# Patient Record
Sex: MALE | Race: WHITE | Employment: OTHER | URBAN - METROPOLITAN AREA
[De-identification: names, ages, dates, MRNs, and addresses within clinical notes are randomized per-mention and may not be internally consistent; named-entity substitution may affect disease eponyms.]

---

## 2017-02-07 DIAGNOSIS — N40.0 BPH (BENIGN PROSTATIC HYPERTROPHY): Primary | ICD-10-CM

## 2017-02-07 NOTE — TELEPHONE ENCOUNTER
TAMSULOSIN HCL 0.4 MG CAPSULE         Last Written Prescription Date: 3/8/2016  Last Fill Quantity: 90, # refills: 3    Last Office Visit with [facility], [facility] or [facility] prescribing provider:  11/21/2016   Future Office Visit:      BP Readings from Last 3 Encounters:   11/21/16 130/80   09/12/16 150/72   09/01/16 130/62

## 2017-02-09 RX ORDER — TAMSULOSIN HYDROCHLORIDE 0.4 MG/1
CAPSULE ORAL
Qty: 90 CAPSULE | Refills: 2 | Status: SHIPPED | OUTPATIENT
Start: 2017-02-09 | End: 2017-11-17

## 2017-03-07 ENCOUNTER — RADIANT APPOINTMENT (OUTPATIENT)
Dept: GENERAL RADIOLOGY | Facility: CLINIC | Age: 67
End: 2017-03-07
Attending: INTERNAL MEDICINE
Payer: COMMERCIAL

## 2017-03-07 ENCOUNTER — OFFICE VISIT (OUTPATIENT)
Dept: FAMILY MEDICINE | Facility: CLINIC | Age: 67
End: 2017-03-07
Payer: COMMERCIAL

## 2017-03-07 VITALS
OXYGEN SATURATION: 96 % | HEIGHT: 68 IN | WEIGHT: 176 LBS | TEMPERATURE: 99.2 F | BODY MASS INDEX: 26.67 KG/M2 | HEART RATE: 89 BPM | SYSTOLIC BLOOD PRESSURE: 144 MMHG | DIASTOLIC BLOOD PRESSURE: 94 MMHG

## 2017-03-07 DIAGNOSIS — J01.00 ACUTE NON-RECURRENT MAXILLARY SINUSITIS: Primary | ICD-10-CM

## 2017-03-07 DIAGNOSIS — J22 LRTI (LOWER RESPIRATORY TRACT INFECTION): ICD-10-CM

## 2017-03-07 DIAGNOSIS — J06.9 UPPER RESPIRATORY TRACT INFECTION, UNSPECIFIED TYPE: ICD-10-CM

## 2017-03-07 LAB
FLUAV+FLUBV AG SPEC QL: NEGATIVE
FLUAV+FLUBV AG SPEC QL: NORMAL
SPECIMEN SOURCE: NORMAL

## 2017-03-07 PROCEDURE — 87804 INFLUENZA ASSAY W/OPTIC: CPT | Performed by: INTERNAL MEDICINE

## 2017-03-07 PROCEDURE — 99213 OFFICE O/P EST LOW 20 MIN: CPT | Performed by: INTERNAL MEDICINE

## 2017-03-07 PROCEDURE — 71020 XR CHEST 2 VW: CPT

## 2017-03-07 RX ORDER — AMOXICILLIN AND CLAVULANATE POTASSIUM 500; 125 MG/1; MG/1
1 TABLET, FILM COATED ORAL 3 TIMES DAILY
Qty: 30 TABLET | Refills: 0 | Status: SHIPPED | OUTPATIENT
Start: 2017-03-07 | End: 2017-07-24

## 2017-03-07 NOTE — MR AVS SNAPSHOT
"              After Visit Summary   3/7/2017    Dale Cuevas    MRN: [id number]           Patient Information     Date Of Birth          1950        Visit Information        Provider Department      3/7/2017 4:30 PM Mike Neff MD [facility]        Today's Diagnoses     Acute non-recurrent maxillary sinusitis    -  1    Upper respiratory tract infection, unspecified type           Follow-ups after your visit        Who to contact     If you have questions or need follow up information about today's clinic visit or your schedule please contact [facility] directly at [phone].  Normal or non-critical lab and imaging results will be communicated to you by MyChart, letter or phone within 4 business days after the clinic has received the results. If you do not hear from us within 7 days, please contact the clinic through [employer] or phone. If you have a critical or abnormal lab result, we will notify you by phone as soon as possible.  Submit refill requests through [employer] or call your pharmacy and they will forward the refill request to us. Please allow 3 business days for your refill to be completed.          Additional Information About Your Visit        MyChart Information     [employer] gives you secure access to your electronic health record. If you see a primary care provider, you can also send messages to your care team and make appointments. If you have questions, please call your primary care clinic.  If you do not have a primary care provider, please call [phone] and they will assist you.        Care EveryWhere ID     This is your Care EveryWhere ID. This could be used by other organizations to access your [place] medical records  [id number]        Your Vitals Were     Pulse Temperature Height Pulse Oximetry BMI (Body Mass Index)       89 99.2  F (37.3  C) (Tympanic) 5' 7.5\" (1.715 m) 96% 27.16 kg/m2        Blood Pressure from Last 3 " Encounters:   03/07/17 (!) 144/94   11/21/16 130/80   09/12/16 150/72    Weight from Last 3 Encounters:   03/07/17 176 lb (79.8 kg)   11/21/16 176 lb (79.8 kg)   09/12/16 168 lb (76.2 kg)              We Performed the Following     Influenza A/B antigen          Today's Medication Changes          These changes are accurate as of: 3/7/17  5:38 PM.  If you have any questions, ask your nurse or doctor.               Start taking these medicines.        Dose/Directions    amoxicillin-clavulanate 500-125 MG per tablet   Commonly known as:  AUGMENTIN   Used for:  Acute non-recurrent maxillary sinusitis   Started by:  Mike Neff MD        Dose:  1 tablet   Take 1 tablet by mouth 3 times daily   Quantity:  30 tablet   Refills:  0            Where to get your medicines      These medications were sent to [place] [address] [place] [address] [address]     Phone:  [phone]     amoxicillin-clavulanate 500-125 MG per tablet                Primary Care Provider Office Phone # Fax #    Artis Espinoza [phone]       [facility] [address] [facility] [address]        Thank you!     Thank you for choosing [facility]  for your care. Our goal is always to provide you with excellent care. Hearing back from our patients is one way we can continue to improve our services. Please take a few minutes to complete the written survey that you may receive in the mail after your visit with us. Thank you!             Your Updated Medication List - Protect others around you: Learn how to safely use, store and throw away your medicines at www.disposemymeds.org.          This list is accurate as of: 3/7/17  5:38 PM.  Always use your most recent med list.                   Brand Name Dispense Instructions for use    amoxicillin-clavulanate 500-125 MG per tablet    AUGMENTIN    30 tablet    Take 1 tablet by  mouth 3 times daily       atorvastatin 40 MG tablet    LIPITOR    30 tablet    TAKE 1 TABLET BY MOUTH EVERY DAY       tamsulosin 0.4 MG capsule    FLOMAX    90 capsule    TAKE ONE CAPSULE BY MOUTH ONE TIME DAILY

## 2017-03-07 NOTE — PROGRESS NOTES
"  SUBJECTIVE:                                                    Dale Cuevas is a 66 year old male who presents to clinic today for the following health issues:      RESPIRATORY SYMPTOMS      Duration: Ongoing this week    Description  nasal congestion, cough, fever, chills and hoarse voice    Severity: moderate    Accompanying signs and symptoms: See above    History (predisposing factors):  none    Precipitating or alleviating factors: None    Therapies tried and outcome:  none                           On prednisone last week per ENT.                  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                Exposed to an ill  3 yr old grandson who goes to .                    Problem list and histories reviewed & adjusted, as indicated.  Additional history: as documented    Current Outpatient Prescriptions   Medication Sig Dispense Refill     tamsulosin (FLOMAX) 0.4 MG capsule TAKE ONE CAPSULE BY MOUTH ONE TIME DAILY 90 capsule 2     atorvastatin (LIPITOR) 40 MG tablet TAKE 1 TABLET BY MOUTH EVERY DAY 30 tablet 8     BP Readings from Last 3 Encounters:   03/07/17 (!) 142/92   11/21/16 130/80   09/12/16 150/72    Wt Readings from Last 3 Encounters:   03/07/17 176 lb (79.8 kg)   11/21/16 176 lb (79.8 kg)   09/12/16 168 lb (76.2 kg)                    Reviewed and updated as needed this visit by clinical staff  Tobacco  Allergies  Meds  Med Hx  Surg Hx  Fam Hx  Soc Hx      Reviewed and updated as needed this visit by Provider         ROS:see above plus  ENT/MOUTH: POSITIVE for rhinorrhea-purulent and bloody                                                                RESP:POSITIVE for cough-productive with tan mucous    OBJECTIVE:                                                    BP (!) 142/92 (BP Location: Left arm, Patient Position: Chair, Cuff Size: Adult Regular)  Pulse 89  Temp 99.2  F (37.3  C) (Tympanic)  Ht 5' 7.5\" (1.715 m)  Wt 176 lb (79.8 kg)  SpO2 96%  BMI 27.16 " kg/m2  Body mass index is 27.16 kg/(m^2).  GENERAL APPEARANCE: alert and no distress  HENT: ear canals and TM's normal, nose and mouth without ulcers or lesions and rhinorrhea bloody and purulent  RESP: no rales or rhonchi  CV: regular rates and rhythm      Diagnostic Test Results:  Results for orders placed or performed in visit on 03/07/17 (from the past 24 hour(s))   Influenza A/B antigen   Result Value Ref Range    Influenza A/B Agn Specimen Nasal     Influenza A Negative NEG    Influenza B  NEG     Negative   Test results must be correlated with clinical data. If necessary, results   should be confirmed by a molecular assay or viral culture.       CXR - no change since 2015; no infiltrates     ASSESSMENT/PLAN:                                                        ICD-10-CM    1. Acute non-recurrent maxillary sinusitis J01.00 amoxicillin-clavulanate (AUGMENTIN) 500-125 MG per tablet   2. Upper respiratory tract infection, unspecified type J06.9 Influenza A/B antigen       Rx for sinusitis.                 Will need BP f/u.   Follow up with Provider - prn     Mike Neff MD  [facility] while

## 2017-03-07 NOTE — NURSING NOTE
"Chief Complaint   Patient presents with     URI     Elevated temp, runny nose, hoarse       Initial BP (!) 142/92 (BP Location: Left arm, Patient Position: Chair, Cuff Size: Adult Regular)  Pulse 89  Temp 99.2  F (37.3  C) (Tympanic)  Ht 5' 7.5\" (1.715 m)  Wt 176 lb (79.8 kg)  SpO2 96%  BMI 27.16 kg/m2 Estimated body mass index is 27.16 kg/(m^2) as calculated from the following:    Height as of this encounter: 5' 7.5\" (1.715 m).    Weight as of this encounter: 176 lb (79.8 kg).  Medication Reconciliation: complete  "

## 2017-06-27 DIAGNOSIS — E78.2 MIXED HYPERLIPIDEMIA: ICD-10-CM

## 2017-06-28 RX ORDER — ATORVASTATIN CALCIUM 40 MG/1
TABLET, FILM COATED ORAL
Qty: 30 TABLET | Refills: 0 | Status: SHIPPED | OUTPATIENT
Start: 2017-06-28 | End: 2017-08-23

## 2017-06-28 NOTE — TELEPHONE ENCOUNTER
atorvastatin (LIPITOR) 40 MG tablet  Last Written Prescription Date: 10/26/2016  Last Fill Quantity: 30, # refills: 8  Last Office Visit with [facility], [facility] or [facility] prescribing provider: 03/07/2017       Lab Results   Component Value Date    CHOL 301 06/07/2016     Lab Results   Component Value Date    HDL 52 06/07/2016     Lab Results   Component Value Date     06/07/2016     Lab Results   Component Value Date    TRIG 91 06/07/2016     Lab Results   Component Value Date    CHOLHDLRATIO 3.9 12/30/2014     Routing refill request to provider for review/approval because:  Patient is overdue for fasting labs. Future lipid profile was ordered. Please add any other PRN labs and approve Rx if PCP would like pt on current dose.

## 2017-07-06 DIAGNOSIS — E78.2 MIXED HYPERLIPIDEMIA: ICD-10-CM

## 2017-07-06 LAB
CHOLEST SERPL-MCNC: 157 MG/DL
HDLC SERPL-MCNC: 38 MG/DL
LDLC SERPL CALC-MCNC: 92 MG/DL
NONHDLC SERPL-MCNC: 119 MG/DL
TRIGL SERPL-MCNC: 134 MG/DL

## 2017-07-06 PROCEDURE — 80061 LIPID PANEL: CPT | Performed by: FAMILY MEDICINE

## 2017-07-06 PROCEDURE — 36415 COLL VENOUS BLD VENIPUNCTURE: CPT | Performed by: FAMILY MEDICINE

## 2017-07-24 ENCOUNTER — OFFICE VISIT (OUTPATIENT)
Dept: FAMILY MEDICINE | Facility: CLINIC | Age: 67
End: 2017-07-24
Payer: COMMERCIAL

## 2017-07-24 VITALS
WEIGHT: 181 LBS | HEIGHT: 68 IN | DIASTOLIC BLOOD PRESSURE: 90 MMHG | RESPIRATION RATE: 16 BRPM | TEMPERATURE: 98 F | HEART RATE: 83 BPM | BODY MASS INDEX: 27.43 KG/M2 | SYSTOLIC BLOOD PRESSURE: 148 MMHG

## 2017-07-24 DIAGNOSIS — R20.9 DISTURBANCE OF SKIN SENSATION: ICD-10-CM

## 2017-07-24 DIAGNOSIS — M75.51 DELTOID BURSITIS, RIGHT: Primary | ICD-10-CM

## 2017-07-24 PROCEDURE — 99213 OFFICE O/P EST LOW 20 MIN: CPT | Performed by: FAMILY MEDICINE

## 2017-07-24 RX ORDER — NAPROXEN SODIUM 220 MG
440 TABLET ORAL 2 TIMES DAILY WITH MEALS
Qty: 60 TABLET | COMMUNITY
Start: 2017-07-24 | End: 2019-03-25

## 2017-07-24 NOTE — NURSING NOTE
"Chief Complaint   Patient presents with     Musculoskeletal Problem     RT shoulder       Initial /90  Pulse 83  Temp 98  F (36.7  C) (Tympanic)  Resp 16  Ht 5' 7.5\" (1.715 m)  Wt 181 lb (82.1 kg)  BMI 27.93 kg/m2 Estimated body mass index is 27.93 kg/(m^2) as calculated from the following:    Height as of this encounter: 5' 7.5\" (1.715 m).    Weight as of this encounter: 181 lb (82.1 kg).  Medication Reconciliation: complete     Fany Pressley CMA      "

## 2017-07-24 NOTE — PATIENT INSTRUCTIONS
Patient will make 2 appointments with [facility]. One for his hand and one for his shoulder. Will increase Aleve to 2 tablets twice daily.

## 2017-07-24 NOTE — MR AVS SNAPSHOT
After Visit Summary   7/24/2017    Dale Cuevas    MRN: [id number]           Patient Information     Date Of Birth          1950        Visit Information        Provider Department      7/24/2017 9:15 AM Artis Espinoza MD [facility]        Today's Diagnoses     Deltoid bursitis, right    -  1    Disturbance of skin sensation          Care Instructions    Patient will make 2 appointments with [facility]. One for his hand and one for his shoulder. Will increase Aleve to 2 tablets twice daily.          Follow-ups after your visit        Additional Services     ORTHOPEDICS ADULT REFERRAL       Your provider has referred you to: [facility] [place] [phone]   [email or web address]    Please be aware that coverage of these services is subject to the terms and limitations of your health insurance plan.  Call member services at your health plan with any benefit or coverage questions.      Please bring the following to your appointment:    >>   Any x-rays, CTs or MRIs which have been performed.  Contact the facility where they were done to arrange for  prior to your scheduled appointment.    >>   List of current medications   >>   This referral request   >>   Any documents/labs given to you for this referral                  Who to contact     If you have questions or need follow up information about today's clinic visit or your schedule please contact [facility] directly at [phone].  Normal or non-critical lab and imaging results will be communicated to you by MyChart, letter or phone within 4 business days after the clinic has received the results. If you do not hear from us within 7 days, please contact the clinic through MyChart or phone. If you have a critical or abnormal lab result, we will notify you by phone as soon as possible.  Submit refill requests through MyChart or call your  "pharmacy and they will forward the refill request to us. Please allow 3 business days for your refill to be completed.          Additional Information About Your Visit        [employer] Information     [employer] gives you secure access to your electronic health record. If you see a primary care provider, you can also send messages to your care team and make appointments. If you have questions, please call your primary care clinic.  If you do not have a primary care provider, please call [phone] and they will assist you.        Care EveryWhere ID     This is your Care EveryWhere ID. This could be used by other organizations to access your [place] medical records  [id number]        Your Vitals Were     Pulse Temperature Respirations Height BMI (Body Mass Index)       83 98  F (36.7  C) (Tympanic) 16 5' 7.5\" (1.715 m) 27.93 kg/m2        Blood Pressure from Last 3 Encounters:   07/24/17 148/90   03/07/17 (!) 144/94   11/21/16 130/80    Weight from Last 3 Encounters:   07/24/17 181 lb (82.1 kg)   03/07/17 176 lb (79.8 kg)   11/21/16 176 lb (79.8 kg)              We Performed the Following     ORTHOPEDICS ADULT REFERRAL          Today's Medication Changes          These changes are accurate as of: 7/24/17 11:40 AM.  If you have any questions, ask your nurse or doctor.               Start taking these medicines.        Dose/Directions    naproxen sodium 220 MG tablet   Commonly known as:  ANAPROX   Used for:  Deltoid bursitis, right   Started by:  Artis Espinoza MD        Dose:  440 mg   Take 2 tablets (440 mg) by mouth 2 times daily (with meals)   Quantity:  60 tablet   Refills:  0            Where to get your medicines      Some of these will need a paper prescription and others can be bought over the counter.  Ask your nurse if you have questions.     You don't need a prescription for these medications     naproxen sodium 220 MG tablet                Primary Care Provider Office Phone # Fax #    Artis Plascencia " MD Alexis [phone]       [facility] [address]  [facility] [address]        Equal Access to Services     SHORTY LLOYD : Hadii artem ku chrisso Sobibali, waaxda luqadaha, qaybta kaalmada adeegyada, geena lozano solitariodemetrius mckeon ella dumont. So [facility] [phone].    ATENCIÓN: Si habla español, tiene a dodge disposición servicios gratuitos de asistencia lingüística. Llame al [phone].    We comply with applicable federal civil rights laws and [place] laws. We do not discriminate on the basis of race, color, national origin, age, disability sex, sexual orientation or gender identity.            Thank you!     Thank you for choosing [facility]  for your care. Our goal is always to provide you with excellent care. Hearing back from our patients is one way we can continue to improve our services. Please take a few minutes to complete the written survey that you may receive in the mail after your visit with us. Thank you!             Your Updated Medication List - Protect others around you: Learn how to safely use, store and throw away your medicines at www.disposemymeds.org.          This list is accurate as of: 7/24/17 11:40 AM.  Always use your most recent med list.                   Brand Name Dispense Instructions for use Diagnosis    atorvastatin 40 MG tablet    LIPITOR    30 tablet    TAKE 1 TABLET BY MOUTH EVERY DAY    Mixed hyperlipidemia       naproxen sodium 220 MG tablet    ANAPROX    60 tablet    Take 2 tablets (440 mg) by mouth 2 times daily (with meals)    Deltoid bursitis, right       tamsulosin 0.4 MG capsule    FLOMAX    90 capsule    TAKE ONE CAPSULE BY MOUTH ONE TIME DAILY    BPH (benign prostatic hypertrophy)

## 2017-07-24 NOTE — PROGRESS NOTES
SUBJECTIVE:                                                    Dale Cuevas is a 67 year old male who presents to clinic today for the following health issues:      Musculoskeletal problem/pain      Duration: 2 months    Description  Location: rt shoulder    Intensity:  moderate    Accompanying signs and symptoms: throbbing pain, pain wakes pt up at night, weakness, numbness in Rt hand and fingers    History  Previous similar problem: no   Previous evaluation:  none    Precipitating or alleviating factors:  Trauma or overuse: YES- thinks may be from overuse  Aggravating factors include: none    Therapies tried and outcome: NSAID - aleve bid            Problem list and histories reviewed & adjusted, as indicated.  Additional history: as documented    Patient Active Problem List   Diagnosis     Elevated blood pressure (not hypertension)     Rosacea     Erectile dysfunction     Psoriasis     Hyperlipidemia LDL goal <100     BPH (benign prostatic hypertrophy)     Family hx of prostate cancer     Diastasis recti     Past Surgical History:   Procedure Laterality Date     ORTHOPEDIC SURGERY  Jan 12, 2015    left wrist     ORTHOPEDIC SURGERY  1989    spine sugery-with disc fragment removed     ORTHOPEDIC SURGERY  2014    right knee arthroscopy       Social History   Substance Use Topics     Smoking status: Former Smoker     Quit date: 5/6/1973     Smokeless tobacco: Never Used     Alcohol use Yes      Comment: every night     Family History   Problem Relation Age of Onset     Hypertension Mother      Prostate Cancer Father 72     Breast Cancer Sister              Reviewed and updated as needed this visit by clinical staff     Reviewed and updated as needed this visit by Provider         ROS:  Constitutional, HEENT, cardiovascular, pulmonary, gi and gu systems are negative, except as otherwise noted.  CONSTITUTIONAL:NEGATIVE for fever, chills, change in weight  MUSCULOSKELETAL: POSITIVE  for arthralgias with right shoulder  "pain  NEURO: POSITIVE for numbness or tingling 4th and 5th fingers on the right      OBJECTIVE:                                                    /90  Pulse 83  Temp 98  F (36.7  C) (Tympanic)  Resp 16  Ht 5' 7.5\" (1.715 m)  Wt 181 lb (82.1 kg)  BMI 27.93 kg/m2  Body mass index is 27.93 kg/(m^2).  GENERAL APPEARANCE: healthy, alert and no distress  MS: extremities normal- no gross deformities noted and right shoulder with tenderness over the deltoid bursa  NEURO: Normal strength and tone, mentation intact, speech normal and normal strength throughout         ASSESSMENT/PLAN:                                                        ICD-10-CM    1. Deltoid bursitis, right M75.51 naproxen sodium (ANAPROX) 220 MG tablet     ORTHOPEDICS ADULT REFERRAL       There are no Patient Instructions on file for this visit.    Artis Espinoza MD  [facility]  "

## 2017-08-01 ENCOUNTER — TRANSFERRED RECORDS (OUTPATIENT)
Dept: HEALTH INFORMATION MANAGEMENT | Facility: CLINIC | Age: 67
End: 2017-08-01

## 2017-08-10 ENCOUNTER — TRANSFERRED RECORDS (OUTPATIENT)
Dept: HEALTH INFORMATION MANAGEMENT | Facility: CLINIC | Age: 67
End: 2017-08-10

## 2017-08-14 ENCOUNTER — TRANSFERRED RECORDS (OUTPATIENT)
Dept: HEALTH INFORMATION MANAGEMENT | Facility: CLINIC | Age: 67
End: 2017-08-14

## 2017-08-23 DIAGNOSIS — E78.2 MIXED HYPERLIPIDEMIA: ICD-10-CM

## 2017-08-24 RX ORDER — ATORVASTATIN CALCIUM 40 MG/1
TABLET, FILM COATED ORAL
Qty: 90 TABLET | Refills: 3 | Status: SHIPPED | OUTPATIENT
Start: 2017-08-24 | End: 2018-08-15

## 2017-08-24 NOTE — TELEPHONE ENCOUNTER
Atorvastatin 40   Last Written Prescription Date: 6/28/2017  Last Fill Quantity: 60, # refills: 0  Last Office Visit with [facility], [facility] or [facility] prescribing provider: 7/24/2017       Lab Results   Component Value Date    CHOL 157 07/06/2017     Lab Results   Component Value Date    HDL 38 07/06/2017     Lab Results   Component Value Date    LDL 92 07/06/2017     Lab Results   Component Value Date    TRIG 134 07/06/2017     Lab Results   Component Value Date    CHOLHDLRATIO 3.9 12/30/2014       Prescription approved per [facility] Refill Protocol.

## 2018-02-01 ENCOUNTER — OFFICE VISIT (OUTPATIENT)
Dept: FAMILY MEDICINE | Facility: CLINIC | Age: 68
End: 2018-02-01
Payer: COMMERCIAL

## 2018-02-01 VITALS
HEART RATE: 80 BPM | BODY MASS INDEX: 26.85 KG/M2 | OXYGEN SATURATION: 96 % | SYSTOLIC BLOOD PRESSURE: 136 MMHG | DIASTOLIC BLOOD PRESSURE: 90 MMHG | RESPIRATION RATE: 20 BRPM | TEMPERATURE: 98.4 F | WEIGHT: 174 LBS

## 2018-02-01 DIAGNOSIS — J06.9 UPPER RESPIRATORY TRACT INFECTION, UNSPECIFIED TYPE: Primary | ICD-10-CM

## 2018-02-01 DIAGNOSIS — J20.9 ACUTE BRONCHITIS, UNSPECIFIED ORGANISM: ICD-10-CM

## 2018-02-01 LAB
FLUAV+FLUBV AG SPEC QL: NEGATIVE
FLUAV+FLUBV AG SPEC QL: NEGATIVE
SPECIMEN SOURCE: NORMAL

## 2018-02-01 PROCEDURE — 87804 INFLUENZA ASSAY W/OPTIC: CPT | Performed by: FAMILY MEDICINE

## 2018-02-01 PROCEDURE — 99213 OFFICE O/P EST LOW 20 MIN: CPT | Performed by: FAMILY MEDICINE

## 2018-02-01 RX ORDER — AZITHROMYCIN 250 MG/1
TABLET, FILM COATED ORAL
Qty: 6 TABLET | Refills: 0 | Status: SHIPPED | OUTPATIENT
Start: 2018-02-01 | End: 2018-02-19

## 2018-02-01 NOTE — MR AVS SNAPSHOT
After Visit Summary   2/1/2018    Dale Cuevas    MRN: [id number]           Patient Information     Date Of Birth          1950        Visit Information        Provider Department      2/1/2018 3:45 PM Edvin Segundo MD [facility]        Today's Diagnoses     Upper respiratory tract infection, unspecified type    -  1    Acute bronchitis, unspecified organism          Care Instructions    Symptomatic treatment.  Will use saline gargles, tylenol and/or advil. Suck on  lozenges as needed. Push fluids. Salt water nasal spray as needed.  Use expectorant such as Mucinex or Robitussin for cough          Follow-ups after your visit        Who to contact     If you have questions or need follow up information about today's clinic visit or your schedule please contact [facility] directly at [phone].  Normal or non-critical lab and imaging results will be communicated to you by [employer], letter or phone within 4 business days after the clinic has received the results. If you do not hear from us within 7 days, please contact the clinic through [employer] or phone. If you have a critical or abnormal lab result, we will notify you by phone as soon as possible.  Submit refill requests through [employer] or call your pharmacy and they will forward the refill request to us. Please allow 3 business days for your refill to be completed.          Additional Information About Your Visit        MyChart Information     [employer] gives you secure access to your electronic health record. If you see a primary care provider, you can also send messages to your care team and make appointments. If you have questions, please call your primary care clinic.  If you do not have a primary care provider, please call [phone] and they will assist you.        Care EveryWhere ID     This is your Care EveryWhere ID. This could be used by other organizations to access your  [facility] medical records  [id number]        Your Vitals Were     Pulse Temperature Respirations Pulse Oximetry BMI (Body Mass Index)       80 98.4  F (36.9  C) (Tympanic) 20 96% 26.85 kg/m2        Blood Pressure from Last 3 Encounters:   02/01/18 136/90   07/24/17 148/90   03/07/17 (!) 144/94    Weight from Last 3 Encounters:   02/01/18 174 lb (78.9 kg)   07/24/17 181 lb (82.1 kg)   03/07/17 176 lb (79.8 kg)              We Performed the Following     Influenza A/B antigen          Today's Medication Changes          These changes are accurate as of 2/1/18  4:17 PM.  If you have any questions, ask your nurse or doctor.               Start taking these medicines.        Dose/Directions    azithromycin 250 MG tablet   Commonly known as:  ZITHROMAX   Used for:  Acute bronchitis, unspecified organism   Started by:  Edvin Segundo MD        Two tablets first day, then one tablet daily for four days.   Quantity:  6 tablet   Refills:  0            Where to get your medicines      These medications were sent to [place] [address] [place] [address]     Phone:  [phone]     azithromycin 250 MG tablet                Primary Care Provider Office Phone # Fax #    Artis Espinoza [phone] [address] [facility] [address]        Equal Access to Services     [facility]: Hadii artem ku hadasho Sobibali, waaxda luqadaha, qaybta kaalmada adeegyada, waxravinder jenny dumont. So [facility] [phone].    ATENCIÓN: Si habla español, tiene a dodge disposición servicios gratuitos de asistencia lingüística. Llame al [phone].    We comply with applicable federal civil rights laws and [place] laws. We do not discriminate on the basis of race, color, national origin, age, disability, sex, sexual orientation, or gender identity.            Thank you!     Thank you for choosing [facility]   for your care. Our goal is always to provide you with excellent care. Hearing back from our patients is one way we can continue to improve our services. Please take a few minutes to complete the written survey that you may receive in the mail after your visit with us. Thank you!             Your Updated Medication List - Protect others around you: Learn how to safely use, store and throw away your medicines at www.disposemymeds.org.          This list is accurate as of 2/1/18  4:17 PM.  Always use your most recent med list.                   Brand Name Dispense Instructions for use Diagnosis    atorvastatin 40 MG tablet    LIPITOR    90 tablet    TAKE 1 TABLET BY MOUTH EVERY DAY    Mixed hyperlipidemia       azithromycin 250 MG tablet    ZITHROMAX    6 tablet    Two tablets first day, then one tablet daily for four days.    Acute bronchitis, unspecified organism       naproxen sodium 220 MG tablet    ANAPROX    60 tablet    Take 2 tablets (440 mg) by mouth 2 times daily (with meals)    Deltoid bursitis, right       tamsulosin 0.4 MG capsule    FLOMAX    90 capsule    TAKE ONE CAPSULE BY MOUTH ONE TIME DAILY    Benign prostatic hyperplasia without lower urinary tract symptoms

## 2018-02-01 NOTE — NURSING NOTE
"Chief Complaint   Patient presents with     URI       Initial /90 (BP Location: Right arm, Patient Position: Sitting, Cuff Size: Adult Regular)  Pulse 80  Temp 98.4  F (36.9  C) (Tympanic)  Resp 20  Wt 174 lb (78.9 kg)  SpO2 96%  BMI 26.85 kg/m2 Estimated body mass index is 26.85 kg/(m^2) as calculated from the following:    Height as of 7/24/17: 5' 7.5\" (1.715 m).    Weight as of this encounter: 174 lb (78.9 kg).  Medication Reconciliation: complete     Princess YOGESH Bernal CMA      "

## 2018-02-01 NOTE — PROGRESS NOTES
SUBJECTIVE:   Dale Cuevas is a 67 year old male who presents to clinic today for the following health issues:    RESPIRATORY SYMPTOMS      Duration: 5 days    Description  nasal congestion, rhinorrhea, cough, fever and chills    Severity: mild    Accompanying signs and symptoms: see below    History (predisposing factors):  Wife and grandchildren were sick    Precipitating or alleviating factors: None    Therapies tried and outcome:  dayquil    ENT Symptoms             Symptoms: cc Present Absent Comment   Fever/Chills  X     Fatigue  X     Muscle Aches  X     Eye Irritation  X  Pink eye   Sneezing       Nasal Ankit/Drg  X     Sinus Pressure/Pain   X    Loss of smell   X    Dental pain   X    Sore Throat  X     Swollen Glands  X     Ear Pain/Fullness   X    Cough  X     Wheeze  X     Chest Pain   X    Shortness of breath   X    Rash   X    Other                 Problem list and histories reviewed & adjusted, as indicated.  Additional history: as documented    Labs reviewed in EPIC    Reviewed and updated as needed this visit by clinical staff  Tobacco  Allergies  Meds  Med Hx  Surg Hx  Fam Hx  Soc Hx      Reviewed and updated as needed this visit by Provider         ROS:  CONSTITUTIONAL:POSITIVE  for fatigue, fever  and myalgias  ENT/MOUTH: POSITIVE for nasal congestion, postnasal drainage, rhinorrhea-clear, sinus pressure and sore throat  RESP:POSITIVE for cough-productive and dyspnea on exertion  CV: NEGATIVE for chest pain, palpitations or peripheral edema  GI: POSITIVE for diarrhea and nausea  MUSCULOSKELETAL: POSITIVE  for myalgia    OBJECTIVE:                                                    /90 (BP Location: Right arm, Patient Position: Sitting, Cuff Size: Adult Regular)  Pulse 80  Temp 98.4  F (36.9  C) (Tympanic)  Resp 20  Wt 174 lb (78.9 kg)  SpO2 96%  BMI 26.85 kg/m2  Body mass index is 26.85 kg/(m^2).  GENERAL APPEARANCE: healthy, alert and no distress  HENT: ear canals and  TM's normal, nose and mouth without ulcers or lesions, nasal mucosa edematous without rhinorrhea, oral mucous membranes moist and oropharynx clear  NECK: no adenopathy, no asymmetry, masses, or scars and thyroid normal to palpation  RESP: rhonchi R mid posterior and L mid posterior and expiratory wheezes R mid posterior and L mid posterior  CV: regular rates and rhythm, normal S1 S2, no S3 or S4 and no murmur, click or rub  ABDOMEN: soft, nontender, without hepatosplenomegaly or masses and bowel sounds normal    Diagnostic test results:  Results for orders placed or performed in visit on 02/01/18 (from the past 24 hour(s))   Influenza A/B antigen   Result Value Ref Range    Influenza A/B Agn Specimen Nasal     Influenza A Negative NEG^Negative    Influenza B Negative NEG^Negative        ASSESSMENT/PLAN:                                                        ICD-10-CM    1. Upper respiratory tract infection, unspecified type J06.9 Influenza A/B antigen   2. Acute bronchitis, unspecified organism J20.9 azithromycin (ZITHROMAX) 250 MG tablet       Follow up with Provider - as needed   Patient Instructions   Symptomatic treatment.  Will use saline gargles, tylenol and/or advil. Suck on  lozenges as needed. Push fluids. Salt water nasal spray as needed.  Use expectorant such as Mucinex or Robitussin for cough      Edvin Segundo MD  [facility]

## 2018-02-19 ENCOUNTER — OFFICE VISIT (OUTPATIENT)
Dept: FAMILY MEDICINE | Facility: CLINIC | Age: 68
End: 2018-02-19
Payer: COMMERCIAL

## 2018-02-19 VITALS
BODY MASS INDEX: 26.7 KG/M2 | HEART RATE: 82 BPM | RESPIRATION RATE: 18 BRPM | DIASTOLIC BLOOD PRESSURE: 86 MMHG | WEIGHT: 173 LBS | TEMPERATURE: 98 F | SYSTOLIC BLOOD PRESSURE: 138 MMHG | OXYGEN SATURATION: 97 %

## 2018-02-19 DIAGNOSIS — M62.08 DIASTASIS RECTI: ICD-10-CM

## 2018-02-19 DIAGNOSIS — R05.3 CHRONIC COUGH: Primary | ICD-10-CM

## 2018-02-19 PROCEDURE — 99214 OFFICE O/P EST MOD 30 MIN: CPT | Performed by: FAMILY MEDICINE

## 2018-02-19 NOTE — PROGRESS NOTES
SUBJECTIVE:   Dale Cuevas is a 67 year old male who presents to clinic today for the following health issues:      RESPIRATORY SYMPTOMS      Duration: 3 weeks    Description  Barking cough    Severity: moderate    Accompanying signs and symptoms: None    History (predisposing factors):  Seen 2/1/18 and treated for bronchitis, NEG flu test    Precipitating or alleviating factors: None  Therapies tried and outcome:  ZITHROMAX- effective at removing his cough that was being caused by his bronchitis.        Problem list and histories reviewed & adjusted, as indicated.  Additional history: Dony is now back to his original barking cough.  He denies any significant reflux.  He has been tried on an acid blocker in the past without much relief.  He is also been to pulmonology and had normal testing done there with the thought that maybe his symptoms were from postnasal drip.  He is also been to [facility] and they have not found anything of significance.    Patient Active Problem List   Diagnosis     Elevated blood pressure (not hypertension)     Rosacea     Erectile dysfunction     Psoriasis     Hyperlipidemia LDL goal <100     BPH (benign prostatic hypertrophy)     Family hx of prostate cancer     Diastasis recti     Past Surgical History:   Procedure Laterality Date     ORTHOPEDIC SURGERY  Jan 12, 2015    left wrist     ORTHOPEDIC SURGERY  1989    spine sugery-with disc fragment removed     ORTHOPEDIC SURGERY  2014    right knee arthroscopy       Social History   Substance Use Topics     Smoking status: Former Smoker     Quit date: 5/6/1973     Smokeless tobacco: Never Used     Alcohol use Yes      Comment: every night     Family History   Problem Relation Age of Onset     Hypertension Mother      Prostate Cancer Father 72     Breast Cancer Sister            Reviewed and updated as needed this visit by clinical staff  Tobacco  Allergies  Meds       Reviewed and updated as needed this visit by Provider          ROS:  Constitutional, HEENT, cardiovascular, pulmonary, gi and gu systems are negative, except as otherwise noted.    OBJECTIVE:                                                    /86  Pulse 82  Temp 98  F (36.7  C) (Tympanic)  Resp 18  Wt 173 lb (78.5 kg)  SpO2 97%  BMI 26.7 kg/m2  Body mass index is 26.7 kg/(m^2).  GENERAL APPEARANCE: healthy, alert and no distress  EYES: Eyes grossly normal to inspection, PERRL and conjunctivae and sclerae normal  HENT: ear canals and TM's normal and nose and mouth without ulcers or lesions  NECK: no adenopathy and no asymmetry, masses, or scars  RESP: lungs clear to auscultation - no rales, rhonchi or wheezes  LYMPHATICS: normal ant/post cervical and supraclavicular nodes         ASSESSMENT/PLAN:                                                        ICD-10-CM    1. Chronic cough R05    2. Diastasis recti M62.08        Patient Instructions   I had a discussion with him about his chronic cough.  I think we have safely at least for the present, ruled out GERD as the source of his cough even though we have not had anyone take a look down into his stomach.  Clearly pulmonology did not think that this was a long related issue and thought it was postnasal drip.  They did recommend a nasal steroid and then adding an antihistamine to that.  Dony did the first part of that with a nasal steroid but never added the antihistamine.  After discussion with the patient we decided to try a couple of weeks of nasal steroids daily along with Allegra on a daily basis.  If he is continuing to improve on that regimen will stay with that.  If not, he will contact me through [employer] at that point,.  We could consider sending him for a GI consult to see if they think that there is any possibility of things such as reflux causing his chronic barky cough.  We also did discuss possibly sending him to the [facility] clinic at the [facility].  I would only suggest doing that  "after we have uncovered every other possibility.  We also had a pretty dev discussion about the fact that this could be \"habit\".  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  He apparently had a lot of workup done on his and they could never find an etiology for it.  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  He will follow-up with me through my chart in a couple of weeks.    We also discussed his diastases recti.  He had a brother who had that fixed about 3 years ago and had nothing but problems with it.  I told him we could certainly refer him over to the general surgeons to get an opinion about whether or not something either could or should be done with the problem.  He will consider that and let me know.      Artis Espinoza MD  [facility]    "

## 2018-02-19 NOTE — MR AVS SNAPSHOT
"              After Visit Summary   2/19/2018    Dale Cuevas    MRN: [id number]           Patient Information     Date Of Birth          1950        Visit Information        Provider Department      2/19/2018 3:30 PM Artis Espinoza MD [facility]        Today's Diagnoses     Chronic cough    -  1    Diastasis recti          Care Instructions    I had a discussion with him about his chronic cough.  I think we have safely at least for the present, ruled out GERD as the source of his cough even though we have not had anyone take a look down into his stomach.  Clearly pulmonology did not think that this was a long related issue and thought it was postnasal drip.  They did recommend a nasal steroid and then adding an antihistamine to that.  Dony did the first part of that with a nasal steroid but never added the antihistamine.  After discussion with the patient we decided to try a couple of weeks of nasal steroids daily along with Allegra on a daily basis.  If he is continuing to improve on that regimen will stay with that.  If not, he will contact me through [employer] at that point,.  We could consider sending him for a GI consult to see if they think that there is any possibility of things such as reflux causing his chronic barky cough.  We also did discuss possibly sending him to the [facility] clinic at the [facility].  I would only suggest doing that after we have uncovered every other possibility.  We also had a pretty dev discussion about the fact that this could be \"habit\".  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  He apparently had a lot of workup done on his and they could never find an etiology for it.  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  He will follow-up with me through my chart in a couple of weeks.    We also discussed his " diastases recti.  He had a brother who had that fixed about 3 years ago and had nothing but problems with it.  I told him we could certainly refer him over to the general surgeons to get an opinion about whether or not something either could or should be done with the problem.  He will consider that and let me know.          Follow-ups after your visit        Who to contact     If you have questions or need follow up information about today's clinic visit or your schedule please contact [facility] directly at [phone].  Normal or non-critical lab and imaging results will be communicated to you by [employer], letter or phone within 4 business days after the clinic has received the results. If you do not hear from us within 7 days, please contact the clinic through [employer] or phone. If you have a critical or abnormal lab result, we will notify you by phone as soon as possible.  Submit refill requests through [employer] or call your pharmacy and they will forward the refill request to us. Please allow 3 business days for your refill to be completed.          Additional Information About Your Visit        [employer] Information     [employer] gives you secure access to your electronic health record. If you see a primary care provider, you can also send messages to your care team and make appointments. If you have questions, please call your primary care clinic.  If you do not have a primary care provider, please call [phone] and they will assist you.        Care EveryWhere ID     This is your Care EveryWhere ID. This could be used by other organizations to access your [facility] medical records  [id number]        Your Vitals Were     Pulse Temperature Respirations Pulse Oximetry BMI (Body Mass Index)       82 98  F (36.7  C) (Tympanic) 18 97% 26.7 kg/m2        Blood Pressure from Last 3 Encounters:   02/19/18 138/86   02/01/18 136/90   07/24/17 148/90    Weight from Last 3 Encounters:    02/19/18 173 lb (78.5 kg)   02/01/18 174 lb (78.9 kg)   07/24/17 181 lb (82.1 kg)              Today, you had the following     No orders found for display       Primary Care Provider Office Phone # Fax #    Artis Espinoza [phone]       [address] [facility] [place] [address]        Equal Access to Services     SHORTY LLOYD : Hadii aad ku hadasho Soomaali, waaxda luqadaha, qaybta kaalmada adeegyada, waxay idiin hayaan adeeg kharash la'betito . So [facility] [phone].    ATENCIÓN: Si habla español, tiene a dodge disposición servicios gratuitos de asistencia lingüística. Llame al [phone].    We comply with applicable federal civil rights laws and [place] laws. We do not discriminate on the basis of race, color, national origin, age, disability, sex, sexual orientation, or gender identity.            Thank you!     Thank you for choosing [facility]  for your care. Our goal is always to provide you with excellent care. Hearing back from our patients is one way we can continue to improve our services. Please take a few minutes to complete the written survey that you may receive in the mail after your visit with us. Thank you!             Your Updated Medication List - Protect others around you: Learn how to safely use, store and throw away your medicines at www.disposemymeds.org.          This list is accurate as of 2/19/18  5:35 PM.  Always use your most recent med list.                   Brand Name Dispense Instructions for use Diagnosis    atorvastatin 40 MG tablet    LIPITOR    90 tablet    TAKE 1 TABLET BY MOUTH EVERY DAY    Mixed hyperlipidemia       naproxen sodium 220 MG tablet    ANAPROX    60 tablet    Take 2 tablets (440 mg) by mouth 2 times daily (with meals)    Deltoid bursitis, right       tamsulosin 0.4 MG capsule    FLOMAX    90 capsule    TAKE ONE CAPSULE BY MOUTH ONE TIME DAILY    Benign prostatic hyperplasia without lower urinary tract  symptoms

## 2018-02-19 NOTE — PATIENT INSTRUCTIONS
"I had a discussion with him about his chronic cough.  I think we have safely at least for the present, ruled out GERD as the source of his cough even though we have not had anyone take a look down into his stomach.  Clearly pulmonology did not think that this was a long related issue and thought it was postnasal drip.  They did recommend a nasal steroid and then adding an antihistamine to that.  Dony did the first part of that with a nasal steroid but never added the antihistamine.  After discussion with the patient we decided to try a couple of weeks of nasal steroids daily along with Allegra on a daily basis.  If he is continuing to improve on that regimen will stay with that.  If not, he will contact me through [employer] at that point,.  We could consider sending him for a GI consult to see if they think that there is any possibility of things such as reflux causing his chronic barky cough.  We also did discuss possibly sending him to the [facility] clinic at the [facility].  I would only suggest doing that after we have uncovered every other possibility.  We also had a pretty dev discussion about the fact that this could be \"habit\".  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  He apparently had a lot of workup done on his and they could never find an etiology for it.  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  He will follow-up with me through my chart in a couple of weeks.    We also discussed his diastases recti.  He had a brother who had that fixed about 3 years ago and had nothing but problems with it.  I told him we could certainly refer him over to the general surgeons to get an opinion about whether or not something either could or should be done with the problem.  He will consider that and let me know.  "

## 2018-02-19 NOTE — NURSING NOTE
"Chief Complaint   Patient presents with     URI     ongoing barking cough     Hernia     past couple years       Initial /86  Pulse 82  Temp 98  F (36.7  C) (Tympanic)  Resp 18  Wt 173 lb (78.5 kg)  SpO2 97%  BMI 26.7 kg/m2 Estimated body mass index is 26.7 kg/(m^2) as calculated from the following:    Height as of 7/24/17: 5' 7.5\" (1.715 m).    Weight as of this encounter: 173 lb (78.5 kg).  Medication Reconciliation: complete     Fany Pressley CMA    "

## 2018-02-27 ENCOUNTER — TRANSFERRED RECORDS (OUTPATIENT)
Dept: HEALTH INFORMATION MANAGEMENT | Facility: CLINIC | Age: 68
End: 2018-02-27

## 2018-03-15 ENCOUNTER — TRANSFERRED RECORDS (OUTPATIENT)
Dept: HEALTH INFORMATION MANAGEMENT | Facility: CLINIC | Age: 68
End: 2018-03-15

## 2018-03-23 ENCOUNTER — TRANSFERRED RECORDS (OUTPATIENT)
Dept: HEALTH INFORMATION MANAGEMENT | Facility: CLINIC | Age: 68
End: 2018-03-23

## 2018-03-29 ENCOUNTER — TRANSFERRED RECORDS (OUTPATIENT)
Dept: HEALTH INFORMATION MANAGEMENT | Facility: CLINIC | Age: 68
End: 2018-03-29

## 2018-05-14 DIAGNOSIS — N40.0 BENIGN PROSTATIC HYPERPLASIA WITHOUT LOWER URINARY TRACT SYMPTOMS: ICD-10-CM

## 2018-05-15 NOTE — TELEPHONE ENCOUNTER
"Requested Prescriptions   Pending Prescriptions Disp Refills     tamsulosin (FLOMAX) 0.4 MG capsule [Pharmacy Med Name: TAMSULOSIN HCL 0.4 MG CAPSULE]  Last Written Prescription Date:  11/20/17  Last Fill Quantity: 90,  # refills: 1   Last office visit: 2/19/2018 with prescribing provider:  Alexis   Future Office Visit:     90 capsule 1     Sig: TAKE ONE CAPSULE BY MOUTH ONE TIME DAILY    Alpha Blockers Passed    5/14/2018  1:24 AM       Passed - Blood pressure under 140/90 in past 12 months    BP Readings from Last 3 Encounters:   02/19/18 138/86   02/01/18 136/90   07/24/17 148/90                Passed - Recent (12 mo) or future (30 days) visit within the authorizing provider's specialty    Patient had office visit in the last 12 months or has a visit in the next 30 days with authorizing provider or within the authorizing provider's specialty.  See \"Patient Info\" tab in inbasket, or \"Choose Columns\" in Meds & Orders section of the refill encounter.           Passed - Patient does not have Tadalafil, Vardenafil, or Sildenafil on their medication list       Passed - Patient is 18 years of age or older          "

## 2018-05-16 RX ORDER — TAMSULOSIN HYDROCHLORIDE 0.4 MG/1
CAPSULE ORAL
Qty: 90 CAPSULE | Refills: 1 | Status: SHIPPED | OUTPATIENT
Start: 2018-05-16 | End: 2018-11-10

## 2018-05-16 NOTE — TELEPHONE ENCOUNTER
Pharmacy calling to check on the status of medication. Patient only has a day or two left. Please send as soon as possible

## 2018-06-06 ENCOUNTER — OFFICE VISIT (OUTPATIENT)
Dept: FAMILY MEDICINE | Facility: CLINIC | Age: 68
End: 2018-06-06
Payer: COMMERCIAL

## 2018-06-06 VITALS
BODY MASS INDEX: 26.7 KG/M2 | RESPIRATION RATE: 18 BRPM | TEMPERATURE: 98.1 F | OXYGEN SATURATION: 97 % | DIASTOLIC BLOOD PRESSURE: 70 MMHG | HEART RATE: 84 BPM | WEIGHT: 173 LBS | SYSTOLIC BLOOD PRESSURE: 118 MMHG

## 2018-06-06 DIAGNOSIS — J22 LOWER RESP. TRACT INFECTION: ICD-10-CM

## 2018-06-06 DIAGNOSIS — J01.90 ACUTE SINUSITIS WITH SYMPTOMS > 10 DAYS: Primary | ICD-10-CM

## 2018-06-06 PROCEDURE — 99214 OFFICE O/P EST MOD 30 MIN: CPT | Performed by: FAMILY MEDICINE

## 2018-06-06 RX ORDER — AMOXICILLIN 500 MG/1
1000 CAPSULE ORAL 3 TIMES DAILY
Qty: 60 CAPSULE | Refills: 0 | Status: SHIPPED | OUTPATIENT
Start: 2018-06-06 | End: 2018-06-16

## 2018-06-06 NOTE — MR AVS SNAPSHOT
After Visit Summary   6/6/2018    Dale Cuevas    MRN: [id number]           Patient Information     Date Of Birth          1950        Visit Information        Provider Department      6/6/2018 2:15 PM Artis Espinoza MD [facility]        Today's Diagnoses     Acute sinusitis with symptoms > 10 days    -  1    Lower resp. tract infection          Care Instructions    Will treat symptomatically and see back as needed if not improving. Will push fluids.  Try tylenol and advil.  May use salt water nasal sprays. Could try a Monica pot.          Follow-ups after your visit        Who to contact     If you have questions or need follow up information about today's clinic visit or your schedule please contact [facility] directly at [phone].  Normal or non-critical lab and imaging results will be communicated to you by Operaxhart, letter or phone within 4 business days after the clinic has received the results. If you do not hear from us within 7 days, please contact the clinic through Operaxhart or phone. If you have a critical or abnormal lab result, we will notify you by phone as soon as possible.  Submit refill requests through CheapFlightsFinder or call your pharmacy and they will forward the refill request to us. Please allow 3 business days for your refill to be completed.          Additional Information About Your Visit        MyChart Information     CheapFlightsFinder gives you secure access to your electronic health record. If you see a primary care provider, you can also send messages to your care team and make appointments. If you have questions, please call your primary care clinic.  If you do not have a primary care provider, please call [phone] and they will assist you.        Care EveryWhere ID     This is your Care EveryWhere ID. This could be used by other organizations to access your [place] medical records  [id number]        Your  Vitals Were     Pulse Temperature Respirations Pulse Oximetry BMI (Body Mass Index)       84 98.1  F (36.7  C) (Tympanic) 18 97% 26.7 kg/m2        Blood Pressure from Last 3 Encounters:   06/06/18 118/70   02/19/18 138/86   02/01/18 136/90    Weight from Last 3 Encounters:   06/06/18 173 lb (78.5 kg)   02/19/18 173 lb (78.5 kg)   02/01/18 174 lb (78.9 kg)              Today, you had the following     No orders found for display         Today's Medication Changes          These changes are accurate as of 6/6/18  2:51 PM.  If you have any questions, ask your nurse or doctor.               Start taking these medicines.        Dose/Directions    amoxicillin 500 MG capsule   Commonly known as:  AMOXIL   Used for:  Acute sinusitis with symptoms > 10 days   Started by:  Artis Espinoza MD        Dose:  1000 mg   Take 2 capsules (1,000 mg) by mouth 3 times daily for 10 days   Quantity:  60 capsule   Refills:  0            Where to get your medicines      These medications were sent to [place] [address] [place] [address]     Phone:  [phone]     amoxicillin 500 MG capsule                Primary Care Provider Office Phone # Fax #    Artis Espinoza [phone] [address] [facility] [address]        Equal Access to Services     DURGA LLOYD AH: Hadii artem ku hadasho Soomaali, waaxda luqadaha, qaybta kaalmada adeegyada, geena dumont. So [facility] [phone].    ATENCIÓN: Si habla español, tiene a dodge disposición servicios gratuitos de asistencia lingüística. Llame al [phone].    We comply with applicable federal civil rights laws and [place] laws. We do not discriminate on the basis of race, color, national origin, age, disability, sex, sexual orientation, or gender identity.            Thank you!     Thank you for choosing [facility]  for your care. Our  goal is always to provide you with excellent care. Hearing back from our patients is one way we can continue to improve our services. Please take a few minutes to complete the written survey that you may receive in the mail after your visit with us. Thank you!             Your Updated Medication List - Protect others around you: Learn how to safely use, store and throw away your medicines at www.disposemymeds.org.          This list is accurate as of 6/6/18  2:51 PM.  Always use your most recent med list.                   Brand Name Dispense Instructions for use Diagnosis    amoxicillin 500 MG capsule    AMOXIL    60 capsule    Take 2 capsules (1,000 mg) by mouth 3 times daily for 10 days    Acute sinusitis with symptoms > 10 days       atorvastatin 40 MG tablet    LIPITOR    90 tablet    TAKE 1 TABLET BY MOUTH EVERY DAY    Mixed hyperlipidemia       naproxen sodium 220 MG tablet    ANAPROX    60 tablet    Take 2 tablets (440 mg) by mouth 2 times daily (with meals)    Deltoid bursitis, right       tamsulosin 0.4 MG capsule    FLOMAX    90 capsule    TAKE ONE CAPSULE BY MOUTH ONE TIME DAILY    Benign prostatic hyperplasia without lower urinary tract symptoms

## 2018-06-06 NOTE — PROGRESS NOTES
SUBJECTIVE:   Dale Cuevas is a 67 year old male who presents to clinic today for the following health issues:      RESPIRATORY SYMPTOMS      Duration: 2 weeks    Description  nasal congestion, facial pain/pressure, cough and fatigue/malaise, fever 101 yesterday    Severity: moderate    Accompanying signs and symptoms: None    History (predisposing factors):  none    Precipitating or alleviating factors: None    Therapies tried and outcome:  dayquil and nyquil          Problem list and histories reviewed & adjusted, as indicated.  Additional history: as documented    Patient Active Problem List   Diagnosis     Elevated blood pressure (not hypertension)     Rosacea     Erectile dysfunction     Psoriasis     Hyperlipidemia LDL goal <100     BPH (benign prostatic hypertrophy)     Family hx of prostate cancer     Diastasis recti     Past Surgical History:   Procedure Laterality Date     ORTHOPEDIC SURGERY  Jan 12, 2015    left wrist     ORTHOPEDIC SURGERY  1989    spine sugery-with disc fragment removed     ORTHOPEDIC SURGERY  2014    right knee arthroscopy       Social History   Substance Use Topics     Smoking status: Former Smoker     Quit date: 5/6/1973     Smokeless tobacco: Never Used     Alcohol use Yes      Comment: every night     Family History   Problem Relation Age of Onset     Hypertension Mother      Prostate Cancer Father 72     Breast Cancer Sister            Reviewed and updated as needed this visit by clinical staff  Tobacco  Allergies  Meds  Med Hx  Surg Hx  Fam Hx  Soc Hx      Reviewed and updated as needed this visit by Provider         ROS:  Constitutional, HEENT, cardiovascular, pulmonary, gi and gu systems are negative, except as otherwise noted.    OBJECTIVE:                                                    /70 (Cuff Size: Adult Large)  Pulse 84  Temp 98.1  F (36.7  C) (Tympanic)  Resp 18  Wt 173 lb (78.5 kg)  SpO2 97%  BMI 26.7 kg/m2  Body mass index is 26.7  kg/(m^2).  GENERAL APPEARANCE: healthy, alert and no distress  EYES: Eyes grossly normal to inspection, PERRL and conjunctivae and sclerae normal  HENT: ear canals and TM's normal and nose and mouth without ulcers or lesions  NECK: no adenopathy and no asymmetry, masses, or scars  RESP: lungs clear to auscultation - no rales, rhonchi or wheezes and bronchial breath sounds right lower anterior  LYMPHATICS: no cervical adenopathy         ASSESSMENT/PLAN:                                                        ICD-10-CM    1. Acute sinusitis with symptoms > 10 days J01.90 amoxicillin (AMOXIL) 500 MG capsule   2. Lower resp. tract infection J22        Patient Instructions   Will treat symptomatically and see back as needed if not improving. Will push fluids.  Try tylenol and advil.  May use salt water nasal sprays. Could try a Monica pot.      Artis Espinoza MD  [facility]

## 2018-06-06 NOTE — PATIENT INSTRUCTIONS
Will treat symptomatically and see back as needed if not improving. Will push fluids.  Try tylenol and advil.  May use salt water nasal sprays. Could try a Monica pot.

## 2018-06-18 ENCOUNTER — TELEPHONE (OUTPATIENT)
Dept: FAMILY MEDICINE | Facility: CLINIC | Age: 68
End: 2018-06-18

## 2018-06-18 NOTE — TELEPHONE ENCOUNTER
LOV 6/6/18:    ASSESSMENT/PLAN:             ICD-10-CM     1. Acute sinusitis with symptoms > 10 days J01.90 amoxicillin (AMOXIL) 500 MG capsule   2. Lower resp. tract infection J22           Patient Instructions   Will treat symptomatically and see back as needed if not improving. Will push fluids.  Try tylenol and advil.  May use salt water nasal sprays. Could try a Monica pot.        Artis Espinoza MD  [facility]      Patient Contact    Attempt # 1    Was call answered?  No.  Left message on voicemail with information to call me back. Dr. SOTO is not here today, and has only same day slots open for tomorrow.

## 2018-06-18 NOTE — TELEPHONE ENCOUNTER
Reason for Call:  Other appointment    Detailed comments: Patient was seen recently and given antibiotics, states that the antibiotics don't seem to have fully worked for him. He would like to be fit in to see Dr. Espinoza on 6/19 or 6/20. Offered him other providers which he declined. He would like a call back to schedule this week with Dr. Espinoza.     Phone Number Patient can be reached at: Home number on file [phone] (home)    Best Time: any    Can we leave a detailed message on this number? YES    Call taken on 6/18/2018 at 8:39 AM by Kristina Pizarro

## 2018-06-19 ENCOUNTER — MYC MEDICAL ADVICE (OUTPATIENT)
Dept: FAMILY MEDICINE | Facility: CLINIC | Age: 68
End: 2018-06-19

## 2018-06-19 ENCOUNTER — RADIANT APPOINTMENT (OUTPATIENT)
Dept: GENERAL RADIOLOGY | Facility: CLINIC | Age: 68
End: 2018-06-19
Attending: FAMILY MEDICINE
Payer: COMMERCIAL

## 2018-06-19 ENCOUNTER — OFFICE VISIT (OUTPATIENT)
Dept: FAMILY MEDICINE | Facility: CLINIC | Age: 68
End: 2018-06-19
Payer: COMMERCIAL

## 2018-06-19 VITALS
DIASTOLIC BLOOD PRESSURE: 80 MMHG | RESPIRATION RATE: 16 BRPM | HEART RATE: 80 BPM | TEMPERATURE: 97 F | SYSTOLIC BLOOD PRESSURE: 136 MMHG | WEIGHT: 182 LBS | BODY MASS INDEX: 28.08 KG/M2

## 2018-06-19 DIAGNOSIS — R05.9 COUGH: Primary | ICD-10-CM

## 2018-06-19 DIAGNOSIS — R05.9 COUGH: ICD-10-CM

## 2018-06-19 PROCEDURE — 71046 X-RAY EXAM CHEST 2 VIEWS: CPT | Mod: FY

## 2018-06-19 PROCEDURE — 99213 OFFICE O/P EST LOW 20 MIN: CPT | Performed by: FAMILY MEDICINE

## 2018-06-19 NOTE — PROGRESS NOTES
SUBJECTIVE:   Dale Cuevas is a 68 year old male who presents to clinic today for the following health issues:      SINUS/COUGH SYMPTOMS      Duration: Chronic    Description  facial pain/pressure, fatigue, cough and headache    Severity: mild    Accompanying signs and symptoms: None    History (predisposing factors):  none    Precipitating or alleviating factors: None    Therapies tried and outcome:  Round of amoxicillin          Problem list and histories reviewed & adjusted, as indicated.  Additional history: as documented    Patient Active Problem List   Diagnosis     Elevated blood pressure (not hypertension)     Rosacea     Erectile dysfunction     Psoriasis     Hyperlipidemia LDL goal <100     BPH (benign prostatic hypertrophy)     Family hx of prostate cancer     Diastasis recti     Past Surgical History:   Procedure Laterality Date     ORTHOPEDIC SURGERY  Jan 12, 2015    left wrist     ORTHOPEDIC SURGERY  1989    spine sugery-with disc fragment removed     ORTHOPEDIC SURGERY  2014    right knee arthroscopy       Social History   Substance Use Topics     Smoking status: Former Smoker     Quit date: 5/6/1973     Smokeless tobacco: Never Used     Alcohol use Yes      Comment: every night     Family History   Problem Relation Age of Onset     Hypertension Mother      Prostate Cancer Father 72     Breast Cancer Sister            Reviewed and updated as needed this visit by clinical staff  Allergies  Meds       Reviewed and updated as needed this visit by Provider         ROS:  Constitutional, HEENT, cardiovascular, pulmonary, gi and gu systems are negative, except as otherwise noted.    OBJECTIVE:                                                    /80 (Cuff Size: Adult Regular)  Pulse 80  Temp 97  F (36.1  C) (Tympanic)  Resp 16  Wt 182 lb (82.6 kg)  BMI 28.08 kg/m2  Body mass index is 28.08 kg/(m^2).  GENERAL APPEARANCE: healthy, alert and no distress  EYES: Eyes grossly normal to inspection,  PERRL and conjunctivae and sclerae normal  HENT: ear canals and TM's normal and nose and mouth without ulcers or lesions  NECK: no adenopathy and no asymmetry, masses, or scars  RESP: lungs clear to auscultation - no rales, rhonchi or wheezes  CV: regular rates and rhythm, normal S1 S2, no S3 or S4 and no murmur, click or rub  LYMPHATICS: no cervical adenopathy    Diagnostic test results:  CXR -was unremarkable to my reading.  I did compare it to his previous x-rays done a year ago and I did not see anything new.     ASSESSMENT/PLAN:                                                        ICD-10-CM    1. Cough R05 XR Chest 2 Views       Patient Instructions   We will await official reading of his chest x-ray by radiology.  If that comes back negative I would suggest that he get a CT scan of his sinuses for further evaluation.  We also did discuss that some of this could be due to reflux even though he is not having any true symptoms of that.  Follow-up will be after review all of his tests.      Artis Espinoza MD  [facility]

## 2018-06-20 NOTE — TELEPHONE ENCOUNTER
I faxed the referral for CT scan to [facility] in [place]. Called pt and gave him their phone number to call for an appt.

## 2018-06-20 NOTE — PATIENT INSTRUCTIONS
We will await official reading of his chest x-ray by radiology.  If that comes back negative I would suggest that he get a CT scan of his sinuses for further evaluation.  We also did discuss that some of this could be due to reflux even though he is not having any true symptoms of that.  Follow-up will be after review all of his tests.

## 2018-06-21 ENCOUNTER — TELEPHONE (OUTPATIENT)
Dept: FAMILY MEDICINE | Facility: CLINIC | Age: 68
End: 2018-06-21

## 2018-06-21 DIAGNOSIS — R05.9 COUGH: Primary | ICD-10-CM

## 2018-06-21 NOTE — TELEPHONE ENCOUNTER
Reason for Call: Request for an order or referral:    Order or referral being requested: CT of the sinuses    Date needed: as soon as possible    Has the patient been seen by the PCP for this problem? YES    Additional comments: An order was sent but need a new order for sinuses not facial. Call if any questions. Patient coming in tomorrow morning.    Phone number Patient can be reached at:  Cell number on file:  [phone]    Best Time:  Until 5 today and 830 tomorrow    Can we leave a detailed message on this number?  YES    Call taken on 6/21/2018 at 2:44 PM by ALEJANDRO JONES

## 2018-06-22 ENCOUNTER — TRANSFERRED RECORDS (OUTPATIENT)
Dept: HEALTH INFORMATION MANAGEMENT | Facility: CLINIC | Age: 68
End: 2018-06-22

## 2018-06-26 ENCOUNTER — TRANSFERRED RECORDS (OUTPATIENT)
Dept: HEALTH INFORMATION MANAGEMENT | Facility: CLINIC | Age: 68
End: 2018-06-26

## 2018-06-26 DIAGNOSIS — J32.2 CHRONIC ETHMOIDAL SINUSITIS: Primary | ICD-10-CM

## 2018-06-26 RX ORDER — DOXYCYCLINE 100 MG/1
100 TABLET ORAL 2 TIMES DAILY
Qty: 28 TABLET | Refills: 0 | Status: SHIPPED | OUTPATIENT
Start: 2018-06-26 | End: 2018-07-10

## 2018-07-19 ENCOUNTER — TRANSFERRED RECORDS (OUTPATIENT)
Dept: HEALTH INFORMATION MANAGEMENT | Facility: CLINIC | Age: 68
End: 2018-07-19

## 2018-07-19 ENCOUNTER — RESULTS ONLY (OUTPATIENT)
Dept: OTHER | Facility: CLINIC | Age: 68
End: 2018-07-19

## 2018-07-19 DIAGNOSIS — M79.673 FOOT PAIN: Primary | ICD-10-CM

## 2018-07-19 LAB
BASOPHILS # BLD AUTO: 0 10E9/L (ref 0–0.2)
BASOPHILS NFR BLD AUTO: 0.3 %
DIFFERENTIAL METHOD BLD: NORMAL
EOSINOPHIL # BLD AUTO: 0.1 10E9/L (ref 0–0.7)
EOSINOPHIL NFR BLD AUTO: 1.3 %
ERYTHROCYTE [DISTWIDTH] IN BLOOD BY AUTOMATED COUNT: 12.9 % (ref 10–15)
ERYTHROCYTE [SEDIMENTATION RATE] IN BLOOD BY WESTERGREN METHOD: 8 MM/H (ref 0–20)
HCT VFR BLD AUTO: 43.6 % (ref 40–53)
HGB BLD-MCNC: 14.7 G/DL (ref 13.3–17.7)
LYMPHOCYTES # BLD AUTO: 1.9 10E9/L (ref 0.8–5.3)
LYMPHOCYTES NFR BLD AUTO: 29.6 %
MCH RBC QN AUTO: 30.8 PG (ref 26.5–33)
MCHC RBC AUTO-ENTMCNC: 33.7 G/DL (ref 31.5–36.5)
MCV RBC AUTO: 91 FL (ref 78–100)
MONOCYTES # BLD AUTO: 0.5 10E9/L (ref 0–1.3)
MONOCYTES NFR BLD AUTO: 8.2 %
NEUTROPHILS # BLD AUTO: 3.8 10E9/L (ref 1.6–8.3)
NEUTROPHILS NFR BLD AUTO: 60.6 %
PLATELET # BLD AUTO: 199 10E9/L (ref 150–450)
RBC # BLD AUTO: 4.77 10E12/L (ref 4.4–5.9)
WBC # BLD AUTO: 6.3 10E9/L (ref 4–11)

## 2018-07-19 PROCEDURE — 81374 HLA I TYPING 1 ANTIGEN LR: CPT | Performed by: FAMILY MEDICINE

## 2018-07-19 PROCEDURE — 84550 ASSAY OF BLOOD/URIC ACID: CPT

## 2018-07-19 PROCEDURE — 85025 COMPLETE CBC W/AUTO DIFF WBC: CPT

## 2018-07-19 PROCEDURE — 86431 RHEUMATOID FACTOR QUANT: CPT

## 2018-07-19 PROCEDURE — 85652 RBC SED RATE AUTOMATED: CPT

## 2018-07-19 PROCEDURE — 36415 COLL VENOUS BLD VENIPUNCTURE: CPT

## 2018-07-20 LAB
HLA-B27 QL NAA+PROBE: NORMAL
RHEUMATOID FACT SER NEPH-ACNC: <20 IU/ML (ref 0–20)
URATE SERPL-MCNC: 4.7 MG/DL (ref 3.5–7.2)

## 2018-07-24 ENCOUNTER — TRANSFERRED RECORDS (OUTPATIENT)
Dept: HEALTH INFORMATION MANAGEMENT | Facility: CLINIC | Age: 68
End: 2018-07-24

## 2018-07-24 LAB
B LOCUS: NORMAL
B27TEST METHOD: NORMAL

## 2018-07-30 ENCOUNTER — OFFICE VISIT (OUTPATIENT)
Dept: FAMILY MEDICINE | Facility: CLINIC | Age: 68
End: 2018-07-30
Payer: COMMERCIAL

## 2018-07-30 VITALS
BODY MASS INDEX: 25.91 KG/M2 | DIASTOLIC BLOOD PRESSURE: 80 MMHG | TEMPERATURE: 98 F | HEART RATE: 78 BPM | HEIGHT: 68 IN | RESPIRATION RATE: 16 BRPM | SYSTOLIC BLOOD PRESSURE: 120 MMHG | WEIGHT: 171 LBS

## 2018-07-30 DIAGNOSIS — Z01.818 PREOP GENERAL PHYSICAL EXAM: Primary | ICD-10-CM

## 2018-07-30 DIAGNOSIS — H53.8 BLURRED VISION, RIGHT EYE: ICD-10-CM

## 2018-07-30 PROCEDURE — 99214 OFFICE O/P EST MOD 30 MIN: CPT | Performed by: FAMILY MEDICINE

## 2018-07-30 NOTE — MR AVS SNAPSHOT
After Visit Summary   7/30/2018    Dale Cuevas    MRN: [id number]           Patient Information     Date Of Birth          1950        Visit Information        Provider Department      7/30/2018 11:00 AM Artis Espinoza MD [facility]        Today's Diagnoses     Preop general physical exam    -  1    Blurred vision, right eye          Care Instructions      Before Your Surgery      Call your surgeon if there is any change in your health. This includes signs of a cold or flu (such as a sore throat, runny nose, cough, rash or fever).    Do not smoke, drink alcohol or take over the counter medicine (unless your surgeon or primary care doctor tells you to) for the 24 hours before and after surgery.    If you take prescribed drugs: Follow your doctor s orders about which medicines to take and which to stop until after surgery.    Eating and drinking prior to surgery: follow the instructions from your surgeon    Take a shower or bath the night before surgery. Use the soap your surgeon gave you to gently clean your skin. If you do not have soap from your surgeon, use your regular soap. Do not shave or scrub the surgery site.  Wear clean pajamas and have clean sheets on your bed.           Follow-ups after your visit        Who to contact     If you have questions or need follow up information about today's clinic visit or your schedule please contact [facility] directly at [phone].  Normal or non-critical lab and imaging results will be communicated to you by MyChart, letter or phone within 4 business days after the clinic has received the results. If you do not hear from us within 7 days, please contact the clinic through MyChart or phone. If you have a critical or abnormal lab result, we will notify you by phone as soon as possible.  Submit refill requests through ASYM III or call your pharmacy and they will forward the refill  "request to us. Please allow 3 business days for your refill to be completed.          Additional Information About Your Visit        MyChart Information     CogniFitharSlideJar gives you secure access to your electronic health record. If you see a primary care provider, you can also send messages to your care team and make appointments. If you have questions, please call your primary care clinic.  If you do not have a primary care provider, please call [phone] and they will assist you.        Care EveryWhere ID     This is your Care EveryWhere ID. This could be used by other organizations to access your [place] medical records  [id number]        Your Vitals Were     Pulse Temperature Respirations Height BMI (Body Mass Index)       78 98  F (36.7  C) (Tympanic) 16 5' 7.5\" (1.715 m) 26.39 kg/m2        Blood Pressure from Last 3 Encounters:   07/30/18 120/80   06/19/18 136/80   06/06/18 118/70    Weight from Last 3 Encounters:   07/30/18 171 lb (77.6 kg)   06/19/18 182 lb (82.6 kg)   06/06/18 173 lb (78.5 kg)              Today, you had the following     No orders found for display       Primary Care Provider Office Phone # Fax #    Artis Espinoza [phone] [address] [facility] [address]        Equal Access to Services     SHORTY LLOYD : Hadii aad ku hadasho Soomaali, waaxda luqadaha, qaybta kaalmada adeegyada, geena dumont. So [facility] [phone].    ATENCIÓN: Si habla español, tiene a dodge disposición servicios gratuitos de asistencia lingüística. Llame al [phone].    We comply with applicable federal civil rights laws and [place] laws. We do not discriminate on the basis of race, color, national origin, age, disability, sex, sexual orientation, or gender identity.            Thank you!     Thank you for choosing [facility]  for your care. Our goal is always to provide you with excellent care. Hearing back from our " patients is one way we can continue to improve our services. Please take a few minutes to complete the written survey that you may receive in the mail after your visit with us. Thank you!             Your Updated Medication List - Protect others around you: Learn how to safely use, store and throw away your medicines at www.disposemymeds.org.          This list is accurate as of 7/30/18 12:07 PM.  Always use your most recent med list.                   Brand Name Dispense Instructions for use Diagnosis    atorvastatin 40 MG tablet    LIPITOR    90 tablet    TAKE 1 TABLET BY MOUTH EVERY DAY    Mixed hyperlipidemia       naproxen sodium 220 MG tablet    ANAPROX    60 tablet    Take 2 tablets (440 mg) by mouth 2 times daily (with meals)    Deltoid bursitis, right       tamsulosin 0.4 MG capsule    FLOMAX    90 capsule    TAKE ONE CAPSULE BY MOUTH ONE TIME DAILY    Benign prostatic hyperplasia without lower urinary tract symptoms

## 2018-07-30 NOTE — PROGRESS NOTES
[facility]  [address]  [facility] [address]  [phone]  Dept: [phone]    PRE-OP EVALUATION:  Today's date: 2018    Dale Cuevas (: 1950) presents for pre-operative evaluation assessment as requested by  .  He requires evaluation and anesthesia risk assessment prior to undergoing surgery/procedure for treatment of RT eye .    Fax number for surgical facility: [phone]  Primary Physician: Artis Espinoza  Type of Anesthesia Anticipated: Local with MAC    Patient has a Health Care Directive or Living Will:  NO    Preop Questions 2018   Who is doing your surgery? [facility]   What are you having done? vitrectomy, membrane peel   Date of Surgery/Procedure:     Facility or Hospital where procedure/surgery will be performed: [facility]   1.  Do you have a history of Heart attack, stroke, stent, coronary bypass surgery, or other heart surgery? No   2.  Do you ever have any pain or discomfort in your chest? No   3.  Do you have a history of  Heart Failure? No   4.   Are you troubled by shortness of breath when:  walking on a level surface, or up a slight hill, or at night? No   5.  Do you currently have a cold, bronchitis or other respiratory infection? No   6.  Do you have a cough, shortness of breath, or wheezing? YES- cough   7.  Do you sometimes get pains in the calves of your legs when you walk? No   8. Do you or anyone in your family have previous history of blood clots? No   9.  Do you or does anyone in your family have a serious bleeding problem such as prolonged bleeding following surgeries or cuts? No   10. Have you ever had problems with anemia or been told to take iron pills? No   11. Have you had any abnormal blood loss such as black, tarry or bloody stools? No   12. Have you ever had a blood transfusion? YES - with back surgery    13. Have you or any of your relatives ever had  problems with anesthesia? No   14. Do you have sleep apnea, excessive snoring or daytime drowsiness? No   15. Do you have any prosthetic heart valves? No   16. Do you have prosthetic joints? No         HPI:     HPI related to upcoming procedure: blurry vision with wrinkle in the right eye      See problem list for active medical problems.  Problems all longstanding and stable, except as noted/documented.  See ROS for pertinent symptoms related to these conditions.                                                                                                                                                          .    MEDICAL HISTORY:     Patient Active Problem List    Diagnosis Date Noted     Diastasis recti 06/15/2016     Priority: Medium     Family hx of prostate cancer 06/07/2016     Priority: Medium     BPH (benign prostatic hypertrophy) 09/08/2014     Priority: Medium     Hyperlipidemia LDL goal <100 02/05/2014     Priority: Medium     Psoriasis 11/23/2013     Priority: Medium     Elevated blood pressure (not hypertension) 05/06/2013     Priority: Medium     Rosacea 05/06/2013     Priority: Medium     Erectile dysfunction 05/06/2013     Priority: Medium      History reviewed. No pertinent past medical history.  Past Surgical History:   Procedure Laterality Date     ORTHOPEDIC SURGERY  Jan 12, 2015    left wrist     ORTHOPEDIC SURGERY  1989    spine sugery-with disc fragment removed     ORTHOPEDIC SURGERY  2014    right knee arthroscopy     Current Outpatient Prescriptions   Medication Sig Dispense Refill     atorvastatin (LIPITOR) 40 MG tablet TAKE 1 TABLET BY MOUTH EVERY DAY 90 tablet 3     tamsulosin (FLOMAX) 0.4 MG capsule TAKE ONE CAPSULE BY MOUTH ONE TIME DAILY 90 capsule 1     naproxen sodium (ANAPROX) 220 MG tablet Take 2 tablets (440 mg) by mouth 2 times daily (with meals) (Patient not taking: Reported on 7/30/2018) 60 tablet      OTC products: None, except as noted above    Allergies   Allergen  "Reactions     Levofloxacin       Latex Allergy: NO    Social History   Substance Use Topics     Smoking status: Former Smoker     Quit date: 5/6/1973     Smokeless tobacco: Never Used     Alcohol use Yes      Comment: every night     History   Drug Use No       REVIEW OF SYSTEMS:   Constitutional, neuro, ENT, endocrine, pulmonary, cardiac, gastrointestinal, genitourinary, musculoskeletal, integument and psychiatric systems are negative, except as otherwise noted.    EXAM:   /80 (Cuff Size: Adult Regular)  Pulse 78  Temp 98  F (36.7  C) (Tympanic)  Resp 16  Ht 5' 7.5\" (1.715 m)  Wt 171 lb (77.6 kg)  BMI 26.39 kg/m2    GENERAL APPEARANCE: healthy, alert and no distress     EYES: EOMI,  PERRL     HENT: ear canals and TM's normal and nose and mouth without ulcers or lesions     NECK: no adenopathy, no asymmetry, masses, or scars and thyroid normal to palpation     RESP: lungs clear to auscultation - no rales, rhonchi or wheezes     CV: regular rates and rhythm, normal S1 S2, no S3 or S4 and no murmur, click or rub     ABDOMEN:  soft, nontender, no HSM or masses and bowel sounds normal     MS: extremities normal- no gross deformities noted, no evidence of inflammation in joints, FROM in all extremities.     SKIN: no suspicious lesions or rashes     NEURO: Normal strength and tone, sensory exam grossly normal, mentation intact and speech normal     PSYCH: mentation appears normal. and affect normal/bright     LYMPHATICS: No cervical adenopathy    DIAGNOSTICS:   No labs or EKG required for low risk surgery (cataract, skin procedure, breast biopsy, etc)    Recent Labs   Lab Test  07/19/18   1148  09/12/16   1638  06/07/16   1122  03/01/15   1435   HGB  14.7  14.1  15.8  16.0   PLT  199  273  198  215   INR   --    --    --   1.05   NA   --   143  139  138   POTASSIUM   --   4.0  3.9  4.2   CR   --   1.05  0.90  0.81        IMPRESSION:   Reason for surgery/procedure: blurred vision with distortions right eye due " to retinal issues    The proposed surgical procedure is considered LOW risk.    REVISED CARDIAC RISK INDEX  The patient has the following serious cardiovascular risks for perioperative complications such as (MI, PE, VFib and 3  AV Block):  No serious cardiac risks  INTERPRETATION: 0 risks: Class I (very low risk - 0.4% complication rate)    The patient has the following additional risks for perioperative complications:  No identified additional risks      ICD-10-CM    1. Preop general physical exam Z01.818    2. Blurred vision, right eye H53.8        RECOMMENDATIONS:             APPROVAL GIVEN to proceed with proposed procedure, without further diagnostic evaluation       Signed Electronically by: Artis Espinoza MD    Copy of this evaluation report is provided to requesting physician.    [facility] [employer] [facility] Preop Guidelines    Revised Cardiac Risk Index

## 2018-08-15 DIAGNOSIS — E78.2 MIXED HYPERLIPIDEMIA: ICD-10-CM

## 2018-08-15 NOTE — TELEPHONE ENCOUNTER
"Requested Prescriptions   Pending Prescriptions Disp Refills     atorvastatin (LIPITOR) 40 MG tablet [Pharmacy Med Name: ATORVASTATIN 40 MG TABLET]  Last Written Prescription Date:  8/24/17  Last Fill Quantity: 90,  # refills: 3   Last office visit: 7/30/2018 with prescribing provider:  Alexis   Future Office Visit:     90 tablet 3     Sig: TAKE 1 TABLET BY MOUTH EVERY DAY    Statins Protocol Failed    8/15/2018  1:36 AM       Failed - LDL on file in past 12 months    Recent Labs   Lab Test  07/06/17   1018   LDL  92            Passed - No abnormal creatine kinase in past 12 months    No lab results found.            Passed - Recent (12 mo) or future (30 days) visit within the authorizing provider's specialty    Patient had office visit in the last 12 months or has a visit in the next 30 days with authorizing provider or within the authorizing provider's specialty.  See \"Patient Info\" tab in inbasket, or \"Choose Columns\" in Meds & Orders section of the refill encounter.           Passed - Patient is age 18 or older          "

## 2018-08-16 DIAGNOSIS — M10.09 IDIOPATHIC GOUT OF MULTIPLE SITES, UNSPECIFIED CHRONICITY: Primary | ICD-10-CM

## 2018-08-16 RX ORDER — ATORVASTATIN CALCIUM 40 MG/1
TABLET, FILM COATED ORAL
Qty: 90 TABLET | Refills: 3 | Status: SHIPPED | OUTPATIENT
Start: 2018-08-16 | End: 2018-09-06

## 2018-08-16 RX ORDER — ALLOPURINOL 100 MG/1
100 TABLET ORAL DAILY
Qty: 30 TABLET | Refills: 1 | Status: SHIPPED | OUTPATIENT
Start: 2018-08-16 | End: 2018-10-26

## 2018-09-06 ENCOUNTER — TELEPHONE (OUTPATIENT)
Dept: FAMILY MEDICINE | Facility: CLINIC | Age: 68
End: 2018-09-06

## 2018-09-06 DIAGNOSIS — E78.2 MIXED HYPERLIPIDEMIA: ICD-10-CM

## 2018-09-06 RX ORDER — ATORVASTATIN CALCIUM 40 MG/1
40 TABLET, FILM COATED ORAL DAILY
Qty: 90 TABLET | Refills: 3 | Status: SHIPPED | OUTPATIENT
Start: 2018-09-06 | End: 2020-01-27

## 2018-09-06 NOTE — TELEPHONE ENCOUNTER
[facility] pharmacist left message with Susan in reception, asking Rx for Lipitor get resent. There was a transmission problem. Rx was resent as requested.

## 2018-09-25 ENCOUNTER — TRANSFERRED RECORDS (OUTPATIENT)
Dept: HEALTH INFORMATION MANAGEMENT | Facility: CLINIC | Age: 68
End: 2018-09-25

## 2018-10-23 ENCOUNTER — TRANSFERRED RECORDS (OUTPATIENT)
Dept: HEALTH INFORMATION MANAGEMENT | Facility: CLINIC | Age: 68
End: 2018-10-23

## 2018-10-26 DIAGNOSIS — M10.09 IDIOPATHIC GOUT OF MULTIPLE SITES, UNSPECIFIED CHRONICITY: ICD-10-CM

## 2018-10-26 RX ORDER — ALLOPURINOL 100 MG/1
TABLET ORAL
Qty: 30 TABLET | Refills: 1 | Status: SHIPPED | OUTPATIENT
Start: 2018-10-26 | End: 2018-12-23

## 2018-10-26 NOTE — TELEPHONE ENCOUNTER
"Requested Prescriptions   Pending Prescriptions Disp Refills     allopurinol (ZYLOPRIM) 100 MG tablet [Pharmacy Med Name: ALLOPURINOL 100 MG TABLET]  Last Written Prescription Date:  9/6/18  Last Fill Quantity: 90,  # refills: 3   Last office visit: 7/30/2018 with prescribing provider:  Alexis   Future Office Visit:   30 tablet 1     Sig: TAKE 1 TABLET BY MOUTH EVERY DAY    Gout Agents Protocol Failed    10/26/2018  1:34 AM       Failed - ALT on file in past 12 months    Recent Labs   Lab Test  09/12/16   1638   ALT  43            Failed - Normal serum creatinine on file in the past 12 months    Recent Labs   Lab Test  09/12/16   1638   CR  1.05            Passed - CBC on file in past 12 months    Recent Labs   Lab Test  07/19/18   1148   WBC  6.3   RBC  4.77   HGB  14.7   HCT  43.6   PLT  199                Passed - Has Uric Acid on file in past 12 months and value is less than 6    Recent Labs   Lab Test  07/19/18   1148   URIC  4.7     If level is 6mg/dL or greater, ok to refill one time and refer to provider.          Passed - Recent (12 mo) or future (30 days) visit within the authorizing provider's specialty    Patient had office visit in the last 12 months or has a visit in the next 30 days with authorizing provider or within the authorizing provider's specialty.  See \"Patient Info\" tab in inbasket, or \"Choose Columns\" in Meds & Orders section of the refill encounter.             Passed - Patient is age 18 or older          "

## 2018-11-10 DIAGNOSIS — N40.0 BENIGN PROSTATIC HYPERPLASIA WITHOUT LOWER URINARY TRACT SYMPTOMS: ICD-10-CM

## 2018-11-10 NOTE — TELEPHONE ENCOUNTER
"Requested Prescriptions   Pending Prescriptions Disp Refills     tamsulosin (FLOMAX) 0.4 MG capsule [Pharmacy Med Name: TAMSULOSIN HCL 0.4 MG CAPSULE]  Last Written Prescription Date:  05/16/2018  Last Fill Quantity: 90,  # refills: 1   Last office visit: 7/30/2018 with prescribing provider:   ERA  Future Office Visit:     90 capsule 1     Sig: TAKE ONE CAPSULE BY MOUTH ONE TIME DAILY    Alpha Blockers Passed    11/10/2018  1:25 AM       Passed - Blood pressure under 140/90 in past 12 months    BP Readings from Last 3 Encounters:   07/30/18 120/80   06/19/18 136/80   06/06/18 118/70                Passed - Recent (12 mo) or future (30 days) visit within the authorizing provider's specialty    Patient had office visit in the last 12 months or has a visit in the next 30 days with authorizing provider or within the authorizing provider's specialty.  See \"Patient Info\" tab in inbasket, or \"Choose Columns\" in Meds & Orders section of the refill encounter.             Passed - Patient does not have Tadalafil, Vardenafil, or Sildenafil on their medication list       Passed - Patient is 18 years of age or older          "

## 2018-11-12 RX ORDER — TAMSULOSIN HYDROCHLORIDE 0.4 MG/1
CAPSULE ORAL
Qty: 90 CAPSULE | Refills: 2 | Status: SHIPPED | OUTPATIENT
Start: 2018-11-12 | End: 2019-07-25

## 2018-11-29 ENCOUNTER — TRANSFERRED RECORDS (OUTPATIENT)
Dept: HEALTH INFORMATION MANAGEMENT | Facility: CLINIC | Age: 68
End: 2018-11-29

## 2019-01-24 DIAGNOSIS — M10.09 IDIOPATHIC GOUT OF MULTIPLE SITES, UNSPECIFIED CHRONICITY: ICD-10-CM

## 2019-01-24 NOTE — TELEPHONE ENCOUNTER
"Requested Prescriptions   Pending Prescriptions Disp Refills     allopurinol (ZYLOPRIM) 100 MG tablet [Pharmacy Med Name: ALLOPURINOL 100 MG TABLET] 30 tablet 0    Last Written Prescription Date:  12/26/2018  Last Fill Quantity: 30,  # refills: 0   Last office visit: 7/30/2018 with prescribing provider:  Dr. Espinoza   Future Office Visit:   Sig: TAKE 1 TABLET BY MOUTH EVERY DAY. PLEASE MAKE APPT FOR LABS    Gout Agents Protocol Failed - 1/24/2019  1:58 AM       Failed - ALT on file in past 12 months    Recent Labs   Lab Test 09/12/16  1638   ALT 43            Failed - Normal serum creatinine on file in the past 12 months    Recent Labs   Lab Test 09/12/16  1638   CR 1.05            Passed - CBC on file in past 12 months    Recent Labs   Lab Test 07/19/18  1148   WBC 6.3   RBC 4.77   HGB 14.7   HCT 43.6                   Passed - Has Uric Acid on file in past 12 months and value is less than 6    Recent Labs   Lab Test 07/19/18  1148   URIC 4.7     If level is 6mg/dL or greater, ok to refill one time and refer to provider.          Passed - Recent (12 mo) or future (30 days) visit within the authorizing provider's specialty    Patient had office visit in the last 12 months or has a visit in the next 30 days with authorizing provider or within the authorizing provider's specialty.  See \"Patient Info\" tab in inbasket, or \"Choose Columns\" in Meds & Orders section of the refill encounter.             Passed - Medication is active on med list       Passed - Patient is age 18 or older        "

## 2019-01-25 RX ORDER — ALLOPURINOL 100 MG/1
TABLET ORAL
Qty: 30 TABLET | Refills: 0 | Status: SHIPPED | OUTPATIENT
Start: 2019-01-25 | End: 2019-02-26

## 2019-03-25 ENCOUNTER — OFFICE VISIT (OUTPATIENT)
Dept: FAMILY MEDICINE | Facility: CLINIC | Age: 69
End: 2019-03-25
Payer: COMMERCIAL

## 2019-03-25 VITALS
TEMPERATURE: 98.5 F | WEIGHT: 184 LBS | HEART RATE: 95 BPM | SYSTOLIC BLOOD PRESSURE: 134 MMHG | RESPIRATION RATE: 14 BRPM | OXYGEN SATURATION: 96 % | BODY MASS INDEX: 27.25 KG/M2 | DIASTOLIC BLOOD PRESSURE: 80 MMHG | HEIGHT: 69 IN

## 2019-03-25 DIAGNOSIS — Z12.11 SCREEN FOR COLON CANCER: ICD-10-CM

## 2019-03-25 DIAGNOSIS — M19.90 INFLAMMATORY ARTHRITIS: ICD-10-CM

## 2019-03-25 DIAGNOSIS — L40.9 PSORIASIS: ICD-10-CM

## 2019-03-25 DIAGNOSIS — Z00.00 ROUTINE MEDICAL EXAM: Primary | ICD-10-CM

## 2019-03-25 DIAGNOSIS — Z12.5 SCREENING FOR PROSTATE CANCER: ICD-10-CM

## 2019-03-25 DIAGNOSIS — E78.5 HYPERLIPIDEMIA LDL GOAL <100: ICD-10-CM

## 2019-03-25 DIAGNOSIS — M10.9 GOUT, UNSPECIFIED CAUSE, UNSPECIFIED CHRONICITY, UNSPECIFIED SITE: ICD-10-CM

## 2019-03-25 LAB
ALBUMIN UR-MCNC: ABNORMAL MG/DL
APPEARANCE UR: CLEAR
BASOPHILS # BLD AUTO: 0 10E9/L (ref 0–0.2)
BASOPHILS NFR BLD AUTO: 0.7 %
BILIRUB UR QL STRIP: NEGATIVE
COLOR UR AUTO: YELLOW
CRP SERPL-MCNC: 12 MG/L (ref 0–8)
DIFFERENTIAL METHOD BLD: NORMAL
EOSINOPHIL # BLD AUTO: 0.1 10E9/L (ref 0–0.7)
EOSINOPHIL NFR BLD AUTO: 1.1 %
ERYTHROCYTE [DISTWIDTH] IN BLOOD BY AUTOMATED COUNT: 13 % (ref 10–15)
ERYTHROCYTE [SEDIMENTATION RATE] IN BLOOD BY WESTERGREN METHOD: 9 MM/H (ref 0–20)
GLUCOSE UR STRIP-MCNC: NEGATIVE MG/DL
HCT VFR BLD AUTO: 43.8 % (ref 40–53)
HGB BLD-MCNC: 15 G/DL (ref 13.3–17.7)
HGB UR QL STRIP: ABNORMAL
KETONES UR STRIP-MCNC: NEGATIVE MG/DL
LEUKOCYTE ESTERASE UR QL STRIP: NEGATIVE
LYMPHOCYTES # BLD AUTO: 1.9 10E9/L (ref 0.8–5.3)
LYMPHOCYTES NFR BLD AUTO: 34.1 %
MCH RBC QN AUTO: 30.5 PG (ref 26.5–33)
MCHC RBC AUTO-ENTMCNC: 34.2 G/DL (ref 31.5–36.5)
MCV RBC AUTO: 89 FL (ref 78–100)
MONOCYTES # BLD AUTO: 0.5 10E9/L (ref 0–1.3)
MONOCYTES NFR BLD AUTO: 8.8 %
MUCOUS THREADS #/AREA URNS LPF: PRESENT /LPF
NEUTROPHILS # BLD AUTO: 3.1 10E9/L (ref 1.6–8.3)
NEUTROPHILS NFR BLD AUTO: 55.3 %
NITRATE UR QL: NEGATIVE
PH UR STRIP: 5.5 PH (ref 5–7)
PLATELET # BLD AUTO: 210 10E9/L (ref 150–450)
RBC # BLD AUTO: 4.92 10E12/L (ref 4.4–5.9)
RBC #/AREA URNS AUTO: ABNORMAL /HPF
SOURCE: ABNORMAL
SP GR UR STRIP: >1.03 (ref 1–1.03)
UROBILINOGEN UR STRIP-ACNC: 0.2 EU/DL (ref 0.2–1)
WBC # BLD AUTO: 5.5 10E9/L (ref 4–11)
WBC #/AREA URNS AUTO: ABNORMAL /HPF

## 2019-03-25 PROCEDURE — 80053 COMPREHEN METABOLIC PANEL: CPT | Performed by: FAMILY MEDICINE

## 2019-03-25 PROCEDURE — 36415 COLL VENOUS BLD VENIPUNCTURE: CPT | Performed by: FAMILY MEDICINE

## 2019-03-25 PROCEDURE — 80061 LIPID PANEL: CPT | Performed by: FAMILY MEDICINE

## 2019-03-25 PROCEDURE — 85025 COMPLETE CBC W/AUTO DIFF WBC: CPT | Performed by: FAMILY MEDICINE

## 2019-03-25 PROCEDURE — 86140 C-REACTIVE PROTEIN: CPT | Performed by: FAMILY MEDICINE

## 2019-03-25 PROCEDURE — 81001 URINALYSIS AUTO W/SCOPE: CPT | Performed by: FAMILY MEDICINE

## 2019-03-25 PROCEDURE — G0103 PSA SCREENING: HCPCS | Performed by: FAMILY MEDICINE

## 2019-03-25 PROCEDURE — 85652 RBC SED RATE AUTOMATED: CPT | Performed by: FAMILY MEDICINE

## 2019-03-25 PROCEDURE — G0439 PPPS, SUBSEQ VISIT: HCPCS | Performed by: FAMILY MEDICINE

## 2019-03-25 RX ORDER — CELECOXIB 200 MG/1
200 CAPSULE ORAL 2 TIMES DAILY
Qty: 60 CAPSULE | Refills: 5 | Status: SHIPPED | OUTPATIENT
Start: 2019-03-25 | End: 2020-01-15

## 2019-03-25 ASSESSMENT — MIFFLIN-ST. JEOR: SCORE: 1585

## 2019-03-25 ASSESSMENT — ACTIVITIES OF DAILY LIVING (ADL): CURRENT_FUNCTION: NO ASSISTANCE NEEDED

## 2019-03-25 NOTE — PATIENT INSTRUCTIONS
Preventive Health Recommendations:     See your health care provider every year to    Review health changes.     Discuss preventive care.      Review your medicines if your doctor has prescribed any.    Talk with your health care provider about whether you should have a test to screen for prostate cancer (PSA).    Every 3 years, have a diabetes test (fasting glucose). If you are at risk for diabetes, you should have this test more often.    Every 5 years, have a cholesterol test. Have this test more often if you are at risk for high cholesterol or heart disease.     Every 10 years, have a colonoscopy. Or, have a yearly FIT test (stool test). These exams will check for colon cancer.    Talk to with your health care provider about screening for Abdominal Aortic Aneurysm if you have a family history of AAA or have a history of smoking.  Shots:     Get a flu shot each year.     Get a tetanus shot every 10 years.     Talk to your doctor about your pneumonia vaccines. There are now two you should receive - Pneumovax (PPSV 23) and Prevnar (PCV 13).    Talk to your pharmacist about a shingles vaccine.     Talk to your doctor about the hepatitis B vaccine.  Nutrition:     Eat at least 5 servings of fruits and vegetables each day.     Eat whole-grain bread, whole-wheat pasta and brown rice instead of white grains and rice.     Get adequate Calcium and Vitamin D.   Lifestyle    Exercise for at least 150 minutes a week (30 minutes a day, 5 days a week). This will help you control your weight and prevent disease.     Limit alcohol to one drink per day.     No smoking.     Wear sunscreen to prevent skin cancer.     See your dentist every six months for an exam and cleaning.     See your eye doctor every 1 to 2 years to screen for conditions such as glaucoma, macular degeneration and cataracts.    Personalized Prevention Plan  You are due for the preventive services outlined below.  Your care team is available to assist you in  scheduling these services.  If you have already completed any of these items, please share that information with your care team to update in your medical record.    Health Maintenance Due   Topic Date Due     Zoster (Shingles) Vaccine (1 of 2) 06/17/1998     AORTIC ANEURYSM SCREENING (SYSTEM ASSIGNED)  06/17/2013     Annual Wellness Visit  06/07/2017     Colon Cancer Screening - every 10 years.  11/25/2018     Depression Assessment 2 - yearly  02/19/2019

## 2019-03-25 NOTE — PROGRESS NOTES
"SUBJECTIVE:   Dale Cuevas is a 68 year old male who presents for Preventive Visit.  click delete button to remove this line now  click delete button to remove this line now  Are you in the first 12 months of your Medicare coverage?  No    Annual Wellness Visit     In general, how would you rate your overall health?  Good    Frequency of exercise:  2-3 days/week    Duration of exercise:  15-30 minutes    Do you usually eat at least 4 servings of fruit and vegetables a day, include whole grains    & fiber and avoid regularly eating high fat or \"junk\" foods?  Yes    Taking medications regularly:  Yes    Medication side effects:  None    Ability to successfully perform activities of daily living:  No assistance needed    Home Safety:  No safety concerns identified    Hearing Impairment:  No hearing concerns    In the past 6 months, have you been bothered by leaking of urine?  No    In general, how would you rate your overall mental or emotional health?  Good    PHQ-2 Total Score: 2    Additional concerns today:  Yes    Do you feel safe in your environment? Yes    Do you have a Health Care Directive? No: Advance care planning reviewed with patient; information given to patient to review.      Fall risk  Fallen 2 or more times in the past year?: No  Any fall with injury in the past year?: No  click delete button to remove this line now  Cognitive Screening   1) Repeat 3 items (Leader, Season, Table)    2) Clock draw: NORMAL  3) 3 item recall: Recalls 2 objects   Results: NORMAL clock, 1-2 items recalled: COGNITIVE IMPAIRMENT LESS LIKELY    Mini-CogTM Copyright TAY Doshi. Licensed by the author for use in [facility]; reprinted with permission (jose armando@.[email or web address]). All rights reserved.      Do you have sleep apnea, excessive snoring or daytime drowsiness?: no    Reviewed and updated as needed this visit by clinical staff  Tobacco  Allergies  Meds  Med Hx  Surg Hx  Fam Hx  Soc Hx        Reviewed and updated as " needed this visit by Provider        Social History     Tobacco Use     Smoking status: Former Smoker     Last attempt to quit: 1973     Years since quittin.9     Smokeless tobacco: Never Used   Substance Use Topics     Alcohol use: Yes     Comment: every night       Alcohol Use 3/25/2019   If you drink alcohol do you typically have greater than 3 drinks per day OR greater than 7 drinks per week? No               Current providers sharing in care for this patient include:   Patient Care Team:  Artis Espinoza MD as PCP - General (Family Practice)  Artis Espinoza MD as Assigned PCP  Aysha Le RN as     The following health maintenance items are reviewed in Epic and correct as of today:  Health Maintenance   Topic Date Due     ZOSTER IMMUNIZATION (1 of 2) 1998     AORTIC ANEURYSM SCREENING (SYSTEM ASSIGNED)  2013     MEDICARE ANNUAL WELLNESS VISIT  2017     COLON CANCER SCREEN (SYSTEM ASSIGNED)  2018     PHQ-2 Q1 YR  2019     FALL RISK ASSESSMENT  2019     ADVANCE DIRECTIVE PLANNING Q5 YRS  2020     LIPID SCREEN Q5 YR MALE (SYSTEM ASSIGNED)  2022     DTAP/TDAP/TD IMMUNIZATION (3 - Td) 2023     INFLUENZA VACCINE  Completed     PNEUMOCOCCAL IMMUNIZATION 65+ LOW/MEDIUM RISK  Completed     HEPATITIS C SCREENING  Completed     IPV IMMUNIZATION  Aged Out     MENINGITIS IMMUNIZATION  Aged Out     Patient Active Problem List   Diagnosis     Elevated blood pressure (not hypertension)     Rosacea     Erectile dysfunction     Psoriasis     Hyperlipidemia LDL goal <100     BPH (benign prostatic hypertrophy)     Family hx of prostate cancer     Diastasis recti     Past Surgical History:   Procedure Laterality Date     ORTHOPEDIC SURGERY  2015    left wrist     ORTHOPEDIC SURGERY      spine sugery-with disc fragment removed     ORTHOPEDIC SURGERY      right knee arthroscopy       Social History     Tobacco Use      "Smoking status: Former Smoker     Last attempt to quit: 1973     Years since quittin.9     Smokeless tobacco: Never Used   Substance Use Topics     Alcohol use: Yes     Comment: every night     Family History   Problem Relation Age of Onset     Hypertension Mother      Prostate Cancer Father 72     Breast Cancer Sister              Review of Systems  CONSTITUTIONAL: NEGATIVE for fever, chills, change in weight  INTEGUMENTARY/SKIN: POSITIVE for rash over extensor surfaces at the elbows and knees  EYES: NEGATIVE for vision changes or irritation  ENT/MOUTH: NEGATIVE for ear, mouth and throat problems  RESP: NEGATIVE for significant cough or SOB  BREAST: NEGATIVE for masses, tenderness or discharge  CV: NEGATIVE for chest pain, palpitations or peripheral edema  GI: POSITIVE for ventral hernia  : NEGATIVE for frequency, dysuria, or hematuria  MUSCULOSKELETAL:joint swelling hands, wrists, elbows and feet  NEURO: NEGATIVE for weakness, dizziness or paresthesias  ENDOCRINE: NEGATIVE for temperature intolerance, skin/hair changes  HEME: NEGATIVE for bleeding problems  PSYCHIATRIC: NEGATIVE for changes in mood or affect    OBJECTIVE:   /80 (Patient Position: Sitting, Cuff Size: Adult Regular)   Pulse 95   Temp 98.5  F (36.9  C) (Tympanic)   Resp 14   Ht 1.753 m (5' 9\")   Wt 83.5 kg (184 lb)   SpO2 96%   BMI 27.17 kg/m   Estimated body mass index is 27.17 kg/m  as calculated from the following:    Height as of this encounter: 1.753 m (5' 9\").    Weight as of this encounter: 83.5 kg (184 lb).  Physical Exam  GENERAL: healthy, alert and no distress  EYES: Eyes grossly normal to inspection, PERRL and conjunctivae and sclerae normal  HENT: ear canals and TM's normal, nose and mouth without ulcers or lesions  NECK: no adenopathy, no asymmetry, masses, or scars and thyroid normal to palpation  RESP: lungs clear to auscultation - no rales, rhonchi or wheezes  CV: regular rate and rhythm, normal S1 S2, no S3 or " S4, no murmur, click or rub, no peripheral edema and peripheral pulses strong  ABDOMEN: soft, nontender, no hepatosplenomegaly, no masses and bowel sounds normal   (male): normal male genitalia without lesions or urethral discharge, no hernia  RECTAL: normal sphincter tone, no rectal masses, prostate normal size, smooth, nontender without nodules or masses  MS: There is marked diffuse swelling of the carpals on both hands.  Wrists are also slightly swollen.  Elbows are slightly swollen.  SKIN: There is plaque psoriasis present over the extensor surfaces of the elbows and knees.  NEURO: Normal strength and tone, mentation intact and speech normal  PSYCH: mentation appears normal, affect normal/bright        ASSESSMENT / PLAN:       ICD-10-CM    1. Screen for colon cancer Z12.11 RHEUMATOLOGY REFERRAL   2. Psoriasis L40.9 CBC with platelets differential     ESR: Erythrocyte sedimentation rate     CRP, inflammation     celecoxib (CELEBREX) 200 MG capsule   3. Inflammatory arthritis M19.90 RHEUMATOLOGY REFERRAL     UA with Microscopic     Comprehensive metabolic panel     celecoxib (CELEBREX) 200 MG capsule   4. Hyperlipidemia LDL goal <100 E78.5 Lipid Profile   5. Gout, unspecified cause, unspecified chronicity, unspecified site M10.9    6. Screening for prostate cancer Z12.5 Prostate spec antigen screen       End of Life Planning:  Patient currently has an advanced directive: No.  I have verified the patient's ablity to prepare an advanced directive/make health care decisions.  Literature was provided to assist patient in preparing an advanced directive.    COUNSELING:  Reviewed preventive health counseling, as reflected in patient instructions       Regular exercise       Healthy diet/nutrition       Vision screening       Referral to rheumatology was completed    BP Readings from Last 1 Encounters:   03/25/19 134/80     Estimated body mass index is 27.17 kg/m  as calculated from the following:    Height as of this  "encounter: 1.753 m (5' 9\").    Weight as of this encounter: 83.5 kg (184 lb).    BP Screening:   Last 3 BP Readings:    BP Readings from Last 3 Encounters:   03/25/19 134/80   07/30/18 120/80   06/19/18 136/80       The following was recommended to the patient:  Re-screen BP within a year and recommended lifestyle modifications       reports that he quit smoking about 45 years ago. he has never used smokeless tobacco.      Appropriate preventive services were discussed with this patient, including applicable screening as appropriate for cardiovascular disease, diabetes, osteopenia/osteoporosis, and glaucoma.  As appropriate for age/gender, discussed screening for colorectal cancer, prostate cancer, breast cancer, and cervical cancer. Checklist reviewing preventive services available has been given to the patient.    Reviewed patients plan of care and provided an AVS. The Basic Care Plan (routine screening as documented in Health Maintenance) for Dale meets the Care Plan requirement. This Care Plan has been established and reviewed with the Patient.    Counseling Resources:  ATP IV Guidelines  Pooled Cohorts Equation Calculator  Breast Cancer Risk Calculator  FRAX Risk Assessment  ICSI Preventive Guidelines  Dietary Guidelines for Americans, 2010  [employer] MyPlate  ASA Prophylaxis  Lung CA Screening    Artis Espinoza MD  [facility]  "

## 2019-03-26 ENCOUNTER — TRANSFERRED RECORDS (OUTPATIENT)
Dept: HEALTH INFORMATION MANAGEMENT | Facility: CLINIC | Age: 69
End: 2019-03-26

## 2019-03-26 LAB
ALBUMIN SERPL-MCNC: 3.8 G/DL (ref 3.4–5)
ALP SERPL-CCNC: 118 U/L (ref 40–150)
ALT SERPL W P-5'-P-CCNC: 27 U/L (ref 0–70)
ANION GAP SERPL CALCULATED.3IONS-SCNC: 6 MMOL/L (ref 3–14)
AST SERPL W P-5'-P-CCNC: 25 U/L (ref 0–45)
BILIRUB SERPL-MCNC: 0.8 MG/DL (ref 0.2–1.3)
BUN SERPL-MCNC: 13 MG/DL (ref 7–30)
CALCIUM SERPL-MCNC: 8.8 MG/DL (ref 8.5–10.1)
CHLORIDE SERPL-SCNC: 111 MMOL/L (ref 94–109)
CHOLEST SERPL-MCNC: 172 MG/DL
CO2 SERPL-SCNC: 24 MMOL/L (ref 20–32)
CREAT SERPL-MCNC: 0.72 MG/DL (ref 0.66–1.25)
GFR SERPL CREATININE-BSD FRML MDRD: >90 ML/MIN/{1.73_M2}
GLUCOSE SERPL-MCNC: 100 MG/DL (ref 70–99)
HDLC SERPL-MCNC: 34 MG/DL
LDLC SERPL CALC-MCNC: 115 MG/DL
NONHDLC SERPL-MCNC: 138 MG/DL
POTASSIUM SERPL-SCNC: 3.9 MMOL/L (ref 3.4–5.3)
PROT SERPL-MCNC: 7.7 G/DL (ref 6.8–8.8)
PSA SERPL-ACNC: 3.68 UG/L (ref 0–4)
SODIUM SERPL-SCNC: 141 MMOL/L (ref 133–144)
TRIGL SERPL-MCNC: 115 MG/DL

## 2019-03-29 ENCOUNTER — DOCUMENTATION ONLY (OUTPATIENT)
Dept: CARE COORDINATION | Facility: CLINIC | Age: 69
End: 2019-03-29

## 2019-04-11 ENCOUNTER — ANCILLARY PROCEDURE (OUTPATIENT)
Dept: GENERAL RADIOLOGY | Facility: CLINIC | Age: 69
End: 2019-04-11
Attending: NURSE PRACTITIONER
Payer: COMMERCIAL

## 2019-04-11 ENCOUNTER — OFFICE VISIT (OUTPATIENT)
Dept: RHEUMATOLOGY | Facility: CLINIC | Age: 69
End: 2019-04-11
Attending: FAMILY MEDICINE
Payer: COMMERCIAL

## 2019-04-11 VITALS
HEIGHT: 69 IN | SYSTOLIC BLOOD PRESSURE: 153 MMHG | DIASTOLIC BLOOD PRESSURE: 88 MMHG | TEMPERATURE: 98 F | OXYGEN SATURATION: 98 % | WEIGHT: 181.1 LBS | BODY MASS INDEX: 26.82 KG/M2 | HEART RATE: 88 BPM

## 2019-04-11 DIAGNOSIS — M54.6 ACUTE MIDLINE THORACIC BACK PAIN: ICD-10-CM

## 2019-04-11 DIAGNOSIS — M10.09 IDIOPATHIC GOUT OF MULTIPLE SITES, UNSPECIFIED CHRONICITY: ICD-10-CM

## 2019-04-11 DIAGNOSIS — L40.9 PSORIASIS: ICD-10-CM

## 2019-04-11 DIAGNOSIS — M19.90 INFLAMMATORY ARTHRITIS: ICD-10-CM

## 2019-04-11 DIAGNOSIS — L40.50 PSORIATIC ARTHRITIS (H): ICD-10-CM

## 2019-04-11 DIAGNOSIS — Z11.59 ENCOUNTER FOR SCREENING FOR OTHER VIRAL DISEASES: ICD-10-CM

## 2019-04-11 DIAGNOSIS — L40.9 PSORIASIS: Primary | ICD-10-CM

## 2019-04-11 DIAGNOSIS — M12.9 ARTHROPATHY: ICD-10-CM

## 2019-04-11 LAB
ALT SERPL W P-5'-P-CCNC: 20 U/L (ref 0–70)
CREAT SERPL-MCNC: 0.73 MG/DL (ref 0.66–1.25)
CRP SERPL-MCNC: 24.4 MG/L (ref 0–8)
ERYTHROCYTE [SEDIMENTATION RATE] IN BLOOD BY WESTERGREN METHOD: 15 MM/H (ref 0–20)
GFR SERPL CREATININE-BSD FRML MDRD: >90 ML/MIN/{1.73_M2}
URATE SERPL-MCNC: 3.5 MG/DL (ref 3.5–7.2)

## 2019-04-11 PROCEDURE — 82310 ASSAY OF CALCIUM: CPT | Performed by: NURSE PRACTITIONER

## 2019-04-11 PROCEDURE — 86038 ANTINUCLEAR ANTIBODIES: CPT | Performed by: NURSE PRACTITIONER

## 2019-04-11 PROCEDURE — 83970 ASSAY OF PARATHORMONE: CPT | Performed by: NURSE PRACTITIONER

## 2019-04-11 PROCEDURE — 87340 HEPATITIS B SURFACE AG IA: CPT | Performed by: NURSE PRACTITIONER

## 2019-04-11 PROCEDURE — 86481 TB AG RESPONSE T-CELL SUSP: CPT | Performed by: NURSE PRACTITIONER

## 2019-04-11 PROCEDURE — 86200 CCP ANTIBODY: CPT | Performed by: NURSE PRACTITIONER

## 2019-04-11 PROCEDURE — 84550 ASSAY OF BLOOD/URIC ACID: CPT | Performed by: NURSE PRACTITIONER

## 2019-04-11 PROCEDURE — 86803 HEPATITIS C AB TEST: CPT | Performed by: NURSE PRACTITIONER

## 2019-04-11 PROCEDURE — 86140 C-REACTIVE PROTEIN: CPT | Performed by: NURSE PRACTITIONER

## 2019-04-11 PROCEDURE — G0463 HOSPITAL OUTPT CLINIC VISIT: HCPCS | Mod: ZF

## 2019-04-11 PROCEDURE — 86704 HEP B CORE ANTIBODY TOTAL: CPT | Performed by: NURSE PRACTITIONER

## 2019-04-11 PROCEDURE — 36415 COLL VENOUS BLD VENIPUNCTURE: CPT | Performed by: NURSE PRACTITIONER

## 2019-04-11 PROCEDURE — 85652 RBC SED RATE AUTOMATED: CPT | Performed by: NURSE PRACTITIONER

## 2019-04-11 PROCEDURE — 86039 ANTINUCLEAR ANTIBODIES (ANA): CPT | Performed by: NURSE PRACTITIONER

## 2019-04-11 PROCEDURE — 83540 ASSAY OF IRON: CPT | Performed by: NURSE PRACTITIONER

## 2019-04-11 PROCEDURE — 84460 ALANINE AMINO (ALT) (SGPT): CPT | Performed by: NURSE PRACTITIONER

## 2019-04-11 PROCEDURE — 84100 ASSAY OF PHOSPHORUS: CPT | Performed by: NURSE PRACTITIONER

## 2019-04-11 PROCEDURE — 82565 ASSAY OF CREATININE: CPT | Performed by: NURSE PRACTITIONER

## 2019-04-11 RX ORDER — METHYLPREDNISOLONE 4 MG
TABLET, DOSE PACK ORAL
Qty: 21 TABLET | Refills: 0 | Status: ON HOLD | OUTPATIENT
Start: 2019-04-11 | End: 2019-04-25

## 2019-04-11 ASSESSMENT — PAIN SCALES - GENERAL: PAINLEVEL: SEVERE PAIN (7)

## 2019-04-11 ASSESSMENT — MIFFLIN-ST. JEOR: SCORE: 1571.84

## 2019-04-11 NOTE — LETTER
HEALTH LAB  [address] [facility]  [address] [id number] [phone]                                                                                                           Dale Cuevas  [address] [facility] [id number]    April 12, 2019      Dear Dale,    Please see attached lab results   They are all normal     THE FOLLOWING ARE EXPLANATIONS OF SOME OF OUR LAB TESTS     YOU DID NOT NECESSARILY HAVE ALL OF THESE DONE     Hgb is the blood iron level   WBC means White Blood Cells   Platelets are small blood    cells that help with forming the blood clots along with other blood factors.   Electrolytes are Sodium, Potassium, Calcium, Magnesium, Phosphorus.   Liver tests are: AST, ALT, Bilirubin, Alkaline Phosphatase.   Kidney tests are Creatinine, GFR.   HDL Choles   terol - is the good cholesterol and it is good to have it high.   LDL cholesterol is the bad cholesterol and it is good to have it low.   It is recommended to have LDL less than 130 for people with hypertension and to have it less than 100 for people with   heart disease, diabetes and chronic kidney disease.   Triglycerides are another type of lipid that can cause heart disease, like the cholesterol and should be kept low     Please continue on the same medications     Thank you for choosing [facility].  We appreciate the opportunity to serve you and look forward to supporting your healthcare needs in the future.    If you have any questions or concerns, please call me or my staff at [phone].      Sincerely,    Sanna Claros MD    Results for orders placed or performed in visit on 04/11/19   Uric acid   Result Value Ref Range    Uric Acid 3.5 3.5 - 7.2 mg/dL   Erythrocyte sedimentation rate auto   Result Value Ref Range    Sed Rate 15 0 - 20 mm/h   CRP inflammation   Result Value Ref Range    CRP Inflammation 24.4 (H) 0.0 - 8.0 mg/L   **ALT FUTURE 6mo   Result Value Ref Range    ALT 20  0 - 70 U/L   **Creatinine FUTURE anytime   Result Value Ref Range    Creatinine 0.73 0.66 - 1.25 mg/dL    GFR Estimate >90 >60 mL/min/[1.73_m2]    GFR Estimate If Black >90 >60 mL/min/[1.73_m2]

## 2019-04-11 NOTE — PATIENT INSTRUCTIONS
Shingrix vaccine. Recommended shingrix. CDC recommends this vaccine 2 doses,  by 2-6 months for adults 50 years and older. It is killed virus and ok to be used in immunocompromised patients. Shingrix reduces the risk of shingles and post-herpetic neuralgia (PH) by more than 90% in people 50 and older.      Possible side-effects include: pain, redness and swelling at the inj site, myalgia, fatigue, headaches, shivering, fever and stomach upset.     Shingrix is an inactive, subunit vaccine for H. zoster, >50 yrs, given as 2 injections doses (0.5 mL each), 2nd dose 2-6 months after 1st. Shingrix is more effective than Zostavax but has more possible side effects.     DONT START OUR MEDICATIONS FOR 2 WEEKS AFTER THE VACCINE

## 2019-04-11 NOTE — PROGRESS NOTES
"[facility] - Rheumatology Clinic Visit    Dale Cuevas  is a 68 year old here for consultation for Inflammatory arthritis  +CRP 12 +HLA B27 -RF, history gout on allopurinol per PCP (uric acid 4.7 7-2018)     Review-  Past-celebrex not effective, aleve has helped some, prednisone 2018 dramatic improvement x 5-7 day course     Initial visit April 11, 2019  Emiliano DELGADO:   Pain with swelling started 1-2 year ago in base of thumbs, hands, MCPs, PIPs, elbows, balls of the feet, swelling in the fingers (sausage) 2-5th bilateral and left 2-5th sausage toes, pain arms to forearm, not able to put rings on, right 4th sausage toes, not able to make a fist, pushes this hard to make a fist and not able to straighten the fingers which is worse in the mornings, lasts all day. Psoriasis on his [elbows, knees, scattered on legs,back of the ears] dry flakey. Reports this is making him depressed, and would be concerned of starting aprimilast (otezla) as this can worsen this. He has started drinking 2-3 drinks a day for the past years due to the pain. The whole hand swells, and then he cant use them. He has pain in the middle part of his spine then this shoots over to his arm, has past had a \"popped disc\" in 1989 then was paralzyed had to have ER surgery and learn to walk again thus suffers peripheral neuropathy as a result from his knees down, this is impacting his ability to lay flat as he gets severe pain and hard to sleep. Denies any fever, chills, SOB, QUEZADA, night sweats, or chest pain, or cough. Reports healthy. celebrex BID, tolerating. Not effective. On allopurinol for gout, reports this is not gout. No nail pitting.       PMH:  Injury-right thumb hammer on this a few times   Medical-gout, BPH, HLD, psoriasis, hypertension, rosacea, ED, URI, chicken pox, right cataract, peripheral neuropathy feet to knee \"feel heat and pain, like my feet are asleep and sensitive to hot and cold) from prior spine injury " "1989, depression     Surgical-left wrist tendon sheath 1-2015, T8-9 (paralzyed for 30 min, learned to walk and stand, \"disc popped) removed rib spine surgery for disc fragment removed 1989 (DDD), right knee arthroscopy, right retinal surgery 2018 (wrinkle on retinal), injections base of thumbs     FH:  No autoimmune disorders, psoriasis, UC, crohn's, SLE, RA, PsA, gout, autoimmune thyroid.  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 Quit >50 yr ago smoking. No IVDU. No Children. Left handed. Partner. Retired  and professional [employer]. Combat vet [place]       [facility] personally reviewed and updated by me.    ROS:  Negative raynaud s phenomena, hairloss, sun sensitivities, keratoconjunctivitis sicca, pleurisy, inflammatory joint symptoms, significant rashes like malar, oral/nasal or ulcerations, inflammatory eye disease, inflammatory bowel disease, tenosynovitis, or enthespathy. Negative for blood clots, gout, UC, crohn's. No temporal headache, no jaw claudication, no scalp tenderness, vision changes, carotidynia, cough. No Parotid swelling. Denies international travel. Denies history of pneumonia, hepatitis, tuberculosis, shingles, sepsis, tick bites or other infections.   +psoriasis   +see above   CONSTITUTIONAL: No fevers, night sweats or unintentional weight change. No acute distress, swollen glands  EYES: No vision change, diplopia, pain in eyes or red eyes   EARS, NOSE, MOUTH, THROAT: No tinnitus or hearing change, no epistaxis or nasal discharge, no oral lesions, throat clear. Normal saliva pool.  No drymouth. No thyroid  enlargement.   CARDIOVASCULAR: No chest pain, palpitations, or pain with walking, no " "orthopnea or PND   RESPIRATORY: No dyspnea, cough, shortness of breath or wheezing. No pleurisy.   GI: No nausea, vomiting, diarrhea or constipation, no abdominal pain, or blood in stools.   : No change in urine, no dysuria or hematuria   MUSCKL: No enthesitis, plantar fascitis or heel pain. +see above   INTEGUMENTARY: No concerning lesions or moles +see above   NEURO: No loss of strength or sensation, no numbness or tingling, no tremor, no dizziness, no headache. No falls   ENDO: No polyuria or polydipsia, no temperature intolerance   HEME/LYMPH:No concerning bumps, bleeding problems, or swollen lymph nodes. No recent infections, hospitalizations or new illnesses.   ALLERGY: No environmental allergies   PSYCH:No depression or anxiety, no sleep problems.  Otherwise 14 point ROS obtained, reviewed and found negative.     Physical exam:  Vitals: Blood pressure 153/88, pulse 88, temperature 98  F (36.7  C), temperature source Oral, height 1.753 m (5' 9\"), weight 82.1 kg (181 lb 1.6 oz), SpO2 98 %.  Wt Readings from Last 4 Encounters:   04/11/19 82.1 kg (181 lb 1.6 oz)   03/25/19 83.5 kg (184 lb)   07/30/18 77.6 kg (171 lb)   06/19/18 82.6 kg (182 lb)     Constitutional: WD-WN-WG cooperative  Eyes: nl EOM, PERRLA, vision, conjunctiva, sclera, No Unilateral or bilateral external ear inflammation   ENT: nl external ears, nose, hearing, lips, teeth, gums, throat. No saddle nose   Neck: no mass or thyroid enlargement  Resp: lungs clear to auscultation, nl to palpation, nl effort  CV: RRR, no murmurs, rubs or gallops, no edema  GI: no ABD mass or tenderness, no HSM  : not tested  Lymph: no cervical or epitrochlear nodes  MS: 2+ swelling in all MCPs bilateral, 2-5th DIP with heberones, all PIPs, 2-3+ swelling at base of thumbs in CMCs, fist formation <1/3 with not able to straighten his fingers, reduced flexion of wrists, tailors bunions, bunions, hammertoes, dactylitis of 2-5th fingers, bilateral 4th toes, s/t MTPs, " reduced flexion left ankle. Tender over thoracic spine. Right wrist s/t, tender in bicep tendons. Right elbow 15 degree contracture, left 10 degree. All TMJ, neck, shoulder, elbow, wrist, hand, spine, hip, knee, ankle, and foot joints were examined and otherwise found normal. Negative Lhermitte's sign. No periuncle erythema  Skin: no nail pitting, alopecia. +dry scaley psoriasis elbows, knees, scattered spots on legs, ears.   Neuro: nl cranial nerves, strength, sensation, DTRs.   Psych: nl judgement, orientation, memory, affect.      Labs/Imaging:  Results for orders placed or performed in visit on 03/25/19   UA with Microscopic   Result Value Ref Range    Color Urine Yellow     Appearance Urine Clear     Glucose Urine Negative NEG^Negative mg/dL    Bilirubin Urine Negative NEG^Negative    Ketones Urine Negative NEG^Negative mg/dL    Specific Gravity Urine >1.030 1.003 - 1.035    pH Urine 5.5 5.0 - 7.0 pH    Protein Albumin Urine Trace (A) NEG^Negative mg/dL    Urobilinogen Urine 0.2 0.2 - 1.0 EU/dL    Nitrite Urine Negative NEG^Negative    Blood Urine Trace (A) NEG^Negative    Leukocyte Esterase Urine Negative NEG^Negative    Source Midstream Urine     WBC Urine 0 - 5 OTO5^0 - 5 /HPF    RBC Urine 2-5 (A) OTO2^O - 2 /HPF    Mucous Urine Present (A) NEG^Negative /LPF   CBC with platelets differential   Result Value Ref Range    WBC 5.5 4.0 - 11.0 10e9/L    RBC Count 4.92 4.4 - 5.9 10e12/L    Hemoglobin 15.0 13.3 - 17.7 g/dL    Hematocrit 43.8 40.0 - 53.0 %    MCV 89 78 - 100 fl    MCH 30.5 26.5 - 33.0 pg    MCHC 34.2 31.5 - 36.5 g/dL    RDW 13.0 10.0 - 15.0 %    Platelet Count 210 150 - 450 10e9/L    % Neutrophils 55.3 %    % Lymphocytes 34.1 %    % Monocytes 8.8 %    % Eosinophils 1.1 %    % Basophils 0.7 %    Absolute Neutrophil 3.1 1.6 - 8.3 10e9/L    Absolute Lymphocytes 1.9 0.8 - 5.3 10e9/L    Absolute Monocytes 0.5 0.0 - 1.3 10e9/L    Absolute Eosinophils 0.1 0.0 - 0.7 10e9/L    Absolute Basophils 0.0 0.0 - 0.2  10e9/L    Diff Method Automated Method    Comprehensive metabolic panel   Result Value Ref Range    Sodium 141 133 - 144 mmol/L    Potassium 3.9 3.4 - 5.3 mmol/L    Chloride 111 (H) 94 - 109 mmol/L    Carbon Dioxide 24 20 - 32 mmol/L    Anion Gap 6 3 - 14 mmol/L    Glucose 100 (H) 70 - 99 mg/dL    Urea Nitrogen 13 7 - 30 mg/dL    Creatinine 0.72 0.66 - 1.25 mg/dL    GFR Estimate >90 >60 mL/min/[1.73_m2]    GFR Estimate If Black >90 >60 mL/min/[1.73_m2]    Calcium 8.8 8.5 - 10.1 mg/dL    Bilirubin Total 0.8 0.2 - 1.3 mg/dL    Albumin 3.8 3.4 - 5.0 g/dL    Protein Total 7.7 6.8 - 8.8 g/dL    Alkaline Phosphatase 118 40 - 150 U/L    ALT 27 0 - 70 U/L    AST 25 0 - 45 U/L   Lipid Profile   Result Value Ref Range    Cholesterol 172 <200 mg/dL    Triglycerides 115 <150 mg/dL    HDL Cholesterol 34 (L) >39 mg/dL    LDL Cholesterol Calculated 115 (H) <100 mg/dL    Non HDL Cholesterol 138 (H) <130 mg/dL   Prostate spec antigen screen   Result Value Ref Range    PSA 3.68 0 - 4 ug/L   ESR: Erythrocyte sedimentation rate   Result Value Ref Range    Sed Rate 9 0 - 20 mm/h   CRP, inflammation   Result Value Ref Range    CRP Inflammation 12.0 (H) 0.0 - 8.0 mg/L       Impression/Plan:  1. Psoriatic arthritis with evidence of rheumatoid arthritis (RA) dactylitis, synovitis, tendonitis, enthesitis, contractures in elbows, hammertoes, tailors bunions, reduced wrist ROM, EAS, elevated CRP with +HLA B27 with dramatic prednisone response. Discussed methotrexate (see below) contraindicated, hydroxychloroquine (plaquenil) contraindicated as may worsen his psoriasis, aprimilast (otezla) contraindicated (see below), thus discussed sulfasalazine (this would not control his severe arthritis and would not help his psoriasis) vs adalimumab (humira) he wishes to do the latter. Will refer him to [facility] for formal educations. We will do x-rays to look for erosions and joint damage, screen hep c/b and MTB QG screen for viral disease before trial of  "adalimumab (humira), check uric acid (gout), CCP for rheumatoid arthritis (RA) and ANTHONY. He may try medrol dose pack 2 weeks after shingrix vaccine, then may start the immunosuppression after he gets more information.   2. Psoriasis, referred to dermatology for formal dx   3. Thoracic pain with radiculopathy, history disc \"pop\" and surgery residual peripheral neuropathy,. Will do thoracic x-rays and refer him to ortho for formal evaluation and treatment  4. Depression w/daily alcohol use. Reports he is not ready to quit alcohol use, I did give him Dr. Juno Gloria contact information for psychology if he wishes or his PCP. Methotrexate contraindicated due to daily alcohol use as this is risk of liver toxicity and he would need to quit, which he does not feel he is able to at this time. This may also worsen depression. Aprimilast (otezla) contraindicated as may worsen depression and cause suicidal ideations and this is concern as well.     RTC 3 weeks    The patient understood the rationale for the diagnosis and treatment plan. Patient shared in the decision making. All questions were answered to best of my ability and the patient's satisfaction  Emiliano DELGADO, CNP, MSN  [facility]  Department of Rheumatology & Autoimmune Disorders    1. Immunization: Reviewed CDC guidelines with patient updated, information provided to patient based on CDC guidelines for vaccination recommendations, patient will discuss with primary provider, he will get the shingrix vaccine   2. Bone Health:Educated on adequate calcium and vitamin D intake, Educated on exercise, Glucocorticoid education discussed risks, benefits & potential long term side effects from use  3. Discussed routine annual physicals, cancer screening, cardiac risk monitoring, bone health and primary care with primary care provider.   4. Educated on diagnosis, prognosis, labs, imaging, treatment options (including risks, benefits, risk of no " treatment), medications (use, dose, side-effects, risks of medications including infection/cancer/bone marrow suppression, lab monitoring), infections what to do, plan of cares, goals of treatment.     Humira risks versus benefits discussed. Relative contra-indication: Multiple sclerosis; CHF; solid malignancy in last 5 years. Discussed side effects: reactions to medication, infections, demyelinating disorder, malignancy.   TB, HCV, HBV, screening  Ordered.  Immunizations to be given : CBC periodically to watch for neutropenia.   Methotrexate risks versus benefits discussed.     Discussed side effects: bone marrow, liver, lung, GI, exacerbation of rheumatoid nodules if present. May be an increased risk of lymphoma. Kidney toxicity not common with low dose MTX therapy however renal failure can increase risk of MTX toxicity. Compliance with lab monitoring discussed. Studies on methotrexate have shown mortality benefit in rheumatoid arthritis patients. Discussed that it is a weekly medication and takes up to few months for maximum drug effectiveness. In women with exposure very early during pregnancy, the risk of congenital anomalies may be lower than 9 percent.   Would need folic acid 1mg PO daily.     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 Recommends checking iron/TIBC.  and serum calcium and phosphorus/PTH, since hemachromatosis and parathyroid disorders could be associated with those bony changes

## 2019-04-11 NOTE — NURSING NOTE
Chief Complaint   Patient presents with     Consult     inflammatory arthritis     Renata Miles MA

## 2019-04-11 NOTE — LETTER
"4/11/2019      RE: Dale Cuevas  [address] [facility] [id number]       [facility] - Rheumatology Clinic Visit    Dale Cuevas  is a 68 year old here for consultation for Inflammatory arthritis  +CRP 12 +HLA B27 -RF, history gout on allopurinol per PCP (uric acid 4.7 7-2018)     Review-  Past-celebrex not effective, aleve has helped some, prednisone 2018 dramatic improvement x 5-7 day course     Initial visit April 11, 2019  Emiliano King APRN:   Pain with swelling started 1-2 year ago in base of thumbs, hands, MCPs, PIPs, elbows, balls of the feet, swelling in the fingers (sausage) 2-5th bilateral and left 2-5th sausage toes, pain arms to forearm, not able to put rings on, right 4th sausage toes, not able to make a fist, pushes this hard to make a fist and not able to straighten the fingers which is worse in the mornings, lasts all day. Psoriasis on his [elbows, knees, scattered on legs,back of the ears] dry flakey. Reports this is making him depressed, and would be concerned of starting aprimilast (otezla) as this can worsen this. He has started drinking 2-3 drinks a day for the past years due to the pain. The whole hand swells, and then he cant use them. He has pain in the middle part of his spine then this shoots over to his arm, has past had a \"popped disc\" in 1989 then was paralzyed had to have ER surgery and learn to walk again thus suffers peripheral neuropathy as a result from his knees down, this is impacting his ability to lay flat as he gets severe pain and hard to sleep. Denies any fever, chills, SOB, QUEZADA, night sweats, or chest pain, or cough. Reports healthy. celebrex BID, tolerating. Not effective. On allopurinol for gout, reports this is not gout. No nail pitting.       PMH:  Injury-right thumb hammer on this a few times   Medical-gout, BPH, HLD, psoriasis, hypertension, rosacea, ED, URI, chicken pox, right cataract, peripheral neuropathy feet to knee \"feel heat and " "pain, like my feet are asleep and sensitive to hot and cold) from prior spine injury 1989, depression     Surgical-left wrist tendon sheath 1-2015, T8-9 (paralzyed for 30 min, learned to walk and stand, \"disc popped) removed rib spine surgery for disc fragment removed 1989 (DDD), right knee arthroscopy, right retinal surgery 2018 (wrinkle on retinal), injections base of thumbs     FH:  No autoimmune disorders, psoriasis, UC, crohn's, SLE, RA, PsA, gout, autoimmune thyroid.  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 Quit >50 yr ago smoking. No IVDU. No Children. Left handed. Partner. Retired  and professional [employer]. Combat vet [place]       [facility] personally reviewed and updated by me.    ROS:  Negative raynaud s phenomena, hairloss, sun sensitivities, keratoconjunctivitis sicca, pleurisy, inflammatory joint symptoms, significant rashes like malar, oral/nasal or ulcerations, inflammatory eye disease, inflammatory bowel disease, tenosynovitis, or enthespathy. Negative for blood clots, gout, UC, crohn's. No temporal headache, no jaw claudication, no scalp tenderness, vision changes, carotidynia, cough. No Parotid swelling. Denies international travel. Denies history of pneumonia, hepatitis, tuberculosis, shingles, sepsis, tick bites or other infections.   +psoriasis   +see above   CONSTITUTIONAL: No fevers, night sweats or unintentional weight change. No acute distress, swollen glands  EYES: No vision change, diplopia, pain in eyes or red eyes   EARS, NOSE, MOUTH, THROAT: No tinnitus or hearing change, no epistaxis or nasal discharge, no oral lesions, throat clear. Normal saliva pool.  No drymouth. No " "thyroid  enlargement.   CARDIOVASCULAR: No chest pain, palpitations, or pain with walking, no orthopnea or PND   RESPIRATORY: No dyspnea, cough, shortness of breath or wheezing. No pleurisy.   GI: No nausea, vomiting, diarrhea or constipation, no abdominal pain, or blood in stools.   : No change in urine, no dysuria or hematuria   MUSCKL: No enthesitis, plantar fascitis or heel pain. +see above   INTEGUMENTARY: No concerning lesions or moles +see above   NEURO: No loss of strength or sensation, no numbness or tingling, no tremor, no dizziness, no headache. No falls   ENDO: No polyuria or polydipsia, no temperature intolerance   HEME/LYMPH:No concerning bumps, bleeding problems, or swollen lymph nodes. No recent infections, hospitalizations or new illnesses.   ALLERGY: No environmental allergies   PSYCH:No depression or anxiety, no sleep problems.  Otherwise 14 point ROS obtained, reviewed and found negative.     Physical exam:  Vitals: Blood pressure 153/88, pulse 88, temperature 98  F (36.7  C), temperature source Oral, height 1.753 m (5' 9\"), weight 82.1 kg (181 lb 1.6 oz), SpO2 98 %.  Wt Readings from Last 4 Encounters:   04/11/19 82.1 kg (181 lb 1.6 oz)   03/25/19 83.5 kg (184 lb)   07/30/18 77.6 kg (171 lb)   06/19/18 82.6 kg (182 lb)     Constitutional: WD-WN-WG cooperative  Eyes: nl EOM, PERRLA, vision, conjunctiva, sclera, No Unilateral or bilateral external ear inflammation   ENT: nl external ears, nose, hearing, lips, teeth, gums, throat. No saddle nose   Neck: no mass or thyroid enlargement  Resp: lungs clear to auscultation, nl to palpation, nl effort  CV: RRR, no murmurs, rubs or gallops, no edema  GI: no ABD mass or tenderness, no HSM  : not tested  Lymph: no cervical or epitrochlear nodes  MS: 2+ swelling in all MCPs bilateral, 2-5th DIP with heberones, all PIPs, 2-3+ swelling at base of thumbs in CMCs, fist formation <1/3 with not able to straighten his fingers, reduced flexion of wrists, tailors " bunions, bunions, hammertoes, dactylitis of 2-5th fingers, bilateral 4th toes, s/t MTPs, reduced flexion left ankle. Tender over thoracic spine. Right wrist s/t, tender in bicep tendons. Right elbow 15 degree contracture, left 10 degree. All TMJ, neck, shoulder, elbow, wrist, hand, spine, hip, knee, ankle, and foot joints were examined and otherwise found normal. Negative Lhermitte's sign. No periuncle erythema  Skin: no nail pitting, alopecia. +dry scaley psoriasis elbows, knees, scattered spots on legs, ears.   Neuro: nl cranial nerves, strength, sensation, DTRs.   Psych: nl judgement, orientation, memory, affect.      Labs/Imaging:  Results for orders placed or performed in visit on 03/25/19   UA with Microscopic   Result Value Ref Range    Color Urine Yellow     Appearance Urine Clear     Glucose Urine Negative NEG^Negative mg/dL    Bilirubin Urine Negative NEG^Negative    Ketones Urine Negative NEG^Negative mg/dL    Specific Gravity Urine >1.030 1.003 - 1.035    pH Urine 5.5 5.0 - 7.0 pH    Protein Albumin Urine Trace (A) NEG^Negative mg/dL    Urobilinogen Urine 0.2 0.2 - 1.0 EU/dL    Nitrite Urine Negative NEG^Negative    Blood Urine Trace (A) NEG^Negative    Leukocyte Esterase Urine Negative NEG^Negative    Source Midstream Urine     WBC Urine 0 - 5 OTO5^0 - 5 /HPF    RBC Urine 2-5 (A) OTO2^O - 2 /HPF    Mucous Urine Present (A) NEG^Negative /LPF   CBC with platelets differential   Result Value Ref Range    WBC 5.5 4.0 - 11.0 10e9/L    RBC Count 4.92 4.4 - 5.9 10e12/L    Hemoglobin 15.0 13.3 - 17.7 g/dL    Hematocrit 43.8 40.0 - 53.0 %    MCV 89 78 - 100 fl    MCH 30.5 26.5 - 33.0 pg    MCHC 34.2 31.5 - 36.5 g/dL    RDW 13.0 10.0 - 15.0 %    Platelet Count 210 150 - 450 10e9/L    % Neutrophils 55.3 %    % Lymphocytes 34.1 %    % Monocytes 8.8 %    % Eosinophils 1.1 %    % Basophils 0.7 %    Absolute Neutrophil 3.1 1.6 - 8.3 10e9/L    Absolute Lymphocytes 1.9 0.8 - 5.3 10e9/L    Absolute Monocytes 0.5 0.0 -  1.3 10e9/L    Absolute Eosinophils 0.1 0.0 - 0.7 10e9/L    Absolute Basophils 0.0 0.0 - 0.2 10e9/L    Diff Method Automated Method    Comprehensive metabolic panel   Result Value Ref Range    Sodium 141 133 - 144 mmol/L    Potassium 3.9 3.4 - 5.3 mmol/L    Chloride 111 (H) 94 - 109 mmol/L    Carbon Dioxide 24 20 - 32 mmol/L    Anion Gap 6 3 - 14 mmol/L    Glucose 100 (H) 70 - 99 mg/dL    Urea Nitrogen 13 7 - 30 mg/dL    Creatinine 0.72 0.66 - 1.25 mg/dL    GFR Estimate >90 >60 mL/min/[1.73_m2]    GFR Estimate If Black >90 >60 mL/min/[1.73_m2]    Calcium 8.8 8.5 - 10.1 mg/dL    Bilirubin Total 0.8 0.2 - 1.3 mg/dL    Albumin 3.8 3.4 - 5.0 g/dL    Protein Total 7.7 6.8 - 8.8 g/dL    Alkaline Phosphatase 118 40 - 150 U/L    ALT 27 0 - 70 U/L    AST 25 0 - 45 U/L   Lipid Profile   Result Value Ref Range    Cholesterol 172 <200 mg/dL    Triglycerides 115 <150 mg/dL    HDL Cholesterol 34 (L) >39 mg/dL    LDL Cholesterol Calculated 115 (H) <100 mg/dL    Non HDL Cholesterol 138 (H) <130 mg/dL   Prostate spec antigen screen   Result Value Ref Range    PSA 3.68 0 - 4 ug/L   ESR: Erythrocyte sedimentation rate   Result Value Ref Range    Sed Rate 9 0 - 20 mm/h   CRP, inflammation   Result Value Ref Range    CRP Inflammation 12.0 (H) 0.0 - 8.0 mg/L       Impression/Plan:  1. Psoriatic arthritis with evidence of rheumatoid arthritis (RA) dactylitis, synovitis, tendonitis, enthesitis, contractures in elbows, hammertoes, tailors bunions, reduced wrist ROM, EAS, elevated CRP with +HLA B27 with dramatic prednisone response. Discussed methotrexate (see below) contraindicated, hydroxychloroquine (plaquenil) contraindicated as may worsen his psoriasis, aprimilast (otezla) contraindicated (see below), thus discussed sulfasalazine (this would not control his severe arthritis and would not help his psoriasis) vs adalimumab (humira) he wishes to do the latter. Will refer him to [facility] for formal educations. We will do x-rays to look for  "erosions and joint damage, screen hep c/b and MTB QG screen for viral disease before trial of adalimumab (humira), check uric acid (gout), CCP for rheumatoid arthritis (RA) and ANTHONY. He may try medrol dose pack 2 weeks after shingrix vaccine, then may start the immunosuppression after he gets more information.   2. Psoriasis, referred to dermatology for formal dx   3. Thoracic pain with radiculopathy, history disc \"pop\" and surgery residual peripheral neuropathy,. Will do thoracic x-rays and refer him to ortho for formal evaluation and treatment  4. Depression w/daily alcohol use. Reports he is not ready to quit alcohol use, I did give him Dr. Juno Gloria contact information for psychology if he wishes or his PCP. Methotrexate contraindicated due to daily alcohol use as this is risk of liver toxicity and he would need to quit, which he does not feel he is able to at this time. This may also worsen depression. Aprimilast (otezla) contraindicated as may worsen depression and cause suicidal ideations and this is concern as well.     RTC 3 weeks    The patient understood the rationale for the diagnosis and treatment plan. Patient shared in the decision making. All questions were answered to best of my ability and the patient's satisfaction  Emiliano DELGADO, CNP, MSN  [facility]  Department of Rheumatology & Autoimmune Disorders    1. Immunization: Reviewed CDC guidelines with patient updated, information provided to patient based on CDC guidelines for vaccination recommendations, patient will discuss with primary provider, he will get the shingrix vaccine   2. Bone Health:Educated on adequate calcium and vitamin D intake, Educated on exercise, Glucocorticoid education discussed risks, benefits & potential long term side effects from use  3. Discussed routine annual physicals, cancer screening, cardiac risk monitoring, bone health and primary care with primary care provider.   4. Educated on " diagnosis, prognosis, labs, imaging, treatment options (including risks, benefits, risk of no treatment), medications (use, dose, side-effects, risks of medications including infection/cancer/bone marrow suppression, lab monitoring), infections what to do, plan of cares, goals of treatment.     Humira risks versus benefits discussed. Relative contra-indication: Multiple sclerosis; CHF; solid malignancy in last 5 years. Discussed side effects: reactions to medication, infections, demyelinating disorder, malignancy.   TB, HCV, HBV, screening  Ordered.  Immunizations to be given : CBC periodically to watch for neutropenia.   Methotrexate risks versus benefits discussed.     Discussed side effects: bone marrow, liver, lung, GI, exacerbation of rheumatoid nodules if present. May be an increased risk of lymphoma. Kidney toxicity not common with low dose MTX therapy however renal failure can increase risk of MTX toxicity. Compliance with lab monitoring discussed. Studies on methotrexate have shown mortality benefit in rheumatoid arthritis patients. Discussed that it is a weekly medication and takes up to few months for maximum drug effectiveness. In women with exposure very early during pregnancy, the risk of congenital anomalies may be lower than 9 percent.   Would need folic acid 1mg PO daily.     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 Recommends checking iron/TIBC.  and serum calcium and phosphorus/PTH, since hemachromatosis and parathyroid disorders could be associated with those bony changes     (4) no impairment

## 2019-04-12 ENCOUNTER — ANCILLARY PROCEDURE (OUTPATIENT)
Dept: GENERAL RADIOLOGY | Facility: CLINIC | Age: 69
End: 2019-04-12
Attending: FAMILY MEDICINE
Payer: COMMERCIAL

## 2019-04-12 ENCOUNTER — PRE VISIT (OUTPATIENT)
Dept: ORTHOPEDICS | Facility: CLINIC | Age: 69
End: 2019-04-12

## 2019-04-12 ENCOUNTER — OFFICE VISIT (OUTPATIENT)
Dept: ORTHOPEDICS | Facility: CLINIC | Age: 69
End: 2019-04-12
Payer: COMMERCIAL

## 2019-04-12 VITALS — BODY MASS INDEX: 26.81 KG/M2 | WEIGHT: 181 LBS | HEIGHT: 69 IN | RESPIRATION RATE: 16 BRPM

## 2019-04-12 DIAGNOSIS — M54.14 THORACIC RADICULOPATHY: ICD-10-CM

## 2019-04-12 DIAGNOSIS — M54.2 NECK PAIN: ICD-10-CM

## 2019-04-12 DIAGNOSIS — M54.6 PAIN IN THORACIC SPINE: ICD-10-CM

## 2019-04-12 DIAGNOSIS — M79.10 TRIGGER POINT: ICD-10-CM

## 2019-04-12 DIAGNOSIS — M54.2 NECK PAIN: Primary | ICD-10-CM

## 2019-04-12 LAB
ANA PAT SER IF-IMP: ABNORMAL
ANA SER QL IF: ABNORMAL
ANA TITR SER IF: ABNORMAL {TITER}
CALCIUM SERPL-MCNC: 9.2 MG/DL (ref 8.5–10.1)
HBV CORE AB SERPL QL IA: NONREACTIVE
HBV SURFACE AG SERPL QL IA: NONREACTIVE
HCV AB SERPL QL IA: NONREACTIVE
IRON SERPL-MCNC: 50 UG/DL (ref 35–180)
PHOSPHATE SERPL-MCNC: 3.3 MG/DL (ref 2.5–4.5)

## 2019-04-12 ASSESSMENT — MIFFLIN-ST. JEOR: SCORE: 1571.39

## 2019-04-12 NOTE — TELEPHONE ENCOUNTER
RECORDS RECEIVED FROM: Inflammatory arthritis, Psoriasis, Psoriatic arthritis, Acute midline thoracic back pain, records are internal, Appt per Pt and [facility]   DATE RECEIVED: 4/12   NOTES STATUS DETAILS   OFFICE NOTE from referring provider Internal    OFFICE NOTE from other specialist Internal    DISCHARGE SUMMARY from hospital N/A    DISCHARGE REPORT from the ER N/A    OPERATIVE REPORT N/A    MEDICATION LIST Internal    IMPLANT RECORD/STICKER N/A    LABS     CBC/DIFF Internal    CULTURES N/A    INJECTIONS DONE IN RADIOLOGY N/A    MRI N/A    CT SCAN N/A    XRAYS (IMAGES & REPORTS) Internal    TUMOR     PATHOLOGY  Slides & report n/a

## 2019-04-12 NOTE — PROGRESS NOTES
" Subjective:   Dale Cuevas is a 68 year old male who presents with thoracic/cervical spine pain with numbing pain down right upper extremity. Prior hx of T8-T9 disc herniation.     He states that he has had several weeks of lower cervical and upper thoracic discomfort.  He denies any trauma.  He states that he feels like maybe he would need a massage.  He describes the pain as radiating into the right trapezius and then it can radiate down the entire arm involving the entire right arm but he tends to notice it more strongly in the axilla and medial upper arm.  He denies any weakness associated with it.  He had thoracic spine films obtained yesterday and those are reviewed.    Background:   Date of injury: None  Duration of symptoms: 2 weeks  Mechanism of Injury: Insidious Onset; Unknown   Intensity: 9/10 at the worst   Aggravating factors: Lying flat, rolling over in bed  Relieving Factors: Nothing- has been on Celebrex and started medrol dose pack today   Prior Evaluation: Dr. King, xrays    PAST MEDICAL, SOCIAL, SURGICAL AND FAMILY HISTORY: He  has no past medical history on file.  He  has a past surgical history that includes orthopedic surgery (Jan 12, 2015); orthopedic surgery (1989); and orthopedic surgery (2014).  His family history includes Breast Cancer in his sister; Hypertension in his mother; Prostate Cancer (age of onset: 72) in his father.  He reports that he quit smoking about 45 years ago. He has never used smokeless tobacco. He reports that he drinks alcohol. He reports that he does not use drugs.    ALLERGIES: He is allergic to levofloxacin.    CURRENT MEDICATIONS: He has a current medication list which includes the following prescription(s): allopurinol, atorvastatin, celecoxib, methylprednisolone, and tamsulosin.     REVIEW OF SYSTEMS: 10 point review of systems is negative except as noted above.     Exam:   Resp 16   Ht 1.753 m (5' 9\")   Wt 82.1 kg (181 lb)   BMI 26.73 kg/m      "   CONSTITUTIONAL: healthy, alert and no distress  HEAD: Normocephalic. No masses, lesions, tenderness or abnormalities  SKIN: no suspicious lesions or rashes  GAIT: normal  NEUROLOGIC: Non-focal, Normal muscle tone and strength, reflexes normal, sensation grossly normal.  PSYCHIATRIC: affect normal/bright and mentation appears normal.    MUSCULOSKELETAL:   CERVICAL SPINE: tender over the cervical spine at approx C6; ROM of demonstrates full and painless flexion, limited and painful extension, limited and painful extension rotation to the right, limited and painful extension rotation to the left, (Negative) Spurling's maneuver; motor examination demonstrates symmetric motor strength (5/5) bilaterally. Sensation to light touch is intact in the bilateral upper extremities. Negative schmidt's.    DTRs   R   L  Triceps  trace   trace  Biceps   trace   trace  Brachioradialis trace   trace    THORACIC SPINE: nontender    R SHOULDER GIRDLE: there is a discrete trigger point in the proximal trapezius which is tender to palpation. No reproduction of arm symptoms with manipulation.     Cervical spine films demonstrate degenerative changes at C5-C6 and C7 with large anterior osteophyte complex between C5 and C6.     Assessment/Plan:   (M54.2) Neck pain  (primary encounter diagnosis  Plan: X-ray Cervical Spine 2-3 vws, MRI Cervical         spine w/o contrast    (M54.6) Pain in thoracic spine  Plan: MR Thoracic Spine w/o Contrast          Dale is a 68-year-old man with psoriatic arthritis who presents with unclear symptoms but suggestion of certainly possible T1 radiculopathy on the right.  He also has significant degenerative change in the mid cervical spine and a myofascial trigger point on the right all of which can produce or contribute to some of the symptoms.  To that end we will can proceed with an MRI of the cervical and thoracic spine.  He will return for 30-minute follow-up with me after he has sees images  completed.    Thank you for allowing me to participate in his care.

## 2019-04-12 NOTE — RESULT ENCOUNTER NOTE
Reviewed x-rays, showing erosive osteoarthritis notably in CMCs bilaterally , RC/triscphase (wrist) and narrowing in 3rd MCPs, question of hemachromatosis and CCPD. Will add more labs. Feet x-rays show degenerative changes as well 1st MTPs and left 3-4 MTP. Narrowing of the 3rd MCPs suggests features of rheumatoid arthritis (RA)

## 2019-04-12 NOTE — LETTER
4/12/2019      RE: Dale Cuevas  [address] [facility] [address]        Subjective:   Dale Cuevas is a 68 year old male who presents with thoracic/cervical spine pain with numbing pain down right upper extremity. Prior hx of T8-T9 disc herniation.     He states that he has had several weeks of lower cervical and upper thoracic discomfort.  He denies any trauma.  He states that he feels like maybe he would need a massage.  He describes the pain as radiating into the right trapezius and then it can radiate down the entire arm involving the entire right arm but he tends to notice it more strongly in the axilla and medial upper arm.  He denies any weakness associated with it.  He had thoracic spine films obtained yesterday and those are reviewed.    Background:   Date of injury: None  Duration of symptoms: 2 weeks  Mechanism of Injury: Insidious Onset; Unknown   Intensity: 9/10 at the worst   Aggravating factors: Lying flat, rolling over in bed  Relieving Factors: Nothing- has been on Celebrex and started medrol dose pack today   Prior Evaluation: Dr. King, xrays    PAST MEDICAL, SOCIAL, SURGICAL AND FAMILY HISTORY: He  has no past medical history on file.  He  has a past surgical history that includes orthopedic surgery (Jan 12, 2015); orthopedic surgery (1989); and orthopedic surgery (2014).  His family history includes Breast Cancer in his sister; Hypertension in his mother; Prostate Cancer (age of onset: 72) in his father.  He reports that he quit smoking about 45 years ago. He has never used smokeless tobacco. He reports that he drinks alcohol. He reports that he does not use drugs.    ALLERGIES: He is allergic to levofloxacin.    CURRENT MEDICATIONS: He has a current medication list which includes the following prescription(s): allopurinol, atorvastatin, celecoxib, methylprednisolone, and tamsulosin.     REVIEW OF SYSTEMS: 10 point review of systems is negative except as noted above.     Exam:  "  Resp 16   Ht 1.753 m (5' 9\")   Wt 82.1 kg (181 lb)   BMI 26.73 kg/m         CONSTITUTIONAL: healthy, alert and no distress  HEAD: Normocephalic. No masses, lesions, tenderness or abnormalities  SKIN: no suspicious lesions or rashes  GAIT: normal  NEUROLOGIC: Non-focal, Normal muscle tone and strength, reflexes normal, sensation grossly normal.  PSYCHIATRIC: affect normal/bright and mentation appears normal.    MUSCULOSKELETAL:   CERVICAL SPINE: tender over the cervical spine at approx C6; ROM of demonstrates full and painless flexion, limited and painful extension, limited and painful extension rotation to the right, limited and painful extension rotation to the left, (Negative) Spurling's maneuver; motor examination demonstrates symmetric motor strength (5/5) bilaterally. Sensation to light touch is intact in the bilateral upper extremities. Negative schmidt's.    DTRs   R   L  Triceps  trace   trace  Biceps   trace   trace  Brachioradialis trace   trace    THORACIC SPINE: nontender    R SHOULDER GIRDLE: there is a discrete trigger point in the proximal trapezius which is tender to palpation. No reproduction of arm symptoms with manipulation.     Cervical spine films demonstrate degenerative changes at C5-C6 and C7 with large anterior osteophyte complex between C5 and C6.     Assessment/Plan:   (M54.2) Neck pain  (primary encounter diagnosis  Plan: X-ray Cervical Spine 2-3 vws, MRI Cervical         spine w/o contrast    (M54.6) Pain in thoracic spine  Plan: MR Thoracic Spine w/o Contrast          Dale is a 68-year-old man with psoriatic arthritis who presents with unclear symptoms but suggestion of certainly possible T1 radiculopathy on the right.  He also has significant degenerative change in the mid cervical spine and a myofascial trigger point on the right all of which can produce or contribute to some of the symptoms.  To that end we will can proceed with an MRI of the cervical and thoracic spine.  He " will return for 30-minute follow-up with me after he has sees images completed.    Thank you for allowing me to participate in his care.          Fredrick Faith MD

## 2019-04-14 LAB
GAMMA INTERFERON BACKGROUND BLD IA-ACNC: 0.07 IU/ML
M TB IFN-G BLD-IMP: NEGATIVE
M TB IFN-G CD4+ BCKGRND COR BLD-ACNC: >10 IU/ML
MITOGEN IGNF BCKGRD COR BLD-ACNC: 0 IU/ML
MITOGEN IGNF BCKGRD COR BLD-ACNC: 0 IU/ML

## 2019-04-15 ENCOUNTER — ANCILLARY PROCEDURE (OUTPATIENT)
Dept: MRI IMAGING | Facility: CLINIC | Age: 69
End: 2019-04-15
Attending: FAMILY MEDICINE
Payer: COMMERCIAL

## 2019-04-15 DIAGNOSIS — M54.6 PAIN IN THORACIC SPINE: ICD-10-CM

## 2019-04-15 DIAGNOSIS — M54.2 NECK PAIN: ICD-10-CM

## 2019-04-16 LAB — CCP AB SER IA-ACNC: 1 U/ML

## 2019-04-16 NOTE — TELEPHONE ENCOUNTER
FUTURE VISIT INFORMATION      FUTURE VISIT INFORMATION:    Date: 4/18    Time: 1240    Location: Derm  REFERRAL INFORMATION:    Referring provider:  Emiliano King APRN CNP    Referring providers clinic:  [facility]he[facility]    Reason for visit/diagnosis: psoriasis    RECORDS REQUESTED FROM:       Clinic name Comments Records Status Imaging Status   Internal 4/11/19 Referral and clinic note EPIC

## 2019-04-17 ENCOUNTER — OFFICE VISIT (OUTPATIENT)
Dept: PHARMACY | Facility: CLINIC | Age: 69
End: 2019-04-17
Payer: COMMERCIAL

## 2019-04-17 VITALS
BODY MASS INDEX: 26.26 KG/M2 | WEIGHT: 177.8 LBS | DIASTOLIC BLOOD PRESSURE: 91 MMHG | SYSTOLIC BLOOD PRESSURE: 154 MMHG | HEART RATE: 75 BPM

## 2019-04-17 DIAGNOSIS — L40.50 PSORIATIC ARTHRITIS (H): Primary | ICD-10-CM

## 2019-04-17 DIAGNOSIS — M10.9 GOUT, UNSPECIFIED CAUSE, UNSPECIFIED CHRONICITY, UNSPECIFIED SITE: ICD-10-CM

## 2019-04-17 DIAGNOSIS — E78.5 HYPERLIPIDEMIA LDL GOAL <100: ICD-10-CM

## 2019-04-17 DIAGNOSIS — R03.0 ELEVATED BLOOD PRESSURE READING WITHOUT DIAGNOSIS OF HYPERTENSION: ICD-10-CM

## 2019-04-17 DIAGNOSIS — L40.9 PSORIASIS: ICD-10-CM

## 2019-04-17 DIAGNOSIS — N40.0 BENIGN PROSTATIC HYPERPLASIA, UNSPECIFIED WHETHER LOWER URINARY TRACT SYMPTOMS PRESENT: ICD-10-CM

## 2019-04-17 PROCEDURE — 99605 MTMS BY PHARM NP 15 MIN: CPT | Performed by: PHARMACIST

## 2019-04-17 PROCEDURE — 99607 MTMS BY PHARM ADDL 15 MIN: CPT | Performed by: PHARMACIST

## 2019-04-17 NOTE — PROGRESS NOTES
"SUBJECTIVE/OBJECTIVE:                           Dale Cuevas is a 68 year old male coming in for an initial visit for Medication Therapy Management.  He was referred to me from Emiliano DELGADO.     Chief Complaint: Potential humira/sulfasalazine start    Allergies/ADRs: Reviewed in Epic  Tobacco: No tobacco use  Alcohol: 1-3/day  Caffeine: little caffeine    Medication Adherence/Access:  no issues reported    Psoriasis/Psoriatic Arthritis:  Medrol dosepak - still some pills left  Celecoxib 200 mg BID    Feels swelling is improved, symptoms have improved. He was prescribed Celebrex by Dr. Espinoza, but says that he is not really using this as it made him feel off. Reports history of multiple strong reactions to drugs - including caffeine where he coded likely due to electrolyte loss.     He recently saw Emiliano DELGADO on 4/11. Per that note \"Discussed methotrexate (see below) contraindicated, hydroxychloroquine (plaquenil) contraindicated as may worsen his psoriasis, aprimilast (otezla) contraindicated (see below), thus discussed sulfasalazine (this would not control his severe arthritis and would not help his psoriasis) vs adalimumab (humira) he wishes to do the latter. \" He is also scheduled to see Dermatology tomorrow.    Our discussion focused on Humira today at Dony's preference. Although sulfasalzine is a consideration, he prefers to use something that is really going to work. He is not concerned with self-injections. We reviewed major topics such as mechanism of action, general dosing, side effects (both common/serious), precautions, monitoring, and time to efficacy. Dony preferred to keep with the major warnings, as he realizes that the labeling will contain a more extensive list of possibilities. Reviewed recommended yearly skin checks with him. Encouraged compliance with recommended inactivated vaccines and avoidance of LIVE vaccines.     He is not sure about he Shingrix vaccines recommended by Emiliano. We " discussed that if he chooses to wait, it is less of a safety concern since Shingrix is inactivated - and more of a concern for decreased efficacy. He has a good relationship with Dr. Espinoza and prefers to hold-off on this until they are able to touch base.    Sed Rate   Date Value Ref Range Status   04/11/2019 15 0 - 20 mm/h Final     CRP Inflammation   Date Value Ref Range Status   04/11/2019 24.4 (H) 0.0 - 8.0 mg/L Final       Elevated Uric Acid:   Allopurinol 100 mg daily    Pt reports struggle with label of gout - as he doesn't know if he really ever had gout. Has inflammatory pain - but unsure sometimes if this is from gout or otherwise. No reported concerns.  Uric Acid   Date Value Ref Range Status   04/11/2019 3.5 3.5 - 7.2 mg/dL Final     Hyperlipidemia: Current therapy includes atorvastatin 40 mg once daily.  Pt reports no significant myalgias or other side effects.  The 10-year ASCVD risk score (Drebarber RUFF Jr., et al., 2013) is: 26.4%    Values used to calculate the score:      Age: 68 years      Sex: Male      Is Non- : No      Diabetic: No      Tobacco smoker: No      Systolic Blood Pressure: 154 mmHg      Is BP treated: No      HDL Cholesterol: 34 mg/dL      Total Cholesterol: 172 mg/dL    Blood Pressure:   No current therapy    Dony denies previous concerns with blood pressure. He reports last time in clinic it was high, and that was unusual for him. He is surprised by today's reading as well since nothing has changed. He denies symptoms of high blood pressure at this time. He is agreeable to having this re-checked with Dr. Espinoza.    BPH:   Tamsulosin 0.4 mg daily    Repots good efficacy of this, however, he did notice one of the side effects for him is erectile dysfunction. Denies dizziness. No other side effects reported. He is aware of ED treatments if needed.    Today's Vitals: BP (!) 154/91   Pulse 75   Wt 177 lb 12.8 oz (80.6 kg)   BMI 26.26 kg/m        ASSESSMENT:                              Current medications were reviewed today.     Medication Adherence: excellent, no issues identified    Psoriasis/Psoriatic Arthritis: Needs improvement. Dony would likely benefit from starting Humira pending dermatology visit. Given that Dony likely qualifies for multiple conditions, we usually defer to the higher dosing strategy - which in this case would be fore psoriasis. Dony is aware of this.    Elevated Uric Acid: Stable.     Hyperlipidemia: Stable.     Blood Pressure: Needs improvement. Agree that this is uncharacteristic for him given good control up until late March. He is still on Medrol, but he was not on his first check in the Rheum clinic. Encouraged him to have this re-checked. We discussed impact of variability between machine and manual blood pressures.    BPH: Stable based on report.    PLAN:                            1. See dermatology as planned  2. Mili to discuss Humira start with Emiliano (update: started by Dermatology)  3. Dony to consider Shingrix, prefers to discuss with Dr. Espinoza  4. Dony to re-check blood pressure with primary care    I spent 90 minutes with this patient today. I offer these suggestions for consideration by his care team. A copy of the visit note was provided to the patient's referring provider.    Will follow up in 1 month from medication start.    The patient was sent via [employer] a summary of these recommendations as an after visit summary.     Mili PhillipsD   MTM Pharmacist   Adult Rheumatology and IBD  Phone: [phone]

## 2019-04-17 NOTE — Clinical Note
heathi - looks like Derm started Humira as discussed. It was approved! Now they are working on free drug due to cost.

## 2019-04-18 ENCOUNTER — OFFICE VISIT (OUTPATIENT)
Dept: DERMATOLOGY | Facility: CLINIC | Age: 69
End: 2019-04-18
Payer: COMMERCIAL

## 2019-04-18 ENCOUNTER — PRE VISIT (OUTPATIENT)
Dept: DERMATOLOGY | Facility: CLINIC | Age: 69
End: 2019-04-18

## 2019-04-18 ENCOUNTER — TELEPHONE (OUTPATIENT)
Dept: DERMATOLOGY | Facility: CLINIC | Age: 69
End: 2019-04-18

## 2019-04-18 VITALS — HEART RATE: 68 BPM | DIASTOLIC BLOOD PRESSURE: 89 MMHG | SYSTOLIC BLOOD PRESSURE: 151 MMHG

## 2019-04-18 DIAGNOSIS — L40.50 PSORIATIC ARTHRITIS (H): ICD-10-CM

## 2019-04-18 DIAGNOSIS — L40.9 PSORIASIS: Primary | ICD-10-CM

## 2019-04-18 ASSESSMENT — PAIN SCALES - GENERAL: PAINLEVEL: NO PAIN (0)

## 2019-04-18 NOTE — PROGRESS NOTES
[facility] Dermatology Note      Dermatology Problem List:  1.Psoriasis with psoriatic arthritis - new diagnosis. Following with rheumatology for psoriatic arthritis.  - Current tx: plan to start adalimumab    Encounter Date: Apr 18, 2019    CC:  Chief Complaint   Patient presents with     Derm Problem     Patient is here today for psoriasis.         History of Present Illness:  Mr. Dale Cuevas is a 68 year old male who presents in consultation from Emiliano King, DANNY, CNP, for evaluation of possible psoriasis. Has had scaly patches on his elbows and knees with some satellite lesions for several years that improve at the end of the summer. They do not itch or cause pain. Has not done anything for the patches besides occasional hydrocortisone, which didn't do anything. He recently saw rheumatology for arthritis that has been present for six months. It is in his hands and feet and is worse in the morning. Recently completed a one week course of prednisone. He and Emiliano King from rheumatology discussed initiating adalimumab, which he was amenable to starting.     Past Medical History:   Patient Active Problem List   Diagnosis     Elevated blood pressure (not hypertension)     Rosacea     Erectile dysfunction     Psoriasis     Hyperlipidemia LDL goal <100     BPH (benign prostatic hypertrophy)     Family hx of prostate cancer     Diastasis recti     History reviewed. No pertinent past medical history.  Past Surgical History:   Procedure Laterality Date     ORTHOPEDIC SURGERY  Jan 12, 2015    left wrist     ORTHOPEDIC SURGERY  1989    spine sugery-with disc fragment removed     ORTHOPEDIC SURGERY  2014    right knee arthroscopy       Social History:  Patient reports that he quit smoking about 45 years ago. He has never used smokeless tobacco. He reports that he drinks alcohol. He reports that he does not use drugs.    Family History:  Family History   Problem Relation Age of Onset      Hypertension Mother      Prostate Cancer Father 72     Breast Cancer Sister        Medications:  Current Outpatient Medications   Medication Sig Dispense Refill     allopurinol (ZYLOPRIM) 100 MG tablet TAKE 1 TABLET BY MOUTH EVERY DAY. PLEASE MAKE APPT FOR LABS 30 tablet 3     atorvastatin (LIPITOR) 40 MG tablet Take 1 tablet (40 mg) by mouth daily 90 tablet 3     celecoxib (CELEBREX) 200 MG capsule Take 1 capsule (200 mg) by mouth 2 times daily 60 capsule 5     methylPREDNISolone (MEDROL DOSEPAK) 4 MG tablet therapy pack Take per package instructions. Take once a day by mouth 21 tablet 0     tamsulosin (FLOMAX) 0.4 MG capsule TAKE ONE CAPSULE BY MOUTH ONE TIME DAILY 90 capsule 2     Allergies   Allergen Reactions     Levofloxacin          Review of Systems:  -Constitutional: Otherwise feeling well today, in usual state of health. No fever, chills, or headache.   -Skin: As above in HPI. No additional skin concerns.    Physical exam:  Vitals: /84 (BP Location: Left arm, Patient Position: Chair, Cuff Size: Adult Regular)   Pulse 76   GEN: This is a well developed, well-nourished male in no acute distress, in a pleasant mood.    SKIN: Total skin excluding the undergarment areas was performed. The exam included the head/face, neck, both arms, chest, back, abdomen, both legs, digits and/or nails.   -Mcconnell phototype II  -Pink plaques with silvery scale on extensor elbows and anterior knees.   -Diffuse erythema with white scale on scalp  -No other lesions of concern on areas examined.     Impression/Plan:  1. Psoriasis with psoriatic arthritis- findings are classic for plaque psoriasis. Psoriatic arthritis appears to be the most bothersome. Based on Mr. Cuevas's risk factors and other co-morbidities, adalimumab would be the best choice. Please see note from Emiliano King, DANNY, CNP on 4/11/19 detailing decision making for avoiding other systemic medications such as methotrexate, hydroxychloroquine,  sulfasalazine, and apremilast.    Start adalimumab    Continue follow-up with rheumatology      CC DANNY Gallego CNP  [address] [address] on close of this encounter.  Follow-up in 3-6 months, earlier for new or changing lesions.     Staff Involved:  I,Fabienne Womack, MS4, saw and examined the patient in the presence of Dr. Jaden MD.      Staff Physician:  I was present with the medical student who participated in the service and in the documentation of the note. I have verified the history and personally performed the physical exam and medical decision making. I edited the assessment and plan of care as documented in the note.     Lopez Stanley MD   of Dermatology  Department of Dermatology  [facility]

## 2019-04-18 NOTE — LETTER
4/18/2019       RE: Dale Cuevas  [address] [id number]     Dear Colleague,    Thank you for referring your patient, Dale Cuevas, to the [facility] DERMATOLOGY at [facility]. Please see a copy of my visit note below.    [facility] Dermatology Note      Dermatology Problem List:  1.Psoriasis with psoriatic arthritis - new diagnosis. Following with rheumatology for psoriatic arthritis.  - Current tx: plan to start adalimumab    Encounter Date: Apr 18, 2019    CC:  Chief Complaint   Patient presents with     Derm Problem     Patient is here today for psoriasis.         History of Present Illness:  Mr. Dale Cuevas is a 68 year old male who presents in consultation from Emiliano King, APRN, CNP, for evaluation of possible psoriasis. Has had scaly patches on his elbows and knees with some satellite lesions for several years that improve at the end of the summer. They do not itch or cause pain. Has not done anything for the patches besides occasional hydrocortisone, which didn't do anything. He recently saw rheumatology for arthritis that has been present for six months. It is in his hands and feet and is worse in the morning. Recently completed a one week course of prednisone. He and Emiliano King from rheumatology discussed initiating adalimumab, which he was amenable to starting.     Past Medical History:   Patient Active Problem List   Diagnosis     Elevated blood pressure (not hypertension)     Rosacea     Erectile dysfunction     Psoriasis     Hyperlipidemia LDL goal <100     BPH (benign prostatic hypertrophy)     Family hx of prostate cancer     Diastasis recti     History reviewed. No pertinent past medical history.  Past Surgical History:   Procedure Laterality Date     ORTHOPEDIC SURGERY  Jan 12, 2015    left wrist     ORTHOPEDIC SURGERY  1989    spine sugery-with disc fragment removed     ORTHOPEDIC SURGERY  2014    right knee  arthroscopy       Social History:  Patient reports that he quit smoking about 45 years ago. He has never used smokeless tobacco. He reports that he drinks alcohol. He reports that he does not use drugs.    Family History:  Family History   Problem Relation Age of Onset     Hypertension Mother      Prostate Cancer Father 72     Breast Cancer Sister        Medications:  Current Outpatient Medications   Medication Sig Dispense Refill     allopurinol (ZYLOPRIM) 100 MG tablet TAKE 1 TABLET BY MOUTH EVERY DAY. PLEASE MAKE APPT FOR LABS 30 tablet 3     atorvastatin (LIPITOR) 40 MG tablet Take 1 tablet (40 mg) by mouth daily 90 tablet 3     celecoxib (CELEBREX) 200 MG capsule Take 1 capsule (200 mg) by mouth 2 times daily 60 capsule 5     methylPREDNISolone (MEDROL DOSEPAK) 4 MG tablet therapy pack Take per package instructions. Take once a day by mouth 21 tablet 0     tamsulosin (FLOMAX) 0.4 MG capsule TAKE ONE CAPSULE BY MOUTH ONE TIME DAILY 90 capsule 2     Allergies   Allergen Reactions     Levofloxacin          Review of Systems:  -Constitutional: Otherwise feeling well today, in usual state of health. No fever, chills, or headache.   -Skin: As above in HPI. No additional skin concerns.    Physical exam:  Vitals: /84 (BP Location: Left arm, Patient Position: Chair, Cuff Size: Adult Regular)   Pulse 76   GEN: This is a well developed, well-nourished male in no acute distress, in a pleasant mood.    SKIN: Total skin excluding the undergarment areas was performed. The exam included the head/face, neck, both arms, chest, back, abdomen, both legs, digits and/or nails.   -Mcconnell phototype II  -Pink plaques with silvery scale on extensor elbows and anterior knees.   -Diffuse erythema with white scale on scalp  -No other lesions of concern on areas examined.     Impression/Plan:  1. Psoriasis with psoriatic arthritis- findings are classic for plaque psoriasis. Psoriatic arthritis appears to be the most bothersome.  Based on Mr. Cuevas's risk factors and other co-morbidities, adalimumab would be the best choice. Please see note from DANNY Gallego, CNP on 4/11/19 detailing decision making for avoiding other systemic medications such as methotrexate, hydroxychloroquine, sulfasalazine, and apremilast.    Start adalimumab    Continue follow-up with rheumatology      CC DANNY Gallego CNP  [address] [place] [address] on close of this encounter.  Follow-up in 3-6 months, earlier for new or changing lesions.     Staff Involved:  I,Fabienne Womack, MS4, saw and examined the patient in the presence of Dr. Jaden MD.      Staff Physician:  I was present with the medical student who participated in the service and in the documentation of the note. I have verified the history and personally performed the physical exam and medical decision making. I edited the assessment and plan of care as documented in the note.     Lopez Stanley MD

## 2019-04-18 NOTE — TELEPHONE ENCOUNTER
PA Initiation    Medication: Humira CF 80mg/ 0.8mL & 40mg/ 0.4mL pens (Pso start kit & maintenance)  Insurance Company: [facility] - Phone [phone] Fax [phone]  Pharmacy Filling the Rx: [facility] - [place], [place] - [facility] [address]  Filling Pharmacy Phone: [phone]  Filling Pharmacy Fax:    Start Date: 4/18/2019    ** New start Humira; unable to tell cost so will also print off free drug forms in case Medicare copay is high

## 2019-04-18 NOTE — TELEPHONE ENCOUNTER
Prior Authorization Approval    Authorization Effective Date: 3/19/2019  Authorization Expiration Date: 7/17/2019  Medication: Humira CF 80mg/ 0.8mL & 40mg/ 0.4mL pens   Approved Dose/Quantity: Pso start kit & maintenance  Reference #: CMM eky# [id number]   Insurance Company: [employer] - Phone [phone] Fax [phone]  Expected CoPay: $2200+ (1st month)     CoPay Card Available: No    Foundation Assistance Needed:    Which Pharmacy is filling the prescription (Not needed for infusion/clinic administered): [facility] - [place], [place] - [address]  Pharmacy Notified:    Patient Notified:      ** Assume need for patient assistance as copay are high

## 2019-04-18 NOTE — NURSING NOTE
Chief Complaint   Patient presents with     Derm Problem     Patient is here today for psoriasis.     Monie Rebolledo, CMA

## 2019-04-19 NOTE — TELEPHONE ENCOUNTER
Starting free drug carlito on behalf of pt due to high cost.    [email or web address]

## 2019-04-22 ENCOUNTER — OFFICE VISIT (OUTPATIENT)
Dept: ORTHOPEDICS | Facility: CLINIC | Age: 69
End: 2019-04-22
Payer: COMMERCIAL

## 2019-04-22 VITALS — BODY MASS INDEX: 26.81 KG/M2 | HEIGHT: 69 IN | WEIGHT: 181 LBS

## 2019-04-22 DIAGNOSIS — M54.12 CERVICAL RADICULOPATHY AT C8: Primary | ICD-10-CM

## 2019-04-22 ASSESSMENT — PAIN SCALES - GENERAL: PAINLEVEL: EXTREME PAIN (8)

## 2019-04-22 ASSESSMENT — MIFFLIN-ST. JEOR: SCORE: 1571.39

## 2019-04-22 NOTE — PROGRESS NOTES
" Subjective:   Dale Cuevas is a 68 year old male who is f/u for cervical and thoracic MRIs.  He has right upper extremity symptoms consistent with C8 and T1 radiculopathy.  We have reviewed his MRIs.  He does have moderate right neural foraminal narrowing predominantly secondary to his disc at C7-T1.  He does have a history of psoriatic arthritis.    Date last seen: 4/12/2019    PAST MEDICAL, SOCIAL, SURGICAL AND FAMILY HISTORY: He  has no past medical history on file.  He  has a past surgical history that includes orthopedic surgery (Jan 12, 2015); orthopedic surgery (1989); and orthopedic surgery (2014).  His family history includes Breast Cancer in his sister; Hypertension in his mother; Prostate Cancer (age of onset: 72) in his father.  He reports that he quit smoking about 45 years ago. He has never used smokeless tobacco. He reports that he drinks alcohol. He reports that he does not use drugs.    ALLERGIES: He is allergic to levofloxacin.    CURRENT MEDICATIONS: He has a current medication list which includes the following prescription(s): adalimumab, adalimumab, allopurinol, atorvastatin, celecoxib, tamsulosin, and methylprednisolone.     REVIEW OF SYSTEMS: 10 point review of systems is negative except as noted above.     Exam:   Ht 1.753 m (5' 9\")   Wt 82.1 kg (181 lb)   BMI 26.73 kg/m        CONSTITUTIONAL: healthy, alert and no distress  GAIT: normal  PSYCHIATRIC: affect normal/bright and mentation appears normal.     Assessment/Plan:   (M54.12) Cervical radiculopathy at C8  (primary encounter diagnosis)    Dale is a 68-year-old gentleman with psoriatic arthritis who has C8 and T1 radiculopathy on the right.  His symptoms do not entirely fit with his findings since there seems to be a lot of crossover between C8 and T1.  That being the case however, given his MRI findings, we will proceed with a right sided C7-C8 transforaminal epidural steroid injection.  We will try and get this toward the end of " the week.  He will pay attention to both immediate relief and relief over the next 2-3 weeks.  If he finds that he has no significant relief in symptoms after approximately 2 weeks then he will call our office and we will proceed with a physical therapy referral to the [facility] site and also have a trial of cervical traction.  If all of these fail then we will have him see neurosurgery.

## 2019-04-22 NOTE — LETTER
"  4/22/2019      RE: Dale Cuevas  [address] [address]        Subjective:   Dale Cuevas is a 68 year old male who is f/u for cervical and thoracic MRIs.  He has right upper extremity symptoms consistent with C8 and T1 radiculopathy.  We have reviewed his MRIs.  He does have moderate right neural foraminal narrowing predominantly secondary to his disc at C7-T1.  He does have a history of psoriatic arthritis.    Date last seen: 4/12/2019    PAST MEDICAL, SOCIAL, SURGICAL AND FAMILY HISTORY: He  has no past medical history on file.  He  has a past surgical history that includes orthopedic surgery (Jan 12, 2015); orthopedic surgery (1989); and orthopedic surgery (2014).  His family history includes Breast Cancer in his sister; Hypertension in his mother; Prostate Cancer (age of onset: 72) in his father.  He reports that he quit smoking about 45 years ago. He has never used smokeless tobacco. He reports that he drinks alcohol. He reports that he does not use drugs.    ALLERGIES: He is allergic to levofloxacin.    CURRENT MEDICATIONS: He has a current medication list which includes the following prescription(s): adalimumab, adalimumab, allopurinol, atorvastatin, celecoxib, tamsulosin, and methylprednisolone.     REVIEW OF SYSTEMS: 10 point review of systems is negative except as noted above.     Exam:   Ht 1.753 m (5' 9\")   Wt 82.1 kg (181 lb)   BMI 26.73 kg/m         CONSTITUTIONAL: healthy, alert and no distress  GAIT: normal  PSYCHIATRIC: affect normal/bright and mentation appears normal.     Assessment/Plan:   (M54.12) Cervical radiculopathy at C8  (primary encounter diagnosis)    Dale is a 68-year-old gentleman with psoriatic arthritis who has C8 and T1 radiculopathy on the right.  His symptoms do not entirely fit with his findings since there seems to be a lot of crossover between C8 and T1.  That being the case however, given his MRI findings, we will proceed with a right sided C7-C8 " transforaminal epidural steroid injection.  We will try and get this toward the end of the week.  He will pay attention to both immediate relief and relief over the next 2-3 weeks.  If he finds that he has no significant relief in symptoms after approximately 2 weeks then he will call our office and we will proceed with a physical therapy referral to the [facility] site and also have a trial of cervical traction.  If all of these fail then we will have him see neurosurgery.    Fredrick Faith MD

## 2019-04-23 NOTE — PATIENT INSTRUCTIONS
Recommendations from today's MTM visit:                                                    MTM (medication therapy management) is a service provided by a clinical pharmacist designed to help you get the most of out of your medicines.   Today we reviewed what your medicines are for, how to know if they are working, that your medicines are safe and how to make your medicine regimen as easy as possible.     1. See dermatology as planned. I will also let Emiliano know you are ready to move forward with Humira as discussed.    2. Consider the Shingrix vaccines. You can discuss this with Dr. Espinoza as well. This vaccine is okay to get while on Humira in terms of safety since it is a non-LIVE vaccine. The gray area is in terms of how well the vaccine will work given immunosuppressive therapy (Humira). Avoid live vaccines while on immunosuppression.     3. Your blood pressure was high today, as it was when you were last visit. If this remains > 140/90 mmHg, I would encourage you to make an appointment with primary care.    Next MTM visit: 1 month for check-in    To schedule another MTM appointment, please call the clinic directly or you may call the MTM scheduling line at [phone] or toll-free at [phone].     My Clinical Pharmacist's contact information:                                                      It was a pleasure talking with you today!  Please feel free to contact me with any questions or concerns you have.      Mili PhillipsD   MTM Pharmacist   Adult Rheumatology and IBD  Phone: [phone]    You may receive a survey about the MTM services you received by email and/or US Mail.  I would appreciate your feedback to help me serve you better in the future. Your comments will be anonymous.

## 2019-04-23 NOTE — TELEPHONE ENCOUNTER
Pt has spoken with [employer] PAP. Pt came to [facility] to drop off add'l info for free drug evaluation. Info was copied for record keeping and faxed to [employer] by writer. Will f/u in 1 week or less if no word from [employer].

## 2019-04-25 ENCOUNTER — HOSPITAL ENCOUNTER (OUTPATIENT)
Dept: GENERAL RADIOLOGY | Facility: CLINIC | Age: 69
End: 2019-04-25
Attending: FAMILY MEDICINE
Payer: COMMERCIAL

## 2019-04-25 ENCOUNTER — HOSPITAL ENCOUNTER (OUTPATIENT)
Facility: CLINIC | Age: 69
Discharge: HOME | End: 2019-04-25
Admitting: PHYSICIAN ASSISTANT
Payer: COMMERCIAL

## 2019-04-25 ENCOUNTER — TRANSFERRED RECORDS (OUTPATIENT)
Dept: HEALTH INFORMATION MANAGEMENT | Facility: CLINIC | Age: 69
End: 2019-04-25

## 2019-04-25 VITALS
TEMPERATURE: 96.7 F | SYSTOLIC BLOOD PRESSURE: 161 MMHG | RESPIRATION RATE: 16 BRPM | HEIGHT: 69 IN | WEIGHT: 178 LBS | BODY MASS INDEX: 26.36 KG/M2 | OXYGEN SATURATION: 94 % | DIASTOLIC BLOOD PRESSURE: 90 MMHG | HEART RATE: 80 BPM

## 2019-04-25 DIAGNOSIS — M54.12 CERVICAL RADICULOPATHY AT C8: ICD-10-CM

## 2019-04-25 PROCEDURE — 25500064 ZZH RX 255 OP 636: Performed by: PHYSICIAN ASSISTANT

## 2019-04-25 PROCEDURE — 25000128 H RX IP 250 OP 636: Performed by: PHYSICIAN ASSISTANT

## 2019-04-25 PROCEDURE — 27211111 XR NERVE BLOCK ROOT CERVICAL/THORACIC SINGLE LEVEL

## 2019-04-25 PROCEDURE — 25000125 ZZHC RX 250: Performed by: PHYSICIAN ASSISTANT

## 2019-04-25 PROCEDURE — 40000863 ZZH STATISTIC RADIOLOGY XRAY, US, CT, MAR, NM

## 2019-04-25 RX ORDER — NICOTINE POLACRILEX 4 MG
15-30 LOZENGE BUCCAL
Status: CANCELLED | OUTPATIENT
Start: 2019-04-25

## 2019-04-25 RX ORDER — LIDOCAINE HYDROCHLORIDE 10 MG/ML
30 INJECTION, SOLUTION EPIDURAL; INFILTRATION; INTRACAUDAL; PERINEURAL ONCE
Status: COMPLETED | OUTPATIENT
Start: 2019-04-25 | End: 2019-04-25

## 2019-04-25 RX ORDER — DEXAMETHASONE SODIUM PHOSPHATE 10 MG/ML
20 INJECTION, SOLUTION INTRAMUSCULAR; INTRAVENOUS ONCE
Status: COMPLETED | OUTPATIENT
Start: 2019-04-25 | End: 2019-04-25

## 2019-04-25 RX ORDER — IOPAMIDOL 408 MG/ML
20 INJECTION, SOLUTION INTRATHECAL ONCE
Status: COMPLETED | OUTPATIENT
Start: 2019-04-25 | End: 2019-04-25

## 2019-04-25 RX ORDER — LIDOCAINE HYDROCHLORIDE 10 MG/ML
5 INJECTION, SOLUTION EPIDURAL; INFILTRATION; INTRACAUDAL; PERINEURAL ONCE
Status: COMPLETED | OUTPATIENT
Start: 2019-04-25 | End: 2019-04-25

## 2019-04-25 RX ORDER — DEXTROSE MONOHYDRATE 25 G/50ML
25-50 INJECTION, SOLUTION INTRAVENOUS
Status: DISCONTINUED | OUTPATIENT
Start: 2019-04-25 | End: 2019-04-25 | Stop reason: HOSPADM

## 2019-04-25 RX ORDER — NICOTINE POLACRILEX 4 MG
15-30 LOZENGE BUCCAL
Status: DISCONTINUED | OUTPATIENT
Start: 2019-04-25 | End: 2019-04-25 | Stop reason: HOSPADM

## 2019-04-25 RX ORDER — DEXTROSE MONOHYDRATE 25 G/50ML
25-50 INJECTION, SOLUTION INTRAVENOUS
Status: CANCELLED | OUTPATIENT
Start: 2019-04-25

## 2019-04-25 RX ADMIN — LIDOCAINE HYDROCHLORIDE 1.5 ML: 10 INJECTION, SOLUTION EPIDURAL; INFILTRATION; INTRACAUDAL; PERINEURAL at 15:17

## 2019-04-25 RX ADMIN — IOPAMIDOL 1 ML: 408 INJECTION, SOLUTION INTRATHECAL at 15:20

## 2019-04-25 RX ADMIN — DEXAMETHASONE SODIUM PHOSPHATE 10 MG: 10 INJECTION, SOLUTION INTRAMUSCULAR; INTRAVENOUS at 15:30

## 2019-04-25 RX ADMIN — LIDOCAINE HYDROCHLORIDE 1 ML: 10 INJECTION, SOLUTION EPIDURAL; INFILTRATION; INTRACAUDAL; PERINEURAL at 15:31

## 2019-04-25 ASSESSMENT — MIFFLIN-ST. JEOR: SCORE: 1557.78

## 2019-04-25 NOTE — TELEPHONE ENCOUNTER
Free Drug Application Approved  Effective Dates Thru 12/31/19  Patient notified? Yes, LVM for pt  Additional Information

## 2019-04-25 NOTE — DISCHARGE INSTRUCTIONS
Steroid Injection Discharge Instructions     After you go home:      You may resume your normal diet.    Care of Puncture Site:      If you have a bandaid on your puncture site, you may remove it the next morning    You may shower tomorrow    No bath tubs, whirlpools or swimming for at least 3 days     Activity:      You may go back to normal activity in 24 hours    You should let pain be your guide as to the extent of your activities    Maintain any activity limitations as ordered by your provider    Do NOT drive a vehicle until tomorrow    Medicines:      You may resume all medications    For minor pain, you may take Acetaminophen (Tylenol) or Ibuprofen (Advil)    Pain:       You may experience increased or different pain over the next 24-48 hours    For the next 48 hrs - you may use ice packs for discomfort     Call your primary care doctor if:      You have severe pain that does not improve with pain medication    You have chills or a fever greater than 101 F (38 C)    The site is red, swollen, hot or tender    New problems with your bowel or bladder    Any questions or concerns    Other Instructions:      New numbness down your leg or arm post injection is temporary and may last for up to 6 hours. You may need assistance with activity until your leg or arm has normal sensation.    For Your Information:      A steroid was injected to help decrease swelling and may help to reduce pain. It may take up to 7-10 days to obtain full results.    Some patients will get lasting relief from a single injection. Others may require up to 3 injections to get results. If you have more than one steroid injection, they should be given 2 weeks apart.    Side effects of your steroid injection are mild and will go away in 2-3 days  - Insomnia  - Heartburn  - Flushed face  - Water retention  - Increased appetite  - Increased blood sugar      If you have questions call:        [facility] Radiology Dept @  [phone]          The provider who performed your procedure was _________________.

## 2019-04-25 NOTE — PROGRESS NOTES
After 30 minutes post injection, injection site is CDI, no hematoma or swelling.  Patient states pain is a little better in the shoulder, but is hopeful for complete relief.  Tolerating regular diet and fluids.  Denies numbness or tingling in hands and feet.  Patient discharged to home. Discharge instructions reviewed with patient and patient verbalizes understanding.

## 2019-04-25 NOTE — PROGRESS NOTES
RADIOLOGY PROCEDURE NOTE  Patient name: Dale Cuevas  MRN: [id number]  : 1950    Pre-procedure diagnosis: right upper extremity radiculopathy, request for C8 selective nerve root block  Post-procedure diagnosis: Same    Procedure Date/Time: 2019  3:36 PM  Procedure: right C8 selective nerve root block  Estimated blood loss: None  Specimen(s) collected with description: none  The patient tolerated the procedure well with no immediate complications.  Significant findings:none    See imaging dictation for procedural details.    Provider name: Deidra Natarajan  Radiologist: Dr Gordon

## 2019-04-27 DIAGNOSIS — M10.09 IDIOPATHIC GOUT OF MULTIPLE SITES, UNSPECIFIED CHRONICITY: ICD-10-CM

## 2019-04-29 ENCOUNTER — OFFICE VISIT (OUTPATIENT)
Dept: RHEUMATOLOGY | Facility: CLINIC | Age: 69
End: 2019-04-29
Attending: NURSE PRACTITIONER
Payer: COMMERCIAL

## 2019-04-29 VITALS
HEIGHT: 69 IN | TEMPERATURE: 97.8 F | HEART RATE: 81 BPM | OXYGEN SATURATION: 96 % | DIASTOLIC BLOOD PRESSURE: 88 MMHG | WEIGHT: 178.7 LBS | SYSTOLIC BLOOD PRESSURE: 168 MMHG | BODY MASS INDEX: 26.47 KG/M2

## 2019-04-29 DIAGNOSIS — D84.9 IMMUNOSUPPRESSION (H): ICD-10-CM

## 2019-04-29 DIAGNOSIS — L40.50 PSORIATIC ARTHRITIS (H): Primary | ICD-10-CM

## 2019-04-29 DIAGNOSIS — L40.9 PSORIASIS: ICD-10-CM

## 2019-04-29 PROCEDURE — G0463 HOSPITAL OUTPT CLINIC VISIT: HCPCS | Mod: ZF

## 2019-04-29 RX ORDER — ALLOPURINOL 100 MG/1
TABLET ORAL
Qty: 30 TABLET | Refills: 3 | Status: SHIPPED | OUTPATIENT
Start: 2019-04-29 | End: 2020-01-15

## 2019-04-29 ASSESSMENT — MIFFLIN-ST. JEOR: SCORE: 1560.96

## 2019-04-29 ASSESSMENT — PAIN SCALES - GENERAL: PAINLEVEL: EXTREME PAIN (9)

## 2019-04-29 NOTE — TELEPHONE ENCOUNTER
"Requested Prescriptions   Pending Prescriptions Disp Refills     allopurinol (ZYLOPRIM) 100 MG tablet [Pharmacy Med Name: ALLOPURINOL 100 MG TABLET] 30 tablet 3     Sig: TAKE 1 TABLET BY MOUTH EVERY DAY. PLEASE MAKE APPT FOR LABS           Last Written Prescription Date:  2/27/2019  Last Fill Quantity: 30,  # refills: 3   Last office visit: 3/25/2019 with prescribing provider:  Dr. Espinoza  Future Office Visit:  None with providers here       Gout Agents Protocol Passed - 4/27/2019  2:44 PM        Passed - CBC on file in past 12 months     Recent Labs   Lab Test 03/25/19  1448   WBC 5.5   RBC 4.92   HGB 15.0   HCT 43.8                    Passed - ALT on file in past 12 months     Recent Labs   Lab Test 04/11/19  1558   ALT 20             Passed - Has Uric Acid on file in past 12 months and value is less than 6     Recent Labs   Lab Test 04/11/19  1558   URIC 3.5     If level is 6mg/dL or greater, ok to refill one time and refer to provider.           Passed - Recent (12 mo) or future (30 days) visit within the authorizing provider's specialty     Patient had office visit in the last 12 months or has a visit in the next 30 days with authorizing provider or within the authorizing provider's specialty.  See \"Patient Info\" tab in inbasket, or \"Choose Columns\" in Meds & Orders section of the refill encounter.              Passed - Medication is active on med list        Passed - Patient is age 18 or older        Passed - Normal serum creatinine on file in the past 12 months     Recent Labs   Lab Test 04/11/19  1558   CR 0.73             "

## 2019-04-29 NOTE — LETTER
"4/29/2019       RE: Dale Cuevas  [address] [id number]     Dear Colleague,    Thank you for referring your patient, Dale Cuevas, to the [facility] RHE[facility]ATOLOG[facility] at [facility]. Please see a copy of my visit note below.    [facility] - Rheumatology Clinic Visit    Dale Cuevas  is a 68 year old here for follow-up newly dx osoriatic arthritis, Inflammatory arthritis history gout on allopurinol per PCP (uric acid 3.5 4-2019)     Review- +CRP 24 +HLA B27 -RF -CCP -ANTHONY -hep B/c, MTB QG   Past-celebrex not effective, aleve has helped some, prednisone 2018 dramatic improvement x 5-7 day course, adalimumab (humira) 4-, medrol dose pack, methotrexate/ leflunomide (arava) and aprimilast (otezla) contraindicated due to daily alcohol use and depression.       Initial visit April 11, 2019  Emiliano DELGADO:   Pain with swelling started 1-2 year ago in base of thumbs, hands, MCPs, PIPs, elbows, balls of the feet, swelling in the fingers (sausage) 2-5th bilateral and left 2-5th sausage toes, pain arms to forearm, not able to put rings on, right 4th sausage toes, not able to make a fist, pushes this hard to make a fist and not able to straighten the fingers which is worse in the mornings, lasts all day. Psoriasis on his [elbows, knees, scattered on legs,back of the ears] dry flakey. Reports this is making him depressed, and would be concerned of starting aprimilast (otezla) as this can worsen this. He has started drinking 2-3 drinks a day for the past years due to the pain. The whole hand swells, and then he cant use them. He has pain in the middle part of his spine then this shoots over to his arm, has past had a \"popped disc\" in 1989 then was paralzyed had to have ER surgery and learn to walk again thus suffers peripheral neuropathy as a result from his knees down, this is impacting his ability to lay flat as he gets severe pain and hard to " "sleep.      April 29, 2019  Will be starting adalimumab (humira) citrate free 80 mg loading dose, then every 14 days starting day 22 per derm. On celebrex 200 mg BID and allopurinol 100 mg day per PCP. Medrol dose pack with dramatic improvement. Local hand specialist injected bilateral 4th trigger fingers and bilateral CMC, dramatic steroid response. Injection of cervical spine last week, right arm is better but still the nerve shooting pain and right rib area. He will follow-up with them later this week with update 7-10 days later. Now able to make a fist, the dactylitis is much improved and less swelling. Would like to hold off on the shingrix and rather start the arthritis medication. Able to function better with all the medrol and cortisone injections. Psoriasis did not get worse on steroid. No leg weakness or loss of bowel and bladder.     Denies any fever, chills, SOB, QUEZADA, night sweats, or chest pain, or cough. Reports healthy. celebrex BID, tolerating. Not effective.  No nail pitting.     PMH:  Injury-right thumb hammer on this a few times   Medical-gout, BPH, HLD, psoriasis, hypertension, rosacea, ED, URI, chicken pox, right cataract, peripheral neuropathy feet to knee \"feel heat and pain, like my feet are asleep and sensitive to hot and cold) from prior spine injury 1989, depression, MRI cervical/thoracic spine 4-2019 per ortho moderate right neural foraminal narrowing predominantly secondary to his disc at C7-T1  Surgical-left wrist tendon sheath 1-2015, T8-9 (paralzyed for 30 min, learned to walk and stand, \"disc popped) removed rib spine surgery for disc fragment removed 1989 (DDD), right knee arthroscopy, right retinal surgery 2018 (wrinkle on retinal), injections base of thumbs     FH:  No autoimmune disorders, psoriasis, UC, crohn's, SLE, RA, PsA, gout, autoimmune thyroid.  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 Quit >50 yr ago smoking. No IVDU. No Children. Left handed. Partner. Retired  and professional [employer]. Combat vet [place]       [facility] personally reviewed and updated by me.    ROS:  Negative raynaud s phenomena, hairloss, sun sensitivities, keratoconjunctivitis sicca, pleurisy, inflammatory joint symptoms, significant rashes like malar, oral/nasal or ulcerations, inflammatory eye disease, inflammatory bowel disease, tenosynovitis, or enthespathy. Negative for blood clots, gout, UC, crohn's. No temporal headache, no jaw claudication, no scalp tenderness, vision changes, carotidynia, cough. No Parotid swelling. Denies international travel. Denies history of pneumonia, hepatitis, tuberculosis, shingles, sepsis, tick bites or other infections.   +psoriasis   +see above   CONSTITUTIONAL: No fevers, night sweats or unintentional weight change. No acute distress, swollen glands  EYES: No vision change, diplopia, pain in eyes or red eyes   EARS, NOSE, MOUTH, THROAT: No tinnitus or hearing change, no epistaxis or nasal discharge, no oral lesions, throat clear. Normal saliva pool.  No drymouth. No thyroid  enlargement.   CARDIOVASCULAR: No chest pain, palpitations, or pain with walking, no orthopnea or PND   RESPIRATORY: No dyspnea, cough, shortness of breath or wheezing. No pleurisy.   GI: No nausea, vomiting, diarrhea or constipation, no abdominal pain, or blood in stools.   : No change in urine, no dysuria or hematuria   MUSCKL: No enthesitis, plantar fascitis or heel pain. +see above   INTEGUMENTARY: No concerning lesions or moles +see above   NEURO: No loss of strength or sensation, no numbness or tingling, no tremor, no dizziness, no headache. No falls   ENDO: No polyuria or polydipsia, no  "temperature intolerance   HEME/LYMPH:No concerning bumps, bleeding problems, or swollen lymph nodes. No recent infections, hospitalizations or new illnesses.   ALLERGY: No environmental allergies   PSYCH:No depression or anxiety, no sleep problems.  Otherwise 14 point ROS obtained, reviewed and found negative.     Physical exam:  Vitals: Blood pressure 176/90, pulse 81, temperature 97.8  F (36.6  C), temperature source Oral, height 1.753 m (5' 9\"), weight 81.1 kg (178 lb 11.2 oz), SpO2 96 %.  Wt Readings from Last 4 Encounters:   04/29/19 81.1 kg (178 lb 11.2 oz)   04/25/19 80.7 kg (178 lb)   04/22/19 82.1 kg (181 lb)   04/17/19 80.6 kg (177 lb 12.8 oz)     Constitutional: WD-WN-WG cooperative  Eyes: nl EOM, PERRLA, vision, conjunctiva, sclera, No Unilateral or bilateral external ear inflammation   ENT: nl external ears, nose, hearing, lips, teeth, gums, throat. No saddle nose   Neck: no mass or thyroid enlargement  Resp: lungs clear to auscultation, nl to palpation, nl effort  CV: RRR, no murmurs, rubs or gallops, no edema  GI: no ABD mass or tenderness, no HSM  : not tested  Lymph: no cervical or epitrochlear nodes  MS: bilateral swelling CMC, trace dactylitis in fingers and 4-5 toes. CMC squaring. Full fist formation. Hammertoes and bunions. Reduced ankle ROM. Elbow contracture 15 degree right and 10 on left. All TMJ, neck, shoulder, elbow, wrist, hand, spine, hip, knee, ankle, and foot joints were examined and otherwise found normal. Negative Lhermitte's sign. No periuncle erythema  4- 2+ swelling in all MCPs bilateral, 2-5th DIP with heberones, all PIPs, 2-3+ swelling at base of thumbs in CMCs, fist formation <1/3 with not able to straighten his fingers, reduced flexion of wrists, tailors bunions, bunions, hammertoes, dactylitis of 2-5th fingers, bilateral 4th toes, s/t MTPs, reduced flexion left ankle. Tender over thoracic spine. Right wrist s/t, tender in bicep tendons. Right elbow 15 degree " contracture, left 10 degree.  Skin: no nail pitting, alopecia. +dry scaley psoriasis elbows, knees, scattered spots on legs, ears.   Neuro: nl cranial nerves, strength, sensation, DTRs.   Psych: nl judgement, orientation, memory, affect.      Labs/Imaging:  Results for orders placed or performed during the hospital encounter of 04/25/19   XR Nerve Block Root Cerv/Thor Single Lvl    Narrative    XR NERVE BLOCK ROOT CERVICAL/THORACIC SINGLE LEVEL       4/25/2019  3:47 PM       History:  Cervical radiculopathy at C8 on the right. Multilevel  cervical degenerative disease. The ordering provider has specifically  requested a right C8 nerve root injection    Procedure:  The risks (bleeding, infection, reaction to contrast and  medications) and benefits of the procedure were explained to the  patient and consent was obtained. Images were reviewed as well as a  symptomatology. . Using sterile technique and fluoroscopic guidance a  #25 gauge spinal needle was placed adjacent to the right C8 exiting  nerve root using a anterior-lateral approach.  A small amount of  contrast was injected confirming satisfactory position of the needle  tip.  1.0 mL of dexamethasone mixed with 1.0 mL Lidocaine 1% were  injected.  No initial complication.        Fluoroscopy time: 0.5 minutes  Number of Images: 4    Meds given: 1.5mL 1% Lidocaine local, 1 mL Isovue M 200,  1 mL  Dexamethazone, 1 mL 1% Lidocaine    The patient's pain levels (0-10 scale) were as follows:                          PRE INJECTION  Neck         6    RIght arm  01     POST INJECTION          Neck         7-8    RIght arm  8       Impression    IMPRESSION:  Technically successful right C8 selective nerve root  injection.  Long term results pending. Discussed with the patient that  if this injection is ineffective, may consider cervical epidural  steroid injection as the next step.    VERONA JHAVERI PA-C       Impression/Plan:  1. Psoriatic arthritis with evidence of  "rheumatoid arthritis (RA) dactylitis, synovitis, tendonitis, enthesitis, contractures in elbows, hammertoes, tailors bunions, reduced wrist ROM, EAS,psoriasis, elevated CRP with +HLA B27 with dramatic prednisone response. I agree with use of adalimumab (humira) for his psoriatic arthritis with goals of improved ROM, function and reduced swelling and psoriasis.     Review- Discussed methotrexate (see below) contraindicated, hydroxychloroquine (plaquenil) contraindicated as may worsen his psoriasis, aprimilast (otezla) contraindicated (see below), thus discussed sulfasalazine (this would not control his severe arthritis and would not help his psoriasis).     2. Psoriasis, per dermatology. Will start adalimumab (humira)   3. Immunosuppression, labs 4-2019 normal. Did advise shingrix prior to start of biologics   4. Depression w/daily alcohol use. Reports he is not ready to quit alcohol use, I did give him Dr. Juno Gloria contact information for psychology if he wishes or his PCP. Methotrexate contraindicated due to daily alcohol use as this is risk of liver toxicity and he would need to quit, which he does not feel he is able to at this time. This may also worsen depression. Aprimilast (otezla) contraindicated as may worsen depression and cause suicidal ideations and this is concern as well.   5. Cervical radiculopathy C8-T1  with thoracic pain. S/p FRANTZ . History disc \"pop\" and surgery residual peripheral neuropathy. Per orthopedics       RTC 6-8 weeks    The patient understood the rationale for the diagnosis and treatment plan. Patient shared in the decision making. All questions were answered to best of my ability and the patient's satisfaction  Emiliano King APRN, CNP, MSN  [facility]  Department of Rheumatology & Autoimmune Disorders    Education:  1. Immunization: Reviewed CDC guidelines with patient updated, information provided to patient based on CDC guidelines for vaccination " recommendations, patient will discuss with primary provider, he will get the shingrix vaccine   2. Bone Health:Educated on adequate calcium and vitamin D intake, Educated on exercise, Glucocorticoid education discussed risks, benefits & potential long term side effects from use  3. Discussed routine annual physicals, cancer screening, cardiac risk monitoring, bone health and primary care with primary care provider.   4. Educated on diagnosis, prognosis, labs, imaging, treatment options (including risks, benefits, risk of no treatment), medications (use, dose, side-effects, risks of medications including infection/cancer/bone marrow suppression, lab monitoring), infections what to do, plan of cares, goals of treatment.        TT 30 min CT 25 min face to face with patient discussion under impression/plan and education              Again, thank you for allowing me to participate in the care of your patient.      Sincerely,    DANNY Gamble CNP
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
## 2019-04-29 NOTE — NURSING NOTE
"Chief Complaint   Patient presents with     RECHECK     Inlammatory arthritis     /90   Pulse 81   Temp 97.8  F (36.6  C) (Oral)   Ht 1.753 m (5' 9\")   Wt 81.1 kg (178 lb 11.2 oz)   SpO2 96%   BMI 26.39 kg/m    Sophy Haywood CMA    "

## 2019-05-13 ENCOUNTER — OFFICE VISIT (OUTPATIENT)
Dept: ORTHOPEDICS | Facility: CLINIC | Age: 69
End: 2019-05-13
Payer: COMMERCIAL

## 2019-05-13 VITALS — HEIGHT: 69 IN | BODY MASS INDEX: 26.36 KG/M2 | WEIGHT: 178 LBS

## 2019-05-13 DIAGNOSIS — M79.18 MYOFASCIAL PAIN: ICD-10-CM

## 2019-05-13 DIAGNOSIS — M50.30 DEGENERATIVE DISC DISEASE, CERVICAL: Primary | ICD-10-CM

## 2019-05-13 ASSESSMENT — MIFFLIN-ST. JEOR: SCORE: 1557.78

## 2019-05-13 ASSESSMENT — PAIN SCALES - GENERAL: PAINLEVEL: EXTREME PAIN (9)

## 2019-05-13 NOTE — PROGRESS NOTES
May 13, 2019: Dale Cuevas is a 68 year old male who is seen in f/u after his cervical injection. He reports little improvement after injection.  He noted primarily C8 radicular symptoms previously.  He had a right sided transforaminal epidural steroid injection which provided him no significant relief.  He notes mostly tightness in the trapezius on the right.  He now is developing nondermatomal symptoms of intermittent paresthesias in the right arm involving the entire arm.  He states that these will last for minutes or up to an hour.  He has no weakness.  He does note a correlation between shoulder pain and upper extremity paresthesias.    Physical examination of the cervical spine demonstrates that he is nontender over the cervical spine.  He has functional range of motion.  Negative Spurling's.  His cervical motor and deep tendon reflexes are symmetric.  Motor exam is 5 out of 5 bilaterally.  Deep tendon reflexes are 1+ and symmetric.  Sensation to light touch is intact and over the bilateral upper extremities.  Right shoulder girdle does have tightness throughout with multiple trigger points.    Assessment: Dony is a 68-year-old male with psoriatic arthritis and degenerative disc disease of the cervical spine with neuroforaminal encroachment's and myofascial shoulder and neck pain.  He is going [facility]  We will give a trial of cervical traction and if this does not provide significant relief then will focus on rehabilitation of his myofascial shoulder pain and trigger point extinguishment.

## 2019-05-13 NOTE — LETTER
5/13/2019      RE: Dale Cuevas  [address] [facility] [address]       May 13, 2019: Dale Cuevas is a 68 year old male who is seen in f/u after his cervical injection. He reports little improvement after injection.  He noted primarily C8 radicular symptoms previously.  He had a right sided transforaminal epidural steroid injection which provided him no significant relief.  He notes mostly tightness in the trapezius on the right.  He now is developing nondermatomal symptoms of intermittent paresthesias in the right arm involving the entire arm.  He states that these will last for minutes or up to an hour.  He has no weakness.  He does note a correlation between shoulder pain and upper extremity paresthesias.    Physical examination of the cervical spine demonstrates that he is nontender over the cervical spine.  He has functional range of motion.  Negative Spurling's.  His cervical motor and deep tendon reflexes are symmetric.  Motor exam is 5 out of 5 bilaterally.  Deep tendon reflexes are 1+ and symmetric.  Sensation to light touch is intact and over the bilateral upper extremities.  Right shoulder girdle does have tightness throughout with multiple trigger points.    Assessment: Dony is a 68-year-old male with psoriatic arthritis and degenerative disc disease of the cervical spine with neuroforaminal encroachment's and myofascial shoulder and neck pain.  He is going to see physical therapy at our [facility].  We will give a trial of cervical traction and if this does not provide significant relief then will focus on rehabilitation of his myofascial shoulder pain and trigger point extinguishment.    Fredrick Faith MD

## 2019-05-14 ENCOUNTER — THERAPY VISIT (OUTPATIENT)
Dept: PHYSICAL THERAPY | Facility: CLINIC | Age: 69
End: 2019-05-14
Payer: COMMERCIAL

## 2019-05-14 DIAGNOSIS — M50.30 DEGENERATIVE DISC DISEASE, CERVICAL: ICD-10-CM

## 2019-05-14 DIAGNOSIS — M79.18 MYOFASCIAL PAIN: ICD-10-CM

## 2019-05-14 DIAGNOSIS — M50.30 DDD (DEGENERATIVE DISC DISEASE), CERVICAL: ICD-10-CM

## 2019-05-14 PROCEDURE — 97110 THERAPEUTIC EXERCISES: CPT | Mod: GP | Performed by: PHYSICAL THERAPIST

## 2019-05-14 PROCEDURE — 97161 PT EVAL LOW COMPLEX 20 MIN: CPT | Mod: GP | Performed by: PHYSICAL THERAPIST
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
## 2019-05-22 ENCOUNTER — THERAPY VISIT (OUTPATIENT)
Dept: PHYSICAL THERAPY | Facility: CLINIC | Age: 69
End: 2019-05-22
Payer: COMMERCIAL

## 2019-05-22 DIAGNOSIS — M50.30 DDD (DEGENERATIVE DISC DISEASE), CERVICAL: ICD-10-CM

## 2019-05-22 PROCEDURE — 97110 THERAPEUTIC EXERCISES: CPT | Mod: GP | Performed by: PHYSICAL THERAPIST

## 2019-05-22 PROCEDURE — 97530 THERAPEUTIC ACTIVITIES: CPT | Mod: GP | Performed by: PHYSICAL THERAPIST

## 2019-05-22 PROCEDURE — 97012 MECHANICAL TRACTION THERAPY: CPT | Mod: GP | Performed by: PHYSICAL THERAPIST

## 2019-05-28 ENCOUNTER — THERAPY VISIT (OUTPATIENT)
Dept: PHYSICAL THERAPY | Facility: CLINIC | Age: 69
End: 2019-05-28
Payer: COMMERCIAL

## 2019-05-28 DIAGNOSIS — M50.30 DDD (DEGENERATIVE DISC DISEASE), CERVICAL: ICD-10-CM

## 2019-05-28 PROCEDURE — 97110 THERAPEUTIC EXERCISES: CPT | Mod: GP | Performed by: PHYSICAL THERAPIST

## 2019-05-28 PROCEDURE — 97140 MANUAL THERAPY 1/> REGIONS: CPT | Mod: GP | Performed by: PHYSICAL THERAPIST

## 2019-06-05 ENCOUNTER — THERAPY VISIT (OUTPATIENT)
Dept: PHYSICAL THERAPY | Facility: CLINIC | Age: 69
End: 2019-06-05
Payer: COMMERCIAL

## 2019-06-05 DIAGNOSIS — M50.30 DDD (DEGENERATIVE DISC DISEASE), CERVICAL: ICD-10-CM

## 2019-06-05 PROCEDURE — 97110 THERAPEUTIC EXERCISES: CPT | Mod: GP | Performed by: PHYSICAL THERAPIST

## 2019-06-05 PROCEDURE — 97140 MANUAL THERAPY 1/> REGIONS: CPT | Mod: GP | Performed by: PHYSICAL THERAPIST

## 2019-06-12 ENCOUNTER — OFFICE VISIT (OUTPATIENT)
Dept: RHEUMATOLOGY | Facility: CLINIC | Age: 69
End: 2019-06-12
Attending: NURSE PRACTITIONER
Payer: COMMERCIAL

## 2019-06-12 VITALS
OXYGEN SATURATION: 96 % | SYSTOLIC BLOOD PRESSURE: 171 MMHG | HEIGHT: 69 IN | WEIGHT: 176 LBS | BODY MASS INDEX: 26.07 KG/M2 | TEMPERATURE: 98.6 F | DIASTOLIC BLOOD PRESSURE: 86 MMHG | HEART RATE: 91 BPM

## 2019-06-12 DIAGNOSIS — D84.9 IMMUNOSUPPRESSION (H): ICD-10-CM

## 2019-06-12 DIAGNOSIS — L40.50 PSORIATIC ARTHRITIS (H): Primary | ICD-10-CM

## 2019-06-12 DIAGNOSIS — L40.9 PSORIASIS: ICD-10-CM

## 2019-06-12 PROCEDURE — G0463 HOSPITAL OUTPT CLINIC VISIT: HCPCS | Mod: ZF

## 2019-06-12 ASSESSMENT — PAIN SCALES - GENERAL: PAINLEVEL: EXTREME PAIN (8)

## 2019-06-12 ASSESSMENT — MIFFLIN-ST. JEOR: SCORE: 1548.71

## 2019-06-12 NOTE — PATIENT INSTRUCTIONS
Preventive Care:    Colorectal Cancer Screening: During our visit today, we discussed that it is recommended you receive colorectal cancer screening. Please call or make an appointment with your primary care provider to discuss this. You may also call the [facility] scheduling line ([phone]) to set up a colonoscopy appointment.

## 2019-06-12 NOTE — NURSING NOTE
"Chief Complaint   Patient presents with     RECHECK     Degenerative disc disease     /86 (BP Location: Left arm, Patient Position: Sitting, Cuff Size: Adult Regular)   Pulse 91   Temp 98.6  F (37  C) (Oral)   Ht 1.753 m (5' 9\")   Wt 79.8 kg (176 lb)   SpO2 96%   BMI 25.99 kg/m    Julita Baker CMA    6/12/2019 10:21 AM      "
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
## 2019-07-11 ENCOUNTER — TELEPHONE (OUTPATIENT)
Dept: FAMILY MEDICINE | Facility: CLINIC | Age: 69
End: 2019-07-11

## 2019-07-11 NOTE — TELEPHONE ENCOUNTER
Panel Management Review      Patient has the following on his problem list: None      Composite cancer screening  Chart review shows that this patient is due/due soon for the following Colonoscopy  Summary:    Patient is due/failing the following:   COLONOSCOPY    Action needed:   Patient needs referral/order: Colonoscopy    Type of outreach:    Sent letter.    Questions for provider review:    None                                                                                                                                    Delphine Armstrong CMA       Chart routed to .

## 2019-07-11 NOTE — LETTER
July 11, 2019    Dale Cuevas  [address] [address]    Dear Ron Hunter cares about your health and your health plan.  I have reviewed your medical conditions, medication list and lab results, and am making recommendations based on this review to better manage your health.    You are in particular need of attention regarding:  -Colon Cancer Screening    I am recommending that you:     -schedule a COLONOSCOPY to look for colon cancer (due every 10 years or 5 years in higher risk situations.)        Colon cancer is now the second leading cause of cancer-related deaths in the [place] for both men and women and there are over 130,000 new cases and 50,000 deaths per year from colon cancer.  Colonoscopies can prevent 90-95% of these deaths.  Problem lesions can be removed before they ever become cancer.  This test is not only looking for cancer, but also getting rid of precancerious lesions.    If you are under/uninsured, we recommend you contact the [facility] program. [facility] is a free colorectal cancer screening program that provides colonoscopies for eligible under/uninsured [place] men and women. If you are interested in receiving a free colonoscopy, please call [facility] at [phone] (mention code [employer]) to see if you re eligible.      If you do not wish to do a colonoscopy or cannot afford to do one, at this time, there is another option. It is called a FIT test or Fecal Immunochemical Occult Blood Test (take home stool sample kit).  It does not replace the colonoscopy for colorectal cancer screening, but it can detect hidden bleeding in the lower colon.  It does need to be repeated every year and if a positive result is obtained, you would be referred for a colonoscopy.          If you have completed either one of these tests at another facility, please call with the details of when and where the tests were done and if they were normal or not. Or have the  records sent to our clinic so that we can best coordinate your care.      Please call us at the [facility] location:  [phone] or use [employer] to address the above recommendations.     Thank you for trusting [facility].  We appreciate the opportunity to serve you and look forward to supporting your healthcare in the future.    If you have (or plan to have) any of these tests done at a facility other than a [facility] or a [facility], please have the results sent to the [facility] location noted above.      Best Regards,    Artis Espinoza MD

## 2019-07-15 ENCOUNTER — OFFICE VISIT (OUTPATIENT)
Dept: FAMILY MEDICINE | Facility: CLINIC | Age: 69
End: 2019-07-15
Payer: COMMERCIAL

## 2019-07-15 VITALS
BODY MASS INDEX: 26 KG/M2 | OXYGEN SATURATION: 96 % | HEART RATE: 79 BPM | DIASTOLIC BLOOD PRESSURE: 78 MMHG | TEMPERATURE: 98.8 F | WEIGHT: 175.5 LBS | RESPIRATION RATE: 16 BRPM | SYSTOLIC BLOOD PRESSURE: 134 MMHG | HEIGHT: 69 IN

## 2019-07-15 DIAGNOSIS — Z01.818 PREOP GENERAL PHYSICAL EXAM: Primary | ICD-10-CM

## 2019-07-15 DIAGNOSIS — L40.50 PSORIATIC ARTHRITIS (H): ICD-10-CM

## 2019-07-15 DIAGNOSIS — M62.08 DIASTASIS RECTI: ICD-10-CM

## 2019-07-15 DIAGNOSIS — N40.0 BENIGN PROSTATIC HYPERPLASIA, UNSPECIFIED WHETHER LOWER URINARY TRACT SYMPTOMS PRESENT: ICD-10-CM

## 2019-07-15 DIAGNOSIS — E78.5 HYPERLIPIDEMIA LDL GOAL <100: ICD-10-CM

## 2019-07-15 DIAGNOSIS — L40.9 PSORIASIS: ICD-10-CM

## 2019-07-15 DIAGNOSIS — M50.30 DDD (DEGENERATIVE DISC DISEASE), CERVICAL: ICD-10-CM

## 2019-07-15 DIAGNOSIS — H25.9 SENILE CATARACT OF RIGHT EYE, UNSPECIFIED AGE-RELATED CATARACT TYPE: ICD-10-CM

## 2019-07-15 PROCEDURE — 99214 OFFICE O/P EST MOD 30 MIN: CPT | Performed by: FAMILY MEDICINE

## 2019-07-15 RX ORDER — KETOROLAC TROMETHAMINE 5 MG/ML
SOLUTION OPHTHALMIC
COMMUNITY
Start: 2019-07-02 | End: 2021-03-03

## 2019-07-15 RX ORDER — POLYMYXIN B SULFATE AND TRIMETHOPRIM 1; 10000 MG/ML; [USP'U]/ML
SOLUTION OPHTHALMIC
COMMUNITY
Start: 2019-07-02 | End: 2020-02-03

## 2019-07-15 RX ORDER — ATROPINE SULFATE 10 MG/ML
SOLUTION/ DROPS OPHTHALMIC
COMMUNITY
Start: 2019-07-03 | End: 2020-02-03

## 2019-07-15 RX ORDER — PREDNISOLONE ACETATE 10 MG/ML
SUSPENSION/ DROPS OPHTHALMIC
COMMUNITY
Start: 2019-07-02 | End: 2020-02-03

## 2019-07-15 ASSESSMENT — MIFFLIN-ST. JEOR: SCORE: 1541.44

## 2019-07-15 NOTE — PROGRESS NOTES
[facility]  [address]  [facility] [address]  [phone]  Dept: [phone]    PRE-OP EVALUATION:  Today's date: 7/15/2019    Dale Cuevas (: 1950) presents for pre-operative evaluation assessment as requested by Dr. Cameron.  He requires evaluation and anesthesia risk assessment prior to undergoing surgery/procedure for treatment of right cataract .    Fax number for surgical facility: [phone]  Primary Physician: Artis Espinoza  Type of Anesthesia Anticipated: Local with MAC    Patient has a Health Care Directive or Living Will:  NO    Preop Questions 7/15/2019   Who is doing your surgery? [facility]   What are you having done? cataract   Date of Surgery/Procedure:    Facility or Hospital where procedure/surgery will be performed: -   1.  Do you have a history of Heart attack, stroke, stent, coronary bypass surgery, or other heart surgery? No   2.  Do you ever have any pain or discomfort in your chest? No   3.  Do you have a history of  Heart Failure? No   4.   Are you troubled by shortness of breath when:  walking on a level surface, or up a slight hill, or at night? No   5.  Do you currently have a cold, bronchitis or other respiratory infection? No   6.  Do you have a cough, shortness of breath, or wheezing? No   7.  Do you sometimes get pains in the calves of your legs when you walk? No   8. Do you or anyone in your family have previous history of blood clots? No   9.  Do you or does anyone in your family have a serious bleeding problem such as prolonged bleeding following surgeries or cuts? No   10. Have you ever had problems with anemia or been told to take iron pills? No   11. Have you had any abnormal blood loss such as black, tarry or bloody stools? No   12. Have you ever had a blood transfusion? No   13. Have you or any of your relatives ever had problems with anesthesia? No   14. Do you have sleep apnea,  excessive snoring or daytime drowsiness? No   15. Do you have any prosthetic heart valves? No   16. Do you have prosthetic joints? No         HPI:     HPI related to upcoming procedure: increasing problems with vision in the right eye due to a cataract      See problem list for active medical problems.  Problems all longstanding and stable, except as noted/documented.  See ROS for pertinent symptoms related to these conditions.      MEDICAL HISTORY:     Patient Active Problem List    Diagnosis Date Noted     DDD (degenerative disc disease), cervical 05/14/2019     Priority: Medium     Diastasis recti 06/15/2016     Priority: Medium     Family hx of prostate cancer 06/07/2016     Priority: Medium     Benign prostatic hyperplasia 09/08/2014     Priority: Medium     Hyperlipidemia LDL goal <100 02/05/2014     Priority: Medium     Psoriasis 11/23/2013     Priority: Medium     Elevated blood pressure reading without diagnosis of hypertension 05/06/2013     Priority: Medium     Rosacea 05/06/2013     Priority: Medium     Erectile dysfunction 05/06/2013     Priority: Medium      Past Medical History:   Diagnosis Date     Psoriatic arthritis (H)      Past Surgical History:   Procedure Laterality Date     ORTHOPEDIC SURGERY  Jan 12, 2015    left wrist     ORTHOPEDIC SURGERY  1989    spine sugery-with disc fragment removed     ORTHOPEDIC SURGERY  2014    right knee arthroscopy     Current Outpatient Medications   Medication Sig Dispense Refill     adalimumab (HUMIRA *CF*) 40 MG/0.4ML pen kit Inject 0.4 mLs (40 mg) Subcutaneous every 14 days Starting day 22. 1 each 3     allopurinol (ZYLOPRIM) 100 MG tablet TAKE 1 TABLET BY MOUTH EVERY DAY. PLEASE MAKE APPT FOR LABS 30 tablet 3     atorvastatin (LIPITOR) 40 MG tablet Take 1 tablet (40 mg) by mouth daily 90 tablet 3     atropine 1 % ophthalmic solution        celecoxib (CELEBREX) 200 MG capsule Take 1 capsule (200 mg) by mouth 2 times daily 60 capsule 5     ketorolac (ACULAR)  "0.5 % ophthalmic solution        prednisoLONE acetate (PRED FORTE) 1 % ophthalmic suspension        tamsulosin (FLOMAX) 0.4 MG capsule TAKE ONE CAPSULE BY MOUTH ONE TIME DAILY 90 capsule 2     trimethoprim-polymyxin b (POLYTRIM) 05521-2.1 UNIT/ML-% ophthalmic solution        OTC products: None, except as noted above    Allergies   Allergen Reactions     Levofloxacin      Other reaction(s): *Unknown      Latex Allergy: NO    Social History     Tobacco Use     Smoking status: Former Smoker     Last attempt to quit: 1973     Years since quittin.2     Smokeless tobacco: Never Used   Substance Use Topics     Alcohol use: Yes     Comment: every night     History   Drug Use No       REVIEW OF SYSTEMS:   CONSTITUTIONAL: NEGATIVE for fever, chills, change in weight  INTEGUMENTARY/SKIN: NEGATIVE for worrisome rashes, moles or lesions  EYES: NEGATIVE for vision changes or irritation  ENT/MOUTH: NEGATIVE for ear, mouth and throat problems  RESP: NEGATIVE for significant cough or SOB  BREAST: NEGATIVE for masses, tenderness or discharge  CV: NEGATIVE for chest pain, palpitations or peripheral edema  GI: NEGATIVE for nausea, abdominal pain, heartburn, or change in bowel habits  : NEGATIVE for frequency, dysuria, or hematuria  MUSCULOSKELETAL: NEGATIVE for significant arthralgias or myalgia  NEURO: NEGATIVE for weakness, dizziness or paresthesias  ENDOCRINE: NEGATIVE for temperature intolerance, skin/hair changes  HEME: NEGATIVE for bleeding problems  PSYCHIATRIC: NEGATIVE for changes in mood or affect    EXAM:   /78   Pulse 79   Temp 98.8  F (37.1  C) (Tympanic)   Resp 16   Ht 1.753 m (5' 9\")   Wt 79.6 kg (175 lb 8 oz)   SpO2 96%   BMI 25.92 kg/m      GENERAL APPEARANCE: healthy, alert and no distress     EYES: EOMI,  PERRL     HENT: ear canals and TM's normal and nose and mouth without ulcers or lesions     NECK: no adenopathy, no asymmetry, masses, or scars and thyroid normal to palpation     RESP: lungs " clear to auscultation - no rales, rhonchi or wheezes     CV: regular rates and rhythm, normal S1 S2, no S3 or S4 and no murmur, click or rub     ABDOMEN:  soft, nontender, no HSM or masses and bowel sounds normal     MS: extremities normal- no gross deformities noted, no evidence of inflammation in joints, FROM in all extremities.     SKIN: no suspicious lesions or rashes     NEURO: Normal strength and tone, sensory exam grossly normal, mentation intact and speech normal     PSYCH: mentation appears normal. and affect normal/bright     LYMPHATICS: No cervical adenopathy    DIAGNOSTICS:   No labs or EKG required for low risk surgery (cataract, skin procedure, breast biopsy, etc)    Recent Labs   Lab Test 04/11/19  1558 03/25/19  1448 07/19/18  1148 09/12/16  1638  03/01/15  1435   HGB  --  15.0 14.7 14.1   < > 16.0   PLT  --  210 199 273   < > 215   INR  --   --   --   --   --  1.05   NA  --  141  --  143   < > 138   POTASSIUM  --  3.9  --  4.0   < > 4.2   CR 0.73 0.72  --  1.05   < > 0.81    < > = values in this interval not displayed.        IMPRESSION:   Reason for surgery/procedure: increasing vision problems right eye due to cataract    The proposed surgical procedure is considered LOW risk.    REVISED CARDIAC RISK INDEX  The patient has the following serious cardiovascular risks for perioperative complications such as (MI, PE, VFib and 3  AV Block):  No serious cardiac risks  INTERPRETATION: 0 risks: Class I (very low risk - 0.4% complication rate)    The patient has the following additional risks for perioperative complications:  No identified additional risks      ICD-10-CM    1. Preop general physical exam Z01.818    2. Senile cataract of right eye, unspecified age-related cataract type H25.9    3. Hyperlipidemia LDL goal <100 E78.5    4. Psoriasis L40.9    5. Benign prostatic hyperplasia, unspecified whether lower urinary tract symptoms present N40.0    6. DDD (degenerative disc disease), cervical M50.30     7. Psoriatic arthritis (H) L40.50        RECOMMENDATIONS:             APPROVAL GIVEN to proceed with proposed procedure, without further diagnostic evaluation       Signed Electronically by: Artis Espinoza MD    Copy of this evaluation report is provided to requesting physician.    [place] Preop Guidelines    Revised Cardiac Risk Index

## 2019-07-15 NOTE — PROGRESS NOTES
[facility]  [address]  [place] [facility] [address]  [phone]  Dept: [phone]    PRE-OP EVALUATION:  Today's date: 7/15/2019    Dale Cuevas (: 1950) presents for pre-operative evaluation assessment as requested by Dr. Cameron.  He requires evaluation and anesthesia risk assessment prior to undergoing surgery/procedure for treatment of Vitrectomy, membrane peel .    Fax number for surgical facility: [phone]  Primary Physician: Artis Espinoza  Type of Anesthesia Anticipated: Local with MAC    Patient has a Health Care Directive or Living Will:  NO    Preop Questions 2018   Who is doing your surgery? [facility]   What are you having done? vitrectomy, membrane peel   Date of Surgery/Procedure:     Facility or Hospital where procedure/surgery will be performed: [facility]   1.  Do you have a history of Heart attack, stroke, stent, coronary bypass surgery, or other heart surgery? No   2.  Do you ever have any pain or discomfort in your chest? No   3.  Do you have a history of  Heart Failure? No   4.   Are you troubled by shortness of breath when:  walking on a level surface, or up a slight hill, or at night? No   5.  Do you currently have a cold, bronchitis or other respiratory infection? No   6.  Do you have a cough, shortness of breath, or wheezing? No   7.  Do you sometimes get pains in the calves of your legs when you walk? No   8. Do you or anyone in your family have previous history of blood clots? No   9.  Do you or does anyone in your family have a serious bleeding problem such as prolonged bleeding following surgeries or cuts? No   10. Have you ever had problems with anemia or been told to take iron pills? No   11. Have you had any abnormal blood loss such as black, tarry or bloody stools? No   12. Have you ever had a blood transfusion? YES - ***   13. Have you or any of your relatives ever had  problems with anesthesia? No   14. Do you have sleep apnea, excessive snoring or daytime drowsiness? No   15. Do you have any prosthetic heart valves? No   16. Do you have prosthetic joints? No         HPI:     HPI related to upcoming procedure: ***      {. Problems:087220}    MEDICAL HISTORY:     Patient Active Problem List    Diagnosis Date Noted     DDD (degenerative disc disease), cervical 05/14/2019     Priority: Medium     Diastasis recti 06/15/2016     Priority: Medium     Family hx of prostate cancer 06/07/2016     Priority: Medium     Benign prostatic hyperplasia 09/08/2014     Priority: Medium     Hyperlipidemia LDL goal <100 02/05/2014     Priority: Medium     Psoriasis 11/23/2013     Priority: Medium     Elevated blood pressure reading without diagnosis of hypertension 05/06/2013     Priority: Medium     Rosacea 05/06/2013     Priority: Medium     Erectile dysfunction 05/06/2013     Priority: Medium      No past medical history on file.  Past Surgical History:   Procedure Laterality Date     ORTHOPEDIC SURGERY  Jan 12, 2015    left wrist     ORTHOPEDIC SURGERY  1989    spine sugery-with disc fragment removed     ORTHOPEDIC SURGERY  2014    right knee arthroscopy     Current Outpatient Medications   Medication Sig Dispense Refill     adalimumab (HUMIRA *CF*) 40 MG/0.4ML pen kit Inject 0.4 mLs (40 mg) Subcutaneous every 14 days Starting day 22. 1 each 3     adalimumab (HUMIRA *CF*) 80 MG/0.8ML & 40MG/0.4ML pen kit 80mg subcutaneous on day 1, 40 mg subcutaneous on day 8, then every 2 weeks thereafter 1 each 0     allopurinol (ZYLOPRIM) 100 MG tablet TAKE 1 TABLET BY MOUTH EVERY DAY. PLEASE MAKE APPT FOR LABS 30 tablet 3     atorvastatin (LIPITOR) 40 MG tablet Take 1 tablet (40 mg) by mouth daily 90 tablet 3     celecoxib (CELEBREX) 200 MG capsule Take 1 capsule (200 mg) by mouth 2 times daily 60 capsule 5     tamsulosin (FLOMAX) 0.4 MG capsule TAKE ONE CAPSULE BY MOUTH ONE TIME DAILY 90 capsule 2     OTC  "products: {OTC ANALGESICS:735960}    Allergies   Allergen Reactions     Levofloxacin      Other reaction(s): *Unknown      Latex Allergy: {YES/NO WITH DEFAULT:094257::\"NO\"}    Social History     Tobacco Use     Smoking status: Former Smoker     Last attempt to quit: 1973     Years since quittin.2     Smokeless tobacco: Never Used   Substance Use Topics     Alcohol use: Yes     Comment: every night     History   Drug Use No       REVIEW OF SYSTEMS:   {ROS Preop Choices:380457}    EXAM:   There were no vitals taken for this visit.  {EXAM Preop Choices:453716}    DIAGNOSTICS:   {DIAGNOSTIC FOR PREOP:890274}    Recent Labs   Lab Test 19  1558 19  1448 18  1148 16  1638  03/01/15  1435   HGB  --  15.0 14.7 14.1   < > 16.0   PLT  --  210 199 273   < > 215   INR  --   --   --   --   --  1.05   NA  --  141  --  143   < > 138   POTASSIUM  --  3.9  --  4.0   < > 4.2   CR 0.73 0.72  --  1.05   < > 0.81    < > = values in this interval not displayed.        IMPRESSION:   {PREOP REASONS:226478::\"Reason for surgery/procedure: ***\",\"Diagnosis/reason for consult: ***\"}    The proposed surgical procedure is considered {HIGH=major cardiovascular or procedures requiring prolonged anesthesia >4 hours or large fluid shifts;    INTERMEDIATE=abdominal, most orthopedic and intrathoracic surgery; LOW= endoscopy, cataract and breast surgery:823477} risk.    REVISED CARDIAC RISK INDEX  The patient has the following serious cardiovascular risks for perioperative complications such as (MI, PE, VFib and 3  AV Block):  {PREOP REVISED CARDIAC INDEX (RCI):065178:p:\"No serious cardiac risks\"}  INTERPRETATION: {REVISED CARDIAC RISK INTERPRETATION:221130}    The patient has the following additional risks for perioperative complications:  {Additional perioperative risks:323037:p:\"No identified additional risks\"}      ICD-10-CM    1. Preop general physical exam Z01.818        RECOMMENDATIONS:     {IMPORTANT - Conditions " "- complete carefully!!:842942}    {IMPORTANT - Medications:353014::\"--Patient is to take all scheduled medications on the day of surgery EXCEPT for modifications listed below.\"}    {IMPORTANT - Approval:673624:p:\"APPROVAL GIVEN to proceed with proposed procedure, without further diagnostic evaluation\"}       Signed Electronically by: Artis Espinoza MD    Copy of this evaluation report is provided to requesting physician.    [facility] Preop Guidelines    Revised Cardiac Risk Index  "

## 2019-07-22 ENCOUNTER — OFFICE VISIT (OUTPATIENT)
Dept: DERMATOLOGY | Facility: CLINIC | Age: 69
End: 2019-07-22
Payer: COMMERCIAL

## 2019-07-22 VITALS — HEART RATE: 93 BPM | SYSTOLIC BLOOD PRESSURE: 161 MMHG | DIASTOLIC BLOOD PRESSURE: 83 MMHG

## 2019-07-22 DIAGNOSIS — L40.9 PSORIASIS: Primary | ICD-10-CM

## 2019-07-22 DIAGNOSIS — L40.50 PSORIATIC ARTHRITIS (H): ICD-10-CM

## 2019-07-22 ASSESSMENT — PAIN SCALES - GENERAL: PAINLEVEL: NO PAIN (0)

## 2019-07-22 NOTE — PROGRESS NOTES
[facility] Dermatology Note      Dermatology Problem List:  1. Psoriasis with psoriatic arthritis  - Current therapy: adalimumab 40 mg [id number] as of 4/19/2019  - Following with rheumatology for psoriatic arthritis  - Last safety lab work in April 2019 (CBC with diff, CMP and quantiferon gold)    Encounter Date: Jul 22, 2019    CC:   Chief Complaint   Patient presents with     Psoriasis     Dale is here today for a recheck and says there has been a lot of improvement.   Psoriatic arthritis follow up    History of Present Illness:  Mr. Dale Cuevas is a 69 year old male who presents as a follow-up for psoriasis with psoriatic arthritis. The patient was last seen on April 18, 2018, when he was felt to have psoriasis with psoriatic arthritis.  The plan was to start adalimumab.  The patient is currently following with rheumatology for psoriatic arthritis as well. The patient says that after 2 to 3 days of starting the medication, he had resolution of psoriasis as well as joint pain.  He is extremely satisfied with his progress on adalimumab.  He denies any adverse effects with the medication.  He says that his joint pain is almost completely resolved.  He is now able to do activities such as cycling without issues.  He does note some scaling and redness of the eyebrows, around the nose and of the beard.  He uses an over-the-counter topical cortisone cream.  He says that he does not need a more potent topical steroid today.    The patient denies any constitutional symptoms, lymphadenopathy, unintentional weight loss or decreased appetite. He denies other skin problems today.    Past Medical History:   Patient Active Problem List   Diagnosis     Elevated blood pressure reading without diagnosis of hypertension     Rosacea     Erectile dysfunction     Psoriasis     Hyperlipidemia LDL goal <100     Benign prostatic hyperplasia     Family hx of prostate cancer     Diastasis recti     DDD (degenerative  disc disease), cervical     Psoriatic arthritis (H)     Past Medical History:   Diagnosis Date     Psoriatic arthritis (H)      Past Surgical History:   Procedure Laterality Date     ORTHOPEDIC SURGERY  Jan 12, 2015    left wrist     ORTHOPEDIC SURGERY  1989    spine sugery-with disc fragment removed     ORTHOPEDIC SURGERY  2014    right knee arthroscopy       Social History:  Patient reports that he quit smoking about 46 years ago. He has never used smokeless tobacco. He reports that he drinks alcohol. He reports that he does not use drugs.    Family History:  Family History   Problem Relation Age of Onset     Hypertension Mother      Prostate Cancer Father 72     Breast Cancer Sister        Medications:  Current Outpatient Medications   Medication Sig Dispense Refill     adalimumab (HUMIRA *CF*) 40 MG/0.4ML pen kit Inject 0.4 mLs (40 mg) Subcutaneous every 14 days Starting day 22. 1 each 3     allopurinol (ZYLOPRIM) 100 MG tablet TAKE 1 TABLET BY MOUTH EVERY DAY. PLEASE MAKE APPT FOR LABS 30 tablet 3     atorvastatin (LIPITOR) 40 MG tablet Take 1 tablet (40 mg) by mouth daily 90 tablet 3     atropine 1 % ophthalmic solution        celecoxib (CELEBREX) 200 MG capsule Take 1 capsule (200 mg) by mouth 2 times daily 60 capsule 5     ketorolac (ACULAR) 0.5 % ophthalmic solution        prednisoLONE acetate (PRED FORTE) 1 % ophthalmic suspension        tamsulosin (FLOMAX) 0.4 MG capsule TAKE ONE CAPSULE BY MOUTH ONE TIME DAILY 90 capsule 2     trimethoprim-polymyxin b (POLYTRIM) 30555-2.1 UNIT/ML-% ophthalmic solution           Allergies   Allergen Reactions     Levofloxacin      Other reaction(s): *Unknown         Review of Systems:  -Skin Establ Pt: The patient denies any new rash, pruritus, or lesions that are symptomatic, changing or bleeding, except as per HPI.  -Constitutional: Otherwise feeling well today, in usual state of health.  -HEENT: Patient denies nonhealing oral sores.  -Skin: As above in HPI. No  additional skin concerns.    Physical exam:  Vitals: /83 (BP Location: Left arm, Patient Position: Sitting, Cuff Size: Adult Regular)   Pulse 93   GEN: This is a well developed, well-nourished male in no acute distress, in a pleasant mood.    SKIN: Total skin excluding the undergarment areas was performed. The exam included the head/face, neck, both arms, chest, back, abdomen, both legs, digits and/or nails.   -Rare nail pitting noted on a few fingernails  -Mild scaly patches noted of the eyebrows and alar crease.  -Few scattered less than 1 cm erythematous and scaly papules noted on the elbows  -No joint deformity or joint swelling noted on the hands  -Less than 1/2% body surface area involved with psoriatic plaques on areas examined  -No other lesions of concern on areas examined.     Impression/Plan:  1. Psoriasis with psoriatic arthritis    Overall, the patient has had dramatic improvement after starting adalimumab.  He denies any adverse effects associated with the medication.  He denies any constitutional symptoms or increased rate of infection.  The last safety labs were reviewed, which were obtained in April 2019, and reassuring.  We will continue with the current plan.    Continue with adalimumab 40 mg q. 14 days    The patient will message when he is about to run out of adalimumab.    No need for topical medicines today    Continue following with rheumatology    Next QuantiFERON gold will be due in April 2020    Appropriate for follow up within 6 months    2. Mild seborrheic dermatitis of the face    The patient is currently using an over-the-counter topical cortisone cream.  He declined a more potent topical corticosteroid, which is reasonable. If he changes, his mind, he was told to contact the clinic. In that case, starting him on hydrocortisone 2.5% ointment BID PRN flares would be reasonable.      Follow-up in 6 months, earlier for new or changing lesions.     Dr. Stanley staffed the  patient.    Staff Involved:  Resident(Dr. Gomez Ely)/Staff    Staff Physician Comments:   I saw and evaluated the patient with the resident and I edited the assessment and plan as documented in the note. I was present for the examination.    Lopez Stanley MD   of Dermatology  Department of Dermatology  [facility] [employer]

## 2019-07-22 NOTE — NURSING NOTE
Dermatology Rooming Note    Dale Cuevas's goals for this visit include:   Chief Complaint   Patient presents with     Psoriasis     Dale is here today for a recheck and says there has been a lot of improvement.     Madai Angela, CMA

## 2019-07-22 NOTE — LETTER
7/22/2019       RE: Dale Cuevas  [address] [address]     Dear Colleague,    Thank you for referring your patient, Dale Cuevas, to the [facility] DERMATOLOGY at [facility]. Please see a copy of my visit note below.    [facility] Dermatology Note      Dermatology Problem List:  1. Psoriasis with psoriatic arthritis  - Current therapy: adalimumab 40 mg [id number] as of 4/19/2019  - Following with rheumatology for psoriatic arthritis  - Last safety lab work in April 2019 (CBC with diff, CMP and quantiferon gold)    Encounter Date: Jul 22, 2019    CC:   Chief Complaint   Patient presents with     Psoriasis     Dale is here today for a recheck and says there has been a lot of improvement.   Psoriatic arthritis follow up    History of Present Illness:  Mr. Dale Cuevas is a 69 year old male who presents as a follow-up for psoriasis with psoriatic arthritis. The patient was last seen on April 18, 2018, when he was felt to have psoriasis with psoriatic arthritis.  The plan was to start adalimumab.  The patient is currently following with rheumatology for psoriatic arthritis as well. The patient says that after 2 to 3 days of starting the medication, he had resolution of psoriasis as well as joint pain.  He is extremely satisfied with his progress on adalimumab.  He denies any adverse effects with the medication.  He says that his joint pain is almost completely resolved.  He is now able to do activities such as cycling without issues.  He does note some scaling and redness of the eyebrows, around the nose and of the beard.  He uses an over-the-counter topical cortisone cream.  He says that he does not need a more potent topical steroid today.    The patient denies any constitutional symptoms, lymphadenopathy, unintentional weight loss or decreased appetite. He denies other skin problems today.    Past Medical History:   Patient Active Problem List    Diagnosis     Elevated blood pressure reading without diagnosis of hypertension     Rosacea     Erectile dysfunction     Psoriasis     Hyperlipidemia LDL goal <100     Benign prostatic hyperplasia     Family hx of prostate cancer     Diastasis recti     DDD (degenerative disc disease), cervical     Psoriatic arthritis (H)     Past Medical History:   Diagnosis Date     Psoriatic arthritis (H)      Past Surgical History:   Procedure Laterality Date     ORTHOPEDIC SURGERY  Jan 12, 2015    left wrist     ORTHOPEDIC SURGERY  1989    spine sugery-with disc fragment removed     ORTHOPEDIC SURGERY  2014    right knee arthroscopy       Social History:  Patient reports that he quit smoking about 46 years ago. He has never used smokeless tobacco. He reports that he drinks alcohol. He reports that he does not use drugs.    Family History:  Family History   Problem Relation Age of Onset     Hypertension Mother      Prostate Cancer Father 72     Breast Cancer Sister        Medications:  Current Outpatient Medications   Medication Sig Dispense Refill     adalimumab (HUMIRA *CF*) 40 MG/0.4ML pen kit Inject 0.4 mLs (40 mg) Subcutaneous every 14 days Starting day 22. 1 each 3     allopurinol (ZYLOPRIM) 100 MG tablet TAKE 1 TABLET BY MOUTH EVERY DAY. PLEASE MAKE APPT FOR LABS 30 tablet 3     atorvastatin (LIPITOR) 40 MG tablet Take 1 tablet (40 mg) by mouth daily 90 tablet 3     atropine 1 % ophthalmic solution        celecoxib (CELEBREX) 200 MG capsule Take 1 capsule (200 mg) by mouth 2 times daily 60 capsule 5     ketorolac (ACULAR) 0.5 % ophthalmic solution        prednisoLONE acetate (PRED FORTE) 1 % ophthalmic suspension        tamsulosin (FLOMAX) 0.4 MG capsule TAKE ONE CAPSULE BY MOUTH ONE TIME DAILY 90 capsule 2     trimethoprim-polymyxin b (POLYTRIM) 94711-3.1 UNIT/ML-% ophthalmic solution           Allergies   Allergen Reactions     Levofloxacin      Other reaction(s): *Unknown         Review of Systems:  -Skin Establ Pt:  The patient denies any new rash, pruritus, or lesions that are symptomatic, changing or bleeding, except as per HPI.  -Constitutional: Otherwise feeling well today, in usual state of health.  -HEENT: Patient denies nonhealing oral sores.  -Skin: As above in HPI. No additional skin concerns.    Physical exam:  Vitals: /83 (BP Location: Left arm, Patient Position: Sitting, Cuff Size: Adult Regular)   Pulse 93   GEN: This is a well developed, well-nourished male in no acute distress, in a pleasant mood.    SKIN: Total skin excluding the undergarment areas was performed. The exam included the head/face, neck, both arms, chest, back, abdomen, both legs, digits and/or nails.   -Rare nail pitting noted on a few fingernails  -Mild scaly patches noted of the eyebrows and alar crease.  -Few scattered less than 1 cm erythematous and scaly papules noted on the elbows  -No joint deformity or joint swelling noted on the hands  -Less than 1/2% body surface area involved with psoriatic plaques on areas examined  -No other lesions of concern on areas examined.     Impression/Plan:  1. Psoriasis with psoriatic arthritis    Overall, the patient has had dramatic improvement after starting adalimumab.  He denies any adverse effects associated with the medication.  He denies any constitutional symptoms or increased rate of infection.  The last safety labs were reviewed, which were obtained in April 2019, and reassuring.  We will continue with the current plan.    Continue with adalimumab 40 mg q. 14 days    The patient will message when he is about to run out of adalimumab.    No need for topical medicines today    Continue following with rheumatology    Next QuantiFERON gold will be due in April 2020    Appropriate for follow up within 6 months    2. Mild seborrheic dermatitis of the face    The patient is currently using an over-the-counter topical cortisone cream.  He declined a more potent topical corticosteroid, which is  reasonable. If he changes, his mind, he was told to contact the clinic. In that case, starting him on hydrocortisone 2.5% ointment BID PRN flares would be reasonable.      Follow-up in 6 months, earlier for new or changing lesions.     Dr. Stanley staffed the patient.    Staff Involved:  Resident(Dr. Gomez Ely)/Staff    Staff Physician Comments:   I saw and evaluated the patient with the resident and I edited the assessment and plan as documented in the note. I was present for the examination.    Lopez Stanley MD   of Dermatology  Department of Dermatology  [facility]

## 2019-07-22 NOTE — PATIENT INSTRUCTIONS
We will see you back in clinic in 6 months while you are on adalimumab. Please message us a few weeks before you are due for a refill, so that we can refill that for you.    If you want, you can use a Head and Shoulders shampoo and massage it into your face in the shower.

## 2019-07-24 ENCOUNTER — TELEPHONE (OUTPATIENT)
Dept: RHEUMATOLOGY | Facility: CLINIC | Age: 69
End: 2019-07-24

## 2019-09-13 PROBLEM — M50.30 DDD (DEGENERATIVE DISC DISEASE), CERVICAL: Status: RESOLVED | Noted: 2019-05-14 | Resolved: 2019-09-13

## 2019-09-13 NOTE — PROGRESS NOTES
Pt is being discharged secondary to lack of follow up.  Please refer to initial eval or last progress note for final values.

## 2019-09-29 ENCOUNTER — HEALTH MAINTENANCE LETTER (OUTPATIENT)
Age: 69
End: 2019-09-29

## 2019-10-31 ENCOUNTER — TRANSFERRED RECORDS (OUTPATIENT)
Dept: HEALTH INFORMATION MANAGEMENT | Facility: CLINIC | Age: 69
End: 2019-10-31

## 2019-12-05 ENCOUNTER — TRANSFERRED RECORDS (OUTPATIENT)
Dept: HEALTH INFORMATION MANAGEMENT | Facility: CLINIC | Age: 69
End: 2019-12-05

## 2019-12-06 ENCOUNTER — TELEPHONE (OUTPATIENT)
Dept: FAMILY MEDICINE | Facility: CLINIC | Age: 69
End: 2019-12-06

## 2019-12-06 NOTE — PROGRESS NOTES
[facility]  [address]  [place] [address]  [phone]  Dept: [phone]    PRE-OP EVALUATION:  Today's date: 2019    Dale Cuevas (: 1950) presents for pre-operative evaluation assessment as requested by Dr. Reed.  He requires evaluation and anesthesia risk assessment prior to undergoing surgery/procedure for treatment of Right Eye .    Fax number for surgical facility: [phone]  Primary Physician: Artis Espinoza  Type of Anesthesia Anticipated: Local with MAC    Patient has a Health Care Directive or Living Will:  NO    Preop Questions 2019   Who is doing your surgery? dr reed   What are you having done? eye laser   Date of Surgery/Procedure: [id number]   Facility or Hospital where procedure/surgery will be performed: [facility] eye   1.  Do you have a history of Heart attack, stroke, stent, coronary bypass surgery, or other heart surgery? No   2.  Do you ever have any pain or discomfort in your chest? No   3.  Do you have a history of  Heart Failure? No   4.   Are you troubled by shortness of breath when:  walking on a level surface, or up a slight hill, or at night? No   5.  Do you currently have a cold, bronchitis or other respiratory infection? No   6.  Do you have a cough, shortness of breath, or wheezing? No   7.  Do you sometimes get pains in the calves of your legs when you walk? No   8. Do you or anyone in your family have previous history of blood clots? No   9.  Do you or does anyone in your family have a serious bleeding problem such as prolonged bleeding following surgeries or cuts? No   10. Have you ever had problems with anemia or been told to take iron pills? No   11. Have you had any abnormal blood loss such as black, tarry or bloody stools? No   12. Have you ever had a blood transfusion? No   13. Have you or any of your relatives ever had problems with anesthesia? No   14. Do you have sleep apnea, excessive  snoring or daytime drowsiness? No   15. Do you have any prosthetic heart valves? No   16. Do you have prosthetic joints? No         HPI:     HPI related to upcoming procedure:       See problem list for active medical problems.  Problems all longstanding and stable, except as noted/documented.  See ROS for pertinent symptoms related to these conditions.      MEDICAL HISTORY:     Patient Active Problem List    Diagnosis Date Noted     Psoriatic arthritis (H) 07/15/2019     Priority: Medium     Diastasis recti 06/15/2016     Priority: Medium     Family hx of prostate cancer 06/07/2016     Priority: Medium     Benign prostatic hyperplasia 09/08/2014     Priority: Medium     Hyperlipidemia LDL goal <100 02/05/2014     Priority: Medium     Psoriasis 11/23/2013     Priority: Medium     Elevated blood pressure reading without diagnosis of hypertension 05/06/2013     Priority: Medium     Rosacea 05/06/2013     Priority: Medium     Erectile dysfunction 05/06/2013     Priority: Medium      Past Medical History:   Diagnosis Date     Psoriatic arthritis (H)      Past Surgical History:   Procedure Laterality Date     ORTHOPEDIC SURGERY  Jan 12, 2015    left wrist     ORTHOPEDIC SURGERY  1989    spine sugery-with disc fragment removed     ORTHOPEDIC SURGERY  2014    right knee arthroscopy     Current Outpatient Medications   Medication Sig Dispense Refill     adalimumab (HUMIRA *CF*) 40 MG/0.4ML pen kit Inject 0.4 mLs (40 mg) Subcutaneous every 14 days Starting day 22. 1 each 3     allopurinol (ZYLOPRIM) 100 MG tablet TAKE 1 TABLET BY MOUTH EVERY DAY. PLEASE MAKE APPT FOR LABS 30 tablet 3     atorvastatin (LIPITOR) 40 MG tablet Take 1 tablet (40 mg) by mouth daily 90 tablet 3     atropine 1 % ophthalmic solution        celecoxib (CELEBREX) 200 MG capsule Take 1 capsule (200 mg) by mouth 2 times daily 60 capsule 5     ketorolac (ACULAR) 0.5 % ophthalmic solution        prednisoLONE acetate (PRED FORTE) 1 % ophthalmic suspension     "    tamsulosin (FLOMAX) 0.4 MG capsule TAKE ONE CAPSULE BY MOUTH ONE TIME DAILY 90 capsule 3     trimethoprim-polymyxin b (POLYTRIM) 75610-1.1 UNIT/ML-% ophthalmic solution        OTC products: None, except as noted above    Allergies   Allergen Reactions     Levofloxacin      Other reaction(s): *Unknown      Latex Allergy: NO    Social History     Tobacco Use     Smoking status: Former Smoker     Last attempt to quit: 1973     Years since quittin.6     Smokeless tobacco: Never Used   Substance Use Topics     Alcohol use: Yes     Comment: every night     History   Drug Use No       REVIEW OF SYSTEMS:   Constitutional, HEENT, cardiovascular, pulmonary, gi and gu systems are negative, except as otherwise noted.    EXAM:   BP (!) 160/90   Pulse 73   Temp 97.9  F (36.6  C) (Tympanic)   Resp 14   Ht 1.753 m (5' 9\")   Wt 81.2 kg (179 lb)   SpO2 96%   BMI 26.43 kg/m      GENERAL APPEARANCE: healthy, alert and no distress     EYES: EOMI,  PERRL     HENT: ear canals and TM's normal and nose and mouth without ulcers or lesions     NECK: no adenopathy, no asymmetry, masses, or scars and thyroid normal to palpation     RESP: lungs clear to auscultation - no rales, rhonchi or wheezes     CV: regular rates and rhythm, normal S1 S2, no S3 or S4 and no murmur, click or rub     ABDOMEN:  soft, nontender, no HSM or masses and bowel sounds normal     MS: extremities normal- no gross deformities noted, no evidence of inflammation in joints, FROM in all extremities.     SKIN: no suspicious lesions or rashes     NEURO: Normal strength and tone, sensory exam grossly normal, mentation intact and speech normal     PSYCH: mentation appears normal. and affect normal/bright     LYMPHATICS: No cervical adenopathy    DIAGNOSTICS:   No labs or EKG required for low risk surgery (cataract, skin procedure, breast biopsy, etc)    Recent Labs   Lab Test 19  1558 19  1448 18  1148 16  1638  03/01/15  1435   HGB  " --  15.0 14.7 14.1   < > 16.0   PLT  --  210 199 273   < > 215   INR  --   --   --   --   --  1.05   NA  --  141  --  143   < > 138   POTASSIUM  --  3.9  --  4.0   < > 4.2   CR 0.73 0.72  --  1.05   < > 0.81    < > = values in this interval not displayed.        IMPRESSION:   Reason for surgery/procedure: problems with vision right eye    The proposed surgical procedure is considered LOW risk.    REVISED CARDIAC RISK INDEX  The patient has the following serious cardiovascular risks for perioperative complications such as (MI, PE, VFib and 3  AV Block):  No serious cardiac risks  INTERPRETATION: 0 risks: Class I (very low risk - 0.4% complication rate)    The patient has the following additional risks for perioperative complications:  No identified additional risks      ICD-10-CM    1. Preop general physical exam Z01.818    2. Vision problem H54.7     right eye   3. Elevated blood pressure reading without diagnosis of hypertension R03.0    4. Psoriatic arthritis (H) L40.50    5. Hyperlipidemia LDL goal <100 E78.5    6. Psoriasis L40.9        RECOMMENDATIONS:             APPROVAL GIVEN to proceed with proposed procedure, without further diagnostic evaluation       Signed Electronically by: Artis Espinoza MD    Copy of this evaluation report is provided to requesting physician.    [facility] Preop Guidelines    Revised Cardiac Risk Index

## 2019-12-06 NOTE — LETTER
December 6, 2019    Dale Cuevas  [address] [address]    Dear Ron Hunter cares about your health and your health plan.  I have reviewed your medical conditions, medication list and lab results, and am making recommendations based on this review to better manage your health.    You are in particular need of attention regarding:  -Colon Cancer Screening    I am recommending that you:     -schedule a COLONOSCOPY to look for colon cancer (due every 10 years or 5 years in higher risk situations.)        Colon cancer is now the second leading cause of cancer-related deaths in the [place] for both men and women and there are over 130,000 new cases and 50,000 deaths per year from colon cancer.  Colonoscopies can prevent 90-95% of these deaths.  Problem lesions can be removed before they ever become cancer.  This test is not only looking for cancer, but also getting rid of precancerious lesions.    If you are under/uninsured, we recommend you contact the [employer] program. [employer] is a free colorectal cancer screening program that provides colonoscopies for eligible under/uninsured [place] men and women. If you are interested in receiving a free colonoscopy, please call [employer] at [phone] (mention code [employer]) to see if you re eligible.      If you do not wish to do a colonoscopy or cannot afford to do one, at this time, there is another option. It is called a FIT test or Fecal Immunochemical Occult Blood Test (take home stool sample kit).  It does not replace the colonoscopy for colorectal cancer screening, but it can detect hidden bleeding in the lower colon.  It does need to be repeated every year and if a positive result is obtained, you would be referred for a colonoscopy.          If you have completed either one of these tests at another facility, please call with the details of when and where the tests were done and if they were normal or not. Or have the  records sent to our clinic so that we can best coordinate your care.      Please call us at the [facility] location:  [phone] or use Linkedwith to address the above recommendations.     Thank you for trusting [facility].  We appreciate the opportunity to serve you and look forward to supporting your healthcare in the future.    If you have (or plan to have) any of these tests done at a facility other than a [facility] or a [facility], please have the results sent to the [facility] location noted above.      Best Regards,    Artis Espinoza MD

## 2019-12-06 NOTE — TELEPHONE ENCOUNTER
Panel Management Review      Patient has the following on his problem list: None      Composite cancer screening  Chart review shows that this patient is due/due soon for the following Colonoscopy  Summary:    Patient is due/failing the following:   COLONOSCOPY    Action needed:   Patient needs to schedule colonoscopy    Type of outreach:    Sent letter.    Questions for provider review:    None                                                                                                                                    Sandi Cash LPN     Chart routed to Provider .

## 2019-12-09 ENCOUNTER — OFFICE VISIT (OUTPATIENT)
Dept: FAMILY MEDICINE | Facility: CLINIC | Age: 69
End: 2019-12-09
Payer: COMMERCIAL

## 2019-12-09 VITALS
SYSTOLIC BLOOD PRESSURE: 160 MMHG | OXYGEN SATURATION: 96 % | RESPIRATION RATE: 14 BRPM | HEIGHT: 69 IN | TEMPERATURE: 97.9 F | BODY MASS INDEX: 26.51 KG/M2 | WEIGHT: 179 LBS | HEART RATE: 73 BPM | DIASTOLIC BLOOD PRESSURE: 90 MMHG

## 2019-12-09 DIAGNOSIS — L40.50 PSORIATIC ARTHRITIS (H): ICD-10-CM

## 2019-12-09 DIAGNOSIS — H54.7 VISION PROBLEM: ICD-10-CM

## 2019-12-09 DIAGNOSIS — E78.5 HYPERLIPIDEMIA LDL GOAL <100: ICD-10-CM

## 2019-12-09 DIAGNOSIS — Z01.818 PREOP GENERAL PHYSICAL EXAM: Primary | ICD-10-CM

## 2019-12-09 DIAGNOSIS — L40.9 PSORIASIS: ICD-10-CM

## 2019-12-09 DIAGNOSIS — R03.0 ELEVATED BLOOD PRESSURE READING WITHOUT DIAGNOSIS OF HYPERTENSION: ICD-10-CM

## 2019-12-09 PROCEDURE — 99214 OFFICE O/P EST MOD 30 MIN: CPT | Performed by: FAMILY MEDICINE

## 2019-12-09 ASSESSMENT — MIFFLIN-ST. JEOR: SCORE: 1557.32

## 2020-01-15 ENCOUNTER — OFFICE VISIT (OUTPATIENT)
Dept: FAMILY MEDICINE | Facility: CLINIC | Age: 70
End: 2020-01-15
Payer: COMMERCIAL

## 2020-01-15 VITALS
WEIGHT: 178 LBS | TEMPERATURE: 98.2 F | OXYGEN SATURATION: 96 % | RESPIRATION RATE: 18 BRPM | HEART RATE: 83 BPM | BODY MASS INDEX: 26.36 KG/M2 | HEIGHT: 69 IN | SYSTOLIC BLOOD PRESSURE: 132 MMHG | DIASTOLIC BLOOD PRESSURE: 86 MMHG

## 2020-01-15 DIAGNOSIS — M54.2 CERVICALGIA: Primary | ICD-10-CM

## 2020-01-15 DIAGNOSIS — L40.50 PSORIATIC ARTHRITIS (H): ICD-10-CM

## 2020-01-15 PROCEDURE — 99213 OFFICE O/P EST LOW 20 MIN: CPT | Performed by: FAMILY MEDICINE

## 2020-01-15 ASSESSMENT — MIFFLIN-ST. JEOR: SCORE: 1552.78

## 2020-01-15 NOTE — PROGRESS NOTES
Subjective     Dale Cuevas is a 69 year old male who presents to clinic today for the following health issues:    HPI   Neck/upper back Pain       Duration: ongoing for months        Specific cause: none    Description:   Location of pain: upper back right  Character of pain: dull ache, stabbing and constant  Pain radiation:none    New numbness or weakness in legs, not attributed to pain:  no -- pt reports numbness in arms    Intensity: Currently 8/10, At its worst 9/10    History:   Pain interferes with job: YES  History of back problems: had disc removed around 30 years ago,lower back   Any previous MRI or X-rays: Yes- at [facility].  Date -4/15/19  Sees a specialist for back pain:  No  Therapies tried without relief: Aleve    Alleviating factors:   Improved by: none      Precipitating factors:  Worsened by: Lifting, Bending, Standing, Sitting, Lying Flat and Walking    Pt would like referral for specialist          Accompanying Signs & Symptoms:  Risk of Fracture:  None  Risk of Cauda Equina:  None  Risk of Infection:  None  Risk of Cancer:  None  Risk of Ankylosing Spondylitis:  Onset at age <35, male, AND morning back stiffness. no         Pt had nerve block C8 done 4/25/19.  He noted that that helped somewhat but he wondered if level was accurate       Pt has Hx of psoriatic arthritis.  He gets Humera injections every 2 weeks    BP Readings from Last 3 Encounters:   01/15/20 132/86   12/09/19 (!) 160/90   07/22/19 161/83    Wt Readings from Last 3 Encounters:   01/15/20 80.7 kg (178 lb)   12/09/19 81.2 kg (179 lb)   07/15/19 79.6 kg (175 lb 8 oz)                      Reviewed and updated as needed this visit by Provider         Review of Systems   ROS COMP: CONSTITUTIONAL: NEGATIVE for fever, chills, change in weight  ENT/MOUTH: NEGATIVE for ear, mouth and throat problems  RESP: NEGATIVE for significant cough or SOB  CV: NEGATIVE for chest pain, palpitations or peripheral edema  MUSCULOSKELETAL: POSITIVE  for  "back pain upper back and neck pain  NEURO: NEGATIVE for weakness, dizziness or paresthesias      Objective    /86 (Patient Position: Sitting, Cuff Size: Adult Regular)   Pulse 83   Temp 98.2  F (36.8  C) (Tympanic)   Resp 18   Ht 1.753 m (5' 9\")   Wt 80.7 kg (178 lb)   SpO2 96%   BMI 26.29 kg/m    Body mass index is 26.29 kg/m .  Physical Exam   GENERAL: healthy, alert and no distress  NECK: no adenopathy, no asymmetry, masses, or scars and thyroid normal to palpation  RESP: lungs clear to auscultation - no rales, rhonchi or wheezes  CV: regular rate and rhythm, normal S1 S2, no S3 or S4, no murmur, click or rub, no peripheral edema and peripheral pulses strong  ABDOMEN: soft, nontender, no hepatosplenomegaly, no masses and bowel sounds normal  MS: neck exam shows normal strength and tenderness to palpation at posterior spinal process C8-T1.  Muscles are tight in this area, tender to palpation more on Rt side than Lt side    Diagnostic Test Results:  Labs reviewed in Epic  none         Assessment & Plan     Dale was seen today for referral and back pain.    Diagnoses and all orders for this visit:    Cervicalgia  -     SPINE SURGERY REFERRAL    Psoriatic arthritis (H)         BMI:   Estimated body mass index is 26.29 kg/m  as calculated from the following:    Height as of this encounter: 1.753 m (5' 9\").    Weight as of this encounter: 80.7 kg (178 lb).           FUTURE APPOINTMENTS:       - Follow-up visit in 1 mo  Patient Instructions   Ice packs to area frequently as needed. Use Ice massage on trigger point areas.  Do gentle Range of motion exercises      Return in about 1 month (around 2/15/2020) for neck & upper back pain.    Edvin Segundo MD  [facility]      "

## 2020-01-15 NOTE — PATIENT INSTRUCTIONS
Ice packs to area frequently as needed. Use Ice massage on trigger point areas.  Do gentle Range of motion exercises

## 2020-01-24 DIAGNOSIS — E78.2 MIXED HYPERLIPIDEMIA: ICD-10-CM

## 2020-01-24 NOTE — TELEPHONE ENCOUNTER
"Requested Prescriptions   Pending Prescriptions Disp Refills     atorvastatin (LIPITOR) 40 MG tablet [Pharmacy Med Name: ATORVASTATIN 40 MG TABLET]  Last Written Prescription Date:  9/6/2018  Last Fill Quantity: 90 tablet,  # refills: 3   Last office visit: 1/15/2020 with prescribing provider:  Sanya   Future Office Visit:     90 tablet 3     Sig: TAKE 1 TABLET BY MOUTH EVERY DAY       Statins Protocol Passed - 1/24/2020 12:30 PM        Passed - LDL on file in past 12 months     Recent Labs   Lab Test 03/25/19  1448   *             Passed - No abnormal creatine kinase in past 12 months     No lab results found.             Passed - Recent (12 mo) or future (30 days) visit within the authorizing provider's specialty     Patient has had an office visit with the authorizing provider or a provider within the authorizing providers department within the previous 12 mos or has a future within next 30 days. See \"Patient Info\" tab in inbasket, or \"Choose Columns\" in Meds & Orders section of the refill encounter.              Passed - Medication is active on med list        Passed - Patient is age 18 or older           "

## 2020-01-27 RX ORDER — ATORVASTATIN CALCIUM 40 MG/1
TABLET, FILM COATED ORAL
Qty: 90 TABLET | Refills: 2 | Status: SHIPPED | OUTPATIENT
Start: 2020-01-27 | End: 2020-10-19

## 2020-02-03 ENCOUNTER — OFFICE VISIT (OUTPATIENT)
Dept: FAMILY MEDICINE | Facility: CLINIC | Age: 70
End: 2020-02-03
Payer: COMMERCIAL

## 2020-02-03 ENCOUNTER — ANCILLARY PROCEDURE (OUTPATIENT)
Dept: GENERAL RADIOLOGY | Facility: CLINIC | Age: 70
End: 2020-02-03
Attending: FAMILY MEDICINE
Payer: COMMERCIAL

## 2020-02-03 VITALS
HEART RATE: 87 BPM | DIASTOLIC BLOOD PRESSURE: 76 MMHG | HEIGHT: 69 IN | OXYGEN SATURATION: 96 % | BODY MASS INDEX: 26.14 KG/M2 | RESPIRATION RATE: 14 BRPM | TEMPERATURE: 99.3 F | WEIGHT: 176.5 LBS | SYSTOLIC BLOOD PRESSURE: 128 MMHG

## 2020-02-03 DIAGNOSIS — R05.9 COUGH: Primary | ICD-10-CM

## 2020-02-03 DIAGNOSIS — E78.5 HYPERLIPIDEMIA LDL GOAL <70: ICD-10-CM

## 2020-02-03 DIAGNOSIS — R05.9 COUGH: ICD-10-CM

## 2020-02-03 PROCEDURE — 99213 OFFICE O/P EST LOW 20 MIN: CPT | Performed by: FAMILY MEDICINE

## 2020-02-03 PROCEDURE — 71046 X-RAY EXAM CHEST 2 VIEWS: CPT | Mod: FY

## 2020-02-03 ASSESSMENT — MIFFLIN-ST. JEOR: SCORE: 1545.98

## 2020-02-03 NOTE — PROGRESS NOTES
"Subjective     Dale Cuevas is a 69 year old male who presents to clinic today for the following health issues:    HPI   RESPIRATORY SYMPTOMS      Duration: Intermittent, ongoing cough since end of December    Description  cough, headache and low grade    Severity: mild    Accompanying signs and symptoms: None    History (predisposing factors):  none    Precipitating or alleviating factors: None    Therapies tried and outcome:  none        Patient Active Problem List   Diagnosis     Elevated blood pressure reading without diagnosis of hypertension     Rosacea     Erectile dysfunction     Psoriasis     Hyperlipidemia LDL goal <100     Benign prostatic hyperplasia     Family hx of prostate cancer     Diastasis recti     Psoriatic arthritis (H)     Past Surgical History:   Procedure Laterality Date     ORTHOPEDIC SURGERY  2015    left wrist     ORTHOPEDIC SURGERY      spine sugery-with disc fragment removed     ORTHOPEDIC SURGERY      right knee arthroscopy       Social History     Tobacco Use     Smoking status: Former Smoker     Last attempt to quit: 1973     Years since quittin.7     Smokeless tobacco: Never Used   Substance Use Topics     Alcohol use: Yes     Comment: every night     Family History   Problem Relation Age of Onset     Hypertension Mother      Prostate Cancer Father 72     Breast Cancer Sister              Reviewed and updated as needed this visit by Provider         Review of Systems   ROS COMP: Constitutional, HEENT, cardiovascular, pulmonary, gi and gu systems are negative, except as otherwise noted.      Objective    /76 (Patient Position: Sitting, Cuff Size: Adult Regular)   Pulse 87   Temp 99.3  F (37.4  C) (Tympanic)   Resp 14   Ht 1.753 m (5' 9\")   Wt 80.1 kg (176 lb 8 oz)   SpO2 96%   BMI 26.06 kg/m    Body mass index is 26.06 kg/m .  Physical Exam   GENERAL APPEARANCE: healthy, alert and no distress  EYES: Eyes grossly normal to inspection, PERRL and " conjunctivae and sclerae normal  HENT: ear canals and TM's normal and nose and mouth without ulcers or lesions  NECK: no adenopathy and no asymmetry, masses, or scars  RESP: lungs clear to auscultation - no rales, rhonchi or wheezes  LYMPHATICS: no cervical adenopathy    Diagnostic Test Results:  CXR - unremarkable to my reading        Assessment & Plan       ICD-10-CM    1. Cough R05 XR Chest 2 Views   2. Hyperlipidemia LDL goal <70 E78.5           Patient Instructions   Patient has seen ENT in the past and was diagnosed with chronic ethmoid sinusitis.  I suggested a follow-up visit with them for further evaluation.  His cough is very dry in nature and may just be from drainage from his sinuses.  He is due in March for his annual wellness exam.  His cough has not affected his ability to sing although sometimes it gets a little bit harder of late.      Return in about 7 weeks (around 3/23/2020) for wellness exam.    Artis Esipnoza MD  [facility]

## 2020-02-04 NOTE — PATIENT INSTRUCTIONS
Patient has seen ENT in the past and was diagnosed with chronic ethmoid sinusitis.  I suggested a follow-up visit with them for further evaluation.  His cough is very dry in nature and may just be from drainage from his sinuses.  He is due in March for his annual wellness exam.  His cough has not affected his ability to sing although sometimes it gets a little bit harder of late.

## 2020-02-05 ENCOUNTER — TELEPHONE (OUTPATIENT)
Dept: NEUROSURGERY | Facility: CLINIC | Age: 70
End: 2020-02-05

## 2020-02-07 ENCOUNTER — OFFICE VISIT (OUTPATIENT)
Dept: NEUROSURGERY | Facility: CLINIC | Age: 70
End: 2020-02-07

## 2020-02-07 VITALS
WEIGHT: 175.7 LBS | HEART RATE: 80 BPM | BODY MASS INDEX: 25.95 KG/M2 | OXYGEN SATURATION: 96 % | DIASTOLIC BLOOD PRESSURE: 88 MMHG | SYSTOLIC BLOOD PRESSURE: 157 MMHG

## 2020-02-07 DIAGNOSIS — M54.12 ACUTE CERVICAL RADICULOPATHY: Primary | ICD-10-CM

## 2020-02-07 DIAGNOSIS — M54.2 ACUTE NECK PAIN: ICD-10-CM

## 2020-02-07 RX ORDER — METHYLPREDNISOLONE 4 MG
TABLET, DOSE PACK ORAL
Qty: 21 TABLET | Refills: 0 | Status: SHIPPED | OUTPATIENT
Start: 2020-02-07 | End: 2021-03-03

## 2020-02-07 ASSESSMENT — ENCOUNTER SYMPTOMS
COUGH DISTURBING SLEEP: 1
ARTHRALGIAS: 0
SPUTUM PRODUCTION: 0
WHEEZING: 1
DYSPNEA ON EXERTION: 0
SHORTNESS OF BREATH: 0
JOINT SWELLING: 0
MYALGIAS: 1
POSTURAL DYSPNEA: 0
NECK PAIN: 1
SNORES LOUDLY: 0
STIFFNESS: 0
MUSCLE WEAKNESS: 1
HEMOPTYSIS: 0
BACK PAIN: 1
COUGH: 1
MUSCLE CRAMPS: 0

## 2020-02-07 ASSESSMENT — PAIN SCALES - GENERAL: PAINLEVEL: EXTREME PAIN (9)

## 2020-02-07 NOTE — LETTER
RE: Dale Cuevas  [address] [id number]     Dear Colleague,    Thank you for referring your patient, Dale Cuevas, to the [facility] NEUROSURGERY at [facility]. Please see a copy of my visit note below.    2/7/2020     Neurosurgery Clinic Note    Reason for visit:                Cervicalgia and right arm radiculopathy     History of present illness:   Mr. Dale Cuevas is a 69 year old male with PMH of psoriatic arthritis, HLD, HTN, and rosacea who presents for neck pain and right upper extremity pain.   He states his symptoms have been present for approx 1 year.  No precipitating event or trauma.  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  He describes pain in right upper back/shoulder blade (in between shoulder blades) and gets a sharp, stabbing pain.  This pain is brought on and increased with   arm movement and cervical extension.  He has pain that radiates down to the right shoulder and down the right arm (posterior/lateral bicep). He gets numbness and tingling in to hand and fingers (primarily last two digits).  Left arm is asymptomatic. He underwent a C8 selective nerve root block which was not helpful.      Current Pain Medications:    Ibuprofen-   Not helpful       Pain Relevant Medications:               Opiates:  None              NSAIDS:    Ibuprofen, Aleve   Not helpful               Muscle Relaxants: None              Anti-depressants:   None              Neuropathics:         None               Topicals:    Ice pack/ Heat.   Not helpful               Other medications not covered above:    None     Past Pain Treatments:              Pain Clinic:     None             PT:     Yes   approx 4-5 sessions      Not helpful              Chiropractor:    No             Acupuncture:    None             TENs Unit:  None             Injections:    Yes  -right C8 selective nerve root injection.   Not helpful              Surgeries related  to pain:    None      Review of systems: 10 point ROS negative except for as detailed in HPI    Past Medical History:   Past Medical History:   Diagnosis Date     Psoriatic arthritis (H)        Elevated blood pressure (not hypertension)     Rosacea     Erectile dysfunction     Psoriasis     Hyperlipidemia LDL goal <100     BPH (benign prostatic hypertrophy)     Family hx of prostate cancer     Diastasis recti     Surgical History:   Past Surgical History:   Procedure Laterality Date     ORTHOPEDIC SURGERY  2015    left wrist     ORTHOPEDIC SURGERY      spine sugery-with disc fragment removed      T8-T9 Michell Garay     ORTHOPEDIC SURGERY      right knee arthroscopy   Vitrectomy-   Right Eye       Medications:  Current Outpatient Medications   Medication     adalimumab (HUMIRA *CF*) 40 MG/0.4ML pen kit     atorvastatin (LIPITOR) 40 MG tablet     ketorolac (ACULAR) 0.5 % ophthalmic solution     tamsulosin (FLOMAX) 0.4 MG capsule     No current facility-administered medications for this visit.      Social History     Socioeconomic History     Marital status: Single   Occupational History     None                [employer]  - [employer]    Tobacco Use     Smoking status: Former Smoker     Last attempt to quit: 1973     Years since quittin.7     Smokeless tobacco: Never Used   Substance and Sexual Activity     Alcohol use: Yes     Comment: every night     Drug use: No     Sexual activity: Never       Family history:   Prostate Cancer- Father     Physical exam:   BP (!) 157/88 (BP Location: Left arm, Patient Position: Sitting)   Pulse 80   Wt 79.7 kg (175 lb 11.2 oz)   SpO2 96%   BMI 25.95 kg/m       General: Awake and alert and in no acute distress.  Pulm: Breathing comfortably on room air  CN: Grossly intact.   Motor: No pronator drift.    Full cervical ROM - elicits pain with Cervical Extension  Good muscle bulk throughout.   Bilateral upper extremities     5/5 including WF/WE/, IO  Bilateral lower extremities    5/5 including DF/PF/EHL  Able to heel walk / toe walk   Spine:   TTP at right scapular ridge  Gait: Intact tandem gait. Negative Romberg  Reflexes:  No Hyper-reflexia or ankle clonus     Imaging:   MR CERVICAL SPINE W/O CONTRAST, MR THORACIC SPINE W/O CONTRAST  4/15/2019 9:39 AM     Findings:  Cervical: The cervical vertebrae appear normally aligned.  There is  multifocal disc height narrowing , most marked at C5-6.  There is  normal signal within the cervical spinal cord which also demonstrates  a normal contour.  The findings on a level by level basis are as  follows:  .  C2-3:  No spinal canal or neuroforaminal narrowing.     C3-4:  No spinal canal or neuroforaminal narrowing.     C4-5:  Minimal left neural foraminal narrowing. Minimal spinal canal  narrowing. Patent right neural foramen.     C5-6:  Severe right and moderate left neural foraminal narrowing. Mild  spinal canal narrowing.     C6-7:  Severe left and mild-to-moderate right neural foraminal  narrowing. Minimal spinal canal narrowing.     C7-T1:  No spinal canal or neural foraminal narrowing.     Thoracic: The thoracic vertebral column appears in normal alignment.  No significant disc height narrowing. The spinal cord contour and  signal pattern appear within normal limits.       Congenital T8-T9 block vertebra. (Surgery 30 yrs ago for herniated disc)   Old mild anterior wedge compression  deformity of L1 vertebra. Degenerative endplate changes at T11-12  intervertebral disc level, likely secondary to block vertebra T8-T9.  No significant neural foraminal or spinal canal narrowing in the  thoracic spine.                                                           Impression:   1. Degenerative changes cervical spine most marked at C5-6 and C6-7  with severe right C5-C6 and severe left C6-7 neural foraminal  narrowing.  2. Congenital T8-T9 block vertebra with degenerative changes below  this  level.  3. Old mild anterior wedge compression deformity of the L1 vertebral  body.     I have personally reviewed the examination and initial interpretation  and I agree with the findings.     GALA PATEL MD    Assessment:         Cervical neuro-foraminal stenosis          Right upper extremity cervical radiculopathy (possibly C6)      Plan:   ~Medrol dose khalida to reduce acute inflammation  ~Referral to Dr. Hines, chiropractor, focusing on cervical traction for neuro-foraminal stenosis  ~EMG (nerve study) of right arm   ~Consider repeat injection at C5-C6 (based on EMG findings)  ~Return to clinic after EMG study and after some therapy sessions with Dr. Donny Bustamante DNP  Neurosurgery Nurse Practitioner  [facility]  [phone]

## 2020-02-07 NOTE — PATIENT INSTRUCTIONS
~Referral to chiropractic focusing on cervical traction for neuro-foraminal stenosis  ~EMG (nerve study) of right arm   ~Consider repeat injection at C5-C6 (based on EMG findings)  ~Return to clinic after EMG study and after some therapy sessions with Dr. Hines

## 2020-02-07 NOTE — PROGRESS NOTES
2/7/2020     Neurosurgery Clinic Note    Reason for visit:                Cervicalgia and right arm radiculopathy     History of present illness:   Mr. Dale Cuevas is a 69 year old male with PMH of psoriatic arthritis, HLD, HTN, and rosacea who presents for neck pain and right upper extremity pain.   He states his symptoms have been present for approx 1 year.  No precipitating event or trauma.  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  He describes pain in right upper back/shoulder blade (in between shoulder blades) and gets a sharp, stabbing pain.  This pain is brought on and increased with   arm movement and cervical extension.  He has pain that radiates down to the right shoulder and down the right arm (posterior/lateral bicep). He gets numbness and tingling in to hand and fingers (primarily last two digits).  Left arm is asymptomatic. He underwent a C8 selective nerve root block which was not helpful.      Current Pain Medications:    Ibuprofen-   Not helpful       Pain Relevant Medications:               Opiates:  None              NSAIDS:   Ibuprofen, Aleve   Not helpful               Muscle Relaxants: None              Anti-depressants:  None              Neuropathics:        None               Topicals:   Ice pack/ Heat.   Not helpful               Other medications not covered above:    None     Past Pain Treatments:              Pain Clinic:     None             PT:    Yes   approx 4-5 sessions      Not helpful              Chiropractor:   No             Acupuncture:   None             TENs Unit:  None             Injections:   Yes  -right C8 selective nerve root injection.   Not helpful              Surgeries related to pain:    None      Review of systems: 10 point ROS negative except for as detailed in HPI    Past Medical History:   Past Medical History:   Diagnosis Date     Psoriatic arthritis (H)        Elevated blood pressure (not hypertension)     Rosacea     Erectile dysfunction      Psoriasis     Hyperlipidemia LDL goal <100     BPH (benign prostatic hypertrophy)     Family hx of prostate cancer     Diastasis recti     Surgical History:   Past Surgical History:   Procedure Laterality Date     ORTHOPEDIC SURGERY  2015    left wrist     ORTHOPEDIC SURGERY      spine sugery-with disc fragment removed      T8-T9 Michell Gandhiis     ORTHOPEDIC SURGERY      right knee arthroscopy   Vitrectomy-   Right Eye       Medications:  Current Outpatient Medications   Medication     adalimumab (HUMIRA *CF*) 40 MG/0.4ML pen kit     atorvastatin (LIPITOR) 40 MG tablet     ketorolac (ACULAR) 0.5 % ophthalmic solution     tamsulosin (FLOMAX) 0.4 MG capsule     No current facility-administered medications for this visit.      Social History     Socioeconomic History     Marital status: Single   Occupational History     None                [employer]  - [employer]    Tobacco Use     Smoking status: Former Smoker     Last attempt to quit: 1973     Years since quittin.7     Smokeless tobacco: Never Used   Substance and Sexual Activity     Alcohol use: Yes     Comment: every night     Drug use: No     Sexual activity: Never       Family history:   Prostate Cancer- Father     Physical exam:   BP (!) 157/88 (BP Location: Left arm, Patient Position: Sitting)   Pulse 80   Wt 79.7 kg (175 lb 11.2 oz)   SpO2 96%   BMI 25.95 kg/m      General: Awake and alert and in no acute distress.  Pulm: Breathing comfortably on room air  CN: Grossly intact.   Motor: No pronator drift.    Full cervical ROM - elicits pain with Cervical Extension  Good muscle bulk throughout.   Bilateral upper extremities    5/5 including WF/WE/, IO  Bilateral lower extremities    5/5 including DF/PF/EHL  Able to heel walk / toe walk   Spine:   TTP at right scapular ridge  Gait: Intact tandem gait. Negative Romberg  Reflexes:  No Hyper-reflexia or ankle clonus     Imaging:   MR CERVICAL  SPINE W/O CONTRAST, MR THORACIC SPINE W/O CONTRAST  4/15/2019 9:39 AM     Findings:  Cervical: The cervical vertebrae appear normally aligned.  There is  multifocal disc height narrowing , most marked at C5-6.  There is  normal signal within the cervical spinal cord which also demonstrates  a normal contour.  The findings on a level by level basis are as  follows:  .  C2-3:  No spinal canal or neuroforaminal narrowing.     C3-4:  No spinal canal or neuroforaminal narrowing.     C4-5:  Minimal left neural foraminal narrowing. Minimal spinal canal  narrowing. Patent right neural foramen.     C5-6:  Severe right and moderate left neural foraminal narrowing. Mild  spinal canal narrowing.     C6-7:  Severe left and mild-to-moderate right neural foraminal  narrowing. Minimal spinal canal narrowing.     C7-T1:  No spinal canal or neural foraminal narrowing.     Thoracic: The thoracic vertebral column appears in normal alignment.  No significant disc height narrowing. The spinal cord contour and  signal pattern appear within normal limits.       Congenital T8-T9 block vertebra. (Surgery 30 yrs ago for herniated disc)   Old mild anterior wedge compression  deformity of L1 vertebra. Degenerative endplate changes at T11-12  intervertebral disc level, likely secondary to block vertebra T8-T9.  No significant neural foraminal or spinal canal narrowing in the  thoracic spine.                                                           Impression:   1. Degenerative changes cervical spine most marked at C5-6 and C6-7  with severe right C5-C6 and severe left C6-7 neural foraminal  narrowing.  2. Congenital T8-T9 block vertebra with degenerative changes below  this level.  3. Old mild anterior wedge compression deformity of the L1 vertebral  body.     I have personally reviewed the examination and initial interpretation  and I agree with the findings.     GALA PATEL MD    Assessment:         Cervical neuro-foraminal stenosis           Right upper extremity cervical radiculopathy (possibly C6)      Plan:   ~Medrol dose khalida to reduce acute inflammation  ~Referral to Dr. Hines, chiropractor, focusing on cervical traction for neuro-foraminal stenosis  ~EMG (nerve study) of right arm   ~Consider repeat injection at C5-C6 (based on EMG findings)  ~Return to clinic after EMG study and after some therapy sessions with Dr. Donny Bustamante DNP  Neurosurgery Nurse Practitioner  [facility]  [phone]

## 2020-02-07 NOTE — NURSING NOTE
Chief Complaint   Patient presents with     Consult     [facility] NEW - CERVICALGIA        Lidia Heard, EMT

## 2020-02-10 ENCOUNTER — TRANSFERRED RECORDS (OUTPATIENT)
Dept: HEALTH INFORMATION MANAGEMENT | Facility: CLINIC | Age: 70
End: 2020-02-10

## 2020-02-11 ENCOUNTER — TRANSFERRED RECORDS (OUTPATIENT)
Dept: HEALTH INFORMATION MANAGEMENT | Facility: CLINIC | Age: 70
End: 2020-02-11

## 2020-02-21 ENCOUNTER — OFFICE VISIT (OUTPATIENT)
Dept: PHYSICAL MEDICINE AND REHAB | Facility: CLINIC | Age: 70
End: 2020-02-21
Attending: NURSE PRACTITIONER

## 2020-02-21 DIAGNOSIS — M54.12 CERVICAL RADICULOPATHY: Primary | ICD-10-CM

## 2020-02-21 DIAGNOSIS — G56.21 ULNAR NEUROPATHY AT ELBOW OF RIGHT UPPER EXTREMITY: ICD-10-CM

## 2020-02-21 NOTE — PROGRESS NOTES
[facility]  Physical Medicine and Rehabilitation    Nerve Conduction & EMG Report          Patient:       Dale Cuevas  Patient ID:    [id number]  Gender:        Male  YOB: 1950  Age:            69 Years 8 Months        History & Examination: Patient is a 70 yo male who presents with one year h/o pain at right side of his neck, and right sofya-scapular area with radiation to RUE. Pain is associated with paresthesia at RUE, located at lateral arm, lateral forearm, 4th and 5th digits. Query peripheral neuropathy vs radiculopathy.       Techniques: Motor and sensory conduction studies were done with surface recording electrodes. Temperature was monitored and recorded throughout the study. Upper extremities were maintained at a temperature of 32 degrees Centigrade or higher. EMG was done with a concentric needle electrode.        Results:    Right median, ulnar, and radial antidromic sensory NCSs were normal.       Right palmar median and ulnar orthodromic mixed NCS was normal.       Right median motor NCSs showed normal distal latencies, reduced distal CMAP amplitudes without segmental changes, and normal conduction velocity.       Left median and ulnar motor NCSs (done distally only) were normal.      Right ulnar motor NCS (recording from the first dorsal interosseous and abductor digiti minimi muscles) showed normal distal latency, normal CMAP amplitudes, normal conduction velocity at the forearm, and attenuated conduction velocity across the the elbow.       Right median, and ulnar F-wave minimal latencies were normal.       EMG showed 2+ positive sharp waves in the right abductor pollicis brevis muscle, large, long duration motor units in the right first dorsal interosseous and abductor pollicis brevis muscles, and a few polyphasic MUAPs in the right abductor pollicis brevis and extensor indicis muscles. Recruitment was mildly to moderately reduced in these muscles as well.    Otherwise, EMG of selected muscles in the right upper extremity was normal as tabulated below.          Interpretation:  --There is electrodiagnostic evidence of a mild right ulnar mononeuropathy at the elbow.    --There is electrodiagnostic evidence of a moderate subacute to chronic, right C8/T1 radiculopathy with some evidence of reinnervation.    --There is no electrodiagnostic evidence of a median, or radial mononeuropathy in the right upper extremity.    --There is no electrodiagnostic evidence of a median or ulnar mononeuropathy on the left.          EMG Physician:  Aubrie Rinaldi MD  Physical Medicine & Rehabilitation           Sensory NCS      Nerve / Sites Rec. Site Onset Peak Ref. NP Amp Ref. PP Amp Dist Willian Ref. Temp     ms ms ms  V  V  V cm m/s m/s  C   R MEDIAN - Dig II Anti      Wrist Dig II 2.92 3.59  19.2 10.0 18.3 14 48.0 48.0 33.3   R ULNAR - Dig V Anti      Wrist Dig V 2.40 3.07  10.0 8.0 6.8 12.5 52.2 48.0 33.1   R RADIAL - Snuff      Forearm Snuff 1.51 2.14  17.7 15.0 23.1 10 66.2 48.0 33.3   R MEDIAN - Ulnar - Palmar      Median Wrist 1.72 2.19 2.40 67.6  85.4 8 46.5  33.4      Ulnar Wrist 1.77 2.34 2.40 10.5  12.4 8 45.2  33.3       Motor NCS      Nerve / Sites Rec. Site Lat Ref. Amp Ref. Rel Amp Dist Willian Ref. Dur. Area Temp.     ms ms mV mV % cm m/s m/s ms %  C   R MEDIAN - APB      Wrist APB 3.54 4.40 2.5 5.0 100 8   5.57 100 33.8      Elbow APB 8.02  2.2  87.6 25.5 56.9 48.0 6.04 90 33.9   L MEDIAN - APB      Wrist APB 3.65 4.40 5.4 5.0 100 8   5.00 100 32.4   R ULNAR - ADM      Wrist ADM 2.97 3.50 7.6 5.0 100 8   5.78 100 33.6      B.Elbow ADM 6.41  6.6  87.2 21 61.1 48.0 5.78 93.7 33.6      A.Elbow ADM 8.65  6.7  87.6 11 49.1 48.0 5.94 93 33.6      Axilla ADM 10.26  6.6  87.3 10 61.9 48.0 5.94 94.8 33.4   L ULNAR - ADM      Wrist ADM 2.45 3.50 12.2 5.0 100 8   5.05 100 32.9   R ULNAR - FDI      Wrist FDI 3.54  13.1  100    4.69 100 33.4      B.Elbow FDI 6.93  12.2  93.3 21 62.0  4.95  99.5 33.4      A.Elbow FDI 9.22  11.3  86.6 11 48.0  5.10 94.5 33.4      Axilla FDI 10.73  11.1  84.6 10 66.2  5.21 93.5 33.4       F  Wave      Nerve Min F Lat Max F Lat Mean FLat Temp.    ms ms ms  C   R MEDIAN 29.43 31.77 30.49 33.9   R ULNAR 28.65 31.88 29.80 33.4       EMG Summary Table     Spontaneous MUAP Recruitment    IA Fib/PSW Fasc H.F. Amp Dur. PPP Pattern   R. DELTOID N None None None N N N N   R. BICEPS N None None None N N N N   R. TRICEPS N None None None N N N N   R. PRON TERES N None None None N N N N   R. FIRST D INTEROSS N None None None N 1+ 2+ Mildly Reduced   R. ABD POLL BREVIS N 2+ None None 1+ 1+ 1+ Moderately Reduced   R. EXT INDICIS N None None None N N 1+ Mildly Reduced   R. RHOMB MAJOR N None None None N N N N

## 2020-02-21 NOTE — LETTER
2/21/2020       RE: Dale Cuevas  [address] [facility] [address] [address]     Dear Colleague,    Thank you for referring your patient, Dale Cuevas, to the [facility] PHYSICAL MEDICINE AND REHABILITATION at [facility]. Please see a copy of my visit note below.                  [facility]  Physical Medicine and Rehabilitation    Nerve Conduction & EMG Report          Patient:       Dale Cuevas  Patient ID:    [id number]  Gender:        Male  YOB: 1950  Age:            69 Years 8 Months        History & Examination: Patient is a 70 yo male who presents with one year h/o pain at right side of his neck, and right sofya-scapular area with radiation to RUE. Pain is associated with paresthesia at RUE, located at lateral arm, lateral forearm, 4th and 5th digits. Query peripheral neuropathy vs radiculopathy.       Techniques: Motor and sensory conduction studies were done with surface recording electrodes. Temperature was monitored and recorded throughout the study. Upper extremities were maintained at a temperature of 32 degrees Centigrade or higher. EMG was done with a concentric needle electrode.        Results:    Right median, ulnar, and radial antidromic sensory NCSs were normal.       Right palmar median and ulnar orthodromic mixed NCS was normal.       Right median motor NCSs showed normal distal latencies, reduced distal CMAP amplitudes without segmental changes, and normal conduction velocity.       Left median and ulnar motor NCSs (done distally only) were normal.      Right ulnar motor NCS (recording from the first dorsal interosseous and abductor digiti minimi muscles) showed normal distal latency, normal CMAP amplitudes, normal conduction velocity at the forearm, and attenuated conduction velocity across the the elbow.       Right median, and ulnar F-wave minimal latencies were normal.       EMG showed 2+ positive sharp waves in the right abductor  pollicis brevis muscle, large, long duration motor units in the right first dorsal interosseous and abductor pollicis brevis muscles, and a few polyphasic MUAPs in the right abductor pollicis brevis and extensor indicis muscles. Recruitment was mildly to moderately reduced in these muscles as well.   Otherwise, EMG of selected muscles in the right upper extremity was normal as tabulated below.          Interpretation:  --There is electrodiagnostic evidence of a mild right ulnar mononeuropathy at the elbow.    --There is electrodiagnostic evidence of a moderate subacute to chronic, right C8/T1 radiculopathy with some evidence of reinnervation.    --There is no electrodiagnostic evidence of a median, or radial mononeuropathy in the right upper extremity.    --There is no electrodiagnostic evidence of a median or ulnar mononeuropathy on the left.          EMG Physician:  Aubrie Rinaldi MD  Physical Medicine & Rehabilitation           Sensory NCS      Nerve / Sites Rec. Site Onset Peak Ref. NP Amp Ref. PP Amp Dist Willian Ref. Temp     ms ms ms  V  V  V cm m/s m/s  C   R MEDIAN - Dig II Anti      Wrist Dig II 2.92 3.59  19.2 10.0 18.3 14 48.0 48.0 33.3   R ULNAR - Dig V Anti      Wrist Dig V 2.40 3.07  10.0 8.0 6.8 12.5 52.2 48.0 33.1   R RADIAL - Snuff      Forearm Snuff 1.51 2.14  17.7 15.0 23.1 10 66.2 48.0 33.3   R MEDIAN - Ulnar - Palmar      Median Wrist 1.72 2.19 2.40 67.6  85.4 8 46.5  33.4      Ulnar Wrist 1.77 2.34 2.40 10.5  12.4 8 45.2  33.3       Motor NCS      Nerve / Sites Rec. Site Lat Ref. Amp Ref. Rel Amp Dist Willian Ref. Dur. Area Temp.     ms ms mV mV % cm m/s m/s ms %  C   R MEDIAN - APB      Wrist APB 3.54 4.40 2.5 5.0 100 8   5.57 100 33.8      Elbow APB 8.02  2.2  87.6 25.5 56.9 48.0 6.04 90 33.9   L MEDIAN - APB      Wrist APB 3.65 4.40 5.4 5.0 100 8   5.00 100 32.4   R ULNAR - ADM      Wrist ADM 2.97 3.50 7.6 5.0 100 8   5.78 100 33.6      B.Elbow ADM 6.41  6.6  87.2 21 61.1 48.0 5.78 93.7 33.6       A.Elbow ADM 8.65  6.7  87.6 11 49.1 48.0 5.94 93 33.6      Axilla ADM 10.26  6.6  87.3 10 61.9 48.0 5.94 94.8 33.4   L ULNAR - ADM      Wrist ADM 2.45 3.50 12.2 5.0 100 8   5.05 100 32.9   R ULNAR - FDI      Wrist FDI 3.54  13.1  100    4.69 100 33.4      B.Elbow FDI 6.93  12.2  93.3 21 62.0  4.95 99.5 33.4      A.Elbow FDI 9.22  11.3  86.6 11 48.0  5.10 94.5 33.4      Axilla FDI 10.73  11.1  84.6 10 66.2  5.21 93.5 33.4       F  Wave      Nerve Min F Lat Max F Lat Mean FLat Temp.    ms ms ms  C   R MEDIAN 29.43 31.77 30.49 33.9   R ULNAR 28.65 31.88 29.80 33.4       EMG Summary Table     Spontaneous MUAP Recruitment    IA Fib/PSW Fasc H.F. Amp Dur. PPP Pattern   R. DELTOID N None None None N N N N   R. BICEPS N None None None N N N N   R. TRICEPS N None None None N N N N   R. PRON TERES N None None None N N N N   R. FIRST D INTEROSS N None None None N 1+ 2+ Mildly Reduced   R. ABD POLL BREVIS N 2+ None None 1+ 1+ 1+ Moderately Reduced   R. EXT INDICIS N None None None N N 1+ Mildly Reduced   R. RHOMB MAJOR N None None None N N N N                                      Again, thank you for allowing me to participate in the care of your patient.      Sincerely,    Aubrie Rinaldi MD

## 2020-03-06 ENCOUNTER — OFFICE VISIT (OUTPATIENT)
Dept: NEUROSURGERY | Facility: CLINIC | Age: 70
End: 2020-03-06

## 2020-03-06 VITALS
OXYGEN SATURATION: 95 % | RESPIRATION RATE: 16 BRPM | HEART RATE: 103 BPM | DIASTOLIC BLOOD PRESSURE: 81 MMHG | WEIGHT: 174.8 LBS | SYSTOLIC BLOOD PRESSURE: 154 MMHG | BODY MASS INDEX: 25.81 KG/M2

## 2020-03-06 DIAGNOSIS — M54.12 ACUTE CERVICAL RADICULOPATHY: Primary | ICD-10-CM

## 2020-03-06 ASSESSMENT — PAIN SCALES - GENERAL: PAINLEVEL: EXTREME PAIN (8)

## 2020-03-06 NOTE — PROGRESS NOTES
[facility]   Neurosurgery Clinic Progress Note      Reason for visit:                Cervicalgia and right arm radiculopathy      History of present illness:   Mr. Dale Cuevas is a 69 year old male with PMH of psoriatic arthritis, HLD, HTN, and rosacea who presents for neck pain and right upper extremity pain.   He states his symptoms have been present for approx 1 year.  No precipitating event or trauma.  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  He describes pain in right upper back/shoulder blade (in between shoulder blades) and gets a sharp, stabbing pain.  This pain is brought on and increased with   arm movement and cervical extension.  He has pain that radiates down to the right shoulder and down the right arm (posterior/lateral bicep). He gets numbness and tingling in to hand and fingers (primarily last two digits).  Left arm is asymptomatic. He underwent a C8 selective nerve root block which was not helpful.      Last seen in Neurosurgery Clinic on 2/07/2020:  ~Medrol dose khalida to reduce acute inflammation  ~Referral to Dr. Hines, chiropractor, focusing on cervical traction for neuro-foraminal stenosis  ~EMG (nerve study) of right arm   ~Consider repeat injection at C5-C6 (based on EMG findings)  ~Return to clinic after EMG study and after some therapy sessions with Dr. Hines    He returns today to update his symptoms and progress.   He took Medrol Dose khalida. Might have helped some.   Saw Dr. Hines for chiropractic therapy -  Not very helpful   However, may consider home cervical traction unit  Interested in repeat injection      EMG STUDY   2/21/2020  Interpretation:   --There is electrodiagnostic evidence of a mild right ulnar mononeuropathy at the elbow.   --There is electrodiagnostic evidence of a moderate subacute to chronic, right C8/T1 radiculopathy with some evidence of reinnervation.   --There is no electrodiagnostic evidence of a median, or radial  mononeuropathy in the right upper extremity.   --There is no electrodiagnostic evidence of a median or ulnar mononeuropathy on the left.     EMG Physician:  Aubrie Rinaldi MD  Physical Medicine & Rehabilitation        Examination:   BP (!) 154/81   Pulse 103   Resp 16   Wt 79.3 kg (174 lb 12.8 oz)   SpO2 95%   BMI 25.81 kg/m        Neurological Assessment:     General Appearance: Awake; Well-Nourished; Pleasant; In no apparent distress  Mental Status: Alert and Oriented to Person, Place, Time and Situation  Speech: Fluent; No aphasia or dysarthria    Cranial Nerves:    Grossly intact     Motor:  Upper Extremities:     C5 (Deltoid) C5/6 (Bicep) C7 (Tricep) C8 (Finger Flexion) T1 (Interosseous)   Right 5/5 5/5 5/5 5/5 5/5   Left 5/5 5/5 5/5 5/5 5/5     IMAGING:    MR CERVICAL SPINE W/O CONTRAST  4/15/2019 9:39 AM    C2-3:  No spinal canal or neuroforaminal narrowing.     C3-4:  No spinal canal or neuroforaminal narrowing.     C4-5:  Minimal left neural foraminal narrowing. Minimal spinal canal  narrowing. Patent right neural foramen.     C5-6:  Severe right and moderate left neural foraminal narrowing. Mild  spinal canal narrowing.     C6-7:  Severe left and mild-to-moderate right neural foraminal  narrowing. Minimal spinal canal narrowing.     C7-T1:  No spinal canal or neural foraminal narrowing.       Impression:   1. Degenerative changes cervical spine most marked at C5-6 and C6-7  with severe right C5-C6 and severe left C6-7 neural foraminal  narrowing.    I have personally reviewed the examination and initial interpretation  and I agree with the findings.     GALA PATEL MD      Assessment:         Cervical neuro-foraminal stenosis                                Right upper extremity cervical radiculopathy (possibly C6)        Plan:   ~RIVER cervical spine on the right at C5-C6  ~If no improvement- referral to spine surgeon for possible foraminotomy  ~RTC with me for update of progress, or to surgeon  depending on how he is doing   Following injection       Rosa Bustamante DNP  Neurosurgery Nurse Practitioner  [facility]  [phone]

## 2020-03-06 NOTE — NURSING NOTE
Chief Complaint   Patient presents with     Follow Up     [facility] RETURN - 4 WEEK RETURN        Sanjeev Sebastian

## 2020-03-06 NOTE — LETTER
3/6/2020       RE: Dale Cuevas  [address] [facility] [address]     Dear Colleague,    Thank you for referring your patient, Dale Cuevas, to the [facility] NEUROSURGERY at [facility]. Please see a copy of my visit note below.    [facility]   Neurosurgery Clinic Progress Note      Reason for visit:                Cervicalgia and right arm radiculopathy      History of present illness:   Mr. Dale Cuevas is a 69 year old male with PMH of psoriatic arthritis, HLD, HTN, and rosacea who presents for neck pain and right upper extremity pain.   He states his symptoms have been present for approx 1 year.  No precipitating event or trauma.  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  He describes pain in right upper back/shoulder blade (in between shoulder blades) and gets a sharp, stabbing pain.  This pain is brought on and increased with   arm movement and cervical extension.  He has pain that radiates down to the right shoulder and down the right arm (posterior/lateral bicep). He gets numbness and tingling in to hand and fingers (primarily last two digits).  Left arm is asymptomatic. He underwent a C8 selective nerve root block which was not helpful.      Last seen in Neurosurgery Clinic on 2/07/2020:  ~Medrol dose khalida to reduce acute inflammation  ~Referral to Dr. Hines, chiropractor, focusing on cervical traction for neuro-foraminal stenosis  ~EMG (nerve study) of right arm   ~Consider repeat injection at C5-C6 (based on EMG findings)  ~Return to clinic after EMG study and after some therapy sessions with Dr. Hines    He returns today to update his symptoms and progress.   He took Medrol Dose khalida. Might have helped some.   Saw Dr. Hines for chiropractic therapy -  Not very helpful   However, may consider home cervical traction unit  Interested in repeat injection      EMG STUDY   2/21/2020  Interpretation:   --There is  electrodiagnostic evidence of a mild right ulnar mononeuropathy at the elbow.   --There is electrodiagnostic evidence of a moderate subacute to chronic, right C8/T1 radiculopathy with some evidence of reinnervation.   --There is no electrodiagnostic evidence of a median, or radial mononeuropathy in the right upper extremity.   --There is no electrodiagnostic evidence of a median or ulnar mononeuropathy on the left.     EMG Physician:  Aubrie Rinaldi MD  Physical Medicine & Rehabilitation        Examination:   BP (!) 154/81   Pulse 103   Resp 16   Wt 79.3 kg (174 lb 12.8 oz)   SpO2 95%   BMI 25.81 kg/m         Neurological Assessment:     General Appearance: Awake; Well-Nourished; Pleasant; In no apparent distress  Mental Status: Alert and Oriented to Person, Place, Time and Situation  Speech: Fluent; No aphasia or dysarthria    Cranial Nerves:    Grossly intact     Motor:  Upper Extremities:     C5 (Deltoid) C5/6 (Bicep) C7 (Tricep) C8 (Finger Flexion) T1 (Interosseous)   Right 5/5 5/5 5/5 5/5 5/5   Left 5/5 5/5 5/5 5/5 5/5     IMAGING:    MR CERVICAL SPINE W/O CONTRAST  4/15/2019 9:39 AM    C2-3:  No spinal canal or neuroforaminal narrowing.     C3-4:  No spinal canal or neuroforaminal narrowing.     C4-5:  Minimal left neural foraminal narrowing. Minimal spinal canal  narrowing. Patent right neural foramen.     C5-6:  Severe right and moderate left neural foraminal narrowing. Mild  spinal canal narrowing.     C6-7:  Severe left and mild-to-moderate right neural foraminal  narrowing. Minimal spinal canal narrowing.     C7-T1:  No spinal canal or neural foraminal narrowing.       Impression:   1. Degenerative changes cervical spine most marked at C5-6 and C6-7  with severe right C5-C6 and severe left C6-7 neural foraminal  narrowing.    I have personally reviewed the examination and initial interpretation  and I agree with the findings.     GALA PATEL MD      Assessment:         Cervical neuro-foraminal  stenosis                                Right upper extremity cervical radiculopathy (possibly C6)        Plan:   ~RIVER cervical spine on the right at C5-C6  ~If no improvement- referral to spine surgeon for possible foraminotomy  ~RTC with me for update of progress, or to surgeon depending on how he is doing   Following injection     Rosa Bustamante DNP  Neurosurgery Nurse Practitioner  [facility]  [phone]

## 2020-03-11 ENCOUNTER — TELEPHONE (OUTPATIENT)
Dept: ANESTHESIOLOGY | Facility: CLINIC | Age: 70
End: 2020-03-11

## 2020-03-11 NOTE — TELEPHONE ENCOUNTER
Received voicemail from patient stating that he has a direct procedure referral and would like to schedule an injection. Please follow up with patient.

## 2020-03-13 ENCOUNTER — TELEPHONE (OUTPATIENT)
Dept: PALLIATIVE MEDICINE | Facility: CLINIC | Age: 70
End: 2020-03-13

## 2020-03-13 NOTE — TELEPHONE ENCOUNTER
[facility] Call Center    Phone Message    May a detailed message be left on voicemail: yes     Reason for Call: Other: Please call back to schedule procedure only, referral in chart.     Action Taken: Message routed to:  [facility] ([facility] Pain     Travel Screening: Not Applicable

## 2020-03-16 NOTE — TELEPHONE ENCOUNTER
Patient called and left voicemail requesting a call back ASAP to schedule an injection. Patient states that he has been trying to schedule this for over a week. Please advise.

## 2020-04-16 ENCOUNTER — TELEPHONE (OUTPATIENT)
Dept: OTOLARYNGOLOGY | Facility: CLINIC | Age: 70
End: 2020-04-16

## 2020-04-16 NOTE — TELEPHONE ENCOUNTER
Spoke with patient regarding virtual visit with Dr. Fuentes on 4/20/20. Confirmed patient's email address. Also verified patient's appointment time, and informed him/her that we would be calling again 10-15 minutes prior to the appointment. Patient expressed understanding. Will send [employer] message with virtual visit information.    Patient email: [email or web address]  Appointment date: 4/20/20  Appointment time: 1:40 pm  Anahart active? YES

## 2020-04-20 ENCOUNTER — PRE VISIT (OUTPATIENT)
Dept: OTOLARYNGOLOGY | Facility: CLINIC | Age: 70
End: 2020-04-20

## 2020-04-20 ENCOUNTER — VIRTUAL VISIT (OUTPATIENT)
Dept: OTOLARYNGOLOGY | Facility: CLINIC | Age: 70
End: 2020-04-20

## 2020-04-20 VITALS — WEIGHT: 175 LBS | BODY MASS INDEX: 25.92 KG/M2 | HEIGHT: 69 IN

## 2020-04-20 DIAGNOSIS — R49.0 DYSPHONIA: Primary | ICD-10-CM

## 2020-04-20 ASSESSMENT — MIFFLIN-ST. JEOR: SCORE: 1539.17

## 2020-04-20 ASSESSMENT — PAIN SCALES - GENERAL: PAINLEVEL: NO PAIN (0)

## 2020-04-20 NOTE — LETTER
"4/20/2020      RE: Dale Cuevas  [address] [facility] [address]       Dale Cuevas is a 69-year-old male who is being evaluated via a billable video visit.      The patient has been notified of following:     \"This video visit will be conducted via a call between you and your physician/provider. We have found that certain health care needs can be provided without the need for an in-person physical exam.  This service lets us provide the care you need with a video conversation.  If a prescription is necessary we can send it directly to your pharmacy.  If lab work is needed we can place an order for that and you can then stop by our lab to have the test done at a later time.    Video visits are billed at different rates depending on your insurance coverage.  Please reach out to your insurance provider with any questions.    If during the course of the call the physician/provider feels a video visit is not appropriate, you will not be charged for this service.\"    Patient has given verbal consent for Video visit? Yes    How would you like to obtain your AVS? Maryjane    Patient would like the video invitation sent by: Send to e-mail at: [email or web address]      Video Start Time: 2:01 PM    Additional provider notes:    Dear Colleague:    I had the pleasure of meeting Dale Cuevas in consultation at the [facility] of the [facility] at your request, for evaluation of dysphonia. The note from our visit follows. Speech recognition software may have been used in the documentation below; input is reviewed before signature to the best of my ability. I appreciate the opportunity to participate in the care of this pleasant patient.    Please feel free to contact me with any questions.    Sincerely yours,    Anika Fuentes M.D., M.P.H.  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       Voice  He is a bass baritone.  He feels that he is losing his lower range. He was seen by Dr. Abdi who recommended further evaluation with us. He noted mild vocal cord thinning in February 2020.  The rest of his notes feel fine.  His lower range loss is limiting his ability to sing some of his repertoire.      Swallowing  No concerns.       Cough/Throat-clearing  He has had a chronic mild cough but this seems better since he has not been caring for his grandchildren during this Covid period.      Breathing  No concerns.       Throat discomfort  No concerns.       Reflux-type symptoms  He experiences heartburn/indigestion: rarely. He is not taking reflux medications.      Allergy tests were negative.      Prior outside records were reviewed for this visit.      MEDICATIONS:     Current Outpatient Medications   Medication Sig Dispense Refill     adalimumab (HUMIRA *CF*) 40 MG/0.4ML pen kit Inject 0.4 mLs (40 mg) Subcutaneous every 14 days Starting day 22. 1 each 3     atorvastatin (LIPITOR) 40 MG tablet TAKE 1 TABLET BY MOUTH EVERY DAY 90 tablet 2     tamsulosin (FLOMAX) 0.4 MG capsule TAKE ONE CAPSULE BY MOUTH ONE TIME DAILY 90 capsule 3     ketorolac (ACULAR) 0.5 % ophthalmic solution        methylPREDNISolone (MEDROL DOSEPAK) 4 MG tablet therapy pack Follow Package Directions 21 tablet 0       ALLERGIES:    Allergies   Allergen Reactions     Levofloxacin      Other reaction(s): *Unknown       PAST MEDICAL HISTORY:   Past Medical History:   Diagnosis Date     Psoriatic arthritis (H)         PAST SURGICAL HISTORY:   Past Surgical History:   Procedure Laterality Date     ORTHOPEDIC SURGERY  Jan 12, 2015    left wrist     ORTHOPEDIC SURGERY  1989    spine sugery-with disc fragment removed     ORTHOPEDIC SURGERY  2014    right  knee arthroscopy       HABITS/SOCIAL HISTORY:    Social History     Tobacco Use     Smoking status: Former Smoker     Last attempt to quit: 1973     Years since quittin.9     Smokeless tobacco: Never Used   Substance Use Topics     Alcohol use: Yes     Comment: every night       FAMILY HISTORY:    Family History   Problem Relation Age of Onset     Hypertension Mother      Prostate Cancer Father 72     Breast Cancer Sister        REVIEW OF SYSTEMS:  The patient completed a comprehensive 11 point review of systems (below), which was reviewed. Positives are as noted below; pertinent findings are as noted in the HPI.     Patient Supplied Answers to Review of Systems       PHYSICAL EXAMINATION:  General: The patient was alert and conversant, and in no acute distress.    Eyes: EOM normal, Conjunctiva and lids normal.  Nose: Grossly unremarkable. no  bleeding.   Oral cavity/oropharynx (visualized by webcam): No visible anterior masses or lesions. Tongue mobility intact and symmetric.  Ears: Normal auricles bilaterally.  Neck: No appreciable cervical masses. Good neck extension and rotation. No tenderness in thyrohyoid region  Resp: Breathing comfortably, no stridor or stertor.  Neuro: Symmetric facial function. Other cranial nerves as documented above.  Psych: Normal affect, pleasant and cooperative.  Voice/speech: No significant dysphonia of speaking voice. Pitch glide: full. MPT: supranormal  Extremities: No cyanosis, clubbing, or edema of the upper extremities.    Speech pathologist present: Delmy York, PhD, CCC-SLP     Intake scores  Total Score for Last Patient-Answered VHI Questionnaire  No flowsheet data found.  Total Score for Last Patient-Answered EAT Questionnaire  No flowsheet data found.  Total Score for Last Patient-Answered CSI Questionnaire  No flowsheet data found.      PROCEDURE: Deferred due to COVID-19 pandemic. Laryngoscopy is an aerosol-generating procedure.      IMPRESSION AND PLAN:    Dale Cuevas presents with a focused dysphonia in the lowest part of his range. Laryngoscopy by Dr. Abdi in February 2020 did not show any lesions.    I recommended speech therapy as initial primary management, with goals including improving laryngeal efficiency and improving phonatory mechanics.      He understands that we also recommend a laryngoscopy/stroboscopy to complete the workup once it is safe to do so from a COVID-19 perspective.    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 He is in agreement, is not interested in pursuing any invasive treatments.      I asked the patient to call if his voice worsens. I appreciate the opportunity to participate in the care of this very pleasant patient.         Video-Visit Details    Type of service:  Video Visit    Video End Time (time video stopped): 2:29 pm    Originating Location (pt. Location): Home    Distant Location (provider location):  [facility] EAR NOSE AND THROAT / Residence    Mode of Communication:  Video Conference via [facility]      Anika Fuentes MD

## 2020-04-20 NOTE — LETTER
"4/20/2020       RE: Dale Cuevas  [address] [facility] [address]     Dear Referred Self,    Thank you for referring your patient, Dale Cuevas, to the Facial Plastic Surgery and Reconstructive Clinic at [facility]. Please see a copy of my visit note below.    Dale Cuevas is a 69-year-old male who is being evaluated via a billable video visit.      The patient has been notified of following:     \"This video visit will be conducted via a call between you and your physician/provider. We have found that certain health care needs can be provided without the need for an in-person physical exam.  This service lets us provide the care you need with a video conversation.  If a prescription is necessary we can send it directly to your pharmacy.  If lab work is needed we can place an order for that and you can then stop by our lab to have the test done at a later time.    Video visits are billed at different rates depending on your insurance coverage.  Please reach out to your insurance provider with any questions.    If during the course of the call the physician/provider feels a video visit is not appropriate, you will not be charged for this service.\"    Patient has given verbal consent for Video visit? Yes    How would you like to obtain your AVS? [facility]    Patient would like the video invitation sent by: Send to e-mail at: [email or web address]"  {If patient encounters technical issues they should call [phone] :[id number]}    Video Start Time: 2:01 PM    Additional provider notes: {insert note template:[id number]} ***    Dear Dr. Patel:    I had the pleasure of meeting Dale Cuevas in consultation at the [facility] the [facility] at your request, for evaluation of dysphonia. The note from our visit follows. Speech recognition software may have been used in the documentation below; input is reviewed before signature to the best of my " ability. I appreciate the opportunity to participate in the care of this pleasant*** patient.    Please feel free to contact me with any questions.    Sincerely yours,    Anika Fuentes M.D., M.P.H.  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       Voice  ***He is a bass baritone.  He feels that he is losing his lower range. He was seen by Dr. Abdi who recommended further evaluation with us. He noted mild vocal cord thinning in February 2020.  The rest of his notes feel fine.  His lower range loss is limiting his ability to sing some of his repertoire.      Swallowing  No concerns.       Cough/Throat-clearing  He has had a chronic mild cough but this seems better since he has not been caring for his grandchildren during this Covid period.      Breathing  No concerns.       Throat discomfort  No concerns.       Reflux-type symptoms  He experiences heartburn/indigestion: rarely. He is not taking reflux medications.      Allergy tests were negative.      Prior outside records were reviewed for this visit.      MEDICATIONS:     Current Outpatient Medications   Medication Sig Dispense Refill     adalimumab (HUMIRA *CF*) 40 MG/0.4ML pen kit Inject 0.4 mLs (40 mg) Subcutaneous every 14 days Starting day 22. 1 each 3     atorvastatin (LIPITOR) 40 MG tablet TAKE 1 TABLET BY MOUTH EVERY DAY 90 tablet 2     tamsulosin (FLOMAX) 0.4 MG capsule TAKE ONE CAPSULE BY MOUTH ONE TIME DAILY 90 capsule 3     ketorolac (ACULAR) 0.5 % ophthalmic solution        methylPREDNISolone (MEDROL DOSEPAK) 4 MG tablet therapy pack Follow Package Directions 21 tablet 0       ALLERGIES:    Allergies   Allergen Reactions     Levofloxacin      Other reaction(s): *Unknown       PAST MEDICAL  HISTORY:   Past Medical History:   Diagnosis Date     Psoriatic arthritis (H)         PAST SURGICAL HISTORY:   Past Surgical History:   Procedure Laterality Date     ORTHOPEDIC SURGERY  2015    left wrist     ORTHOPEDIC SURGERY      spine sugery-with disc fragment removed     ORTHOPEDIC SURGERY      right knee arthroscopy       HABITS/SOCIAL HISTORY:    Social History     Tobacco Use     Smoking status: Former Smoker     Last attempt to quit: 1973     Years since quittin.9     Smokeless tobacco: Never Used   Substance Use Topics     Alcohol use: Yes     Comment: every night       FAMILY HISTORY:    Family History   Problem Relation Age of Onset     Hypertension Mother      Prostate Cancer Father 72     Breast Cancer Sister        REVIEW OF SYSTEMS:  The patient completed a comprehensive 11 point review of systems (below), which was reviewed. Positives are as noted below; pertinent findings are as noted in the HPI.     Patient Supplied Answers to Review of Systems       PHYSICAL EXAMINATION:  General: The patient was alert and conversant, and in no acute distress.    Eyes: EOM normal, Conjunctiva and lids normal.  Nose: Grossly unremarkable. no  bleeding.   Oral cavity/oropharynx (visualized by webcam): No visible anterior masses or lesions. Tongue mobility intact and symmetric.  Ears: Normal auricles bilaterally.  Neck: No appreciable cervical masses. Good neck extension and rotation. No tenderness in thyrohyoid region  Resp: Breathing comfortably, no stridor or stertor.  Neuro: Symmetric facial function. Other cranial nerves as documented above.  Psych: Normal affect, pleasant and cooperative.  Voice/speech: No significant dysphonia of speaking voice. Pitch glide: full. MPT: supranormal  Extremities: No cyanosis, clubbing, or edema of the upper extremities.    Speech pathologist present: Delmy York, PhD, CCC-SLP     Intake scores  Total Score for Last Patient-Answered VHI  Questionnaire  No flowsheet data found.  Total Score for Last Patient-Answered EAT Questionnaire  No flowsheet data found.  Total Score for Last Patient-Answered CSI Questionnaire  No flowsheet data found.      PROCEDURE: Deferred due to COVID-19 pandemic. Laryngoscopy is an aerosol-generating procedure.      IMPRESSION AND PLAN:   Dale Cuevas presents with a focused dysphonia in the lowest part of his range. Laryngoscopy by Dr. Abdi in February 2020 did not show any lesions.    I recommended speech therapy as initial primary management, with goals including improving laryngeal efficiency and improving phonatory mechanics.      He understands that we also recommend a laryngoscopy/stroboscopy to complete the workup once it is safe to do so from a COVID-19 perspective.    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 He is in agreement, is not interested in pursuing any invasive treatments.      I asked the patient to call if his voice worsens. I appreciate the opportunity to participate in the care of this very pleasant patient.         Video-Visit Details    Type of service:  Video Visit    Video End Time (time video stopped): 2:29 pm    Originating Location (pt. Location): Home    Distant Location (provider location):  [facility] / Residence    Mode of Communication:  Video Conference via [employer]      Anika Fuentes MD        Again, thank you for allowing me to participate in the care of your patient.      Sincerely,    Anika Fuentes MD

## 2020-04-20 NOTE — PROGRESS NOTES
"Dale Cuevas is a 69-year-old male who is being evaluated via a billable video visit.      The patient has been notified of following:     \"This video visit will be conducted via a call between you and your physician/provider. We have found that certain health care needs can be provided without the need for an in-person physical exam.  This service lets us provide the care you need with a video conversation.  If a prescription is necessary we can send it directly to your pharmacy.  If lab work is needed we can place an order for that and you can then stop by our lab to have the test done at a later time.    Video visits are billed at different rates depending on your insurance coverage.  Please reach out to your insurance provider with any questions.    If during the course of the call the physician/provider feels a video visit is not appropriate, you will not be charged for this service.\"    Patient has given verbal consent for Video visit? Yes    How would you like to obtain your AVS? Maryjane    Patient would like the video invitation sent by: Send to e-mail at: [email or web address]      Video Start Time: 2:01 PM    Additional provider notes:    Dear Colleague:    I had the pleasure of meeting Dale Cuevas in consultation at the [facility] of the [facility] at your request, for evaluation of dysphonia. The note from our visit follows. Speech recognition software may have been used in the documentation below; input is reviewed before signature to the best of my ability. I appreciate the opportunity to participate in the care of this pleasant patient.    Please feel free to contact me with any questions.    Sincerely yours,    nAika Fuentes M.D., M.P.H.  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       Voice  He is a bass baritone.  He feels that he is losing his lower range. He was seen by Dr. Abdi who recommended further evaluation with us. He noted mild vocal cord thinning in February 2020.  The rest of his notes feel fine.  His lower range loss is limiting his ability to sing some of his repertoire.      Swallowing  No concerns.       Cough/Throat-clearing  He has had a chronic mild cough but this seems better since he has not been caring for his grandchildren during this Covid period.      Breathing  No concerns.       Throat discomfort  No concerns.       Reflux-type symptoms  He experiences heartburn/indigestion: rarely. He is not taking reflux medications.      Allergy tests were negative.      Prior outside records were reviewed for this visit.      MEDICATIONS:     Current Outpatient Medications   Medication Sig Dispense Refill     adalimumab (HUMIRA *CF*) 40 MG/0.4ML pen kit Inject 0.4 mLs (40 mg) Subcutaneous every 14 days Starting day 22. 1 each 3     atorvastatin (LIPITOR) 40 MG tablet TAKE 1 TABLET BY MOUTH EVERY DAY 90 tablet 2     tamsulosin (FLOMAX) 0.4 MG capsule TAKE ONE CAPSULE BY MOUTH ONE TIME DAILY 90 capsule 3     ketorolac (ACULAR) 0.5 % ophthalmic solution        methylPREDNISolone (MEDROL DOSEPAK) 4 MG tablet therapy pack Follow Package Directions 21 tablet 0       ALLERGIES:    Allergies   Allergen Reactions     Levofloxacin      Other reaction(s): *Unknown       PAST MEDICAL HISTORY:   Past Medical History:   Diagnosis Date     Psoriatic arthritis (H)         PAST SURGICAL HISTORY:   Past Surgical History:   Procedure Laterality Date     ORTHOPEDIC SURGERY  Jan 12, 2015    left wrist     ORTHOPEDIC SURGERY  1989    spine sugery-with disc fragment removed     ORTHOPEDIC SURGERY  2014    right knee arthroscopy       HABITS/SOCIAL HISTORY:    Social History     Tobacco Use     Smoking  status: Former Smoker     Last attempt to quit: 1973     Years since quittin.9     Smokeless tobacco: Never Used   Substance Use Topics     Alcohol use: Yes     Comment: every night       FAMILY HISTORY:    Family History   Problem Relation Age of Onset     Hypertension Mother      Prostate Cancer Father 72     Breast Cancer Sister        REVIEW OF SYSTEMS:  The patient completed a comprehensive 11 point review of systems (below), which was reviewed. Positives are as noted below; pertinent findings are as noted in the HPI.     Patient Supplied Answers to Review of Systems       PHYSICAL EXAMINATION:  General: The patient was alert and conversant, and in no acute distress.    Eyes: EOM normal, Conjunctiva and lids normal.  Nose: Grossly unremarkable. no  bleeding.   Oral cavity/oropharynx (visualized by webcam): No visible anterior masses or lesions. Tongue mobility intact and symmetric.  Ears: Normal auricles bilaterally.  Neck: No appreciable cervical masses. Good neck extension and rotation. No tenderness in thyrohyoid region  Resp: Breathing comfortably, no stridor or stertor.  Neuro: Symmetric facial function. Other cranial nerves as documented above.  Psych: Normal affect, pleasant and cooperative.  Voice/speech: No significant dysphonia of speaking voice. Pitch glide: full. MPT: supranormal  Extremities: No cyanosis, clubbing, or edema of the upper extremities.    Speech pathologist present: Delmy York, PhD, CCC-SLP     Intake scores  Total Score for Last Patient-Answered VHI Questionnaire  No flowsheet data found.  Total Score for Last Patient-Answered EAT Questionnaire  No flowsheet data found.  Total Score for Last Patient-Answered CSI Questionnaire  No flowsheet data found.      PROCEDURE: Deferred due to COVID-19 pandemic. Laryngoscopy is an aerosol-generating procedure.      IMPRESSION AND PLAN:   Dale Cuevas presents with a focused dysphonia in the lowest part of his range.  Laryngoscopy by Dr. Abdi in February 2020 did not show any lesions.    I recommended speech therapy as initial primary management, with goals including improving laryngeal efficiency and improving phonatory mechanics.      He understands that we also recommend a laryngoscopy/stroboscopy to complete the workup once it is safe to do so from a COVID-19 perspective.    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 He is in agreement, is not interested in pursuing any invasive treatments.      I asked the patient to call if his voice worsens. I appreciate the opportunity to participate in the care of this very pleasant patient.         Video-Visit Details    Type of service:  Video Visit    Video End Time (time video stopped): 2:29 pm    Originating Location (pt. Location): Home    Distant Location (provider location):  [facility] / Residence    Mode of Communication:  Video Conference via [facility]      Anika Fuentes MD

## 2020-04-21 NOTE — PATIENT INSTRUCTIONS
1. You were seen in the ENT Clinic today by .  If you have any questions or concerns after your appointment, please call   - Option 1: ENT Clinic: [phone]  - Option 2: Estephanie ('s Nurse): [phone]    2.   Dr. Fuentse is recommending voice therapy at this time.     UYEN Hummel  [facility]  [phone]

## 2020-04-22 ENCOUNTER — TELEPHONE (OUTPATIENT)
Dept: OTOLARYNGOLOGY | Facility: CLINIC | Age: 70
End: 2020-04-22

## 2020-04-22 NOTE — TELEPHONE ENCOUNTER
LVM per provider request to set up 5 return video sessions with provider. Scheduling as follows:    2 sessions a week apart, then two more sessions 2-3 weeks apart, with a 5th session 4-6 weeks after the 4th. Ok to deviate from recommendation to accommodate patient and provider availability.     Left call center number for scheduling.

## 2020-04-25 NOTE — PROGRESS NOTES
"Dale Cuevas is a 69 year old male who is being evaluated via a billable video visit.      The patient has been notified of following:     \"This video visit will be conducted via a call between you and your physician/provider. We have found that certain health care needs can be provided without the need for an in-person physical exam.  This service lets us provide the care you need with a video conversation.  If a prescription is necessary we can send it directly to your pharmacy.  If lab work is needed we can place an order for that and you can then stop by our lab to have the test done at a later time.    Video visits are billed at different rates depending on your insurance coverage.  Please reach out to your insurance provider with any questions.    If during the course of the call the physician/provider feels a video visit is not appropriate, you will not be charged for this service.\"    Patient has given verbal consent for Video visit? Yes    How would you like to obtain your AVS? E-Mail (inform patient AVS not encrypted)    Patient would like the video invitation sent by: Send to e-mail at: [email or web address]    Will anyone else be joining your video visit? No        Video-Visit Details    Type of service:  Video Visit    Video Start Time: 1:43 PM  Video End Time: 2:30 PM    Originating Location (pt. Location): Home    Distant Location (provider location):  [facility]     Mode of Communication:  Video Conference via [facility]  Justino Miller Jr., M.D., F.A.C.S.  Anika Fuentes M.D., M.P.H.  Delmy York, Ph.D., CCC/SLP  Marva Matute M.M. (voice), M.A., CCC/SLP  Josias Paiz M.M. (voice), M.A., CCC/SLP    [facility]  VOICE/SPEECH/BREATHING EVALUATION AND LARYNGEAL EXAMINATION REPORT    Patient: Dale Cuevas  Date of Visit: 4/20/2020    Clinician: Delmy York, Ph.D., CCC/SLP  Seen in conjunction with: Dr. Fuentes  Referring Physician: Dr. George Abdi, " "[facility]    CHIEF COMPLAINT:  voice    HISTORY  Dale Cuevas is a 69 year old presenting today for evaluation of dysphonia.    Please refer to Dr. Fuentes s dictation for a more complete history and impressions.   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 Abdi 2/10/20; \"mild vocal cord thinning\" per flexible endoscopy; apparently described as loss of muscle mass to Mr. Cuevas    Referred here for further evaluation and treatment    Also referred to Dr. Shrestha at [facility] for allergy testing; this was negative    Denies any breathing or swallowing problems    DIAGNOSIS/REASON FOR REFERRAL  Dysphonia/ Evaluate, perform laryngeal exam, treat as appropriate    CURRENT PATIENT COMPLAINTS    Primary: voice    Secondary: cough    Denies significant dyspnea, dysphagia, or pain    OTHER PERTINENT HISTORY    Recent spinal problems; undergoing treatent    Otherwise unknown; please refer to Dr. Fuentes's note    OBJECTIVE FINDINGS  VOICE/ SPEECH/ NON-COMMUNICATIVE LARYNGEAL BEHAVIORS EVALUATION  An evaluation of the voice was accomplished today; salient features are as follows:    Self-palpation of the laryngeal area shows:    No tenderness or tightness of the thyrohyoid area     Cough:    Not observed    Breathing pattern:    Appears within normal limits and adequate " "    No overt tension is evident.    Voice quality is characterized by    Clear resonant quality with the very mild roughness at lower pitches, consistent with phonation at very low frequency (bass voice)    Habitual pitch is WNL for a bass, in the range of E2 to A2 (when he drops to E2, it is essentially glottal posada)    Loudness is WNL and appropriate for the setting    Sustained vowels:     Comfortable pitch /a/: B2; clear and resonant    Comfortable pitch /i/: A3: resonant but pressed    A singing task shows voice quality is consistent between speech and singing    Bright, forward, slightly pressed quality in the range from Ab2 to Ab3    In a pitch glide task to determine pitch range    Highest pitch: A4 with good acces to clear falsetto    Lowest pitch: C2: he states he can phonate at that pitch, but it is \"not useable\"     LARYNGEAL EXAMINATION    Endoscopic laryngeal examination was not accomplished during this video visit.    Dr. Fuentes and I reviewed the results of this case history and perceptual evaluation with Mr. 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 Cuevas  R49.0 (Dysphonia)    Perceptual evaluation demonstrated excellent voice quality and vocal production with some loss of vitality in pitches lower than G2    RECOMMENDATIONS:     A course of speech therapy is recommended to optimize vocal technique and improve voice quality and vitality of the lower pitches    He demonstrates a Good prognosis for improvement given adherence to therapeutic recommendations. Therapeutic     Positive indicators: " commitment to process; diagnosis is known to respond to functional treatment;     Negative indicators: none    TREATMENT PLAN  Speech therapy    DURATION/FREQUENCY OF TREATMENT  Four weekly or bi-weekly one-hour sessions, with four monthly one-hour follow-up sessions    GOALS  Patient goal:    To recover his former voice quality     Long-term goal(s): In six months, Mr. Cuevas will:  1.  In a singing task, demonstrate ability to vocalize to E2, five times in 10 minutes, with roughness and weakness that do not exceed a level of 2 out of 10, for all 5 repetitions, by therapist and patient judgment  2.  Report resolution of symptoms, and use of optimal voice quality and comfort to meet personal, social, and professional needs, 90% of the time during a typical week of vocal activities    This treatment plan was developed with the patient who agreed with the recommendations.      TOTAL SERVICE TIME: 47 minutes  EVALUATION OF VOICE AND RESONANCE (26434)  NO CHARGE FACILITY FEE (46562)      Delmy York, Ph.D., [facility]-SLP  Speech-Language Pathologist  Director, [facility]  [phone]

## 2020-05-14 ENCOUNTER — TRANSFERRED RECORDS (OUTPATIENT)
Dept: HEALTH INFORMATION MANAGEMENT | Facility: CLINIC | Age: 70
End: 2020-05-14

## 2020-06-01 ENCOUNTER — TELEPHONE (OUTPATIENT)
Dept: DERMATOLOGY | Facility: CLINIC | Age: 70
End: 2020-06-01

## 2020-06-01 DIAGNOSIS — L40.50 PSORIATIC ARTHRITIS (H): ICD-10-CM

## 2020-06-01 DIAGNOSIS — L40.9 PSORIASIS: ICD-10-CM

## 2020-07-16 ENCOUNTER — TELEPHONE (OUTPATIENT)
Dept: FAMILY MEDICINE | Facility: CLINIC | Age: 70
End: 2020-07-16

## 2020-07-16 NOTE — TELEPHONE ENCOUNTER
Panel Management Review      Patient has the following on his problem list: None      Composite cancer screening  Chart review shows that this patient is due/due soon for the following None  Summary:    Patient is due/failing the following:   PHYSICAL    Action needed:   Patient needs office visit for physical.    Type of outreach:    Sent letter.    Questions for provider review:    None

## 2020-07-16 NOTE — LETTER
July 16, 2020    Dale Cuevas  [address] [facility] [address]    Dear Dale    [facility] cares about your health and your health plan.  I have reviewed your medical conditions, medication list and lab results, and am making recommendations based on this review to better manage your health.    You are in particular need of attention regarding:  -Wellness (Physical) Visit     I am recommending that you:     -schedule a WELLNESS (Physical) APPOINTMENT with me.   I will check fasting labs the same day - nothing to eat except water and meds for 8-10 hours prior. (If you go elsewhere for Wellness visits then please disregard this reminder.) This can be a face to face visit or a video visit.        Please call us at the [email or web address] location:  [phone] or use [employer] to address the above recommendations.     Thank you for trusting [facility].  We appreciate the opportunity to serve you and look forward to supporting your healthcare in the future.    If you have (or plan to have) any of these tests done at a facility other than a [facility] or a [facility], please have the results sent to the [facility] location noted above.      Best Regards,    Artis Espinoza MD

## 2020-07-18 DIAGNOSIS — N40.0 BENIGN PROSTATIC HYPERPLASIA WITHOUT LOWER URINARY TRACT SYMPTOMS: ICD-10-CM

## 2020-07-20 RX ORDER — TAMSULOSIN HYDROCHLORIDE 0.4 MG/1
CAPSULE ORAL
Qty: 90 CAPSULE | Refills: 3 | Status: SHIPPED | OUTPATIENT
Start: 2020-07-20 | End: 2021-07-05

## 2020-07-20 NOTE — TELEPHONE ENCOUNTER
Routing refill request to provider for review/approval because:  Labs out of range:  Elevated BP    BP Readings from Last 3 Encounters:   03/06/20 (!) 154/81   02/07/20 (!) 157/88   02/03/20 128/76

## 2020-08-31 ENCOUNTER — TRANSFERRED RECORDS (OUTPATIENT)
Dept: HEALTH INFORMATION MANAGEMENT | Facility: CLINIC | Age: 70
End: 2020-08-31

## 2020-09-08 ENCOUNTER — TRANSFERRED RECORDS (OUTPATIENT)
Dept: HEALTH INFORMATION MANAGEMENT | Facility: CLINIC | Age: 70
End: 2020-09-08

## 2020-10-23 ENCOUNTER — TRANSFERRED RECORDS (OUTPATIENT)
Dept: HEALTH INFORMATION MANAGEMENT | Facility: CLINIC | Age: 70
End: 2020-10-23

## 2020-10-29 DIAGNOSIS — Z51.81 MEDICATION MONITORING ENCOUNTER: Primary | ICD-10-CM

## 2020-10-29 DIAGNOSIS — L40.9 PSORIASIS: ICD-10-CM

## 2020-10-29 DIAGNOSIS — L40.50 PSORIATIC ARTHRITIS (H): ICD-10-CM

## 2020-10-29 NOTE — TELEPHONE ENCOUNTER
adalimumab (HUMIRA *CF*) 40 MG/0.4ML   Last Written Prescription Date:  6/1/20  Last Fill Quantity: 2 ,   # refills: 5  Last Office Visit : 7/22/19  Future Office visit:  NONE  Scheduling has been notified to contact the pt for appointment.  Routing refill request to provider for review/approval because:  Drug not on the refill protocol  * OVER DUE FOLLOW UP APPT   ( FEB 2020 )

## 2020-10-29 NOTE — TELEPHONE ENCOUNTER
"Received refill request for \"adalimumab\" as the resident on call. Reviewed patient's chart and attached communication. Patient last seen 7/22/19 for psoriasis and psoriatic arthritis. RTC was to be in 6 months was did not follow up. Needs safety monitoring labs (quantgold; last done 4/2019)    After reviewing the medication list and assessment and plan from last visit, the refill request was accepted but gave 3 month supply. Needs appt and safety labs. Standing order placed    CC'ing Dr. Stanley as AMRIK Herbert MD  Med/Derm PGY-3  P: [phone]    "

## 2020-10-29 NOTE — TELEPHONE ENCOUNTER
[facility] Call Center    Phone Message    May a detailed message be left on voicemail: yes     Reason for Call: Medication Refill Request    Has the patient contacted the pharmacy for the refill? Yes     Name of medication being requested: adalimumab (HUMIRA *CF*) 40 MG/0.4ML pen kit    Provider who prescribed the medication: Dr. Stanley    Pharmacy: [facility] - [address]    Date medication is needed: ASAP - OUT and will need them again in the next week or two. Just used the last one today. Please send refill or call the pt back to discuss. Thanks      Action Taken: Message routed to:  [facility] DERM    Travel Screening: Not Applicable

## 2020-11-05 DIAGNOSIS — Z51.81 MEDICATION MONITORING ENCOUNTER: ICD-10-CM

## 2020-11-05 PROCEDURE — 99000 SPECIMEN HANDLING OFFICE-LAB: CPT | Performed by: PATHOLOGY

## 2020-11-05 PROCEDURE — 86481 TB AG RESPONSE T-CELL SUSP: CPT | Mod: 90 | Performed by: PATHOLOGY

## 2020-11-05 PROCEDURE — 36415 COLL VENOUS BLD VENIPUNCTURE: CPT | Performed by: PATHOLOGY

## 2020-11-06 ENCOUNTER — TELEPHONE (OUTPATIENT)
Dept: DERMATOLOGY | Facility: CLINIC | Age: 70
End: 2020-11-06

## 2020-11-06 NOTE — TELEPHONE ENCOUNTER
Free drug renewal started, sent prescriber portion to clinic staff (Nam will be signing instead of Jaden)

## 2020-11-09 LAB
GAMMA INTERFERON BACKGROUND BLD IA-ACNC: 0.43 IU/ML
M TB IFN-G CD4+ BCKGRND COR BLD-ACNC: 9.57 IU/ML
M TB TUBERC IFN-G BLD QL: NEGATIVE
MITOGEN IGNF BCKGRD COR BLD-ACNC: 0 IU/ML
MITOGEN IGNF BCKGRD COR BLD-ACNC: 0 IU/ML

## 2020-11-23 ENCOUNTER — TRANSFERRED RECORDS (OUTPATIENT)
Dept: HEALTH INFORMATION MANAGEMENT | Facility: CLINIC | Age: 70
End: 2020-11-23

## 2020-12-14 ENCOUNTER — TRANSFERRED RECORDS (OUTPATIENT)
Dept: HEALTH INFORMATION MANAGEMENT | Facility: CLINIC | Age: 70
End: 2020-12-14

## 2021-01-14 ENCOUNTER — HEALTH MAINTENANCE LETTER (OUTPATIENT)
Age: 71
End: 2021-01-14

## 2021-02-04 ENCOUNTER — OFFICE VISIT (OUTPATIENT)
Dept: DERMATOLOGY | Facility: CLINIC | Age: 71
End: 2021-02-04
Payer: COMMERCIAL

## 2021-02-04 DIAGNOSIS — Z51.81 MEDICATION MONITORING ENCOUNTER: ICD-10-CM

## 2021-02-04 DIAGNOSIS — L40.9 PSORIASIS: Primary | ICD-10-CM

## 2021-02-04 DIAGNOSIS — L40.50 PSORIATIC ARTHRITIS (H): ICD-10-CM

## 2021-02-04 LAB
ALBUMIN SERPL-MCNC: 4 G/DL (ref 3.4–5)
ALP SERPL-CCNC: 96 U/L (ref 40–150)
ALT SERPL W P-5'-P-CCNC: 41 U/L (ref 0–70)
ANION GAP SERPL CALCULATED.3IONS-SCNC: 5 MMOL/L (ref 3–14)
AST SERPL W P-5'-P-CCNC: 28 U/L (ref 0–45)
BASOPHILS # BLD AUTO: 0 10E9/L (ref 0–0.2)
BASOPHILS NFR BLD AUTO: 0.3 %
BILIRUB SERPL-MCNC: 0.8 MG/DL (ref 0.2–1.3)
BUN SERPL-MCNC: 21 MG/DL (ref 7–30)
CALCIUM SERPL-MCNC: 9 MG/DL (ref 8.5–10.1)
CHLORIDE SERPL-SCNC: 110 MMOL/L (ref 94–109)
CO2 SERPL-SCNC: 26 MMOL/L (ref 20–32)
CREAT SERPL-MCNC: 1 MG/DL (ref 0.66–1.25)
DIFFERENTIAL METHOD BLD: NORMAL
EOSINOPHIL # BLD AUTO: 0 10E9/L (ref 0–0.7)
EOSINOPHIL NFR BLD AUTO: 0.7 %
ERYTHROCYTE [DISTWIDTH] IN BLOOD BY AUTOMATED COUNT: 12 % (ref 10–15)
GFR SERPL CREATININE-BSD FRML MDRD: 75 ML/MIN/{1.73_M2}
GLUCOSE SERPL-MCNC: 106 MG/DL (ref 70–99)
HCT VFR BLD AUTO: 47.4 % (ref 40–53)
HGB BLD-MCNC: 15.7 G/DL (ref 13.3–17.7)
IMM GRANULOCYTES # BLD: 0 10E9/L (ref 0–0.4)
IMM GRANULOCYTES NFR BLD: 0.2 %
LYMPHOCYTES # BLD AUTO: 1.5 10E9/L (ref 0.8–5.3)
LYMPHOCYTES NFR BLD AUTO: 24.9 %
MCH RBC QN AUTO: 29.4 PG (ref 26.5–33)
MCHC RBC AUTO-ENTMCNC: 33.1 G/DL (ref 31.5–36.5)
MCV RBC AUTO: 89 FL (ref 78–100)
MONOCYTES # BLD AUTO: 0.5 10E9/L (ref 0–1.3)
MONOCYTES NFR BLD AUTO: 7.5 %
NEUTROPHILS # BLD AUTO: 4.1 10E9/L (ref 1.6–8.3)
NEUTROPHILS NFR BLD AUTO: 66.4 %
NRBC # BLD AUTO: 0 10*3/UL
NRBC BLD AUTO-RTO: 0 /100
PLATELET # BLD AUTO: 190 10E9/L (ref 150–450)
POTASSIUM SERPL-SCNC: 4.2 MMOL/L (ref 3.4–5.3)
PROT SERPL-MCNC: 7.9 G/DL (ref 6.8–8.8)
RBC # BLD AUTO: 5.34 10E12/L (ref 4.4–5.9)
SODIUM SERPL-SCNC: 142 MMOL/L (ref 133–144)
WBC # BLD AUTO: 6.1 10E9/L (ref 4–11)

## 2021-02-04 PROCEDURE — 36415 COLL VENOUS BLD VENIPUNCTURE: CPT | Performed by: PATHOLOGY

## 2021-02-04 PROCEDURE — 85025 COMPLETE CBC W/AUTO DIFF WBC: CPT | Performed by: PATHOLOGY

## 2021-02-04 PROCEDURE — 80053 COMPREHEN METABOLIC PANEL: CPT | Performed by: PATHOLOGY

## 2021-02-04 PROCEDURE — 99214 OFFICE O/P EST MOD 30 MIN: CPT | Mod: GC | Performed by: DERMATOLOGY

## 2021-02-04 ASSESSMENT — PAIN SCALES - GENERAL: PAINLEVEL: NO PAIN (0)

## 2021-02-04 NOTE — PROGRESS NOTES
"[facility] Dermatology Note  Encounter Date: Feb 4, 2021  Office Visit     Dermatology Problem List:  1. Psoriasis with psoriatic arthritis  - Current therapy: adalimumab 40 mg [id number]   - Following with rheumatology for psoriatic arthritis  - Last quant gold 11/5/20: negative  - CBC and CMP ordered 2/4/21    ____________________________________________    Assessment & Plan:     # Psoriasis with psoriatic arthritis, currently well controlled on Humira every other week.   - Continue adalimumab 40 mg every other week.   - Last quant gold 11/5/20 (negative), will check CBC with diff and CMP today  - Discussed modestly increased risk of malignancy in patients on Humira. Pt deferred FBSE today     Procedures Performed: none    Follow-up: 6 month(s) in-person, or earlier for new or changing lesions    Staff and Resident:     Silke Shanks MD  PGY-3 Dermatology Resident     I have seen and examined this patient and agree with the assessment and plan as documented in the resident's note.    Car Browning MD  Dermatology Attending    ____________________________________________    CC: No chief complaint on file.    HPI:  Mr. Dale Cuevas is a(n) 70 year old male who presents today as a return patient for psoriasis. He was last seen in clinic on 7/22/19 at which time he was recommended to continue to treat his psoriasis with Humira every other week.  Since his last visit, he states that his psoriasis has been well controlled with Humira, which he calls a \"Humerical.\"  His only active areas of involvement on examination today are on his b/l elbows. At this time, he has not needed to apply topical corticosteroids. He denies joint symptoms such pain, stiffness or swelling since he has been on Humira.     The patient denies any constitutional symptoms, lymphadenopathy, unintentional weight loss or decreased appetite. He denies other skin problems today.    Labs:  Last Quant gold negative 11/5/20    Physical " Exam:  Vitals: There were no vitals taken for this visit.  SKIN: Focused examination of face, scalp, chest, back and b/l upper extremities was performed.  - Well defined, thin pink plaques with overlying micaceous scale on the b/l elbows  - No nail pitting present on the b/l fingernails  - No other lesions of concern on areas examined.     Medications:  Current Outpatient Medications   Medication     adalimumab (HUMIRA *CF*) 40 MG/0.4ML pen kit     atorvastatin (LIPITOR) 40 MG tablet     ketorolac (ACULAR) 0.5 % ophthalmic solution     methylPREDNISolone (MEDROL DOSEPAK) 4 MG tablet therapy pack     tamsulosin (FLOMAX) 0.4 MG capsule     No current facility-administered medications for this visit.       Past Medical History:   Patient Active Problem List   Diagnosis     Elevated blood pressure reading without diagnosis of hypertension     Rosacea     Erectile dysfunction     Psoriasis     Hyperlipidemia LDL goal <100     Benign prostatic hyperplasia     Family hx of prostate cancer     Diastasis recti     Psoriatic arthritis (H)     Past Medical History:   Diagnosis Date     Benign prostatic hyperplasia 9/8/2014     Diastasis recti 6/15/2016     Elevated blood pressure reading without diagnosis of hypertension 5/6/2013     Erectile dysfunction 5/6/2013     Family hx of prostate cancer 6/7/2016     Hyperlipidemia LDL goal <100 2/5/2014     Psoriasis 11/23/2013     Psoriatic arthritis (H)      Rosacea 5/6/2013       CC Referred Self, MD  No address on file on close of this encounter.

## 2021-02-04 NOTE — LETTER
"2/4/2021       RE: Dale Cuevas  [address] [facility] [address]     Dear Colleague,    Thank you for referring your patient, Dale Cuevas, to the [facility] [place] at [facility]. Please see a copy of my visit note below.    [facility] Dermatology Note  Encounter Date: Feb 4, 2021  Office Visit     Dermatology Problem List:  1. Psoriasis with psoriatic arthritis  - Current therapy: adalimumab 40 mg [id number]   - Following with rheumatology for psoriatic arthritis  - Last quant gold 11/5/20: negative  - CBC and CMP ordered 2/4/21    ____________________________________________    Assessment & Plan:     # Psoriasis with psoriatic arthritis, currently well controlled on Humira every other week.   - Continue adalimumab 40 mg every other week.   - Last quant gold 11/5/20 (negative), will check CBC with diff and CMP today  - Discussed modestly increased risk of malignancy in patients on Humira. Pt deferred FBSE today     Procedures Performed: none    Follow-up: 6 month(s) in-person, or earlier for new or changing lesions    Staff and Resident:     Silke Shanks MD  PGY-3 Dermatology Resident     I have seen and examined this patient and agree with the assessment and plan as documented in the resident's note.    Car Browning MD  Dermatology Attending    ____________________________________________    CC: No chief complaint on file.    HPI:  Mr. Dale Cuevas is a(n) 70 year old male who presents today as a return patient for psoriasis. He was last seen in clinic on 7/22/19 at which time he was recommended to continue to treat his psoriasis with Humira every other week.  Since his last visit, he states that his psoriasis has been well controlled with Humira, which he calls a \"Humerical.\"  His only active areas of involvement on examination today are on his b/l elbows. At this time, he has not needed to apply topical " corticosteroids. He denies joint symptoms such pain, stiffness or swelling since he has been on Humira.     The patient denies any constitutional symptoms, lymphadenopathy, unintentional weight loss or decreased appetite. He denies other skin problems today.    Labs:  Last Quant gold negative 11/5/20    Physical Exam:  Vitals: There were no vitals taken for this visit.  SKIN: Focused examination of face, scalp, chest, back and b/l upper extremities was performed.  - Well defined, thin pink plaques with overlying micaceous scale on the b/l elbows  - No nail pitting present on the b/l fingernails  - No other lesions of concern on areas examined.     Medications:  Current Outpatient Medications   Medication     adalimumab (HUMIRA *CF*) 40 MG/0.4ML pen kit     atorvastatin (LIPITOR) 40 MG tablet     ketorolac (ACULAR) 0.5 % ophthalmic solution     methylPREDNISolone (MEDROL DOSEPAK) 4 MG tablet therapy pack     tamsulosin (FLOMAX) 0.4 MG capsule     No current facility-administered medications for this visit.       Past Medical History:   Patient Active Problem List   Diagnosis     Elevated blood pressure reading without diagnosis of hypertension     Rosacea     Erectile dysfunction     Psoriasis     Hyperlipidemia LDL goal <100     Benign prostatic hyperplasia     Family hx of prostate cancer     Diastasis recti     Psoriatic arthritis (H)     Past Medical History:   Diagnosis Date     Benign prostatic hyperplasia 9/8/2014     Diastasis recti 6/15/2016     Elevated blood pressure reading without diagnosis of hypertension 5/6/2013     Erectile dysfunction 5/6/2013     Family hx of prostate cancer 6/7/2016     Hyperlipidemia LDL goal <100 2/5/2014     Psoriasis 11/23/2013     Psoriatic arthritis (H)      Rosacea 5/6/2013       CC Referred Self, MD  No address on file on close of this encounter.

## 2021-02-04 NOTE — NURSING NOTE
Dermatology Rooming Note    Dale Cuevas's goals for this visit include:   Chief Complaint   Patient presents with     Psoriasis     Dale is here today for a follow up regarding his psoriasis. He states that his elbows have been the main area of concern. Dale would also like to discuss Humira today.      Bird Licea, EMT

## 2021-02-08 ENCOUNTER — TELEPHONE (OUTPATIENT)
Dept: DERMATOLOGY | Facility: CLINIC | Age: 71
End: 2021-02-08

## 2021-02-08 NOTE — TELEPHONE ENCOUNTER
Prior Authorization Approval    Authorization Effective Date: 1/9/2021  Authorization Expiration Date: 2/8/2024  Medication: Humira - Approved  Approved Dose/Quantity:   Reference #: [id number]   Insurance Company: [employer] - Phone [phone] Fax [phone]  Expected CoPay: $1763.51     CoPay Card Available:      Foundation Assistance Needed:    Which Pharmacy is filling the prescription (Not needed for infusion/clinic administered):    Pharmacy Notified:    Patient Notified:      Will start application for free drug.

## 2021-02-10 ENCOUNTER — OFFICE VISIT (OUTPATIENT)
Dept: INTERNAL MEDICINE | Facility: CLINIC | Age: 71
End: 2021-02-10
Payer: COMMERCIAL

## 2021-02-10 VITALS
OXYGEN SATURATION: 96 % | HEART RATE: 73 BPM | SYSTOLIC BLOOD PRESSURE: 122 MMHG | TEMPERATURE: 97.8 F | WEIGHT: 181.9 LBS | BODY MASS INDEX: 26.94 KG/M2 | DIASTOLIC BLOOD PRESSURE: 78 MMHG | HEIGHT: 69 IN

## 2021-02-10 DIAGNOSIS — E78.5 HYPERLIPIDEMIA LDL GOAL <100: ICD-10-CM

## 2021-02-10 DIAGNOSIS — D84.9 IMMUNOSUPPRESSION (H): ICD-10-CM

## 2021-02-10 DIAGNOSIS — M17.11 PRIMARY OSTEOARTHRITIS OF RIGHT KNEE: ICD-10-CM

## 2021-02-10 DIAGNOSIS — Z01.818 PREOP GENERAL PHYSICAL EXAM: Primary | ICD-10-CM

## 2021-02-10 DIAGNOSIS — L40.9 PSORIASIS: ICD-10-CM

## 2021-02-10 DIAGNOSIS — L40.50 PSORIATIC ARTHRITIS (H): ICD-10-CM

## 2021-02-10 PROCEDURE — 99213 OFFICE O/P EST LOW 20 MIN: CPT | Mod: 25 | Performed by: FAMILY MEDICINE

## 2021-02-10 PROCEDURE — 93000 ELECTROCARDIOGRAM COMPLETE: CPT | Performed by: FAMILY MEDICINE

## 2021-02-10 ASSESSMENT — MIFFLIN-ST. JEOR: SCORE: 1565.47

## 2021-02-10 NOTE — H&P (VIEW-ONLY)
[address] [address]  Phone: [phone]  Primary Provider: Jennie Girard  Pre-op Performing Provider: JENNIE GIRARD      PREOPERATIVE EVALUATION:  Today's date: 2/10/2021    Dale Cuevas is a 70 year old male who presents for a preoperative evaluation.    Surgical Information:  Surgery/Procedure: Right Knee Replacement  Surgery Location: [facility]  Surgeon: Dr. Jose C Hernandez  Surgery Date: 03/04/2021  Time of Surgery: 9:00am  Where patient plans to recover: At home with family  Fax number for surgical facility: Note does not need to be faxed, will be available electronically in Epic.    Type of Anesthesia Anticipated: General    Assessment & Plan     The proposed surgical procedure is considered INTERMEDIATE risk.    Preop general physical exam    - EKG 12-lead complete w/read - Clinics    Primary osteoarthritis of right knee      Psoriatic arthritis (H)      Hyperlipidemia LDL goal <100      Psoriasis      Immunosuppression (H)                     RECOMMENDATION:  APPROVAL GIVEN to proceed with proposed procedure, without further diagnostic evaluation.                      Subjective     HPI related to upcoming procedure: Right Knee Replacement    Preop Questions 2/10/2021   1. Have you ever had a heart attack or stroke? No   2. Have you ever had surgery on your heart or blood vessels, such as a stent placement, a coronary artery bypass, or surgery on an artery in your head, neck, heart, or legs? No   3. Do you have chest pain with activity? No   4. Do you have a history of  heart failure? No   5. Do you currently have a cold, bronchitis or symptoms of other infection? No   6. Do you have a cough, shortness of breath, or wheezing? No   7. Do you or anyone in your family have previous history of blood clots? No   8. Do you or does anyone in your family have a serious bleeding problem such as prolonged bleeding  following surgeries or cuts? No   9. Have you ever had problems with anemia or been told to take iron pills? No   10. Have you had any abnormal blood loss such as black, tarry or bloody stools? No   11. Have you ever had a blood transfusion? No   12. Are you willing to have a blood transfusion if it is medically needed before, during, or after your surgery? Yes   13. Have you or any of your relatives ever had problems with anesthesia? No   14. Do you have sleep apnea, excessive snoring or daytime drowsiness? No   15. Do you have any artifical heart valves or other implanted medical devices like a pacemaker, defibrillator, or continuous glucose monitor? No   16. Do you have artificial joints? No   17. Are you allergic to latex? No       Health Care Directive:  Patient does not have a Health Care Directive or Living Will: Discussed advance care planning with patient; information given to patient to review.    Preoperative Review of :   reviewed - no record of controlled substances prescribed.          Review of Systems  Constitutional, neuro, ENT, endocrine, pulmonary, cardiac, gastrointestinal, genitourinary, musculoskeletal, integument and psychiatric systems are negative, except as otherwise noted.    Patient Active Problem List    Diagnosis Date Noted     Immunosuppression (H) 02/10/2021     Priority: Medium     Psoriatic arthritis (H) 07/15/2019     Priority: Medium     Diastasis recti 06/15/2016     Priority: Medium     Family hx of prostate cancer 06/07/2016     Priority: Medium     Benign prostatic hyperplasia 09/08/2014     Priority: Medium     Hyperlipidemia LDL goal <100 02/05/2014     Priority: Medium     Psoriasis 11/23/2013     Priority: Medium     Elevated blood pressure reading without diagnosis of hypertension 05/06/2013     Priority: Medium     Rosacea 05/06/2013     Priority: Medium     Erectile dysfunction 05/06/2013     Priority: Medium      Past Medical History:   Diagnosis Date     Benign  "prostatic hyperplasia 2014     Diastasis recti 6/15/2016     Elevated blood pressure reading without diagnosis of hypertension 2013     Erectile dysfunction 2013     Family hx of prostate cancer 2016     Hyperlipidemia LDL goal <100 2014     Psoriasis 2013     Psoriatic arthritis (H)      Psoriatic arthritis (H)      Rosacea 2013     Past Surgical History:   Procedure Laterality Date     Hx Vitrectomy/Membrane Peel/Laser/Air Right 2018    Dr. Medrano / Dr. Mendiola       ORTHOPEDIC SURGERY  2015    left wrist     ORTHOPEDIC SURGERY      spine sugery-with disc fragment removed     ORTHOPEDIC SURGERY      right knee arthroscopy     SEPTOPLASTY       Current Outpatient Medications   Medication Sig Dispense Refill     adalimumab (HUMIRA *CF*) 40 MG/0.4ML pen kit Inject 0.4 mLs (40 mg) Subcutaneous every 14 days 2 each 5     atorvastatin (LIPITOR) 40 MG tablet TAKE 1 TABLET BY MOUTH EVERY DAY. APPOINTMENT NEEDED FOR FURTHER REFILLS. 90 tablet 0     tamsulosin (FLOMAX) 0.4 MG capsule TAKE ONE CAPSULE BY MOUTH ONE TIME DAILY 90 capsule 3     ketorolac (ACULAR) 0.5 % ophthalmic solution        methylPREDNISolone (MEDROL DOSEPAK) 4 MG tablet therapy pack Follow Package Directions 21 tablet 0       Allergies   Allergen Reactions     Levofloxacin      Other reaction(s): *Unknown        Social History     Tobacco Use     Smoking status: Former Smoker     Quit date: 1973     Years since quittin.8     Smokeless tobacco: Never Used   Substance Use Topics     Alcohol use: Yes     Comment: every night     Family History   Problem Relation Age of Onset     Hypertension Mother      Prostate Cancer Father 72     Breast Cancer Sister      Thyroid Cancer Brother      HIV/AIDS Brother      History   Drug Use No         Objective     /78   Pulse 73   Temp 97.8  F (36.6  C) (Oral)   Ht 1.753 m (5' 9\")   Wt 82.5 kg (181 lb 14.4 oz)   SpO2 96%   BMI 26.86 kg/m  "     Physical Exam    GENERAL APPEARANCE: healthy, alert and no distress     EYES: EOMI,  PERRL     HENT: ear canals and TM's normal and nose and mouth without ulcers or lesions     NECK: no adenopathy, no asymmetry, masses, or scars and thyroid normal to palpation     RESP: lungs clear to auscultation - no rales, rhonchi or wheezes     CV: regular rates and rhythm, normal S1 S2, no S3 or S4 and no murmur, click or rub     ABDOMEN:  soft, nontender, no HSM or masses and bowel sounds normal     MS: extremities normal- no gross deformities noted, no evidence of inflammation in joints, FROM in all extremities.     SKIN: no suspicious lesions or rashes     NEURO: Normal strength and tone, sensory exam grossly normal, mentation intact and speech normal     PSYCH: mentation appears normal. and affect normal/bright     LYMPHATICS: No cervical adenopathy    Recent Labs   Lab Test 02/04/21  1505 04/11/19  1558 03/25/19  1448   HGB 15.7  --  15.0     --  210     --  141   POTASSIUM 4.2  --  3.9   CR 1.00 0.73 0.72        Diagnostics:  No labs were ordered during this visit as they were just done at his rheumatologist.   EKG: appears normal, NSR, normal axis, normal intervals, no acute ST/T changes c/w ischemia, no LVH by voltage criteria, unchanged from previous tracings    Revised Cardiac Risk Index (RCRI):  The patient has the following serious cardiovascular risks for perioperative complications:   - No serious cardiac risks = 0 points     RCRI Interpretation: 0 points: Class I (very low risk - 0.4% complication rate)             Signed Electronically by: Artis Espinoza MD  Copy of this evaluation report is provided to requesting physician.    [facility] Guidelines    Revised Cardiac Risk Index

## 2021-02-10 NOTE — PROGRESS NOTES
[address] [address]  Phone: [phone]  Primary Provider: Jennie Girard  Pre-op Performing Provider: JENNIE GIRARD      PREOPERATIVE EVALUATION:  Today's date: 2/10/2021    Dale Cuevas is a 70 year old male who presents for a preoperative evaluation.    Surgical Information:  Surgery/Procedure: Right Knee Replacement  Surgery Location: [facility]  Surgeon: Dr. Jose C Hernandez  Surgery Date: 03/04/2021  Time of Surgery: 9:00am  Where patient plans to recover: At home with family  Fax number for surgical facility: Note does not need to be faxed, will be available electronically in Epic.    Type of Anesthesia Anticipated: General    Assessment & Plan     The proposed surgical procedure is considered INTERMEDIATE risk.    Preop general physical exam    - EKG 12-lead complete w/read - Clinics    Primary osteoarthritis of right knee      Psoriatic arthritis (H)      Hyperlipidemia LDL goal <100      Psoriasis      Immunosuppression (H)                     RECOMMENDATION:  APPROVAL GIVEN to proceed with proposed procedure, without further diagnostic evaluation.                      Subjective     HPI related to upcoming procedure: Right Knee Replacement    Preop Questions 2/10/2021   1. Have you ever had a heart attack or stroke? No   2. Have you ever had surgery on your heart or blood vessels, such as a stent placement, a coronary artery bypass, or surgery on an artery in your head, neck, heart, or legs? No   3. Do you have chest pain with activity? No   4. Do you have a history of  heart failure? No   5. Do you currently have a cold, bronchitis or symptoms of other infection? No   6. Do you have a cough, shortness of breath, or wheezing? No   7. Do you or anyone in your family have previous history of blood clots? No   8. Do you or does anyone in your family have a serious bleeding problem such as prolonged bleeding  following surgeries or cuts? No   9. Have you ever had problems with anemia or been told to take iron pills? No   10. Have you had any abnormal blood loss such as black, tarry or bloody stools? No   11. Have you ever had a blood transfusion? No   12. Are you willing to have a blood transfusion if it is medically needed before, during, or after your surgery? Yes   13. Have you or any of your relatives ever had problems with anesthesia? No   14. Do you have sleep apnea, excessive snoring or daytime drowsiness? No   15. Do you have any artifical heart valves or other implanted medical devices like a pacemaker, defibrillator, or continuous glucose monitor? No   16. Do you have artificial joints? No   17. Are you allergic to latex? No       Health Care Directive:  Patient does not have a Health Care Directive or Living Will: Discussed advance care planning with patient; information given to patient to review.    Preoperative Review of :   reviewed - no record of controlled substances prescribed.          Review of Systems  Constitutional, neuro, ENT, endocrine, pulmonary, cardiac, gastrointestinal, genitourinary, musculoskeletal, integument and psychiatric systems are negative, except as otherwise noted.    Patient Active Problem List    Diagnosis Date Noted     Immunosuppression (H) 02/10/2021     Priority: Medium     Psoriatic arthritis (H) 07/15/2019     Priority: Medium     Diastasis recti 06/15/2016     Priority: Medium     Family hx of prostate cancer 06/07/2016     Priority: Medium     Benign prostatic hyperplasia 09/08/2014     Priority: Medium     Hyperlipidemia LDL goal <100 02/05/2014     Priority: Medium     Psoriasis 11/23/2013     Priority: Medium     Elevated blood pressure reading without diagnosis of hypertension 05/06/2013     Priority: Medium     Rosacea 05/06/2013     Priority: Medium     Erectile dysfunction 05/06/2013     Priority: Medium      Past Medical History:   Diagnosis Date     Benign  "prostatic hyperplasia 2014     Diastasis recti 6/15/2016     Elevated blood pressure reading without diagnosis of hypertension 2013     Erectile dysfunction 2013     Family hx of prostate cancer 2016     Hyperlipidemia LDL goal <100 2014     Psoriasis 2013     Psoriatic arthritis (H)      Psoriatic arthritis (H)      Rosacea 2013     Past Surgical History:   Procedure Laterality Date     Hx Vitrectomy/Membrane Peel/Laser/Air Right 2018    Dr. Medrano / Dr. Mendiola       ORTHOPEDIC SURGERY  2015    left wrist     ORTHOPEDIC SURGERY      spine sugery-with disc fragment removed     ORTHOPEDIC SURGERY      right knee arthroscopy     SEPTOPLASTY       Current Outpatient Medications   Medication Sig Dispense Refill     adalimumab (HUMIRA *CF*) 40 MG/0.4ML pen kit Inject 0.4 mLs (40 mg) Subcutaneous every 14 days 2 each 5     atorvastatin (LIPITOR) 40 MG tablet TAKE 1 TABLET BY MOUTH EVERY DAY. APPOINTMENT NEEDED FOR FURTHER REFILLS. 90 tablet 0     tamsulosin (FLOMAX) 0.4 MG capsule TAKE ONE CAPSULE BY MOUTH ONE TIME DAILY 90 capsule 3     ketorolac (ACULAR) 0.5 % ophthalmic solution        methylPREDNISolone (MEDROL DOSEPAK) 4 MG tablet therapy pack Follow Package Directions 21 tablet 0       Allergies   Allergen Reactions     Levofloxacin      Other reaction(s): *Unknown        Social History     Tobacco Use     Smoking status: Former Smoker     Quit date: 1973     Years since quittin.8     Smokeless tobacco: Never Used   Substance Use Topics     Alcohol use: Yes     Comment: every night     Family History   Problem Relation Age of Onset     Hypertension Mother      Prostate Cancer Father 72     Breast Cancer Sister      Thyroid Cancer Brother      HIV/AIDS Brother      History   Drug Use No         Objective     /78   Pulse 73   Temp 97.8  F (36.6  C) (Oral)   Ht 1.753 m (5' 9\")   Wt 82.5 kg (181 lb 14.4 oz)   SpO2 96%   BMI 26.86 kg/m  "     Physical Exam    GENERAL APPEARANCE: healthy, alert and no distress     EYES: EOMI,  PERRL     HENT: ear canals and TM's normal and nose and mouth without ulcers or lesions     NECK: no adenopathy, no asymmetry, masses, or scars and thyroid normal to palpation     RESP: lungs clear to auscultation - no rales, rhonchi or wheezes     CV: regular rates and rhythm, normal S1 S2, no S3 or S4 and no murmur, click or rub     ABDOMEN:  soft, nontender, no HSM or masses and bowel sounds normal     MS: extremities normal- no gross deformities noted, no evidence of inflammation in joints, FROM in all extremities.     SKIN: no suspicious lesions or rashes     NEURO: Normal strength and tone, sensory exam grossly normal, mentation intact and speech normal     PSYCH: mentation appears normal. and affect normal/bright     LYMPHATICS: No cervical adenopathy    Recent Labs   Lab Test 02/04/21  1505 04/11/19  1558 03/25/19  1448   HGB 15.7  --  15.0     --  210     --  141   POTASSIUM 4.2  --  3.9   CR 1.00 0.73 0.72        Diagnostics:  No labs were ordered during this visit as they were just done at his rheumatologist.   EKG: appears normal, NSR, normal axis, normal intervals, no acute ST/T changes c/w ischemia, no LVH by voltage criteria, unchanged from previous tracings    Revised Cardiac Risk Index (RCRI):  The patient has the following serious cardiovascular risks for perioperative complications:   - No serious cardiac risks = 0 points     RCRI Interpretation: 0 points: Class I (very low risk - 0.4% complication rate)             Signed Electronically by: Artis Espinoza MD  Copy of this evaluation report is provided to requesting physician.    [facility] Guidelines    Revised Cardiac Risk Index

## 2021-02-10 NOTE — PATIENT INSTRUCTIONS

## 2021-02-11 NOTE — TELEPHONE ENCOUNTER
Free Drug Application Initiated  Medication Humira  Sponsor Susy Suh  Phone # [phone]  Fax #  Additional Information Submitted on [employer] [facility] [id number]

## 2021-02-12 NOTE — TELEPHONE ENCOUNTER
[facility] Call Center    Phone Message    May a detailed message be left on voicemail: yes     Reason for Call: Other: Pt calling in requesting a call back from Radha Capps or [employer] to discuss Angeli, Pt is out of injections Please call back ASAP   Thank You       Action Taken: Message routed to:  [facility] ([facility] Derm    Travel Screening: Not Applicable

## 2021-02-18 ENCOUNTER — TRANSFERRED RECORDS (OUTPATIENT)
Dept: HEALTH INFORMATION MANAGEMENT | Facility: CLINIC | Age: 71
End: 2021-02-18

## 2021-02-18 DIAGNOSIS — Z11.59 ENCOUNTER FOR SCREENING FOR OTHER VIRAL DISEASES: ICD-10-CM

## 2021-03-01 DIAGNOSIS — Z11.59 ENCOUNTER FOR SCREENING FOR OTHER VIRAL DISEASES: ICD-10-CM

## 2021-03-01 PROBLEM — Z51.81 MEDICATION MONITORING ENCOUNTER: Status: ACTIVE | Noted: 2021-03-01

## 2021-03-01 LAB
SARS-COV-2 RNA RESP QL NAA+PROBE: NORMAL
SPECIMEN SOURCE: NORMAL

## 2021-03-01 PROCEDURE — U0003 INFECTIOUS AGENT DETECTION BY NUCLEIC ACID (DNA OR RNA); SEVERE ACUTE RESPIRATORY SYNDROME CORONAVIRUS 2 (SARS-COV-2) (CORONAVIRUS DISEASE [COVID-19]), AMPLIFIED PROBE TECHNIQUE, MAKING USE OF HIGH THROUGHPUT TECHNOLOGIES AS DESCRIBED BY CMS-2020-01-R: HCPCS | Performed by: FAMILY MEDICINE

## 2021-03-01 PROCEDURE — U0005 INFEC AGEN DETEC AMPLI PROBE: HCPCS | Performed by: FAMILY MEDICINE

## 2021-03-01 NOTE — TELEPHONE ENCOUNTER
Free Drug Application Approved  Effective Dates through 12/31/2021  Patient notified? yes  Additional Information

## 2021-03-02 LAB
LABORATORY COMMENT REPORT: NORMAL
SARS-COV-2 RNA RESP QL NAA+PROBE: NEGATIVE
SPECIMEN SOURCE: NORMAL

## 2021-03-03 ENCOUNTER — ANESTHESIA EVENT (OUTPATIENT)
Dept: SURGERY | Facility: CLINIC | Age: 71
End: 2021-03-03
Payer: COMMERCIAL

## 2021-03-03 RX ORDER — IBUPROFEN 200 MG
200-400 TABLET ORAL EVERY 4 HOURS PRN
Status: ON HOLD | COMMUNITY
End: 2021-03-05

## 2021-03-03 ASSESSMENT — LIFESTYLE VARIABLES: TOBACCO_USE: 1

## 2021-03-03 NOTE — PROGRESS NOTES
PTA medications updated by Medication Scribe prior to surgery via phone call with patient        Comments:    Medication history sources: Patient, Surescripts, H&P and Patient's home med list  Medication history source reliability: Good  Adherence assessment: N/A Not Observed    Significant changes made to the medication list:    Patient reports no longer taking the following meds (med scribe removed from PTA med list): Ketorolac eye drops, Medrol dosepak       Pt currently waiting for his Humira to be refilled      Additional medication history information:   None        Prior to Admission medications    Medication Sig Last Dose Taking? Auth Provider   adalimumab (HUMIRA *CF*) 40 MG/0.4ML pen kit Inject 0.4 mLs (40 mg) Subcutaneous every 14 days more than 2 weeks Yes Car Browning MD   atorvastatin (LIPITOR) 40 MG tablet TAKE 1 TABLET BY MOUTH EVERY DAY. APPOINTMENT NEEDED FOR FURTHER REFILLS.  Patient taking differently: Take 40 mg by mouth every evening   at PM Yes Artis Espinoza MD   ibuprofen (ADVIL/MOTRIN) 200 MG tablet Take 200-400 mg by mouth every 4 hours as needed for mild pain  at PRN Yes Reported, Patient   tamsulosin (FLOMAX) 0.4 MG capsule TAKE ONE CAPSULE BY MOUTH ONE TIME DAILY  Patient taking differently: Take 0.4 mg by mouth every evening   at PM Yes Sue Sewell PA-C

## 2021-03-04 ENCOUNTER — APPOINTMENT (OUTPATIENT)
Dept: GENERAL RADIOLOGY | Facility: CLINIC | Age: 71
End: 2021-03-04
Attending: ORTHOPAEDIC SURGERY
Payer: COMMERCIAL

## 2021-03-04 ENCOUNTER — HOSPITAL ENCOUNTER (OUTPATIENT)
Facility: CLINIC | Age: 71
Discharge: HOME | End: 2021-03-05
Attending: ORTHOPAEDIC SURGERY | Admitting: ORTHOPAEDIC SURGERY
Payer: COMMERCIAL

## 2021-03-04 ENCOUNTER — APPOINTMENT (OUTPATIENT)
Dept: PHYSICAL THERAPY | Facility: CLINIC | Age: 71
End: 2021-03-04
Attending: ORTHOPAEDIC SURGERY
Payer: COMMERCIAL

## 2021-03-04 ENCOUNTER — ANESTHESIA (OUTPATIENT)
Dept: SURGERY | Facility: CLINIC | Age: 71
End: 2021-03-04
Payer: COMMERCIAL

## 2021-03-04 DIAGNOSIS — Z96.651 AFTERCARE FOLLOWING RIGHT KNEE JOINT REPLACEMENT SURGERY: Primary | ICD-10-CM

## 2021-03-04 DIAGNOSIS — Z96.651 STATUS POST TOTAL KNEE REPLACEMENT, RIGHT: ICD-10-CM

## 2021-03-04 DIAGNOSIS — Z47.1 AFTERCARE FOLLOWING RIGHT KNEE JOINT REPLACEMENT SURGERY: Primary | ICD-10-CM

## 2021-03-04 LAB
CREAT SERPL-MCNC: 0.85 MG/DL (ref 0.66–1.25)
GFR SERPL CREATININE-BSD FRML MDRD: 87 ML/MIN/{1.73_M2}
HGB BLD-MCNC: 15.5 G/DL (ref 13.3–17.7)

## 2021-03-04 PROCEDURE — 97161 PT EVAL LOW COMPLEX 20 MIN: CPT | Mod: GP | Performed by: PHYSICAL THERAPIST

## 2021-03-04 PROCEDURE — 258N000003 HC RX IP 258 OP 636

## 2021-03-04 PROCEDURE — 258N000001 HC RX 258: Performed by: ORTHOPAEDIC SURGERY

## 2021-03-04 PROCEDURE — 250N000011 HC RX IP 250 OP 636: Performed by: ANESTHESIOLOGY

## 2021-03-04 PROCEDURE — 250N000013 HC RX MED GY IP 250 OP 250 PS 637: Performed by: NURSE PRACTITIONER

## 2021-03-04 PROCEDURE — 250N000011 HC RX IP 250 OP 636

## 2021-03-04 PROCEDURE — 278N000051 HC OR IMPLANT GENERAL: Performed by: ORTHOPAEDIC SURGERY

## 2021-03-04 PROCEDURE — 360N000077 HC SURGERY LEVEL 4, PER MIN: Performed by: ORTHOPAEDIC SURGERY

## 2021-03-04 PROCEDURE — 82565 ASSAY OF CREATININE: CPT | Performed by: PHYSICIAN ASSISTANT

## 2021-03-04 PROCEDURE — 250N000009 HC RX 250: Performed by: ORTHOPAEDIC SURGERY

## 2021-03-04 PROCEDURE — 250N000013 HC RX MED GY IP 250 OP 250 PS 637: Performed by: PHYSICIAN ASSISTANT

## 2021-03-04 PROCEDURE — 258N000003 HC RX IP 258 OP 636: Performed by: PHYSICIAN ASSISTANT

## 2021-03-04 PROCEDURE — 258N000003 HC RX IP 258 OP 636: Performed by: ANESTHESIOLOGY

## 2021-03-04 PROCEDURE — 999N000141 HC STATISTIC PRE-PROCEDURE NURSING ASSESSMENT: Performed by: ORTHOPAEDIC SURGERY

## 2021-03-04 PROCEDURE — C1776 JOINT DEVICE (IMPLANTABLE): HCPCS | Performed by: ORTHOPAEDIC SURGERY

## 2021-03-04 PROCEDURE — 272N000001 HC OR GENERAL SUPPLY STERILE: Performed by: ORTHOPAEDIC SURGERY

## 2021-03-04 PROCEDURE — 36415 COLL VENOUS BLD VENIPUNCTURE: CPT | Performed by: PHYSICIAN ASSISTANT

## 2021-03-04 PROCEDURE — 999N000063 XR KNEE PORT RT 1/2 VW: Mod: RT

## 2021-03-04 PROCEDURE — 250N000011 HC RX IP 250 OP 636: Performed by: PHYSICIAN ASSISTANT

## 2021-03-04 PROCEDURE — 250N000009 HC RX 250: Performed by: PHYSICIAN ASSISTANT

## 2021-03-04 PROCEDURE — 250N000013 HC RX MED GY IP 250 OP 250 PS 637: Performed by: ORTHOPAEDIC SURGERY

## 2021-03-04 PROCEDURE — 258N000003 HC RX IP 258 OP 636: Performed by: ORTHOPAEDIC SURGERY

## 2021-03-04 PROCEDURE — 85018 HEMOGLOBIN: CPT | Performed by: PHYSICIAN ASSISTANT

## 2021-03-04 PROCEDURE — 97110 THERAPEUTIC EXERCISES: CPT | Mod: GP | Performed by: PHYSICAL THERAPIST

## 2021-03-04 PROCEDURE — 250N000011 HC RX IP 250 OP 636: Performed by: ORTHOPAEDIC SURGERY

## 2021-03-04 PROCEDURE — 97116 GAIT TRAINING THERAPY: CPT | Mod: GP | Performed by: PHYSICAL THERAPIST

## 2021-03-04 PROCEDURE — 710N000009 HC RECOVERY PHASE 1, LEVEL 1, PER MIN: Performed by: ORTHOPAEDIC SURGERY

## 2021-03-04 PROCEDURE — 370N000017 HC ANESTHESIA TECHNICAL FEE, PER MIN: Performed by: ORTHOPAEDIC SURGERY

## 2021-03-04 DEVICE — IMP INSERT BASEPLATE TIBIAL HOWM TRI 5 5521-B-500: Type: IMPLANTABLE DEVICE | Site: KNEE | Status: FUNCTIONAL

## 2021-03-04 DEVICE — IMP COMP FEM STRK TRIATHLN PS RT 5 5515-F-502: Type: IMPLANTABLE DEVICE | Site: KNEE | Status: FUNCTIONAL

## 2021-03-04 DEVICE — BONE CEMENT RADIOPAQUE SIMPLEX HV FULL DOSE 6194-1-001: Type: IMPLANTABLE DEVICE | Site: KNEE | Status: FUNCTIONAL

## 2021-03-04 DEVICE — IMP COMP PATELLA HOWM TRI 35 10MM 5551-L-350: Type: IMPLANTABLE DEVICE | Site: KNEE | Status: FUNCTIONAL

## 2021-03-04 DEVICE — IMPLANTABLE DEVICE
Type: IMPLANTABLE DEVICE | Site: KNEE | Status: NON-FUNCTIONAL
Removed: 2021-06-03

## 2021-03-04 DEVICE — IMP PEG DISTAL FEMORAL STRK 5575-X-000: Type: IMPLANTABLE DEVICE | Site: KNEE | Status: FUNCTIONAL

## 2021-03-04 RX ORDER — HYDROMORPHONE HYDROCHLORIDE 1 MG/ML
.3-.5 INJECTION, SOLUTION INTRAMUSCULAR; INTRAVENOUS; SUBCUTANEOUS EVERY 5 MIN PRN
Status: DISCONTINUED | OUTPATIENT
Start: 2021-03-04 | End: 2021-03-04 | Stop reason: HOSPADM

## 2021-03-04 RX ORDER — BUPIVACAINE HYDROCHLORIDE 7.5 MG/ML
INJECTION, SOLUTION INTRASPINAL PRN
Status: DISCONTINUED | OUTPATIENT
Start: 2021-03-04 | End: 2021-03-04

## 2021-03-04 RX ORDER — NALOXONE HYDROCHLORIDE 0.4 MG/ML
0.2 INJECTION, SOLUTION INTRAMUSCULAR; INTRAVENOUS; SUBCUTANEOUS
Status: DISCONTINUED | OUTPATIENT
Start: 2021-03-04 | End: 2021-03-05 | Stop reason: HOSPADM

## 2021-03-04 RX ORDER — HYDROXYZINE HYDROCHLORIDE 10 MG/1
10 TABLET, FILM COATED ORAL EVERY 6 HOURS PRN
Status: DISCONTINUED | OUTPATIENT
Start: 2021-03-04 | End: 2021-03-05 | Stop reason: HOSPADM

## 2021-03-04 RX ORDER — DOCUSATE SODIUM 100 MG/1
100 CAPSULE, LIQUID FILLED ORAL 2 TIMES DAILY
Status: DISCONTINUED | OUTPATIENT
Start: 2021-03-04 | End: 2021-03-05 | Stop reason: HOSPADM

## 2021-03-04 RX ORDER — TAMSULOSIN HYDROCHLORIDE 0.4 MG/1
0.4 CAPSULE ORAL EVERY EVENING
Status: DISCONTINUED | OUTPATIENT
Start: 2021-03-04 | End: 2021-03-05 | Stop reason: HOSPADM

## 2021-03-04 RX ORDER — FENTANYL CITRATE 50 UG/ML
25-50 INJECTION, SOLUTION INTRAMUSCULAR; INTRAVENOUS
Status: DISCONTINUED | OUTPATIENT
Start: 2021-03-04 | End: 2021-03-04 | Stop reason: HOSPADM

## 2021-03-04 RX ORDER — ONDANSETRON 2 MG/ML
4 INJECTION INTRAMUSCULAR; INTRAVENOUS EVERY 6 HOURS PRN
Status: DISCONTINUED | OUTPATIENT
Start: 2021-03-04 | End: 2021-03-05 | Stop reason: HOSPADM

## 2021-03-04 RX ORDER — CEFAZOLIN SODIUM 2 G/100ML
2 INJECTION, SOLUTION INTRAVENOUS
Status: COMPLETED | OUTPATIENT
Start: 2021-03-04 | End: 2021-03-04

## 2021-03-04 RX ORDER — ACETAMINOPHEN 325 MG/1
975 TABLET ORAL EVERY 8 HOURS
Status: DISCONTINUED | OUTPATIENT
Start: 2021-03-04 | End: 2021-03-05 | Stop reason: HOSPADM

## 2021-03-04 RX ORDER — LIDOCAINE 40 MG/G
CREAM TOPICAL
Status: DISCONTINUED | OUTPATIENT
Start: 2021-03-04 | End: 2021-03-05 | Stop reason: HOSPADM

## 2021-03-04 RX ORDER — ATORVASTATIN CALCIUM 40 MG/1
40 TABLET, FILM COATED ORAL EVERY EVENING
Status: DISCONTINUED | OUTPATIENT
Start: 2021-03-04 | End: 2021-03-05 | Stop reason: HOSPADM

## 2021-03-04 RX ORDER — ACETAMINOPHEN 325 MG/1
975 TABLET ORAL 4 TIMES DAILY
Qty: 100 TABLET | Refills: 0 | Status: SHIPPED | OUTPATIENT
Start: 2021-03-04 | End: 2021-04-15

## 2021-03-04 RX ORDER — ONDANSETRON 2 MG/ML
4 INJECTION INTRAMUSCULAR; INTRAVENOUS EVERY 30 MIN PRN
Status: DISCONTINUED | OUTPATIENT
Start: 2021-03-04 | End: 2021-03-04 | Stop reason: HOSPADM

## 2021-03-04 RX ORDER — AMOXICILLIN 250 MG
1 CAPSULE ORAL 2 TIMES DAILY
Status: DISCONTINUED | OUTPATIENT
Start: 2021-03-04 | End: 2021-03-05 | Stop reason: HOSPADM

## 2021-03-04 RX ORDER — METHOCARBAMOL 500 MG/1
500 TABLET, FILM COATED ORAL EVERY 6 HOURS PRN
Status: DISCONTINUED | OUTPATIENT
Start: 2021-03-04 | End: 2021-03-05 | Stop reason: HOSPADM

## 2021-03-04 RX ORDER — SODIUM CHLORIDE, SODIUM LACTATE, POTASSIUM CHLORIDE, CALCIUM CHLORIDE 600; 310; 30; 20 MG/100ML; MG/100ML; MG/100ML; MG/100ML
INJECTION, SOLUTION INTRAVENOUS CONTINUOUS
Status: DISCONTINUED | OUTPATIENT
Start: 2021-03-04 | End: 2021-03-04 | Stop reason: HOSPADM

## 2021-03-04 RX ORDER — DIPHENHYDRAMINE HCL 12.5MG/5ML
12.5 LIQUID (ML) ORAL EVERY 6 HOURS PRN
Status: DISCONTINUED | OUTPATIENT
Start: 2021-03-04 | End: 2021-03-05 | Stop reason: HOSPADM

## 2021-03-04 RX ORDER — OXYCODONE HYDROCHLORIDE 5 MG/1
10 TABLET ORAL EVERY 4 HOURS PRN
Status: DISCONTINUED | OUTPATIENT
Start: 2021-03-04 | End: 2021-03-05 | Stop reason: HOSPADM

## 2021-03-04 RX ORDER — NALOXONE HYDROCHLORIDE 0.4 MG/ML
0.4 INJECTION, SOLUTION INTRAMUSCULAR; INTRAVENOUS; SUBCUTANEOUS
Status: DISCONTINUED | OUTPATIENT
Start: 2021-03-04 | End: 2021-03-05 | Stop reason: HOSPADM

## 2021-03-04 RX ORDER — MEPERIDINE HYDROCHLORIDE 25 MG/ML
12.5 INJECTION INTRAMUSCULAR; INTRAVENOUS; SUBCUTANEOUS EVERY 5 MIN PRN
Status: DISCONTINUED | OUTPATIENT
Start: 2021-03-04 | End: 2021-03-04 | Stop reason: HOSPADM

## 2021-03-04 RX ORDER — TRANEXAMIC ACID 650 MG/1
1950 TABLET ORAL ONCE
Status: COMPLETED | OUTPATIENT
Start: 2021-03-04 | End: 2021-03-04

## 2021-03-04 RX ORDER — CEFAZOLIN SODIUM 2 G/100ML
2 INJECTION, SOLUTION INTRAVENOUS EVERY 8 HOURS
Status: COMPLETED | OUTPATIENT
Start: 2021-03-04 | End: 2021-03-05

## 2021-03-04 RX ORDER — ACETAMINOPHEN 325 MG/1
650 TABLET ORAL EVERY 4 HOURS PRN
Status: DISCONTINUED | OUTPATIENT
Start: 2021-03-07 | End: 2021-03-05 | Stop reason: HOSPADM

## 2021-03-04 RX ORDER — KETOROLAC TROMETHAMINE 10 MG/1
10 TABLET, FILM COATED ORAL EVERY 8 HOURS
Qty: 6 TABLET | Refills: 0 | Status: SHIPPED | OUTPATIENT
Start: 2021-03-04 | End: 2021-03-06

## 2021-03-04 RX ORDER — ONDANSETRON 4 MG/1
4 TABLET, ORALLY DISINTEGRATING ORAL EVERY 6 HOURS PRN
Status: DISCONTINUED | OUTPATIENT
Start: 2021-03-04 | End: 2021-03-05 | Stop reason: HOSPADM

## 2021-03-04 RX ORDER — DEXAMETHASONE SODIUM PHOSPHATE 4 MG/ML
INJECTION, SOLUTION INTRA-ARTICULAR; INTRALESIONAL; INTRAMUSCULAR; INTRAVENOUS; SOFT TISSUE PRN
Status: DISCONTINUED | OUTPATIENT
Start: 2021-03-04 | End: 2021-03-04

## 2021-03-04 RX ORDER — PROPOFOL 10 MG/ML
INJECTION, EMULSION INTRAVENOUS CONTINUOUS PRN
Status: DISCONTINUED | OUTPATIENT
Start: 2021-03-04 | End: 2021-03-04

## 2021-03-04 RX ORDER — CELECOXIB 200 MG/1
400 CAPSULE ORAL ONCE
Status: COMPLETED | OUTPATIENT
Start: 2021-03-04 | End: 2021-03-04

## 2021-03-04 RX ORDER — MAGNESIUM HYDROXIDE 1200 MG/15ML
LIQUID ORAL PRN
Status: DISCONTINUED | OUTPATIENT
Start: 2021-03-04 | End: 2021-03-04 | Stop reason: HOSPADM

## 2021-03-04 RX ORDER — LIDOCAINE HYDROCHLORIDE 20 MG/ML
JELLY TOPICAL ONCE
Status: COMPLETED | OUTPATIENT
Start: 2021-03-04 | End: 2021-03-04

## 2021-03-04 RX ORDER — POLYETHYLENE GLYCOL 3350 17 G/17G
17 POWDER, FOR SOLUTION ORAL DAILY
Status: DISCONTINUED | OUTPATIENT
Start: 2021-03-05 | End: 2021-03-05 | Stop reason: HOSPADM

## 2021-03-04 RX ORDER — LABETALOL HYDROCHLORIDE 5 MG/ML
10 INJECTION, SOLUTION INTRAVENOUS
Status: DISCONTINUED | OUTPATIENT
Start: 2021-03-04 | End: 2021-03-04 | Stop reason: HOSPADM

## 2021-03-04 RX ORDER — CEFAZOLIN SODIUM 2 G/100ML
2 INJECTION, SOLUTION INTRAVENOUS SEE ADMIN INSTRUCTIONS
Status: DISCONTINUED | OUTPATIENT
Start: 2021-03-04 | End: 2021-03-04 | Stop reason: HOSPADM

## 2021-03-04 RX ORDER — SODIUM CHLORIDE, SODIUM LACTATE, POTASSIUM CHLORIDE, CALCIUM CHLORIDE 600; 310; 30; 20 MG/100ML; MG/100ML; MG/100ML; MG/100ML
INJECTION, SOLUTION INTRAVENOUS CONTINUOUS
Status: DISCONTINUED | OUTPATIENT
Start: 2021-03-04 | End: 2021-03-05 | Stop reason: HOSPADM

## 2021-03-04 RX ORDER — AMOXICILLIN 250 MG
1-2 CAPSULE ORAL 2 TIMES DAILY
Qty: 30 TABLET | Refills: 0 | Status: SHIPPED | OUTPATIENT
Start: 2021-03-04 | End: 2021-04-15

## 2021-03-04 RX ORDER — VANCOMYCIN HYDROCHLORIDE 1 G/20ML
INJECTION, POWDER, LYOPHILIZED, FOR SOLUTION INTRAVENOUS PRN
Status: DISCONTINUED | OUTPATIENT
Start: 2021-03-04 | End: 2021-03-04 | Stop reason: HOSPADM

## 2021-03-04 RX ORDER — ONDANSETRON 4 MG/1
4 TABLET, ORALLY DISINTEGRATING ORAL EVERY 30 MIN PRN
Status: DISCONTINUED | OUTPATIENT
Start: 2021-03-04 | End: 2021-03-04 | Stop reason: HOSPADM

## 2021-03-04 RX ORDER — ALBUTEROL SULFATE 0.83 MG/ML
2.5 SOLUTION RESPIRATORY (INHALATION) EVERY 4 HOURS PRN
Status: DISCONTINUED | OUTPATIENT
Start: 2021-03-04 | End: 2021-03-04 | Stop reason: HOSPADM

## 2021-03-04 RX ORDER — OXYCODONE HYDROCHLORIDE 5 MG/1
5 TABLET ORAL EVERY 4 HOURS PRN
Status: DISCONTINUED | OUTPATIENT
Start: 2021-03-04 | End: 2021-03-05 | Stop reason: HOSPADM

## 2021-03-04 RX ORDER — BISACODYL 10 MG
10 SUPPOSITORY, RECTAL RECTAL DAILY PRN
Status: DISCONTINUED | OUTPATIENT
Start: 2021-03-04 | End: 2021-03-05 | Stop reason: HOSPADM

## 2021-03-04 RX ORDER — ONDANSETRON 2 MG/ML
INJECTION INTRAMUSCULAR; INTRAVENOUS PRN
Status: DISCONTINUED | OUTPATIENT
Start: 2021-03-04 | End: 2021-03-04

## 2021-03-04 RX ORDER — CALCIUM CARBONATE 500 MG/1
500 TABLET, CHEWABLE ORAL 4 TIMES DAILY PRN
Status: DISCONTINUED | OUTPATIENT
Start: 2021-03-04 | End: 2021-03-05 | Stop reason: HOSPADM

## 2021-03-04 RX ORDER — HYDROMORPHONE HCL IN WATER/PF 6 MG/30 ML
0.2 PATIENT CONTROLLED ANALGESIA SYRINGE INTRAVENOUS
Status: DISCONTINUED | OUTPATIENT
Start: 2021-03-04 | End: 2021-03-05 | Stop reason: HOSPADM

## 2021-03-04 RX ORDER — PREGABALIN 150 MG/1
150 CAPSULE ORAL ONCE
Status: COMPLETED | OUTPATIENT
Start: 2021-03-04 | End: 2021-03-04

## 2021-03-04 RX ORDER — PROCHLORPERAZINE MALEATE 5 MG
5 TABLET ORAL EVERY 6 HOURS PRN
Status: DISCONTINUED | OUTPATIENT
Start: 2021-03-04 | End: 2021-03-05 | Stop reason: HOSPADM

## 2021-03-04 RX ORDER — KETOROLAC TROMETHAMINE 15 MG/ML
15 INJECTION, SOLUTION INTRAMUSCULAR; INTRAVENOUS EVERY 6 HOURS
Status: COMPLETED | OUTPATIENT
Start: 2021-03-04 | End: 2021-03-05

## 2021-03-04 RX ORDER — HYDROMORPHONE HYDROCHLORIDE 1 MG/ML
0.4 INJECTION, SOLUTION INTRAMUSCULAR; INTRAVENOUS; SUBCUTANEOUS
Status: DISCONTINUED | OUTPATIENT
Start: 2021-03-04 | End: 2021-03-05 | Stop reason: HOSPADM

## 2021-03-04 RX ORDER — HYDRALAZINE HYDROCHLORIDE 20 MG/ML
2.5-5 INJECTION INTRAMUSCULAR; INTRAVENOUS EVERY 10 MIN PRN
Status: DISCONTINUED | OUTPATIENT
Start: 2021-03-04 | End: 2021-03-04 | Stop reason: HOSPADM

## 2021-03-04 RX ADMIN — DOCUSATE SODIUM 50 MG AND SENNOSIDES 8.6 MG 1 TABLET: 8.6; 5 TABLET, FILM COATED ORAL at 20:53

## 2021-03-04 RX ADMIN — MIDAZOLAM HYDROCHLORIDE 2 MG: 1 INJECTION, SOLUTION INTRAMUSCULAR; INTRAVENOUS at 07:06

## 2021-03-04 RX ADMIN — SODIUM CHLORIDE, POTASSIUM CHLORIDE, SODIUM LACTATE AND CALCIUM CHLORIDE: 600; 310; 30; 20 INJECTION, SOLUTION INTRAVENOUS at 13:24

## 2021-03-04 RX ADMIN — PHENYLEPHRINE HYDROCHLORIDE 100 MCG: 10 INJECTION INTRAVENOUS at 08:47

## 2021-03-04 RX ADMIN — CEFAZOLIN SODIUM 2 G: 2 INJECTION, SOLUTION INTRAVENOUS at 07:56

## 2021-03-04 RX ADMIN — KETOROLAC TROMETHAMINE 15 MG: 15 INJECTION, SOLUTION INTRAMUSCULAR; INTRAVENOUS at 18:53

## 2021-03-04 RX ADMIN — PHENYLEPHRINE HYDROCHLORIDE 100 MCG: 10 INJECTION INTRAVENOUS at 08:24

## 2021-03-04 RX ADMIN — CEFAZOLIN SODIUM 2 G: 2 INJECTION, SOLUTION INTRAVENOUS at 23:50

## 2021-03-04 RX ADMIN — SODIUM CHLORIDE, POTASSIUM CHLORIDE, SODIUM LACTATE AND CALCIUM CHLORIDE: 600; 310; 30; 20 INJECTION, SOLUTION INTRAVENOUS at 21:05

## 2021-03-04 RX ADMIN — PHENYLEPHRINE HYDROCHLORIDE 100 MCG: 10 INJECTION INTRAVENOUS at 08:44

## 2021-03-04 RX ADMIN — PHENYLEPHRINE HYDROCHLORIDE 100 MCG: 10 INJECTION INTRAVENOUS at 08:14

## 2021-03-04 RX ADMIN — ATORVASTATIN CALCIUM 40 MG: 40 TABLET, FILM COATED ORAL at 20:54

## 2021-03-04 RX ADMIN — DEXAMETHASONE SODIUM PHOSPHATE 4 MG: 4 INJECTION, SOLUTION INTRA-ARTICULAR; INTRALESIONAL; INTRAMUSCULAR; INTRAVENOUS; SOFT TISSUE at 08:25

## 2021-03-04 RX ADMIN — CEFAZOLIN SODIUM 2 G: 2 INJECTION, SOLUTION INTRAVENOUS at 16:13

## 2021-03-04 RX ADMIN — SODIUM CHLORIDE, POTASSIUM CHLORIDE, SODIUM LACTATE AND CALCIUM CHLORIDE: 600; 310; 30; 20 INJECTION, SOLUTION INTRAVENOUS at 09:53

## 2021-03-04 RX ADMIN — ACETAMINOPHEN 975 MG: 325 TABLET, FILM COATED ORAL at 20:53

## 2021-03-04 RX ADMIN — CELECOXIB 400 MG: 200 CAPSULE ORAL at 06:47

## 2021-03-04 RX ADMIN — PHENYLEPHRINE HYDROCHLORIDE 100 MCG: 10 INJECTION INTRAVENOUS at 08:06

## 2021-03-04 RX ADMIN — ACETAMINOPHEN 975 MG: 325 TABLET, FILM COATED ORAL at 13:23

## 2021-03-04 RX ADMIN — TAMSULOSIN HYDROCHLORIDE 0.4 MG: 0.4 CAPSULE ORAL at 20:54

## 2021-03-04 RX ADMIN — DOCUSATE SODIUM 100 MG: 100 CAPSULE, LIQUID FILLED ORAL at 20:54

## 2021-03-04 RX ADMIN — PROPOFOL 100 MCG/KG/MIN: 10 INJECTION, EMULSION INTRAVENOUS at 07:40

## 2021-03-04 RX ADMIN — BUPIVACAINE HYDROCHLORIDE IN DEXTROSE 15 MG: 7.5 INJECTION, SOLUTION SUBARACHNOID at 07:33

## 2021-03-04 RX ADMIN — KETOROLAC TROMETHAMINE 15 MG: 15 INJECTION, SOLUTION INTRAMUSCULAR; INTRAVENOUS at 23:48

## 2021-03-04 RX ADMIN — PREGABALIN 150 MG: 150 CAPSULE ORAL at 06:47

## 2021-03-04 RX ADMIN — LIDOCAINE HYDROCHLORIDE: 20 JELLY TOPICAL at 14:22

## 2021-03-04 RX ADMIN — ONDANSETRON 4 MG: 2 INJECTION INTRAMUSCULAR; INTRAVENOUS at 09:54

## 2021-03-04 RX ADMIN — SODIUM CHLORIDE, POTASSIUM CHLORIDE, SODIUM LACTATE AND CALCIUM CHLORIDE: 600; 310; 30; 20 INJECTION, SOLUTION INTRAVENOUS at 07:28

## 2021-03-04 RX ADMIN — TRANEXAMIC ACID 1950 MG: 650 TABLET ORAL at 06:47

## 2021-03-04 NOTE — ANESTHESIA PROCEDURE NOTES
"Pre-Procedure   Staff -   Anesthesiologist:  Weston Taylor MD  Performed By: anesthesiologist  Location: pre-op  Pre-Anesthestic Checklist: patient identified, IV checked, site marked, risks and benefits discussed, informed consent, monitors and equipment checked, pre-op evaluation, at physician/surgeon's request and post-op pain management  Timeout:  Correct Patient: Yes   Correct Procedure: Yes   Correct Site: Yes   Correct Position: Yes   Correct Laterality: Yes   Site Marked: Yes    Procedure Documentation  Procedure: Adductor canal, Femoral  Laterality: right  Patient Position:supine  Patient Prep/Sterile Barriers: sterile gloves, mask, Chloraprep, \"no-touch\" technique   Local skin infiltrated with 3 mL of 1% lidocaine.   Needle type: insulated and short bevel (Pajunk)  Needle Gauge: 21.   Needle Length (millimeters) 100   Ultrasound guided, Ultrasound used to identify targeted nerve, plexus, vascular marker, or fascial plane and place a needle adjacent to it in real-time, Ultrasound was used to visualize the spread of anesthetic in close proximity to the above referenced structure  A permanent image is entered into the patient's record.    Assessment/Narrative      The placement was negative for: blood aspirated, painful injection and site bleeding  Paresthesias: No.  Test dose of mL at.   Test dose negative, 3 minutes after injection, for signs of intravascular, subdural, or intrathecal injection.  Bolus given via needle..   Secured via.   Insertion/Infusion Method: Single Shot  Complications: none  Injection made incrementally with aspirations every 5 mL.  Comments:  0.5% Bupivacaine with 1:400,000 epinephrine injected. Patient tolerated the procedure well.    Ultrasound Interpretation, Peripheral Nerve Block    1. Under ultrasound guidance, the needle was inserted and placed in close proximity to the target nerve(s).  2. Ultrasound was also used to visualize the spread of the anesthetic in close " proximity to the nerve(s) being blocked.  Local anesthetic was administered in incremental doses, with intermittent negative aspiration.    3. The nerve(s) appeared anatomically normal.  4. There were no apparent abnormal pathological findings.  5. A permanent ultrasound image was saved in the patient's record.    The surgeon has given a verbal order transferring care of this patient to me for the performance of a regional analgesia block for post-op pain control. It is requested of me because I am uniquely trained and qualified to perform this block and the surgeon is neither trained nor qualified to perform this procedure.

## 2021-03-04 NOTE — CONSULTS
Brief Hospitalist Consult:  Consult received for post-operative medical co-management status post right total knee arthroplasty 3/4/2021. Chart reviewed. BP mildly elevated without history of same. Would not initiate anti-hypertensive at this time; recommendation based on his age and co-morbidities is < 150/90.     Plan:  - will defer formal consultation, please re-consult if SBP remains persistently > 180  - home medications reconciled   - Hospitalist will chart check 3/5/2021     DANNY Rodriguez, CNP  Hospitalist Service, House Officer  [facility]     Text Page  Pager: [phone]

## 2021-03-04 NOTE — OR NURSING
Dr. Day approved pt to Tx to station 55.  Report called.  Pt moving bilat toes/feet and has feeling, baseline numbness in bilat feet r/t back injury.  Bladder scanned for 5cc.  Denies pain.  Wife Susan updated.

## 2021-03-04 NOTE — ANESTHESIA PROCEDURE NOTES
Pre-Procedure   Staff -   Anesthesiologist:  Weston Taylor MD  Performed By: anesthesiologist  Location: OR  Pre-Anesthestic Checklist: patient identified, IV checked, site marked, risks and benefits discussed, informed consent, monitors and equipment checked, pre-op evaluation, at physician/surgeon's request and post-op pain management  Timeout:  Correct Patient: Yes   Correct Procedure: Yes   Correct Site: Yes   Correct Position: Yes   Correct Laterality: N/A   Site Marked: N/A    Procedure Documentation  Procedure: intrathecal  Patient Position:sitting  Patient Prep/Sterile Barriers: sterile gloves, mask, Betadine, patient draped  Insertion Site: L3-4. (midline approach).  Spinal Needle (gauge): 24   Spinal/LP Needle Length (inches): 3  Spinal Needle Type: Elisa-MarxIntroducer used  Introducer: 20 G  # of attempts: 1 and # of redirects: : 0.    Assessment/Narrative      Paresthesias: No.  CSF fluid: clear.  Opening pressure was cmH2O while sitting.      Comments:  12.5 mg 0.75% BUPIVACAINE WITH 8.25% DEXTROSE INJECTED. No paresthesia on injection. Patient tolerated the procedure well.

## 2021-03-04 NOTE — PROGRESS NOTES
Patient vital signs are at baseline: No,  Reason:  BP elevated, per pt, BP is always elevated while in the hospital.  Patient able to ambulate as they were prior to admission or with assist devices provided by therapies during their stay:  No,  Reason:  Not OOB yet, PT is at 1445.  Patient MUST void prior to discharge:  No,  Reason:  Pt having difficulty emptying bladder. Bladder scanned and has over 600 cc. Will straight cath.  Patient able to tolerate oral intake:  Yes  Pain has adequate pain control using Oral analgesics:  Yes

## 2021-03-04 NOTE — BRIEF OP NOTE
[facility]    Brief Operative Note    Pre-operative diagnosis: Right knee OA  Post-operative diagnosis same  Procedure: Procedure(s): RIGHT TOTAL KNEE ARTHROPLASTY  Surgeon(s) and Role:     * Jose C Westbrook MD - Primary     * Ton Wallace PA-C - Assisting  Anesthesia: Spinal   Estimated blood loss: 100 ml  Drains:  None  Specimens: None  Findings:  Advanced OA  Complications: None    Plan: DC home POD1 w/family assist.  DVT prophylaxis w/ASA 325mg QD x6wks.      Implants:   Implant Name Type Inv. Item Serial No.  Lot No. LRB No. Used Action   BONE CEMENT RADIOPAQUE SIMPLEX HV FULL DOSE 6194-1-001 Cement, Bone BONE CEMENT RADIOPAQUE SIMPLEX HV FULL DOSE 6194-1-001  [employer] [id number] Right 2 Implanted   IMP COMP PATELLA HOWM TRI 35 10MM 5551-L-350 Total Joint Component/Insert IMP COMP PATELLA HOWM TRI 35 10MM 5551-L-350  [employer] [id number] Right 1 Implanted   IMP INSERT BASEPLATE TIBIAL HOWM TRI 5 5521-B-500 Total Joint Component/Insert IMP INSERT BASEPLATE TIBIAL HOWM TRI 5 5521-B-500  [employer] [id number] Right 1 Implanted   IMP COMP FEM STRK TRIATHLN PS RT 5 5515-F-502 Total Joint Component/Insert IMP COMP FEM STRK TRIATHLN PS RT 5 5515-F-502  [employer] [id number] Right 1 Implanted   IMP PEG DISTAL FEMORAL STRK 5575-X-000 Total Joint Component/Insert IMP PEG DISTAL FEMORAL STRK 5575-X-000  [employer] [id number] Right 1 Implanted   TRIATHLON TIBIAL BEARING INSERT / SIZE #5 / TYP PS / THKNS 10MM    [employer] [id number] Right 1 Implanted

## 2021-03-04 NOTE — PROGRESS NOTES
"   03/04/21 1600   Quick Adds   Quick Adds Certification   Type of Visit Initial PT Evaluation   Living Environment   People in home spouse   Current Living Arrangements house   Home Accessibility stairs to enter home;stairs within home   Number of Stairs, Main Entrance 10   Stair Railings, Main Entrance railing on left side (ascending)   Number of Stairs, Within Home, Primary none   Stair Railings, Within Home, Primary none   Transportation Anticipated car, drives self;family or friend will provide   Living Environment Comments Pt lives in a one story home that has 10 stairs to enter with railing on the L side when ascending. Pt's spouse is available 24/7 to help care for him if needed. Pt plans on his wife driving him home once d/c and will be using a car to get home.   Self-Care   Usual Activity Tolerance good   Current Activity Tolerance good   Regular Exercise Yes   Activity/Exercise Type biking   Exercise Amount/Frequency 2 times/wk   Equipment Currently Used at Home grab bar, toilet;cane, straight   Activity/Exercise/Self-Care Comment Pt indicated he used a cane for community ambualtion. He stated that prior to surgery, he had difficulty negotiating stairs and walking more than 300' due to R knee pain   Disability/Function   Hearing Difficulty or Deaf no   Wear Glasses or Blind no   Walking or Climbing Stairs Difficulty yes   Fall history within last six months yes  (1 fall but did not result in injury)   Number of times patient has fallen within last six months 1   Change in Functional Status Since Onset of Current Illness/Injury yes   General Information   Onset of Illness/Injury or Date of Surgery 03/04/21   Referring Physician Jose C Westbrook MD   Patient/Family Therapy Goals Statement (PT) \"To get up and move\"   Pertinent History of Current Problem (include personal factors and/or comorbidities that impact the POC) s/p R TKA POD#0   Existing Precautions/Restrictions fall   Weight-Bearing Status - LLE " full weight-bearing   Weight-Bearing Status - RLE weight-bearing as tolerated   General Observations capnography   Cognition   Orientation Status (Cognition) oriented x 4   Follows Commands (Cognition) WNL   Pain Assessment   Patient Currently in Pain Yes, see Vital Sign flowsheet   Posture    Posture Forward head position   Right Knee Extension/Flexion ROM   Right Knee Extension/Flexion AROM - degrees 4-89   Strength   Strength Comments Unable to formally assess, pt able to ambulate w/ FWW   Bed Mobility   Comment (Bed Mobility) sup>sit SBA   Transfers   Transfer Safety Comments sit>stand w/ CGA   Gait/Stairs (Locomotion)   Distance in Feet (Required for LE Total Joints) 5'   Balance   Balance Comments Pt uses BUEs on FWW for increased stability    Sensory Examination   Sensory Perception Comments Pt has bilat N&T in both feet at baseline due to previous back injury    Clinical Impression   Criteria for Skilled Therapeutic Intervention yes, treatment indicated   PT Diagnosis (PT) Impaired gait   Influenced by the following impairments Increased pain, decreased strength, decreased balance, decreased ROM   Functional limitations due to impairments Impaired functional mobility   Clinical Presentation Stable/Uncomplicated   Clinical Decision Making (Complexity) low complexity   Therapy Frequency (PT) Daily   Predicted Duration of Therapy Intervention (days/wks) 2 days   Planned Therapy Interventions (PT) balance training;gait training;home exercise program;ROM (range of motion);stair training;strengthening;stretching;transfer training   Anticipated Equipment Needs at Discharge (PT) walker, rolling   Risk & Benefits of therapy have been explained evaluation/treatment results reviewed;participants voiced agreement with care plan;participants included;patient   PT Discharge Planning    PT Rationale for DC Rec Anticipate pt will be SBA for bed mobility, transfers, and gait w/ FWW. Anticipate pt will be CGA w/ negotiating  stairs w/ rail   PT Brief overview of current status  Pt is CGA for bed mobility and functional transfers. Pt is SBA w/ gait using FWW   Therapy Certification   Start of care date 03/04/21   Certification date from 03/04/21   Certification date to 03/06/21   Medical Diagnosis R TKA   Total Evaluation Time   Total Evaluation Time (Minutes) 15

## 2021-03-04 NOTE — PROGRESS NOTES
[unfilled]                                                                              [facility]      OUTPATIENT PHYSICAL THERAPY EVALUATION  PLAN OF TREATMENT FOR OUTPATIENT REHABILITATION  (COMPLETE FOR INITIAL CLAIMS ONLY)  Patient's Last Name, First Name, M.I.  YOB: 1950  Dale Cuevas                        Provider's Name  [facility] Medical Record No.  [id number]                               Onset Date:  03/04/21   Start of Care Date:  03/04/21      Type:     _X_PT   ___OT   ___SLP Medical Diagnosis:  R TKA                        PT Diagnosis:  Impaired gait   Visits from SOC:  1   _________________________________________________________________________________  Plan of Treatment/Functional Goals    Planned Interventions: balance training, gait training, home exercise program, ROM (range of motion), stair training, strengthening, stretching, transfer training     Goals: See Physical Therapy Goals on Care Plan in [facility] electronic health record.    Therapy Frequency: Daily  Predicted Duration of Therapy Intervention: 2 days  _________________________________________________________________________________    I CERTIFY THE NEED FOR THESE SERVICES FURNISHED UNDER        THIS PLAN OF TREATMENT AND WHILE UNDER MY CARE     (Physician co-signature of this document indicates review and certification of the therapy plan).                Certification date from: 03/04/21, Certification date to: 03/06/21    Referring Physician: Jose C Westbrook MD            Initial Assessment        See Physical Therapy evaluation dated 03/04/21 in Epic electronic health record.

## 2021-03-04 NOTE — ANESTHESIA POSTPROCEDURE EVALUATION
Patient: Dale Cuevas    Procedure(s):  RIGHT TOTAL KNEE ARTHROPLASTY    Diagnosis:Right knee DJD [M17.11]  Diagnosis Additional Information: No value filed.    Anesthesia Type:  Spinal    Note:  Disposition: Admission   Postop Pain Control: Uneventful            Sign Out: Well controlled pain   PONV: No   Neuro/Psych: Uneventful            Sign Out: Acceptable/Baseline neuro status   Airway/Respiratory: Uneventful            Sign Out: Acceptable/Baseline resp. status   CV/Hemodynamics: Uneventful            Sign Out: Acceptable CV status   Other NRE: NONE   DID A NON-ROUTINE EVENT OCCUR?     Event details/Postop Comments:  Recovery from regional anesthesia has not occurred but is anticipated within 48 hours.          Last vitals:  Vitals:    03/04/21 1050 03/04/21 1100 03/04/21 1110   BP: (!) 148/88 (!) 150/90 (!) 154/86   Pulse: 69 72 61   Resp: 9 12 12   Temp:      SpO2: 96% 96% 96%       Last vitals prior to Anesthesia Care Transfer:  CRNA VITALS  3/4/2021 0944 - 3/4/2021 1044      3/4/2021             Pulse:  72    SpO2:  96 %    Resp Rate (set):  10          Electronically Signed By: Alejandro Day MD  March 4, 2021  11:28 AM

## 2021-03-04 NOTE — ANESTHESIA CARE TRANSFER NOTE
Patient: Dale Cuevas    Procedure(s):  RIGHT TOTAL KNEE ARTHROPLASTY    Diagnosis: Right knee DJD [M17.11]  Diagnosis Additional Information: No value filed.    Anesthesia Type:   Spinal     Note:    Oropharynx: oropharynx clear of all foreign objects  Level of Consciousness: awake  Oxygen Supplementation: face mask  Level of Supplemental Oxygen (L/min / FiO2): 6  Independent Airway: airway patency satisfactory and stable  Dentition: dentition unchanged  Vital Signs Stable: post-procedure vital signs reviewed and stable  Report to RN Given: handoff report given  Patient transferred to: PACU    Handoff Report: Identifed the Patient, Identified the Reponsible Provider, Reviewed the pertinent medical history, Discussed the surgical course, Reviewed Intra-OP anesthesia mangement and issues during anesthesia, Set expectations for post-procedure period and Allowed opportunity for questions and acknowledgement of understanding      Vitals: (Last set prior to Anesthesia Care Transfer)  [facility] VITALS  3/4/2021 0944 - 3/4/2021 1020      3/4/2021             Pulse:  72    SpO2:  96 %    Resp Rate (set):  10        Electronically Signed By: DANNY Morales CRNA  March 4, 2021  10:20 AM

## 2021-03-05 ENCOUNTER — APPOINTMENT (OUTPATIENT)
Dept: OCCUPATIONAL THERAPY | Facility: CLINIC | Age: 71
End: 2021-03-05
Attending: ORTHOPAEDIC SURGERY
Payer: COMMERCIAL

## 2021-03-05 ENCOUNTER — APPOINTMENT (OUTPATIENT)
Dept: PHYSICAL THERAPY | Facility: CLINIC | Age: 71
End: 2021-03-05
Attending: ORTHOPAEDIC SURGERY
Payer: COMMERCIAL

## 2021-03-05 VITALS
WEIGHT: 175 LBS | OXYGEN SATURATION: 96 % | BODY MASS INDEX: 24.5 KG/M2 | HEIGHT: 71 IN | TEMPERATURE: 98.3 F | SYSTOLIC BLOOD PRESSURE: 139 MMHG | HEART RATE: 69 BPM | RESPIRATION RATE: 16 BRPM | DIASTOLIC BLOOD PRESSURE: 80 MMHG

## 2021-03-05 DIAGNOSIS — L40.50 PSORIATIC ARTHRITIS (H): ICD-10-CM

## 2021-03-05 DIAGNOSIS — L40.9 PSORIASIS: ICD-10-CM

## 2021-03-05 LAB
GLUCOSE BLDC GLUCOMTR-MCNC: 105 MG/DL (ref 70–99)
HGB BLD-MCNC: 11.8 G/DL (ref 13.3–17.7)

## 2021-03-05 PROCEDURE — 97116 GAIT TRAINING THERAPY: CPT | Mod: GP | Performed by: PHYSICAL THERAPIST

## 2021-03-05 PROCEDURE — 258N000003 HC RX IP 258 OP 636: Performed by: ORTHOPAEDIC SURGERY

## 2021-03-05 PROCEDURE — 97165 OT EVAL LOW COMPLEX 30 MIN: CPT | Mod: GO | Performed by: OCCUPATIONAL THERAPIST

## 2021-03-05 PROCEDURE — 82962 GLUCOSE BLOOD TEST: CPT

## 2021-03-05 PROCEDURE — 250N000013 HC RX MED GY IP 250 OP 250 PS 637: Performed by: ORTHOPAEDIC SURGERY

## 2021-03-05 PROCEDURE — 97535 SELF CARE MNGMENT TRAINING: CPT | Mod: GO | Performed by: OCCUPATIONAL THERAPIST

## 2021-03-05 PROCEDURE — 250N000011 HC RX IP 250 OP 636: Performed by: ORTHOPAEDIC SURGERY

## 2021-03-05 PROCEDURE — 36415 COLL VENOUS BLD VENIPUNCTURE: CPT | Performed by: ORTHOPAEDIC SURGERY

## 2021-03-05 PROCEDURE — 85018 HEMOGLOBIN: CPT | Performed by: ORTHOPAEDIC SURGERY

## 2021-03-05 RX ORDER — OXYCODONE HYDROCHLORIDE 5 MG/1
5-10 TABLET ORAL EVERY 4 HOURS PRN
Qty: 40 TABLET | Refills: 0 | Status: SHIPPED | OUTPATIENT
Start: 2021-03-05 | End: 2021-04-15

## 2021-03-05 RX ADMIN — DOCUSATE SODIUM 100 MG: 100 CAPSULE, LIQUID FILLED ORAL at 09:00

## 2021-03-05 RX ADMIN — OXYCODONE HYDROCHLORIDE 5 MG: 5 TABLET ORAL at 07:35

## 2021-03-05 RX ADMIN — ACETAMINOPHEN 975 MG: 325 TABLET, FILM COATED ORAL at 12:13

## 2021-03-05 RX ADMIN — KETOROLAC TROMETHAMINE 15 MG: 15 INJECTION, SOLUTION INTRAMUSCULAR; INTRAVENOUS at 07:35

## 2021-03-05 RX ADMIN — ASPIRIN 325 MG: 325 TABLET, COATED ORAL at 08:59

## 2021-03-05 RX ADMIN — DOCUSATE SODIUM 50 MG AND SENNOSIDES 8.6 MG 1 TABLET: 8.6; 5 TABLET, FILM COATED ORAL at 09:00

## 2021-03-05 RX ADMIN — ACETAMINOPHEN 975 MG: 325 TABLET, FILM COATED ORAL at 05:15

## 2021-03-05 RX ADMIN — SODIUM CHLORIDE, POTASSIUM CHLORIDE, SODIUM LACTATE AND CALCIUM CHLORIDE: 600; 310; 30; 20 INJECTION, SOLUTION INTRAVENOUS at 07:44

## 2021-03-05 RX ADMIN — HYDROMORPHONE HYDROCHLORIDE 0.2 MG: 0.2 INJECTION, SOLUTION INTRAMUSCULAR; INTRAVENOUS; SUBCUTANEOUS at 02:58

## 2021-03-05 RX ADMIN — KETOROLAC TROMETHAMINE 15 MG: 15 INJECTION, SOLUTION INTRAMUSCULAR; INTRAVENOUS at 13:11

## 2021-03-05 RX ADMIN — POLYETHYLENE GLYCOL 3350 17 G: 17 POWDER, FOR SOLUTION ORAL at 08:59

## 2021-03-05 RX ADMIN — OXYCODONE HYDROCHLORIDE 5 MG: 5 TABLET ORAL at 12:13

## 2021-03-05 ASSESSMENT — ACTIVITIES OF DAILY LIVING (ADL): PREVIOUS_RESPONSIBILITIES: MEAL PREP;SHOPPING;MEDICATION MANAGEMENT;FINANCES;DRIVING

## 2021-03-05 ASSESSMENT — MIFFLIN-ST. JEOR: SCORE: 1562.74

## 2021-03-05 NOTE — TELEPHONE ENCOUNTER
Can a nurse please [facility] and clarify the directions?  The patient really would like to get this medication.    Thank you,  Mary Kay

## 2021-03-05 NOTE — PROGRESS NOTES
CHART CHECK: Consult received for post-operative medical co-management status post right total knee arthroplasty 3/4/2021. Chart reviewed. BP much improved this AM. Pt with PMHx of BPH, dyslipidemia, psoriatic arthritis, and rosacea. Also note hx of elevated blood pressure reading without diagnosis of hypertension. PTA meds reconciled.     Given the above, will continue to defer formal consult. Routine post-operative cares, IVF, DVT prophylaxis ( mg daily), and pain management to orthopedic service. PT and OT to assess per Ortho. Bowel regimen in place while on pain regimen. No changes to PTA medication regimen at discharge unless issues arise throughout stay.  Happy to be reconsulted in the future if necessary. Appreciate consult.     Hospitalist service will sign off. Please page with questions or concerns.     Paola Oquendo PA-C   Pager: [phone]

## 2021-03-05 NOTE — PLAN OF CARE
Physical Therapy Discharge Summary    Reason for therapy discharge:    Discharged to home with outpatient therapy.    Progress towards therapy goal(s). See goals on Care Plan in [facility] electronic health record for goal details.  Goals met    Therapy recommendation(s):    Continued therapy is recommended.  Rationale/Recommendations:  OP PT recommended to increase strength, safety, and independence with functional mobility.

## 2021-03-05 NOTE — PROGRESS NOTES
Patient vital signs are at baseline: Yes  Patient able to ambulate as they were prior to admission or with assist devices provided by therapies during their stay:  Yes  Patient MUST void prior to discharge:  Yes  Patient able to tolerate oral intake:  Yes  Pain has adequate pain control using Oral analgesics:  Yes   Discharge instructions reviewed with pt and wife and all questions answered appropriately. Pt asked to verbalize what was taught and did well. Up with SBA with walker/GB. Discharging to home with wife via pvt ride.

## 2021-03-05 NOTE — PROGRESS NOTES
03/05/21 1157   Quick Adds   Type of Visit Initial Occupational Therapy Evaluation   Living Environment   People in home spouse   Current Living Arrangements house   Home Accessibility stairs to enter home   Number of Stairs, Main Entrance 10   Transportation Anticipated car, drives self   Living Environment Comments walk in shower,    Self-Care   Usual Activity Tolerance good   Current Activity Tolerance good   Regular Exercise Yes   Activity/Exercise/Self-Care Comment pt was I with ADL's and IADL's prior to surgery   General Information   Onset of Illness/Injury or Date of Surgery 03/04/21   Referring Physician Jose C Westbrook MD   Patient/Family Therapy Goal Statement (OT) pt ready to go home   Additional Occupational Profile Info/Pertinent History of Current Problem pt s/p R TKA   Performance Patterns (Routines, Roles, Habits) retired   Existing Precautions/Restrictions fall   General Observations and Info pt sleeping, agreeable to OT eval   Cognitive Status Examination   Orientation Status orientation to person, place and time   Affect/Mental Status (Cognitive) WNL   Follows Commands WNL   Visual Perception   Visual Impairment/Limitations WNL   Sensory   Sensory Quick Adds No deficits were identified   Pain Assessment   Patient Currently in Pain Yes, see Vital Sign flowsheet   Integumentary/Edema   Integumentary/Edema no deficits were identifed   Posture   Posture not impaired   Range of Motion Comprehensive   General Range of Motion no range of motion deficits identified   Strength Comprehensive (MMT)   General Manual Muscle Testing (MMT) Assessment no strength deficits identified   Muscle Tone Assessment   Muscle Tone Quick Adds No deficits were identified   Coordination   Upper Extremity Coordination No deficits were identified   Bed Mobility   Comment (Bed Mobility) SBA with all bed mobiliyt   Transfers   Transfers toilet transfer   Toilet Transfer   Toilet Transfer Comments completed mock toilet transfer  needing SBA   Balance   Balance Assessment no deficits were identified   Activities of Daily Living   BADL Assessment/Intervention lower body dressing;toileting   Lower Body Dressing Assessment/Training   [place] Level (Lower Body Dressing) supervision   Position (Lower Body Dressing) edge of bed sitting   Toileting   [place] Level (Toileting) supervision;verbal cues   Instrumental Activities of Daily Living (IADL)   Previous Responsibilities meal prep;shopping;medication management;finances;driving   Clinical Impression   Criteria for Skilled Therapeutic Interventions Met (OT) yes   OT Diagnosis decreased I with dressing and bathing.    OT Problem List-Impairments impacting ADL problems related to;flexibility;pain;activity tolerance impaired   ADL comments/analysis decreased activity tolerance and pain impairing ADL's   Assessment of Occupational Performance 1-3 Performance Deficits   Identified Performance Deficits decreased functional mobility   Planned Therapy Interventions (OT) ADL retraining;transfer training   Clinical Decision Making Complexity (OT) low complexity   Therapy Frequency (OT) 1x eval and treat   Risk & Benefits of therapy have been explained evaluation/treatment results reviewed;care plan/treatment goals reviewed;risks/benefits reviewed;current/potential barriers reviewed;participants included;participants voiced agreement with care plan;patient   OT Discharge Planning    OT Rationale for DC Rec pt has met all OT goals. demo's mod I with LE dressing and mock toilet transfer. wife can assist pt as needed with ADL's and IADl's until he is back to baseline.    Therapy Certification   Start of Care Date 03/05/21   Certification date from 03/05/21   Certification date to 03/05/21   Medical Diagnosis R TKA   Total Evaluation Time (Minutes)   Total Evaluation Time (Minutes) 10

## 2021-03-05 NOTE — PROGRESS NOTES
[unfilled]                                                                              [facility]      OUTPATIENT OCCUPATIONAL THERAPY  EVALUATION  PLAN OF TREATMENT FOR OUTPATIENT REHABILITATION  (COMPLETE FOR INITIAL CLAIMS ONLY)  Patient's Last Name, First Name, M.I.  YOB: 1950  Dale Cuevas                          Provider's Name  [facility] Medical Record No.  [id number]                               Onset Date:  (P) 03/04/21   Start of Care Date:  (P) 03/05/21     Type:     ___PT   _X_OT   ___SLP Medical Diagnosis:  (P) R TKA                        OT Diagnosis:  (P) decreased I with dressing and bathing.    Visits from SOC:  1   _________________________________________________________________________________  Plan of Treatment/Functional Goals    Planned Interventions: (P) ADL retraining, transfer training   Goals: See Occupational Therapy Goals on Care Plan in [facility] electronic health record.    Therapy Frequency: (P) 1x eval and treat  Predicted Duration of Therapy Intervention:    _________________________________________________________________________________    I CERTIFY THE NEED FOR THESE SERVICES FURNISHED UNDER        THIS PLAN OF TREATMENT AND WHILE UNDER MY CARE     (Physician co-signature of this document indicates review and certification of the therapy plan).                Certification date from: (P) 03/05/21, Certification date to: (P) 03/05/21    Referring Physician: (RHONDA) Jose C Westbrook MD            Initial Assessment        See Occupational Therapy evaluation dated (P) 03/05/21 in Epic electronic health record.

## 2021-03-05 NOTE — PLAN OF CARE
Date/Time: 3/5/21 Nights     Behavior/Aggression tool color: Green  Mental Status: AOx4  Activity/dangle: A1 GB/walker, walks to BR -- also uses urinal at times  Diet: Reg  Pain: Controlled w/ Tylenol, Toradol & Oxycodone   Piña/Voiding: Voids in BR,   Tele/Restraints/Iso: NA  02/LDA: receiving O2 via NC  D/C Date: Possibly discharge to home today  Other Info: Pt has had trouble voiding - takes flomax at home -- pushing fluids & bladder scanning post void.         OBS NOTE:    Patient vital signs are at baseline: No, pt receiving O2 via NC overnight  Patient able to ambulate as they were prior to admission or with assist devices provided by therapies during their stay:  Yes  Patient MUST void prior to discharge:  Yes  Patient able to tolerate oral intake:  Yes  Pain has adequate pain control using Oral analgesics:  Yes

## 2021-03-05 NOTE — PROGRESS NOTES
"Orthopedic Surgery  3/5/2021  POD #1    S: Patient voices no complaints today.     O: Blood pressure 117/70, pulse 75, temperature 98.4  F (36.9  C), temperature source Oral, resp. rate 20, height 1.798 m (5' 10.8\"), weight 79.4 kg (175 lb), SpO2 97 %.  Lab Results   Component Value Date    HGB 15.5 03/04/2021     Neurovascularly intact.  Calves are negative bilaterally, both soft and nontender.  The dressing is C/D/I.    A: Mr. Cuevas is doing well status post right total knee arthroplasty.    P:   1. No dressing change, patient to remove outer dressing on POD3, leaving mesh adhesive in place.  2. Mobilize and continue physical therapy.   3. Discharge to home today if cleared by medicine and PT/OT.    Ton Wallace PA-C      "

## 2021-03-08 ENCOUNTER — IMMUNIZATION (OUTPATIENT)
Dept: NURSING | Facility: CLINIC | Age: 71
End: 2021-03-08
Payer: COMMERCIAL

## 2021-03-08 ENCOUNTER — TELEPHONE (OUTPATIENT)
Dept: DERMATOLOGY | Facility: CLINIC | Age: 71
End: 2021-03-08

## 2021-03-08 PROCEDURE — 0031A PR COVID VAC JANSSEN AD26 0.5ML: CPT

## 2021-03-08 PROCEDURE — 91303 PR COVID VAC JANSSEN AD26 0.5ML: CPT

## 2021-03-08 NOTE — TELEPHONE ENCOUNTER
adalimumab (HUMIRA *CF*) 40 MG/0.4ML pen kit 2 each 5 2/4/2021  No   Sig - Route: Inject 0.4 mLs (40 mg) Subcutaneous every 14 days - Subcutaneous   Sent to pharmacy as: Adalimumab 40 MG/0.4ML Subcutaneous Pen-injector Kit (HUMIRA *CF*)

## 2021-03-08 NOTE — TELEPHONE ENCOUNTER
I tried to call [employer] to answer questions but was on hold for 10+ min and had to return to clinic. Unable to speak to anyone from pharmacy. Will try later.     If they call back please ask what needs to be clarified.     Monie CASPER CMA

## 2021-03-08 NOTE — TELEPHONE ENCOUNTER
Medication instructions were cut off. Verbal sig/order was given.    Madai Angela, [facility]

## 2021-03-08 NOTE — TELEPHONE ENCOUNTER
[facility] called this morning needing a clarification on the Humira prescription.  I sent the fax last week also.  I gave them the general phone number to call and speak to a nurse.     Mary Kay

## 2021-03-09 NOTE — TELEPHONE ENCOUNTER
Writer talked to Dr. Browning who states it is okay to take the loading dose. Patient is going to just take 2 doses instead of asking [facility] to send extra doses.    SIGRID Mccray

## 2021-03-09 NOTE — TELEPHONE ENCOUNTER
Patient called this morning and was wondering if he should do the starting dose again.  His symptoms are pretty bad and he is concerned about not responding to the 40mg injection.  Please call the patient.    Mary Kay

## 2021-03-09 NOTE — OP NOTE
Operative Note    Dale Cuevas MRN# [id number]   YOB: 1950 Age: 70 year old   Date of Procedure: 3/4/2021    1st Assistant: Lars Wallace PA-C    PREOPERATIVE DIAGNOSIS: Right knee osteoarthritis, failure to respond to conservative management.     POSTOPERATIVE DIAGNOSIS: Right knee osteoarthritis, failure to respond to conservative management.     PROCEDURE: Right total knee arthroplasty, [place] [employer] components, posterior stabilized.  Tourniquet-less technique.     DESCRIPTION OF PROCEDURE: Dale Cuevas was brought to the operating room. After satisfactory anesthesia, the right lower extremity was prepped and draped in the usual sterile fashion. The patient received 1 gram of Ancef preoperatively.     Straight anterior incision was made. Dissection was carried down to the extensor mechanism. Medial arthrotomy was made.  Advanced osteoarthritis was noted. Patella was exposed, measured and resected to accept a size 35mm, asymmetric patella. The knee was then flexed up. Intramedullary guide was placed at 5 degrees as per the preoperative plan. The distal femoral cut was made followed by anteroposterior cuts and chamfer cuts. Box cut was made for the femoral component as well. Femur sized to be a size 35. Attention was then directed to the tibia. Tibial cut was made perpendicular to the long axis of the tibia in both AP and lateral planes, 0 degree posterior slope. The tibia sized to be a size 5. Soft tissue balancing was performed. Trial reduction was performed and with a 10-mm PS insert, there was excellent soft tissue balancing, patellar tracking, alignment as well as motion. Trial components were removed. Tibial baseplate was prepared for size 5 baseplate. All 3 components were cemented in place without difficulty. Excess cement was removed.  A three minute betadine soak was performed.  The wound was thoroughly irrigated and tissues were infiltrated with toradol/marcaine mixture.  The real  10-mm PS insert was impacted in place.  One gram of vancomycin powder was placed deep and superficial prior to closure. The extensor mechanism was closed in sequential layers including a running number 1 Stratafix, then augmented with interrupted 0 Vicryl and 0 ethibond suture. The skin was closed with interrupted 2-0 Vicryl subcutaneously, then a running 2-0 Stratafix, followed by a subcuticular 3-0 monocryl.  A mesh dressing with skin glue was applied. A sterile dressing was applied. The patient left the operating room in satisfactory condition. Patient received 1 gm of tranexamic acid pre-op and at closure.  A skilled first assistant was necessary for this procedure for assistance with patient positioning, prepping, draping, surgical visualization, performance of the repair, wound closure, and application of the dressing.  The assistant was present for the entire procedure.       MD MARIETTA Martel MD

## 2021-03-23 ENCOUNTER — TRANSFERRED RECORDS (OUTPATIENT)
Dept: HEALTH INFORMATION MANAGEMENT | Facility: CLINIC | Age: 71
End: 2021-03-23

## 2021-03-23 ENCOUNTER — TELEPHONE (OUTPATIENT)
Dept: DERMATOLOGY | Facility: CLINIC | Age: 71
End: 2021-03-23

## 2021-03-23 NOTE — TELEPHONE ENCOUNTER
[facility] Call Center    Phone Message    May a detailed message be left on voicemail: yes     Reason for Call: Other: [id number] called and wanted to speak to a nurse regarding a safety report. Please call them back. Thanks     Action Taken: Message routed to:  [facility] DERM    Travel Screening: Not Applicable

## 2021-04-02 ENCOUNTER — TELEPHONE (OUTPATIENT)
Dept: DERMATOLOGY | Facility: CLINIC | Age: 71
End: 2021-04-02

## 2021-04-02 NOTE — TELEPHONE ENCOUNTER
[facility] Call Center    Phone Message    May a detailed message be left on voicemail: yes     Reason for Call: other: Adverse Event Pt had with his medication from [employer](Humira) Ref#[id number]  Please call Back to discuss with Company  Thank you,  Action Taken: Message routed to:  [facility] Derm    Travel Screening: Not Applicable

## 2021-04-05 NOTE — TELEPHONE ENCOUNTER
Spoke with Tete from the safety report team. They were reaching out due to the patient being in the hopsital for a knee replacement surgery at [facility]. I was asked multiple questions in regards to his surgery and I told her that it would be best to contact the facility that performed the surgery. I gave her the little information I have in regards to his (name and location).     Madai Angela, [facility]

## 2021-04-11 DIAGNOSIS — E78.2 MIXED HYPERLIPIDEMIA: ICD-10-CM

## 2021-04-11 NOTE — LETTER
[facility]  [address] [id number] [phone]            Dale Cuevas  [address] [facility] [address]        April 13, 2021    Dear Dale,    While refilling your prescription today, we noticed that you are due for a virtual visit before next refill for cholesterol..  We will refill your prescription for 90 days, but a follow-up appointment must be made before any additional refills can be approved.     Taking care of your health is important to us and we look forward to seeing you in the near future.  Please call us at [phone] or [phone] (or use [employer]) to schedule an appointment.     Please disregard this notice if you have already made an appointment.    Sincerely,        [facility]

## 2021-04-13 RX ORDER — ATORVASTATIN CALCIUM 40 MG/1
TABLET, FILM COATED ORAL
Qty: 90 TABLET | Refills: 0 | Status: SHIPPED | OUTPATIENT
Start: 2021-04-13 | End: 2021-07-05

## 2021-04-13 NOTE — TELEPHONE ENCOUNTER
Rx approved and letter sent to pt stating that he needs to make a follow up cholesterol with pcp before next refill.

## 2021-04-13 NOTE — TELEPHONE ENCOUNTER
LDL Cholesterol Calculated   Date Value Ref Range Status   03/25/2019 115 (H) <100 mg/dL Final     Comment:     Above desirable:  100-129 mg/dl  Borderline High:  130-159 mg/dL  High:             160-189 mg/dL  Very high:       >189 mg/dl       Not RN protocol.  Labs out of date and range.    Please refill as appropriate.    Romi Ordoñez RN  [facility]

## 2021-04-15 ENCOUNTER — TELEPHONE (OUTPATIENT)
Dept: RHEUMATOLOGY | Facility: CLINIC | Age: 71
End: 2021-04-15

## 2021-04-15 ENCOUNTER — OFFICE VISIT (OUTPATIENT)
Dept: RHEUMATOLOGY | Facility: CLINIC | Age: 71
End: 2021-04-15
Attending: NURSE PRACTITIONER
Payer: COMMERCIAL

## 2021-04-15 VITALS
TEMPERATURE: 98.2 F | BODY MASS INDEX: 24.48 KG/M2 | HEART RATE: 79 BPM | OXYGEN SATURATION: 97 % | SYSTOLIC BLOOD PRESSURE: 145 MMHG | DIASTOLIC BLOOD PRESSURE: 89 MMHG | WEIGHT: 174.5 LBS

## 2021-04-15 DIAGNOSIS — L40.9 PSORIASIS: ICD-10-CM

## 2021-04-15 DIAGNOSIS — M19.90 INFLAMMATORY ARTHRITIS: ICD-10-CM

## 2021-04-15 DIAGNOSIS — L40.50 PSORIATIC ARTHRITIS (H): Primary | ICD-10-CM

## 2021-04-15 DIAGNOSIS — D84.9 IMMUNOSUPPRESSION (H): ICD-10-CM

## 2021-04-15 PROCEDURE — G0463 HOSPITAL OUTPT CLINIC VISIT: HCPCS

## 2021-04-15 PROCEDURE — 99214 OFFICE O/P EST MOD 30 MIN: CPT | Performed by: NURSE PRACTITIONER

## 2021-04-15 RX ORDER — METHYLPREDNISOLONE 4 MG
TABLET, DOSE PACK ORAL
Qty: 21 TABLET | Refills: 0 | Status: SHIPPED | OUTPATIENT
Start: 2021-04-15 | End: 2021-06-01

## 2021-04-15 SDOH — HEALTH STABILITY: MENTAL HEALTH: HOW OFTEN DO YOU HAVE A DRINK CONTAINING ALCOHOL?: 2-3 TIMES A WEEK

## 2021-04-15 SDOH — HEALTH STABILITY: MENTAL HEALTH: HOW MANY STANDARD DRINKS CONTAINING ALCOHOL DO YOU HAVE ON A TYPICAL DAY?: NOT ASKED

## 2021-04-15 SDOH — HEALTH STABILITY: MENTAL HEALTH: HOW OFTEN DO YOU HAVE 6 OR MORE DRINKS ON ONE OCCASION?: NOT ASKED

## 2021-04-15 NOTE — TELEPHONE ENCOUNTER
See OV notes from today    Patient s/p right TKA 3-4 with [facility] Dr. Hernandez who he is seeing Tu.     Psoriatic arthritis flare with swelling in bilateral MCPs, right wrist and loss fist formation right. We would like to have him use medrol dose pack if Dr. Hernandez ok with this. Or we can use prednisone 10 mg day x 4 day     Adalimumab (humira) 80 mg 3-18, then 40 mg 4-1 and 4-13 does every 14 days   Was off end of Jan to March 18 due to coverage issues, medication program and surgery so is flaring now.     RN to call Lars with [facility].
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
## 2021-04-15 NOTE — PATIENT INSTRUCTIONS
Advise shingrix vaccines. Talk to your pharmacy, insurance and primary care provider     Education:   1. Immunizations: Please review your vaccination history with your primary provider to get up-to-date per the CDC guidelines to reduce infection risks.  2. Bone Health: Please take adequate calcium and vitamin D and exercise.. No smoking, Glucocorticoids (like prednisone or medrol) can increase your chance of infections including shingles, complications from Covid-19 (coronavirus), diabetes, osteoporosis or osteopenia, avascular necrosis, thin skin, cataracts, adrenal insufficiency, heart disease among others. Long-term use you can't stop this abruptly.   3. Important to have a primary care provider. Get yearly physicals, cancer screening including skin and oral, cardiac risk monitoring, bone health and primary care. Seek immediate medical attention for any signs or symptoms of infections. Hold your immunosuppression medications you have an infection and especially if taking antibiotics. No live vaccinations if you are on biologics.   4. Rheumatology we do not prescribe narcotics or manage chronic pain. You should discuss with primary care or go to a pain clinic for management.   5. Covid-19 (coronavirus) prevention in high risk patients follow CDC guidelines/resources and follow their guidelines what to do for exposure signs or symptoms. Goal to minimize prednisone exposure to reduce infection risks. Good hand washing techniques with soap and water for at least 20 seconds, avoid touching eyes, nose, mouth, avoiding crowds, social distancing. If any signs or symptoms of infection, call 911 go to ER.   6. For more information on disease or medication education, go to the American College of Rheumatology website. Www.rheumatology.org >go under patient section  7. Always read your medication phamplet for your medications education when you get them from pharmacy   8. It is advised if you are smoking, to work with your  primary care provider about a quitting program. We do have one here. Smoking can increase your chance of infections, cancer, heart disease including blood clots or strokes or hypertension and lung disease like COPD.      Thank-you for allowing me to participate in your care.     Emiliano DELGADO, CNP, MSN  [facility]  Department of Rheumatology & Autoimmune Disorders  [facility] Rheumatology: [phone] Opt 2, then Opt 1 Scheduling   [facility]: [phone] for any issues; main number [phone]
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
## 2021-04-16 NOTE — TELEPHONE ENCOUNTER
Connect with Dale regarding the medrol dose pac and he did receive a confirmation from [facility] to go ahead and start.    Dale has no further questions.    Lyndsay Vicnent MSN, RN  Rheumatology RN Care Coordinator  [facility]

## 2021-04-20 ENCOUNTER — TRANSFERRED RECORDS (OUTPATIENT)
Dept: HEALTH INFORMATION MANAGEMENT | Facility: CLINIC | Age: 71
End: 2021-04-20

## 2021-05-19 DIAGNOSIS — Z11.59 ENCOUNTER FOR SCREENING FOR OTHER VIRAL DISEASES: ICD-10-CM

## 2021-05-20 ENCOUNTER — VIRTUAL VISIT (OUTPATIENT)
Dept: DERMATOLOGY | Facility: CLINIC | Age: 71
End: 2021-05-20
Payer: COMMERCIAL

## 2021-05-20 DIAGNOSIS — L40.9 PSORIASIS: ICD-10-CM

## 2021-05-20 DIAGNOSIS — L40.50 PSORIATIC ARTHRITIS (H): ICD-10-CM

## 2021-05-20 PROCEDURE — 99214 OFFICE O/P EST MOD 30 MIN: CPT | Mod: 95 | Performed by: DERMATOLOGY

## 2021-05-20 ASSESSMENT — PAIN SCALES - GENERAL: PAINLEVEL: EXTREME PAIN (8)

## 2021-05-20 NOTE — LETTER
5/20/2021       RE: Dale Cuevas  [address] [address]     Dear Colleague,    Thank you for referring your patient, Dale Cuevas, to the [facility] DERMATOLOGY [facility] [place] at [facility]. Please see a copy of my visit note below.    [facility] Dermatology Note  Encounter Date: May 20, 2021  Telephone ([phone]). Location of teledermatologist: [facility] [place]. Start time: 5:14 PM. End time: 5:31 PM.    Dermatology Problem List:  1. Plaque soriasis with psoriatic arthritis  - Current therapy: adalimumab 40 mg q7 days (increased from q14 days due to joint symptoms)  - Following with rheumatology for psoriatic arthritis  - Last quant gold 11/5/20: negative    ____________________________________________    Assessment & Plan:     # Plaque psoriasis with psoriatic arthritis. Chronic, flare.   Skin relatively well controlled, though with persistent and worsening joint symptoms. We did discuss alternatives such as increasing frequency of his Humira dosing from [id number] to once weekly, weekly enbrel, oral methotrexate, switch to IL-17 inhibitor such as Taltz or cosentyx. Will place new referral to rheumatology for re-evaluation. In meantime, per patient preference, will submit for increase in Humira dosing to once weekly.    * Reviewed the result(s) of each unique test: quant gold 11/5/2020  - Will submit for Humira 40 mg subcutaneous q1week (increased from q2week dosing)  - Rheumatology referral sent to discuss alternative options for joint symptoms    Procedures Performed:    None    Follow-up: 3 month(s) virtually (telephone with photos), or earlier for new or changing lesions    Staff:     Mario Alberto Ricardo MD  Pronouns: he/him/his    Department of Dermatology  [facility]: Phone: [phone],  Fax:[phone]  [facility]: Phone: [phone] Fax: [phone]    ____________________________________________    CC: Other (Dale would like to follow up on the psoriatic arthritis. He states that things are getting worse, noting that the pain has spread from his hands to his elbows and shoulders. )    HPI:  Mr. Dale Cuevas is a(n) 70 year old male who presents today as a return patient for plaque psoriasis with psoriatic arthritis. Last seen by Dr. Browning on 2/4/21 when continued on humira. He was subsequently seen by rheumatology (NP) on 4/15/21.     Today, he reports his skin psoriasis continues to be overall well controlled on Humira, though with a few persistent areas on the elbows. He does continue to ufnortunately have significant joint pains. Had discussed alternatives with NP in 4/15/21 but felt methotrexate was not a good option (due to alcohol use), apremilast (due to history of depression), plaquenil (due to history of cutaneous psoriasis). He states he has prior followed with a different rheumatologist who has since retired. He is interested in increasing the frequency of his Humira dosing.     Patient is otherwise feeling well, without additional skin concerns.    Labs Reviewed:  N/A    Physical Exam:  Vitals: There were no vitals taken for this visit.  SKIN: Teledermatology photos were reviewed; image quality and interpretability: acceptable. Image date: 5/21/21.  - Swelling around MCP joints  - Skin otherwise clear  - No other lesions of concern on areas examined.                Medications:  Current Outpatient Medications   Medication     adalimumab (HUMIRA *CF*) 40 MG/0.4ML pen kit     atorvastatin (LIPITOR) 40 MG tablet     tamsulosin (FLOMAX) 0.4 MG capsule     methylPREDNISolone (MEDROL DOSEPAK) 4 MG tablet therapy pack     No current facility-administered medications for this visit.       Past Medical/Surgical History:   Patient Active  Problem List   Diagnosis     Elevated blood pressure reading without diagnosis of hypertension     Rosacea     Erectile dysfunction     Psoriasis     Hyperlipidemia LDL goal <100     Benign prostatic hyperplasia     Family hx of prostate cancer     Diastasis recti     Psoriatic arthritis (H)     Immunosuppression (H)     Medication monitoring encounter     Status post total knee replacement, right     Past Medical History:   Diagnosis Date     Benign prostatic hyperplasia 9/8/2014     Diastasis recti 6/15/2016     Elevated blood pressure reading without diagnosis of hypertension 5/6/2013     Erectile dysfunction 5/6/2013     Family hx of prostate cancer 6/7/2016     Hyperlipidemia LDL goal <100 2/5/2014     Psoriasis 11/23/2013     Psoriatic arthritis (H)      Psoriatic arthritis (H)      Rosacea 5/6/2013

## 2021-05-20 NOTE — PROGRESS NOTES
[facility] Dermatology Note  Encounter Date: May 20, 2021  Telephone ([phone]). Location of teledermatologist: [facility] [place]. Start time: 5:14 PM. End time: 5:31 PM.    Dermatology Problem List:  1. Plaque soriasis with psoriatic arthritis  - Current therapy: adalimumab 40 mg q7 days (increased from q14 days due to joint symptoms)  - Following with rheumatology for psoriatic arthritis  - Last quant gold 11/5/20: negative    ____________________________________________    Assessment & Plan:     # Plaque psoriasis with psoriatic arthritis. Chronic, flare.   Skin relatively well controlled, though with persistent and worsening joint symptoms. We did discuss alternatives such as increasing frequency of his Humira dosing from [id number] to once weekly, weekly enbrel, oral methotrexate, switch to IL-17 inhibitor such as Taltz or cosentyx. Will place new referral to rheumatology for re-evaluation. In meantime, per patient preference, will submit for increase in Humira dosing to once weekly.    * Reviewed the result(s) of each unique test: quant gold 11/5/2020  - Will submit for Humira 40 mg subcutaneous q1week (increased from q2week dosing)  - Rheumatology referral sent to discuss alternative options for joint symptoms    Procedures Performed:    None    Follow-up: 3 month(s) virtually (telephone with photos), or earlier for new or changing lesions    Staff:     Mario Alberto Ricardo MD  Pronouns: he/him/his    Department of Dermatology  [facility]: Phone: [phone], Fax:[phone]  [facility]: Phone: [phone] Fax: [phone]    ____________________________________________    CC: Other (Dale would like to follow up on the psoriatic arthritis. He states that things are getting worse, noting that the pain has spread from his hands to his  elbows and shoulders. )    HPI:  Mr. Dale Cuevas is a(n) 70 year old male who presents today as a return patient for plaque psoriasis with psoriatic arthritis. Last seen by Dr. Brownnig on 2/4/21 when continued on humira. He was subsequently seen by rheumatology (NP) on 4/15/21.     Today, he reports his skin psoriasis continues to be overall well controlled on Humira, though with a few persistent areas on the elbows. He does continue to ufnortunately have significant joint pains. Had discussed alternatives with NP in 4/15/21 but felt methotrexate was not a good option (due to alcohol use), apremilast (due to history of depression), plaquenil (due to history of cutaneous psoriasis). He states he has prior followed with a different rheumatologist who has since retired. He is interested in increasing the frequency of his Humira dosing.     Patient is otherwise feeling well, without additional skin concerns.    Labs Reviewed:  N/A    Physical Exam:  Vitals: There were no vitals taken for this visit.  SKIN: Teledermatology photos were reviewed; image quality and interpretability: acceptable. Image date: 5/21/21.  - Swelling around MCP joints  - Skin otherwise clear  - No other lesions of concern on areas examined.                Medications:  Current Outpatient Medications   Medication     adalimumab (HUMIRA *CF*) 40 MG/0.4ML pen kit     atorvastatin (LIPITOR) 40 MG tablet     tamsulosin (FLOMAX) 0.4 MG capsule     methylPREDNISolone (MEDROL DOSEPAK) 4 MG tablet therapy pack     No current facility-administered medications for this visit.       Past Medical/Surgical History:   Patient Active Problem List   Diagnosis     Elevated blood pressure reading without diagnosis of hypertension     Rosacea     Erectile dysfunction     Psoriasis     Hyperlipidemia LDL goal <100     Benign prostatic hyperplasia     Family hx of prostate cancer     Diastasis recti     Psoriatic arthritis (H)     Immunosuppression (H)     Medication  monitoring encounter     Status post total knee replacement, right     Past Medical History:   Diagnosis Date     Benign prostatic hyperplasia 9/8/2014     Diastasis recti 6/15/2016     Elevated blood pressure reading without diagnosis of hypertension 5/6/2013     Erectile dysfunction 5/6/2013     Family hx of prostate cancer 6/7/2016     Hyperlipidemia LDL goal <100 2/5/2014     Psoriasis 11/23/2013     Psoriatic arthritis (H)      Psoriatic arthritis (H)      Rosacea 5/6/2013

## 2021-05-20 NOTE — NURSING NOTE
Dermatology Rooming Note    Dale Cuevas's goals for this visit include:   Chief Complaint   Patient presents with     Other     Dale would like to follow up on the psoriatic arthritis. He states that things are getting worse, noting that the pain has spread from his hands to his elbows and shoulders.      Bird Licea, EMT

## 2021-05-21 NOTE — PATIENT INSTRUCTIONS
[facility] Dermatology Visit    Thank you for allowing us to participate in your care. Your findings, instructions and follow-up plan are as follows:     Psoriasis    1. Will submit for increased frequency of dosing of humira from every 2 weeks to once weekly. Rheumatology referral sent.  2. Follow-up in 3 months    When should I call my doctor?    If you are worsening or not improving, please, contact us or seek urgent care as noted below.     Who should I call with questions (adults)?    [facility] (adult and pediatric): [phone]     [facility] (adult): [phone]    For urgent needs outside of business hours call the [facility] at [phone] and ask for the dermatology resident on call    If this is a medical emergency and you are unable to reach an ER, Call [phone]      Who should I call with questions (pediatric)?  [facility]- Pediatric Dermatology  Dr. Issi Mitchell, Dr. Brian Alvarado, Dr. Alexus Davis, Zayra Warren, ELAN Gonzalez, Dr. Maame Cole & Dr. Lopez Rico  Non Urgent  Nurse Triage Line; [phone]- Cris and Yeni RN Care Coordinators   María Elena (/Complex ) [phone]    If you need a prescription refill, please contact your pharmacy. Refills are approved or denied by our Physicians during normal business hours, Monday through Fridays  Per office policy, refills will not be granted if you have not been seen within the past year (or sooner depending on your child's condition)    Scheduling Information:  Pediatric Appointment Scheduling and Call Center [phone]  Radiology Scheduling- [phone]  Sedation Unit Scheduling- [phone]  [place] Scheduling- General [phone]; Pediatric Dermatology [phone]  Main  Services: [phone]  Mozambican: [phone]  Kenyan: [phone]  Hmong/Jason/Upper sorbian:  [phone]  Preadmission Nursing Department Fax Number: [phone] (Fax all pre-operative paperwork to this number)    For urgent matters arising during evenings, weekends, or holidays that cannot wait for normal business hours please call [phone] and ask for the Dermatology Resident On-Call to be paged.

## 2021-06-01 ENCOUNTER — OFFICE VISIT (OUTPATIENT)
Dept: INTERNAL MEDICINE | Facility: CLINIC | Age: 71
End: 2021-06-01
Payer: COMMERCIAL

## 2021-06-01 VITALS
TEMPERATURE: 98 F | WEIGHT: 173.7 LBS | HEART RATE: 79 BPM | SYSTOLIC BLOOD PRESSURE: 130 MMHG | BODY MASS INDEX: 24.36 KG/M2 | DIASTOLIC BLOOD PRESSURE: 70 MMHG | RESPIRATION RATE: 16 BRPM | OXYGEN SATURATION: 96 %

## 2021-06-01 DIAGNOSIS — Z01.818 PREOP GENERAL PHYSICAL EXAM: Primary | ICD-10-CM

## 2021-06-01 DIAGNOSIS — L40.9 PSORIASIS: ICD-10-CM

## 2021-06-01 DIAGNOSIS — L40.50 PSORIATIC ARTHRITIS (H): ICD-10-CM

## 2021-06-01 DIAGNOSIS — Z96.651 STATUS POST TOTAL KNEE REPLACEMENT, RIGHT: ICD-10-CM

## 2021-06-01 DIAGNOSIS — Z11.59 ENCOUNTER FOR SCREENING FOR OTHER VIRAL DISEASES: ICD-10-CM

## 2021-06-01 LAB
ALBUMIN UR-MCNC: NEGATIVE MG/DL
APPEARANCE UR: CLEAR
BACTERIA #/AREA URNS HPF: ABNORMAL /HPF
BASOPHILS # BLD AUTO: 0 10E9/L (ref 0–0.2)
BASOPHILS NFR BLD AUTO: 0.3 %
BILIRUB UR QL STRIP: NEGATIVE
COLOR UR AUTO: YELLOW
DIFFERENTIAL METHOD BLD: NORMAL
EOSINOPHIL # BLD AUTO: 0 10E9/L (ref 0–0.7)
EOSINOPHIL NFR BLD AUTO: 0.7 %
ERYTHROCYTE [DISTWIDTH] IN BLOOD BY AUTOMATED COUNT: 13.2 % (ref 10–15)
GLUCOSE UR STRIP-MCNC: NEGATIVE MG/DL
HCT VFR BLD AUTO: 41.2 % (ref 40–53)
HGB BLD-MCNC: 13.3 G/DL (ref 13.3–17.7)
HGB UR QL STRIP: ABNORMAL
KETONES UR STRIP-MCNC: NEGATIVE MG/DL
LABORATORY COMMENT REPORT: NORMAL
LEUKOCYTE ESTERASE UR QL STRIP: NEGATIVE
LYMPHOCYTES # BLD AUTO: 1.6 10E9/L (ref 0.8–5.3)
LYMPHOCYTES NFR BLD AUTO: 26.5 %
MCH RBC QN AUTO: 28.7 PG (ref 26.5–33)
MCHC RBC AUTO-ENTMCNC: 32.3 G/DL (ref 31.5–36.5)
MCV RBC AUTO: 89 FL (ref 78–100)
MONOCYTES # BLD AUTO: 0.5 10E9/L (ref 0–1.3)
MONOCYTES NFR BLD AUTO: 8.7 %
MUCOUS THREADS #/AREA URNS LPF: PRESENT /LPF
NEUTROPHILS # BLD AUTO: 3.7 10E9/L (ref 1.6–8.3)
NEUTROPHILS NFR BLD AUTO: 63.8 %
NITRATE UR QL: NEGATIVE
PH UR STRIP: 5.5 PH (ref 5–7)
PLATELET # BLD AUTO: 251 10E9/L (ref 150–450)
RBC # BLD AUTO: 4.63 10E12/L (ref 4.4–5.9)
RBC #/AREA URNS AUTO: ABNORMAL /HPF
SARS-COV-2 RNA RESP QL NAA+PROBE: NEGATIVE
SARS-COV-2 RNA RESP QL NAA+PROBE: NORMAL
SOURCE: ABNORMAL
SP GR UR STRIP: >1.03 (ref 1–1.03)
SPECIMEN SOURCE: NORMAL
SPECIMEN SOURCE: NORMAL
UROBILINOGEN UR STRIP-ACNC: 0.2 EU/DL (ref 0.2–1)
WBC # BLD AUTO: 5.8 10E9/L (ref 4–11)
WBC #/AREA URNS AUTO: ABNORMAL /HPF

## 2021-06-01 PROCEDURE — 85025 COMPLETE CBC W/AUTO DIFF WBC: CPT | Performed by: FAMILY MEDICINE

## 2021-06-01 PROCEDURE — 36415 COLL VENOUS BLD VENIPUNCTURE: CPT | Performed by: FAMILY MEDICINE

## 2021-06-01 PROCEDURE — 81001 URINALYSIS AUTO W/SCOPE: CPT | Performed by: FAMILY MEDICINE

## 2021-06-01 PROCEDURE — U0005 INFEC AGEN DETEC AMPLI PROBE: HCPCS | Performed by: ORTHOPAEDIC SURGERY

## 2021-06-01 PROCEDURE — 80048 BASIC METABOLIC PNL TOTAL CA: CPT | Performed by: FAMILY MEDICINE

## 2021-06-01 PROCEDURE — 99214 OFFICE O/P EST MOD 30 MIN: CPT | Performed by: FAMILY MEDICINE

## 2021-06-01 PROCEDURE — U0003 INFECTIOUS AGENT DETECTION BY NUCLEIC ACID (DNA OR RNA); SEVERE ACUTE RESPIRATORY SYNDROME CORONAVIRUS 2 (SARS-COV-2) (CORONAVIRUS DISEASE [COVID-19]), AMPLIFIED PROBE TECHNIQUE, MAKING USE OF HIGH THROUGHPUT TECHNOLOGIES AS DESCRIBED BY CMS-2020-01-R: HCPCS | Performed by: ORTHOPAEDIC SURGERY

## 2021-06-01 NOTE — PROGRESS NOTES
[address] [address]  Phone: [phone]  Primary Provider: Jennie Girard  Pre-op Performing Provider: JENNIE GIRARD      PREOPERATIVE EVALUATION:  Today's date: 6/1/2021    Dale Cuevas is a 70 year old male who presents for a preoperative evaluation.    Surgical Information:  Surgery/Procedure: RIGHT KNEE POLYETHYLENE EXCHANGE  Surgery Location: [facility]  Surgeon: Dr. Jose C Hernandez  Surgery Date: 06/03/2021  Time of Surgery: 9:45 am  Where patient plans to recover: At home with family  Fax number for surgical facility: Note does not need to be faxed, will be available electronically in Epic.    Type of Anesthesia Anticipated: General    Assessment & Plan     The proposed surgical procedure is considered INTERMEDIATE risk.    Preop general physical exam    - UA with Microscopic  - CBC with platelets differential  - Basic metabolic panel    Status post total knee replacement, right      Psoriasis      Psoriatic arthritis (H)                     RECOMMENDATION:  APPROVAL GIVEN to proceed with proposed procedure, without further diagnostic evaluation.                      Subjective     HPI related to upcoming procedure: Patient has had increasing problems with clicking and occasional discomfort in his right knee since having his knee replaced.  He is being admitted for revision as noted above.    Preop Questions 6/1/2021   1. Have you ever had a heart attack or stroke? No   2. Have you ever had surgery on your heart or blood vessels, such as a stent placement, a coronary artery bypass, or surgery on an artery in your head, neck, heart, or legs? No   3. Do you have chest pain with activity? No   4. Do you have a history of  heart failure? No   5. Do you currently have a cold, bronchitis or symptoms of other infection? No   6. Do you have a cough, shortness of breath, or wheezing? No   7. Do you or anyone in your family have  previous history of blood clots? No   8. Do you or does anyone in your family have a serious bleeding problem such as prolonged bleeding following surgeries or cuts? No   9. Have you ever had problems with anemia or been told to take iron pills? No   10. Have you had any abnormal blood loss such as black, tarry or bloody stools? No   11. Have you ever had a blood transfusion? No   12. Are you willing to have a blood transfusion if it is medically needed before, during, or after your surgery? Yes   13. Have you or any of your relatives ever had problems with anesthesia? No   14. Do you have sleep apnea, excessive snoring or daytime drowsiness? No   15. Do you have any artifical heart valves or other implanted medical devices like a pacemaker, defibrillator, or continuous glucose monitor? No   16. Do you have artificial joints? YES -right knee   17. Are you allergic to latex? No       Health Care Directive:  Patient does not have a Health Care Directive or Living Will: Discussed advance care planning with patient; however, patient declined at this time.    Preoperative Review of :   reviewed - no record of controlled substances prescribed.          Review of Systems  Constitutional, neuro, ENT, endocrine, pulmonary, cardiac, gastrointestinal, genitourinary, musculoskeletal, integument and psychiatric systems are negative, except as otherwise noted.  Patient has had a flare of his psoriatic arthritis recently.  Was about 6 weeks off Humira.  He is now back on it weekly.  He has an upcoming appointment with a new rheumatologist at the [facility] in about a week.  Patient Active Problem List    Diagnosis Date Noted     Status post total knee replacement, right 03/04/2021     Priority: Medium     Medication monitoring encounter 03/01/2021     Priority: Medium     Immunosuppression (H) 02/10/2021     Priority: Medium     Psoriatic arthritis (H) 07/15/2019     Priority: Medium     Diastasis recti  06/15/2016     Priority: Medium     Family hx of prostate cancer 06/07/2016     Priority: Medium     Benign prostatic hyperplasia 09/08/2014     Priority: Medium     Hyperlipidemia LDL goal <100 02/05/2014     Priority: Medium     Psoriasis 11/23/2013     Priority: Medium     Elevated blood pressure reading without diagnosis of hypertension 05/06/2013     Priority: Medium     Rosacea 05/06/2013     Priority: Medium     Erectile dysfunction 05/06/2013     Priority: Medium      Past Medical History:   Diagnosis Date     Benign prostatic hyperplasia 9/8/2014     Diastasis recti 6/15/2016     Elevated blood pressure reading without diagnosis of hypertension 5/6/2013     Erectile dysfunction 5/6/2013     Family hx of prostate cancer 6/7/2016     Hyperlipidemia LDL goal <100 2/5/2014     Psoriasis 11/23/2013     Psoriatic arthritis (H)      Psoriatic arthritis (H)      Rosacea 5/6/2013     Past Surgical History:   Procedure Laterality Date     ARTHROPLASTY KNEE Right 3/4/2021    Procedure: RIGHT TOTAL KNEE ARTHROPLASTY;  Surgeon: Jose C Westbrook MD;  Location: [facility] OR     BACK SURGERY       Hx Vitrectomy/Membrane Peel/Laser/Air Right 08/06/2018    Dr. Medrano / Dr. Mendiola       ORTHOPEDIC SURGERY  Jan 12, 2015    left wrist     ORTHOPEDIC SURGERY  1989    spine sugery-with disc fragment removed     ORTHOPEDIC SURGERY  2014    right knee arthroscopy     SEPTOPLASTY       Current Outpatient Medications   Medication Sig Dispense Refill     adalimumab (HUMIRA *CF*) 40 MG/0.4ML pen kit Inject 0.4 mLs (40 mg) Subcutaneous every 7 days 4 each 5     atorvastatin (LIPITOR) 40 MG tablet TAKE 1 TABLET BY MOUTH EVERY DAY. APPOINTMENT NEEDED FOR FURTHER REFILLS. 90 tablet 0     tamsulosin (FLOMAX) 0.4 MG capsule TAKE ONE CAPSULE BY MOUTH ONE TIME DAILY (Patient taking differently: Take 0.4 mg by mouth every evening ) 90 capsule 3       Allergies   Allergen Reactions     Levofloxacin      Other reaction(s): *Unknown        Social  History     Tobacco Use     Smoking status: Former Smoker     Quit date: 1973     Years since quittin.1     Smokeless tobacco: Never Used   Substance Use Topics     Alcohol use: Yes     Frequency: 2-3 times a week     Comment: every night     Family History   Problem Relation Age of Onset     Hypertension Mother      Prostate Cancer Father 72     Breast Cancer Sister      Thyroid Cancer Brother      HIV/AIDS Brother      History   Drug Use No         Objective     /70 (BP Location: Right arm, Patient Position: Chair, Cuff Size: Adult Regular)   Pulse 79   Temp 98  F (36.7  C) (Temporal)   Resp 16   Wt 78.8 kg (173 lb 11.2 oz)   SpO2 96%   BMI 24.36 kg/m      Physical Exam    GENERAL APPEARANCE: healthy, alert and no distress     EYES: EOMI,  PERRL     HENT: ear canals and TM's normal and nose and mouth without ulcers or lesions     NECK: no adenopathy, no asymmetry, masses, or scars and thyroid normal to palpation     RESP: lungs clear to auscultation - no rales, rhonchi or wheezes     CV: regular rates and rhythm, normal S1 S2, no S3 or S4 and no murmur, click or rub     ABDOMEN:  soft, nontender, no HSM or masses and bowel sounds normal     MS: He does have some doughy swelling of the second and third MCP joints both hands.  He is also having some swelling in his elbows.     SKIN: no suspicious lesions or rashes     NEURO: Normal strength and tone, sensory exam grossly normal, mentation intact and speech normal     PSYCH: mentation appears normal. and affect normal/bright     LYMPHATICS: No cervical adenopathy    Recent Labs   Lab Test 21  0722 21  0655 21  1505   HGB 11.8* 15.5 15.7   PLT  --   --  190   NA  --   --  142   POTASSIUM  --   --  4.2   CR  --  0.85 1.00        Diagnostics:  Labs pending at this time.  Results will be reviewed when available.   No EKG required, no history of coronary heart disease, significant arrhythmia, peripheral arterial disease or other  structural heart disease.  His last EKG was done in February.    Revised Cardiac Risk Index (RCRI):  The patient has the following serious cardiovascular risks for perioperative complications:   - No serious cardiac risks = 0 points     RCRI Interpretation: 0 points: Class I (very low risk - 0.4% complication rate)           Signed Electronically by: Artis Espinoza MD  Copy of this evaluation report is provided to requesting physician.

## 2021-06-01 NOTE — H&P (VIEW-ONLY)
[address] [address]  Phone: [phone]  Primary Provider: Jennie Girard  Pre-op Performing Provider: JENNIE GIRARD      PREOPERATIVE EVALUATION:  Today's date: 6/1/2021    Dale Cuevas is a 70 year old male who presents for a preoperative evaluation.    Surgical Information:  Surgery/Procedure: RIGHT KNEE POLYETHYLENE EXCHANGE  Surgery Location: [facility]  Surgeon: Dr. Jose C Hernandez  Surgery Date: 06/03/2021  Time of Surgery: 9:45 am  Where patient plans to recover: At home with family  Fax number for surgical facility: Note does not need to be faxed, will be available electronically in Epic.    Type of Anesthesia Anticipated: General    Assessment & Plan     The proposed surgical procedure is considered INTERMEDIATE risk.    Preop general physical exam    - UA with Microscopic  - CBC with platelets differential  - Basic metabolic panel    Status post total knee replacement, right      Psoriasis      Psoriatic arthritis (H)                     RECOMMENDATION:  APPROVAL GIVEN to proceed with proposed procedure, without further diagnostic evaluation.                      Subjective     HPI related to upcoming procedure: Patient has had increasing problems with clicking and occasional discomfort in his right knee since having his knee replaced.  He is being admitted for revision as noted above.    Preop Questions 6/1/2021   1. Have you ever had a heart attack or stroke? No   2. Have you ever had surgery on your heart or blood vessels, such as a stent placement, a coronary artery bypass, or surgery on an artery in your head, neck, heart, or legs? No   3. Do you have chest pain with activity? No   4. Do you have a history of  heart failure? No   5. Do you currently have a cold, bronchitis or symptoms of other infection? No   6. Do you have a cough, shortness of breath, or wheezing? No   7. Do you or anyone in your family have  previous history of blood clots? No   8. Do you or does anyone in your family have a serious bleeding problem such as prolonged bleeding following surgeries or cuts? No   9. Have you ever had problems with anemia or been told to take iron pills? No   10. Have you had any abnormal blood loss such as black, tarry or bloody stools? No   11. Have you ever had a blood transfusion? No   12. Are you willing to have a blood transfusion if it is medically needed before, during, or after your surgery? Yes   13. Have you or any of your relatives ever had problems with anesthesia? No   14. Do you have sleep apnea, excessive snoring or daytime drowsiness? No   15. Do you have any artifical heart valves or other implanted medical devices like a pacemaker, defibrillator, or continuous glucose monitor? No   16. Do you have artificial joints? YES -right knee   17. Are you allergic to latex? No       Health Care Directive:  Patient does not have a Health Care Directive or Living Will: Discussed advance care planning with patient; however, patient declined at this time.    Preoperative Review of :   reviewed - no record of controlled substances prescribed.          Review of Systems  Constitutional, neuro, ENT, endocrine, pulmonary, cardiac, gastrointestinal, genitourinary, musculoskeletal, integument and psychiatric systems are negative, except as otherwise noted.  Patient has had a flare of his psoriatic arthritis recently.  Was about 6 weeks off Humira.  He is now back on it weekly.  He has an upcoming appointment with a new rheumatologist at the [facility] in about a week.  Patient Active Problem List    Diagnosis Date Noted     Status post total knee replacement, right 03/04/2021     Priority: Medium     Medication monitoring encounter 03/01/2021     Priority: Medium     Immunosuppression (H) 02/10/2021     Priority: Medium     Psoriatic arthritis (H) 07/15/2019     Priority: Medium     Diastasis recti  06/15/2016     Priority: Medium     Family hx of prostate cancer 06/07/2016     Priority: Medium     Benign prostatic hyperplasia 09/08/2014     Priority: Medium     Hyperlipidemia LDL goal <100 02/05/2014     Priority: Medium     Psoriasis 11/23/2013     Priority: Medium     Elevated blood pressure reading without diagnosis of hypertension 05/06/2013     Priority: Medium     Rosacea 05/06/2013     Priority: Medium     Erectile dysfunction 05/06/2013     Priority: Medium      Past Medical History:   Diagnosis Date     Benign prostatic hyperplasia 9/8/2014     Diastasis recti 6/15/2016     Elevated blood pressure reading without diagnosis of hypertension 5/6/2013     Erectile dysfunction 5/6/2013     Family hx of prostate cancer 6/7/2016     Hyperlipidemia LDL goal <100 2/5/2014     Psoriasis 11/23/2013     Psoriatic arthritis (H)      Psoriatic arthritis (H)      Rosacea 5/6/2013     Past Surgical History:   Procedure Laterality Date     ARTHROPLASTY KNEE Right 3/4/2021    Procedure: RIGHT TOTAL KNEE ARTHROPLASTY;  Surgeon: Jose C Westbrook MD;  Location: [facility] OR     BACK SURGERY       Hx Vitrectomy/Membrane Peel/Laser/Air Right 08/06/2018    Dr. Medrano / Dr. Mendiola       ORTHOPEDIC SURGERY  Jan 12, 2015    left wrist     ORTHOPEDIC SURGERY  1989    spine sugery-with disc fragment removed     ORTHOPEDIC SURGERY  2014    right knee arthroscopy     SEPTOPLASTY       Current Outpatient Medications   Medication Sig Dispense Refill     adalimumab (HUMIRA *CF*) 40 MG/0.4ML pen kit Inject 0.4 mLs (40 mg) Subcutaneous every 7 days 4 each 5     atorvastatin (LIPITOR) 40 MG tablet TAKE 1 TABLET BY MOUTH EVERY DAY. APPOINTMENT NEEDED FOR FURTHER REFILLS. 90 tablet 0     tamsulosin (FLOMAX) 0.4 MG capsule TAKE ONE CAPSULE BY MOUTH ONE TIME DAILY (Patient taking differently: Take 0.4 mg by mouth every evening ) 90 capsule 3       Allergies   Allergen Reactions     Levofloxacin      Other reaction(s): *Unknown        Social  History     Tobacco Use     Smoking status: Former Smoker     Quit date: 1973     Years since quittin.1     Smokeless tobacco: Never Used   Substance Use Topics     Alcohol use: Yes     Frequency: 2-3 times a week     Comment: every night     Family History   Problem Relation Age of Onset     Hypertension Mother      Prostate Cancer Father 72     Breast Cancer Sister      Thyroid Cancer Brother      HIV/AIDS Brother      History   Drug Use No         Objective     /70 (BP Location: Right arm, Patient Position: Chair, Cuff Size: Adult Regular)   Pulse 79   Temp 98  F (36.7  C) (Temporal)   Resp 16   Wt 78.8 kg (173 lb 11.2 oz)   SpO2 96%   BMI 24.36 kg/m      Physical Exam    GENERAL APPEARANCE: healthy, alert and no distress     EYES: EOMI,  PERRL     HENT: ear canals and TM's normal and nose and mouth without ulcers or lesions     NECK: no adenopathy, no asymmetry, masses, or scars and thyroid normal to palpation     RESP: lungs clear to auscultation - no rales, rhonchi or wheezes     CV: regular rates and rhythm, normal S1 S2, no S3 or S4 and no murmur, click or rub     ABDOMEN:  soft, nontender, no HSM or masses and bowel sounds normal     MS: He does have some doughy swelling of the second and third MCP joints both hands.  He is also having some swelling in his elbows.     SKIN: no suspicious lesions or rashes     NEURO: Normal strength and tone, sensory exam grossly normal, mentation intact and speech normal     PSYCH: mentation appears normal. and affect normal/bright     LYMPHATICS: No cervical adenopathy    Recent Labs   Lab Test 21  0722 21  0655 21  1505   HGB 11.8* 15.5 15.7   PLT  --   --  190   NA  --   --  142   POTASSIUM  --   --  4.2   CR  --  0.85 1.00        Diagnostics:  Labs pending at this time.  Results will be reviewed when available.   No EKG required, no history of coronary heart disease, significant arrhythmia, peripheral arterial disease or other  structural heart disease.  His last EKG was done in February.    Revised Cardiac Risk Index (RCRI):  The patient has the following serious cardiovascular risks for perioperative complications:   - No serious cardiac risks = 0 points     RCRI Interpretation: 0 points: Class I (very low risk - 0.4% complication rate)           Signed Electronically by: Artis Espinoza MD  Copy of this evaluation report is provided to requesting physician.

## 2021-06-02 ENCOUNTER — ANESTHESIA EVENT (OUTPATIENT)
Dept: SURGERY | Facility: CLINIC | Age: 71
End: 2021-06-02
Payer: COMMERCIAL

## 2021-06-02 LAB
ANION GAP SERPL CALCULATED.3IONS-SCNC: 3 MMOL/L (ref 3–14)
BUN SERPL-MCNC: 15 MG/DL (ref 7–30)
CALCIUM SERPL-MCNC: 8.8 MG/DL (ref 8.5–10.1)
CHLORIDE SERPL-SCNC: 110 MMOL/L (ref 94–109)
CO2 SERPL-SCNC: 26 MMOL/L (ref 20–32)
CREAT SERPL-MCNC: 0.84 MG/DL (ref 0.66–1.25)
GFR SERPL CREATININE-BSD FRML MDRD: 87 ML/MIN/{1.73_M2}
GLUCOSE SERPL-MCNC: 102 MG/DL (ref 70–99)
POTASSIUM SERPL-SCNC: 4 MMOL/L (ref 3.4–5.3)
SODIUM SERPL-SCNC: 139 MMOL/L (ref 133–144)

## 2021-06-02 ASSESSMENT — LIFESTYLE VARIABLES: TOBACCO_USE: 1

## 2021-06-02 NOTE — ANESTHESIA PREPROCEDURE EVALUATION
Anesthesia Pre-Procedure Evaluation    Patient: Dale Cuevas   MRN: [id number] : 1950        Preoperative Diagnosis: Aftercare following right knee joint replacement surgery [Z47.1, Z96.651]   Procedure : Procedure(s):  RIGHT KNEE POLYETHYLENE EXCHANGE     Past Medical History:   Diagnosis Date     Benign prostatic hyperplasia 2014     Diastasis recti 6/15/2016     Elevated blood pressure reading without diagnosis of hypertension 2013     Erectile dysfunction 2013     Family hx of prostate cancer 2016     Hyperlipidemia LDL goal <100 2014     Psoriasis 2013     Psoriatic arthritis (H)      Psoriatic arthritis (H)      Rosacea 2013      Past Surgical History:   Procedure Laterality Date     ARTHROPLASTY KNEE Right 3/4/2021    Procedure: RIGHT TOTAL KNEE ARTHROPLASTY;  Surgeon: Jose C Westbrook MD;  Location: [facility] OR     BACK SURGERY       Hx Vitrectomy/Membrane Peel/Laser/Air Right 2018    Dr. Medrano / Dr. Mendiola       ORTHOPEDIC SURGERY  2015    left wrist     ORTHOPEDIC SURGERY      spine sugery-with disc fragment removed     ORTHOPEDIC SURGERY      right knee arthroscopy     SEPTOPLASTY        Allergies   Allergen Reactions     Levofloxacin      Other reaction(s): *Unknown      Social History     Tobacco Use     Smoking status: Former Smoker     Quit date: 1973     Years since quittin.1     Smokeless tobacco: Never Used   Substance Use Topics     Alcohol use: Yes     Frequency: 2-3 times a week     Comment: every night      Wt Readings from Last 1 Encounters:   21 78.8 kg (173 lb 11.2 oz)        EKG 2/10/2021  Sinus  Rhythm   -Left atrial enlargement.     BORDERLINE  Anesthesia Evaluation   Pt has had prior anesthetic.     No history of anesthetic complications       ROS/MED HX  ENT/Pulmonary: Comment: Evaluated for dysphonia    (+) tobacco use, Past use,  (-) sleep apnea   Neurologic:  - neg neurologic ROS   (+) peripheral neuropathy  (neuropathy from herniated disc approx 30 years ago. stable no acute change), - peripheral neuropathy from knees down - related to spine.  (-) no CVA   Cardiovascular: Comment: Elevated BP without diagnosis of HTN    (+) Dyslipidemia ----- (-) CHF and orthopnea/PND   METS/Exercise Tolerance:     Hematologic:  - neg hematologic  ROS     Musculoskeletal: Comment: S/p back surgery T 8 - 9    CERVICAL NEURO FORAMINAL STENOSIS C 5 - 6, C 6 - 7    Cervical radiculopathy     Psoriasis    Psoriatic arthritis - recent flare off Humir x 6 weeks, restarted, Rheumatology visit 4/15/2021    Gout    S/p right total knee arthroplasty 3/4/2021 under spinal anesthesia with peripheral nerve block  (+) arthritis,     GI/Hepatic:    (-) GERD   Renal/Genitourinary:     (+) BPH,     Endo:  - neg endo ROS  (-) Type II DM and thyroid disease   Psychiatric/Substance Use: Comment: Daily alcohol consumption - neg psychiatric ROS     Infectious Disease:       Malignancy:       Other:            Physical Exam    Airway        Mallampati: I   TM distance: > 3 FB   Neck ROM: full   Mouth opening: > 3 cm    Respiratory Devices and Support         Dental  no notable dental history         Cardiovascular   cardiovascular exam normal       Rhythm and rate: regular and normal     Pulmonary   pulmonary exam normal        breath sounds clear to auscultation           OUTSIDE LABS:  CBC:   Lab Results   Component Value Date    WBC 5.8 06/01/2021    WBC 6.1 02/04/2021    HGB 13.3 06/01/2021    HGB 11.8 (L) 03/05/2021    HCT 41.2 06/01/2021    HCT 47.4 02/04/2021     06/01/2021     02/04/2021     BMP:   Lab Results   Component Value Date     06/01/2021     02/04/2021    POTASSIUM 4.0 06/01/2021    POTASSIUM 4.2 02/04/2021    CHLORIDE 110 (H) 06/01/2021    CHLORIDE 110 (H) 02/04/2021    CO2 26 06/01/2021    CO2 26 02/04/2021    BUN 15 06/01/2021    BUN 21 02/04/2021    CR 0.84 06/01/2021    CR 0.85 03/04/2021     (H)  06/01/2021     (H) 02/04/2021     COAGS:   Lab Results   Component Value Date    INR 1.05 03/01/2015     POC:   Lab Results   Component Value Date     (H) 03/05/2021     HEPATIC:   Lab Results   Component Value Date    ALBUMIN 4.0 02/04/2021    PROTTOTAL 7.9 02/04/2021    ALT 41 02/04/2021    AST 28 02/04/2021    ALKPHOS 96 02/04/2021    BILITOTAL 0.8 02/04/2021     OTHER:   Lab Results   Component Value Date    LOUISE 8.8 06/01/2021    PHOS 3.3 04/11/2019    TSH 1.04 09/12/2016    CRP 24.4 (H) 04/11/2019    SED 15 04/11/2019       Anesthesia Plan    ASA Status:  2   NPO Status:  NPO Appropriate    Anesthesia Type: Spinal.              Consents    Anesthesia Plan(s) and associated risks, benefits, and realistic alternatives discussed. Questions answered and patient/representative(s) expressed understanding.     - Discussed with:  Patient         Postoperative Care    Pain management: Peripheral nerve block (Single Shot), Multi-modal analgesia.   PONV prophylaxis: Ondansetron (or other 5HT-3), Dexamethasone or Solumedrol     Comments:    The surgeon has given a verbal order transferring care of this patient to me for the performance of a regional analgesia block for post-op pain control. It is requested of me because I am uniquely trained and qualified to perform this block and the surgeon is neither trained nor qualified to perform this procedure.            Jaylen Puentes MD

## 2021-06-02 NOTE — PROGRESS NOTES
PTA medications updated by Medication Scribe prior to surgery via phone call with patient (last doses completed by Nurse)     Medication history sources: Patient, Surescripts and H&P  In the past week, patient estimated taking medication this percent of the time: Greater than 90%  Adherence assessment: N/A Not Observed    Significant changes made to the medication list:  None      Additional medication history information:   None    Medication reconciliation completed by provider prior to medication history? No    Time spent in this activity: 15 minutes    The information provided in this note is only as accurate as the sources available at the time of update(s)      Prior to Admission medications    Medication Sig Last Dose Taking? Auth Provider   adalimumab (HUMIRA *CF*) 40 MG/0.4ML pen kit Inject 0.4 mLs (40 mg) Subcutaneous every 7 days 6/1/2021 Yes Mario Alberto Ricardo MD   atorvastatin (LIPITOR) 40 MG tablet TAKE 1 TABLET BY MOUTH EVERY DAY. APPOINTMENT NEEDED FOR FURTHER REFILLS.  Patient taking differently: Take 40 mg by mouth every evening   at pm Yes Artis Espinoza MD   tamsulosin (FLOMAX) 0.4 MG capsule TAKE ONE CAPSULE BY MOUTH ONE TIME DAILY  Patient taking differently: Take 0.4 mg by mouth every evening   at pm Yes Sue Sewell PA-C

## 2021-06-03 ENCOUNTER — ANESTHESIA (OUTPATIENT)
Dept: SURGERY | Facility: CLINIC | Age: 71
End: 2021-06-03
Payer: COMMERCIAL

## 2021-06-03 ENCOUNTER — HOSPITAL ENCOUNTER (OUTPATIENT)
Facility: CLINIC | Age: 71
Discharge: HOME | End: 2021-06-04
Attending: ORTHOPAEDIC SURGERY | Admitting: ORTHOPAEDIC SURGERY
Payer: COMMERCIAL

## 2021-06-03 ENCOUNTER — APPOINTMENT (OUTPATIENT)
Dept: GENERAL RADIOLOGY | Facility: CLINIC | Age: 71
End: 2021-06-03
Attending: ORTHOPAEDIC SURGERY
Payer: COMMERCIAL

## 2021-06-03 ENCOUNTER — APPOINTMENT (OUTPATIENT)
Dept: PHYSICAL THERAPY | Facility: CLINIC | Age: 71
End: 2021-06-03
Attending: ORTHOPAEDIC SURGERY
Payer: COMMERCIAL

## 2021-06-03 DIAGNOSIS — Z96.651 STATUS POST REVISION OF TOTAL KNEE, RIGHT: Primary | ICD-10-CM

## 2021-06-03 LAB
CREAT SERPL-MCNC: 0.74 MG/DL (ref 0.66–1.25)
GFR SERPL CREATININE-BSD FRML MDRD: >90 ML/MIN/{1.73_M2}
GRAM STN SPEC: NORMAL
GRAM STN SPEC: NORMAL
HGB BLD-MCNC: 13.9 G/DL (ref 13.3–17.7)
Lab: NORMAL
SPECIMEN SOURCE: NORMAL

## 2021-06-03 PROCEDURE — 999N000063 XR KNEE PORT RIGHT 1/2 VIEWS: Mod: RT

## 2021-06-03 PROCEDURE — 250N000009 HC RX 250: Performed by: PHYSICIAN ASSISTANT

## 2021-06-03 PROCEDURE — 250N000009 HC RX 250: Performed by: NURSE ANESTHETIST, CERTIFIED REGISTERED

## 2021-06-03 PROCEDURE — 250N000013 HC RX MED GY IP 250 OP 250 PS 637: Performed by: PHYSICIAN ASSISTANT

## 2021-06-03 PROCEDURE — 250N000009 HC RX 250: Performed by: ORTHOPAEDIC SURGERY

## 2021-06-03 PROCEDURE — 370N000017 HC ANESTHESIA TECHNICAL FEE, PER MIN: Performed by: ORTHOPAEDIC SURGERY

## 2021-06-03 PROCEDURE — 87205 SMEAR GRAM STAIN: CPT | Performed by: ORTHOPAEDIC SURGERY

## 2021-06-03 PROCEDURE — 999N000141 HC STATISTIC PRE-PROCEDURE NURSING ASSESSMENT: Performed by: ORTHOPAEDIC SURGERY

## 2021-06-03 PROCEDURE — 87075 CULTR BACTERIA EXCEPT BLOOD: CPT | Performed by: ORTHOPAEDIC SURGERY

## 2021-06-03 PROCEDURE — 250N000011 HC RX IP 250 OP 636: Performed by: PHYSICIAN ASSISTANT

## 2021-06-03 PROCEDURE — 250N000011 HC RX IP 250 OP 636: Performed by: NURSE ANESTHETIST, CERTIFIED REGISTERED

## 2021-06-03 PROCEDURE — 710N000009 HC RECOVERY PHASE 1, LEVEL 1, PER MIN: Performed by: ORTHOPAEDIC SURGERY

## 2021-06-03 PROCEDURE — 97116 GAIT TRAINING THERAPY: CPT | Mod: GP

## 2021-06-03 PROCEDURE — 258N000003 HC RX IP 258 OP 636: Performed by: NURSE ANESTHETIST, CERTIFIED REGISTERED

## 2021-06-03 PROCEDURE — 272N000001 HC OR GENERAL SUPPLY STERILE: Performed by: ORTHOPAEDIC SURGERY

## 2021-06-03 PROCEDURE — 85018 HEMOGLOBIN: CPT | Performed by: PHYSICIAN ASSISTANT

## 2021-06-03 PROCEDURE — 258N000003 HC RX IP 258 OP 636: Performed by: ANESTHESIOLOGY

## 2021-06-03 PROCEDURE — 97530 THERAPEUTIC ACTIVITIES: CPT | Mod: GP

## 2021-06-03 PROCEDURE — 97161 PT EVAL LOW COMPLEX 20 MIN: CPT | Mod: GP

## 2021-06-03 PROCEDURE — 36415 COLL VENOUS BLD VENIPUNCTURE: CPT | Performed by: PHYSICIAN ASSISTANT

## 2021-06-03 PROCEDURE — C1776 JOINT DEVICE (IMPLANTABLE): HCPCS | Performed by: ORTHOPAEDIC SURGERY

## 2021-06-03 PROCEDURE — 258N000001 HC RX 258: Performed by: ORTHOPAEDIC SURGERY

## 2021-06-03 PROCEDURE — 250N000013 HC RX MED GY IP 250 OP 250 PS 637: Performed by: ORTHOPAEDIC SURGERY

## 2021-06-03 PROCEDURE — 250N000011 HC RX IP 250 OP 636: Performed by: ANESTHESIOLOGY

## 2021-06-03 PROCEDURE — 258N000003 HC RX IP 258 OP 636: Performed by: PHYSICIAN ASSISTANT

## 2021-06-03 PROCEDURE — 258N000003 HC RX IP 258 OP 636: Performed by: ORTHOPAEDIC SURGERY

## 2021-06-03 PROCEDURE — 250N000013 HC RX MED GY IP 250 OP 250 PS 637: Performed by: NURSE PRACTITIONER

## 2021-06-03 PROCEDURE — 82565 ASSAY OF CREATININE: CPT | Performed by: PHYSICIAN ASSISTANT

## 2021-06-03 PROCEDURE — 87070 CULTURE OTHR SPECIMN AEROBIC: CPT | Performed by: ORTHOPAEDIC SURGERY

## 2021-06-03 PROCEDURE — 360N000078 HC SURGERY LEVEL 5, PER MIN: Performed by: ORTHOPAEDIC SURGERY

## 2021-06-03 PROCEDURE — 250N000009 HC RX 250: Performed by: ANESTHESIOLOGY

## 2021-06-03 PROCEDURE — 250N000011 HC RX IP 250 OP 636: Performed by: ORTHOPAEDIC SURGERY

## 2021-06-03 DEVICE — TIBIAL BEARING INSERT
Type: IMPLANTABLE DEVICE | Site: KNEE | Status: FUNCTIONAL
Brand: TRIATHLON

## 2021-06-03 RX ORDER — FENTANYL CITRATE 50 UG/ML
50 INJECTION, SOLUTION INTRAMUSCULAR; INTRAVENOUS
Status: COMPLETED | OUTPATIENT
Start: 2021-06-03 | End: 2021-06-03

## 2021-06-03 RX ORDER — LIDOCAINE HYDROCHLORIDE 20 MG/ML
INJECTION, SOLUTION INFILTRATION; PERINEURAL PRN
Status: DISCONTINUED | OUTPATIENT
Start: 2021-06-03 | End: 2021-06-03

## 2021-06-03 RX ORDER — NALOXONE HYDROCHLORIDE 0.4 MG/ML
0.2 INJECTION, SOLUTION INTRAMUSCULAR; INTRAVENOUS; SUBCUTANEOUS
Status: DISCONTINUED | OUTPATIENT
Start: 2021-06-03 | End: 2021-06-03

## 2021-06-03 RX ORDER — OXYCODONE HYDROCHLORIDE 5 MG/1
5 TABLET ORAL EVERY 4 HOURS PRN
Status: DISCONTINUED | OUTPATIENT
Start: 2021-06-03 | End: 2021-06-04 | Stop reason: HOSPADM

## 2021-06-03 RX ORDER — ACETAMINOPHEN 325 MG/1
650 TABLET ORAL EVERY 4 HOURS PRN
Status: DISCONTINUED | OUTPATIENT
Start: 2021-06-06 | End: 2021-06-04 | Stop reason: HOSPADM

## 2021-06-03 RX ORDER — ONDANSETRON 2 MG/ML
4 INJECTION INTRAMUSCULAR; INTRAVENOUS EVERY 30 MIN PRN
Status: DISCONTINUED | OUTPATIENT
Start: 2021-06-03 | End: 2021-06-03 | Stop reason: HOSPADM

## 2021-06-03 RX ORDER — POLYETHYLENE GLYCOL 3350 17 G/17G
17 POWDER, FOR SOLUTION ORAL DAILY
Status: DISCONTINUED | OUTPATIENT
Start: 2021-06-04 | End: 2021-06-04 | Stop reason: HOSPADM

## 2021-06-03 RX ORDER — HYDROXYZINE HYDROCHLORIDE 10 MG/1
10 TABLET, FILM COATED ORAL EVERY 6 HOURS PRN
Status: DISCONTINUED | OUTPATIENT
Start: 2021-06-03 | End: 2021-06-04 | Stop reason: HOSPADM

## 2021-06-03 RX ORDER — KETOROLAC TROMETHAMINE 15 MG/ML
15 INJECTION, SOLUTION INTRAMUSCULAR; INTRAVENOUS EVERY 6 HOURS
Status: DISCONTINUED | OUTPATIENT
Start: 2021-06-03 | End: 2021-06-04 | Stop reason: HOSPADM

## 2021-06-03 RX ORDER — AMOXICILLIN 250 MG
1-2 CAPSULE ORAL 2 TIMES DAILY
Qty: 30 TABLET | Refills: 0 | Status: SHIPPED | OUTPATIENT
Start: 2021-06-03 | End: 2021-10-14

## 2021-06-03 RX ORDER — CEFAZOLIN SODIUM 2 G/100ML
2 INJECTION, SOLUTION INTRAVENOUS SEE ADMIN INSTRUCTIONS
Status: DISCONTINUED | OUTPATIENT
Start: 2021-06-03 | End: 2021-06-03 | Stop reason: HOSPADM

## 2021-06-03 RX ORDER — CEFAZOLIN SODIUM 1 G/3ML
1 INJECTION, POWDER, FOR SOLUTION INTRAMUSCULAR; INTRAVENOUS EVERY 8 HOURS
Status: COMPLETED | OUTPATIENT
Start: 2021-06-03 | End: 2021-06-04

## 2021-06-03 RX ORDER — PROCHLORPERAZINE MALEATE 5 MG
5 TABLET ORAL EVERY 6 HOURS PRN
Status: DISCONTINUED | OUTPATIENT
Start: 2021-06-03 | End: 2021-06-04 | Stop reason: HOSPADM

## 2021-06-03 RX ORDER — NALOXONE HYDROCHLORIDE 0.4 MG/ML
0.4 INJECTION, SOLUTION INTRAMUSCULAR; INTRAVENOUS; SUBCUTANEOUS
Status: DISCONTINUED | OUTPATIENT
Start: 2021-06-03 | End: 2021-06-03

## 2021-06-03 RX ORDER — SODIUM CHLORIDE, SODIUM LACTATE, POTASSIUM CHLORIDE, CALCIUM CHLORIDE 600; 310; 30; 20 MG/100ML; MG/100ML; MG/100ML; MG/100ML
INJECTION, SOLUTION INTRAVENOUS CONTINUOUS
Status: DISCONTINUED | OUTPATIENT
Start: 2021-06-03 | End: 2021-06-03 | Stop reason: HOSPADM

## 2021-06-03 RX ORDER — FENTANYL CITRATE 50 UG/ML
25 INJECTION, SOLUTION INTRAMUSCULAR; INTRAVENOUS EVERY 5 MIN PRN
Status: DISCONTINUED | OUTPATIENT
Start: 2021-06-03 | End: 2021-06-03 | Stop reason: HOSPADM

## 2021-06-03 RX ORDER — FENTANYL CITRATE 50 UG/ML
INJECTION, SOLUTION INTRAMUSCULAR; INTRAVENOUS PRN
Status: DISCONTINUED | OUTPATIENT
Start: 2021-06-03 | End: 2021-06-03

## 2021-06-03 RX ORDER — HYDROMORPHONE HYDROCHLORIDE 1 MG/ML
0.4 INJECTION, SOLUTION INTRAMUSCULAR; INTRAVENOUS; SUBCUTANEOUS
Status: DISCONTINUED | OUTPATIENT
Start: 2021-06-03 | End: 2021-06-04 | Stop reason: HOSPADM

## 2021-06-03 RX ORDER — METHOCARBAMOL 500 MG/1
500 TABLET, FILM COATED ORAL EVERY 6 HOURS PRN
Status: DISCONTINUED | OUTPATIENT
Start: 2021-06-03 | End: 2021-06-04 | Stop reason: HOSPADM

## 2021-06-03 RX ORDER — HYDROMORPHONE HCL IN WATER/PF 6 MG/30 ML
0.2 PATIENT CONTROLLED ANALGESIA SYRINGE INTRAVENOUS
Status: DISCONTINUED | OUTPATIENT
Start: 2021-06-03 | End: 2021-06-04 | Stop reason: HOSPADM

## 2021-06-03 RX ORDER — FENTANYL CITRATE 50 UG/ML
25-50 INJECTION, SOLUTION INTRAMUSCULAR; INTRAVENOUS
Status: DISCONTINUED | OUTPATIENT
Start: 2021-06-03 | End: 2021-06-03 | Stop reason: HOSPADM

## 2021-06-03 RX ORDER — ONDANSETRON 2 MG/ML
4 INJECTION INTRAMUSCULAR; INTRAVENOUS EVERY 6 HOURS PRN
Status: DISCONTINUED | OUTPATIENT
Start: 2021-06-03 | End: 2021-06-04 | Stop reason: HOSPADM

## 2021-06-03 RX ORDER — ONDANSETRON 4 MG/1
4 TABLET, ORALLY DISINTEGRATING ORAL EVERY 30 MIN PRN
Status: DISCONTINUED | OUTPATIENT
Start: 2021-06-03 | End: 2021-06-03 | Stop reason: HOSPADM

## 2021-06-03 RX ORDER — DEXAMETHASONE SODIUM PHOSPHATE 4 MG/ML
INJECTION, SOLUTION INTRA-ARTICULAR; INTRALESIONAL; INTRAMUSCULAR; INTRAVENOUS; SOFT TISSUE PRN
Status: DISCONTINUED | OUTPATIENT
Start: 2021-06-03 | End: 2021-06-03

## 2021-06-03 RX ORDER — HYDROMORPHONE HYDROCHLORIDE 1 MG/ML
.3-.5 INJECTION, SOLUTION INTRAMUSCULAR; INTRAVENOUS; SUBCUTANEOUS EVERY 5 MIN PRN
Status: DISCONTINUED | OUTPATIENT
Start: 2021-06-03 | End: 2021-06-03 | Stop reason: HOSPADM

## 2021-06-03 RX ORDER — DIPHENHYDRAMINE HCL 12.5MG/5ML
12.5 LIQUID (ML) ORAL EVERY 6 HOURS PRN
Status: DISCONTINUED | OUTPATIENT
Start: 2021-06-03 | End: 2021-06-04 | Stop reason: HOSPADM

## 2021-06-03 RX ORDER — SODIUM CHLORIDE, SODIUM LACTATE, POTASSIUM CHLORIDE, CALCIUM CHLORIDE 600; 310; 30; 20 MG/100ML; MG/100ML; MG/100ML; MG/100ML
INJECTION, SOLUTION INTRAVENOUS CONTINUOUS
Status: DISCONTINUED | OUTPATIENT
Start: 2021-06-03 | End: 2021-06-04 | Stop reason: HOSPADM

## 2021-06-03 RX ORDER — BUPIVACAINE HYDROCHLORIDE 7.5 MG/ML
INJECTION, SOLUTION INTRASPINAL
Status: COMPLETED | OUTPATIENT
Start: 2021-06-03 | End: 2021-06-03

## 2021-06-03 RX ORDER — AMOXICILLIN 250 MG
1 CAPSULE ORAL 2 TIMES DAILY
Status: DISCONTINUED | OUTPATIENT
Start: 2021-06-03 | End: 2021-06-04 | Stop reason: HOSPADM

## 2021-06-03 RX ORDER — LIDOCAINE 40 MG/G
CREAM TOPICAL
Status: DISCONTINUED | OUTPATIENT
Start: 2021-06-03 | End: 2021-06-04 | Stop reason: HOSPADM

## 2021-06-03 RX ORDER — VANCOMYCIN HYDROCHLORIDE 1 G/20ML
INJECTION, POWDER, LYOPHILIZED, FOR SOLUTION INTRAVENOUS PRN
Status: DISCONTINUED | OUTPATIENT
Start: 2021-06-03 | End: 2021-06-03 | Stop reason: HOSPADM

## 2021-06-03 RX ORDER — TAMSULOSIN HYDROCHLORIDE 0.4 MG/1
0.4 CAPSULE ORAL EVERY EVENING
Status: DISCONTINUED | OUTPATIENT
Start: 2021-06-03 | End: 2021-06-04 | Stop reason: HOSPADM

## 2021-06-03 RX ORDER — OXYCODONE HYDROCHLORIDE 5 MG/1
10 TABLET ORAL EVERY 4 HOURS PRN
Status: DISCONTINUED | OUTPATIENT
Start: 2021-06-03 | End: 2021-06-04 | Stop reason: HOSPADM

## 2021-06-03 RX ORDER — BISACODYL 10 MG
10 SUPPOSITORY, RECTAL RECTAL DAILY PRN
Status: DISCONTINUED | OUTPATIENT
Start: 2021-06-03 | End: 2021-06-04 | Stop reason: HOSPADM

## 2021-06-03 RX ORDER — ACETAMINOPHEN 325 MG/1
975 TABLET ORAL EVERY 8 HOURS
Status: DISCONTINUED | OUTPATIENT
Start: 2021-06-03 | End: 2021-06-04 | Stop reason: HOSPADM

## 2021-06-03 RX ORDER — DOCUSATE SODIUM 100 MG/1
100 CAPSULE, LIQUID FILLED ORAL 2 TIMES DAILY
Status: DISCONTINUED | OUTPATIENT
Start: 2021-06-03 | End: 2021-06-04 | Stop reason: HOSPADM

## 2021-06-03 RX ORDER — PROPOFOL 10 MG/ML
INJECTION, EMULSION INTRAVENOUS CONTINUOUS PRN
Status: DISCONTINUED | OUTPATIENT
Start: 2021-06-03 | End: 2021-06-03

## 2021-06-03 RX ORDER — MAGNESIUM HYDROXIDE 1200 MG/15ML
LIQUID ORAL PRN
Status: DISCONTINUED | OUTPATIENT
Start: 2021-06-03 | End: 2021-06-03 | Stop reason: HOSPADM

## 2021-06-03 RX ORDER — ONDANSETRON 4 MG/1
4 TABLET, ORALLY DISINTEGRATING ORAL EVERY 6 HOURS PRN
Status: DISCONTINUED | OUTPATIENT
Start: 2021-06-03 | End: 2021-06-04 | Stop reason: HOSPADM

## 2021-06-03 RX ORDER — TRANEXAMIC ACID 650 MG/1
1950 TABLET ORAL ONCE
Status: COMPLETED | OUTPATIENT
Start: 2021-06-03 | End: 2021-06-03

## 2021-06-03 RX ORDER — PROPOFOL 10 MG/ML
INJECTION, EMULSION INTRAVENOUS PRN
Status: DISCONTINUED | OUTPATIENT
Start: 2021-06-03 | End: 2021-06-03

## 2021-06-03 RX ORDER — CEFAZOLIN SODIUM 2 G/100ML
2 INJECTION, SOLUTION INTRAVENOUS
Status: COMPLETED | OUTPATIENT
Start: 2021-06-03 | End: 2021-06-03

## 2021-06-03 RX ORDER — CELECOXIB 200 MG/1
400 CAPSULE ORAL ONCE
Status: COMPLETED | OUTPATIENT
Start: 2021-06-03 | End: 2021-06-03

## 2021-06-03 RX ORDER — KETOROLAC TROMETHAMINE 10 MG/1
10 TABLET, FILM COATED ORAL EVERY 8 HOURS
Qty: 6 TABLET | Refills: 0 | Status: SHIPPED | OUTPATIENT
Start: 2021-06-03 | End: 2021-06-05

## 2021-06-03 RX ORDER — PREGABALIN 150 MG/1
150 CAPSULE ORAL ONCE
Status: COMPLETED | OUTPATIENT
Start: 2021-06-03 | End: 2021-06-03

## 2021-06-03 RX ORDER — ACETAMINOPHEN 325 MG/1
975 TABLET ORAL 4 TIMES DAILY
Qty: 100 TABLET | Refills: 0 | Status: SHIPPED | OUTPATIENT
Start: 2021-06-03 | End: 2021-10-14

## 2021-06-03 RX ORDER — ONDANSETRON 2 MG/ML
INJECTION INTRAMUSCULAR; INTRAVENOUS PRN
Status: DISCONTINUED | OUTPATIENT
Start: 2021-06-03 | End: 2021-06-03

## 2021-06-03 RX ORDER — CALCIUM CARBONATE 500 MG/1
500 TABLET, CHEWABLE ORAL 4 TIMES DAILY PRN
Status: DISCONTINUED | OUTPATIENT
Start: 2021-06-03 | End: 2021-06-04 | Stop reason: HOSPADM

## 2021-06-03 RX ADMIN — TAMSULOSIN HYDROCHLORIDE 0.4 MG: 0.4 CAPSULE ORAL at 19:23

## 2021-06-03 RX ADMIN — BUPIVACAINE HYDROCHLORIDE IN DEXTROSE 1.6 ML: 7.5 INJECTION, SOLUTION SUBARACHNOID at 10:40

## 2021-06-03 RX ADMIN — SODIUM CHLORIDE, POTASSIUM CHLORIDE, SODIUM LACTATE AND CALCIUM CHLORIDE: 600; 310; 30; 20 INJECTION, SOLUTION INTRAVENOUS at 16:47

## 2021-06-03 RX ADMIN — FENTANYL CITRATE 50 MCG: 50 INJECTION, SOLUTION INTRAMUSCULAR; INTRAVENOUS at 10:37

## 2021-06-03 RX ADMIN — DEXAMETHASONE SODIUM PHOSPHATE 4 MG: 4 INJECTION, SOLUTION INTRA-ARTICULAR; INTRALESIONAL; INTRAMUSCULAR; INTRAVENOUS; SOFT TISSUE at 10:47

## 2021-06-03 RX ADMIN — BUPIVACAINE HYDROCHLORIDE 15 ML: 5 INJECTION, SOLUTION EPIDURAL; INTRACAUDAL; PERINEURAL at 09:22

## 2021-06-03 RX ADMIN — SODIUM CHLORIDE, POTASSIUM CHLORIDE, SODIUM LACTATE AND CALCIUM CHLORIDE: 600; 310; 30; 20 INJECTION, SOLUTION INTRAVENOUS at 09:00

## 2021-06-03 RX ADMIN — PHENYLEPHRINE HYDROCHLORIDE 100 MCG: 10 INJECTION INTRAVENOUS at 12:05

## 2021-06-03 RX ADMIN — ACETAMINOPHEN 975 MG: 325 TABLET, FILM COATED ORAL at 16:47

## 2021-06-03 RX ADMIN — FENTANYL CITRATE 50 MCG: 50 INJECTION, SOLUTION INTRAMUSCULAR; INTRAVENOUS at 09:14

## 2021-06-03 RX ADMIN — CEFAZOLIN 1 G: 1 INJECTION, POWDER, FOR SOLUTION INTRAMUSCULAR; INTRAVENOUS at 19:23

## 2021-06-03 RX ADMIN — PHENYLEPHRINE HYDROCHLORIDE 100 MCG: 10 INJECTION INTRAVENOUS at 12:20

## 2021-06-03 RX ADMIN — PREGABALIN 150 MG: 150 CAPSULE ORAL at 08:32

## 2021-06-03 RX ADMIN — LIDOCAINE HYDROCHLORIDE 40 MG: 20 INJECTION, SOLUTION INFILTRATION; PERINEURAL at 10:37

## 2021-06-03 RX ADMIN — SODIUM CHLORIDE, POTASSIUM CHLORIDE, SODIUM LACTATE AND CALCIUM CHLORIDE: 600; 310; 30; 20 INJECTION, SOLUTION INTRAVENOUS at 11:52

## 2021-06-03 RX ADMIN — DOCUSATE SODIUM 100 MG: 100 CAPSULE, LIQUID FILLED ORAL at 21:18

## 2021-06-03 RX ADMIN — ONDANSETRON 4 MG: 2 INJECTION INTRAMUSCULAR; INTRAVENOUS at 10:55

## 2021-06-03 RX ADMIN — CEFAZOLIN SODIUM 2 G: 2 INJECTION, SOLUTION INTRAVENOUS at 10:49

## 2021-06-03 RX ADMIN — PHENYLEPHRINE HYDROCHLORIDE 100 MCG: 10 INJECTION INTRAVENOUS at 11:17

## 2021-06-03 RX ADMIN — Medication 1 MG: at 21:18

## 2021-06-03 RX ADMIN — PROPOFOL 20 MG: 10 INJECTION, EMULSION INTRAVENOUS at 10:47

## 2021-06-03 RX ADMIN — MIDAZOLAM HYDROCHLORIDE 2 MG: 1 INJECTION, SOLUTION INTRAMUSCULAR; INTRAVENOUS at 09:14

## 2021-06-03 RX ADMIN — PROPOFOL 20 MG: 10 INJECTION, EMULSION INTRAVENOUS at 10:37

## 2021-06-03 RX ADMIN — PROPOFOL 100 MCG/KG/MIN: 10 INJECTION, EMULSION INTRAVENOUS at 10:42

## 2021-06-03 RX ADMIN — ACETAMINOPHEN 975 MG: 325 TABLET, FILM COATED ORAL at 23:48

## 2021-06-03 RX ADMIN — PHENYLEPHRINE HYDROCHLORIDE 100 MCG: 10 INJECTION INTRAVENOUS at 11:49

## 2021-06-03 RX ADMIN — PHENYLEPHRINE HYDROCHLORIDE 100 MCG: 10 INJECTION INTRAVENOUS at 11:35

## 2021-06-03 RX ADMIN — CELECOXIB 400 MG: 200 CAPSULE ORAL at 08:32

## 2021-06-03 RX ADMIN — TRANEXAMIC ACID 1950 MG: 650 TABLET ORAL at 08:32

## 2021-06-03 RX ADMIN — KETOROLAC TROMETHAMINE 15 MG: 15 INJECTION, SOLUTION INTRAMUSCULAR; INTRAVENOUS at 19:23

## 2021-06-03 RX ADMIN — HYDROXYZINE HYDROCHLORIDE 10 MG: 10 TABLET ORAL at 21:18

## 2021-06-03 RX ADMIN — SENNOSIDES AND DOCUSATE SODIUM 1 TABLET: 8.6; 5 TABLET ORAL at 21:18

## 2021-06-03 ASSESSMENT — MIFFLIN-ST. JEOR: SCORE: 1527.82

## 2021-06-03 ASSESSMENT — ENCOUNTER SYMPTOMS: ORTHOPNEA: 0

## 2021-06-03 NOTE — ANESTHESIA CARE TRANSFER NOTE
Patient: Dale Cuevas    Procedure(s):  RIGHT KNEE POLYETHYLENE EXCHANGE    Diagnosis: Aftercare following right knee joint replacement surgery [Z47.1, Z96.651]  Diagnosis Additional Information: No value filed.    Anesthesia Type:   Spinal     Note:    Oropharynx: oropharynx clear of all foreign objects and spontaneously breathing  Level of Consciousness: drowsy  Oxygen Supplementation: face mask  Level of Supplemental Oxygen (L/min / FiO2): 6  Independent Airway: airway patency satisfactory and stable  Dentition: dentition unchanged  Vital Signs Stable: post-procedure vital signs reviewed and stable  Report to RN Given: handoff report given  Patient transferred to: PACU    Handoff Report: Identifed the Patient, Identified the Reponsible Provider, Reviewed the pertinent medical history, Discussed the surgical course, Reviewed Intra-OP anesthesia mangement and issues during anesthesia, Set expectations for post-procedure period and Allowed opportunity for questions and acknowledgement of understanding      Vitals: (Last set prior to Anesthesia Care Transfer)  CRNA VITALS  6/3/2021 1226 - 6/3/2021 1302      6/3/2021             Pulse:  71    SpO2:  97 %    Resp Rate   12        Electronically Signed By: DANNY Best CRNA  Acacia 3, 2021  1:02 PM

## 2021-06-03 NOTE — PROGRESS NOTES
[facility]      OUTPATIENT PHYSICAL THERAPY EVALUATION  PLAN OF TREATMENT FOR OUTPATIENT REHABILITATION  (COMPLETE FOR INITIAL CLAIMS ONLY)  Patient's Last Name, First Name, M.I.  YOB: 1950  Dale Cuevas                        Provider's Name  [facility] Medical Record No.  [id number]                               Onset Date:  (P) 06/03/21   Start of Care Date:  (P) 06/03/21      Type:     _X_PT   ___OT   ___SLP Medical Diagnosis:  (P) R knee polyexchange                        PT Diagnosis:  (P) impaired IND with transfers and gait s/p R knee polyexchange   Visits from SOC:  1   _________________________________________________________________________________  Plan of Treatment/Functional Goals    Planned Interventions: (P) balance training, bed mobility training, gait training, home exercise program, patient/family education, ROM (range of motion), stair training, strengthening, transfer training     Goals: See Physical Therapy Goals on Care Plan in [facility] electronic health record.    Therapy Frequency: (P) 2x/day  Predicted Duration of Therapy Intervention: (P) 3 days  _________________________________________________________________________________    I CERTIFY THE NEED FOR THESE SERVICES FURNISHED UNDER        THIS PLAN OF TREATMENT AND WHILE UNDER MY CARE     (Physician co-signature of this document indicates review and certification of the therapy plan).                Certification date from: (P) 06/03/21, Certification date to: (P) 06/05/21    Referring Physician: (P) Jose C Mariscal MD            Initial Assessment        See Physical Therapy evaluation dated (P) 06/03/21 in Epic electronic health record.

## 2021-06-03 NOTE — ANESTHESIA PROCEDURE NOTES
Adductor canal Procedure Note  Pre-Procedure   Staff -        Anesthesiologist:  Jaylen Puentes MD       Performed By: Anesthesiologist       Location: pre-op       Pre-Anesthestic Checklist: patient identified, IV checked, site marked, risks and benefits discussed, informed consent, monitors and equipment checked, pre-op evaluation, at physician/surgeon's request and post-op pain management  Timeout:       Correct Patient: Yes        Correct Procedure: Yes        Correct Site: Yes        Correct Position: Yes        Correct Laterality: Yes        Site Marked: Yes  Procedure Documentation  Procedure: Adductor canal       Laterality: right       Patient Position: supine       Skin prep: Chloraprep       Local skin infiltrated with 3 mL of 1% lidocaine.        Needle Gauge: 21.        Needle Length (millimeters): 100        Ultrasound guided       1. Ultrasound was used to identify targeted nerve, plexus, vascular marker, or fascial plane and place a needle adjacent to it in real-time.       2. Ultrasound was used to visualize the spread of anesthetic in close proximity to the above referenced structure.       3. A permanent image is entered into the patient's record.       4. The visualized anatomic structures appeared normal.       5. There were no apparent abnormal pathologic findings.    Assessment/Narrative        Bolus given via needle. No blood aspirated via catheter.        Secured via.        Insertion/Infusion Method: Single Shot       Complications: none    Medication(s) Administered   Bupivacaine 0.5% w/ 1:400K Epi (Injection), 15 mL  Medication Administration Time: 6/3/2021 9:22 AM    Comments:  Ultrasound Interpretation, Peripheral Nerve Block    1. Under ultrasound guidance, the needle was inserted and placed in close proximity to the target nerve(s).  2. Ultrasound was also used to visualize the spread of the anesthetic in close proximity to the nerve(s) being blocked.  Local anesthetic was  administered in incremental doses, with intermittent negative aspiration.    3. The nerve(s) appeared anatomically normal.  4. There were no apparent abnormal pathological findings.  5. A permanent ultrasound image was saved in the patient's record.    Pt tolerated well.    No complications.      The surgeon has given a verbal order transferring care of this patient to me for the performance of a regional analgesia block for post-op pain control. It is requested of me because I am uniquely trained and qualified to perform this block and the surgeon is neither trained nor qualified to perform this procedure.

## 2021-06-03 NOTE — CONSULTS
Dale Cuevas is a 69 yo male with a PMH of psoriatic arthritis, BPH and HLD. Is admitted by orthopedics following redo RTKA.     His procedure was uncomplicated. VSS post-op and plans to discharge home POD 1    His PTA medications have been reconciled. He has not active medical issues. He can follow up with Rheumatology to discuss resumption of Humira following surgery    Hospitalist will sign off. Please do not hesitate to re consult us if additional concerns arise.     Ivana Brothers PA-C  Hospitalist Service  [phone]

## 2021-06-03 NOTE — PLAN OF CARE
Patient vital signs are at baseline: Yes  Patient able to ambulate as they were prior to admission or with assist devices provided by therapies during their stay:  No,  Reason:  Pt just arrived on the floor  Patient MUST void prior to discharge:  No,  Reason:  DTV  Patient able to tolerate oral intake:  No,  Reason:  progressing on this, pt is currently taking ice chips  Pain has adequate pain control using Oral analgesics:  No,  Reason:  Pt's block still in effect

## 2021-06-03 NOTE — PROGRESS NOTES
06/03/21 1634   Quick Adds   Type of Visit Initial PT Evaluation   Living Environment   People in home spouse   Current Living Arrangements house   Living Environment Comments pt reports he needs to negotiate full flight of stairs   Self-Care   Usual Activity Tolerance good   Current Activity Tolerance moderate   Equipment Currently Used at Home none   Activity/Exercise/Self-Care Comment TKA in 3/21, recently not needed AD.    Disability/Function   Fall history within last six months no   Change in Functional Status Since Onset of Current Illness/Injury yes   General Information   Onset of Illness/Injury or Date of Surgery 06/03/21   Referring Physician Jose C Mariscal MD   Pertinent History of Current Problem (include personal factors and/or comorbidities that impact the POC) R knee polyexchange   Existing Precautions/Restrictions fall   Weight-Bearing Status - RLE weight-bearing as tolerated   General Observations ROM and WB AT    Cognition   Cognitive Status Comments somewhat impulsive   Pain Assessment   Patient Currently in Pain No   Integumentary/Edema   Integumentary/Edema Comments see nursing notes   Posture    Posture Forward head position   Range of Motion (ROM)   ROM Comment 0-95* R knee    Strength   Strength Comments Antigravity strength in RLE    Bed Mobility   Comment (Bed Mobility) SBA   Transfers   Transfer Safety Comments CGA STS with FWW    Gait/Stairs (Locomotion)   Distance in Feet (Required for LE Total Joints) 5' eval 200' treatment   Comment (Gait/Stairs) CGA with FWW    Balance   Balance Comments good dynamic balance with use of FWW    Sensory Examination   Sensory Perception Comments intact to light touch    Clinical Impression   Criteria for Skilled Therapeutic Intervention yes, treatment indicated   PT Diagnosis (PT) impaired IND with transfers and gait s/p R knee polyexchange   Influenced by the following impairments pain, weakness, high level balance   Functional limitations due to  impairments see aboev   Clinical Presentation Stable/Uncomplicated   Clinical Presentation Rationale clinical judgement   Clinical Decision Making (Complexity) low complexity   Therapy Frequency (PT) 2x/day   Predicted Duration of Therapy Intervention (days/wks) 3 days   Planned Therapy Interventions (PT) balance training;bed mobility training;gait training;home exercise program;patient/family education;ROM (range of motion);stair training;strengthening;transfer training   Risk & Benefits of therapy have been explained evaluation/treatment results reviewed;care plan/treatment goals reviewed;risks/benefits reviewed;patient   PT Discharge Planning    PT Rationale for DC Rec SBA bed mobility. CGA STS with FWW. Pt ambulated 180' with FWW and CGA > SBA no LOB. Increased time to attempt to void. R knee ROM 0-95*, no quad lag with SLR. Mobilizing well    Therapy Certification   Start of care date 06/03/21   Certification date from 06/03/21   Certification date to 06/05/21   Medical Diagnosis R knee polyexchange   Total Evaluation Time   Total Evaluation Time (Minutes) 12

## 2021-06-03 NOTE — BRIEF OP NOTE
[facility]    Brief Operative Note    Pre-operative diagnosis: Right knee instability s/p TKA  Post-operative diagnosis same  Procedure: RIGHT KNEE POLYETHYLENE EXCHANGE  Surgeon(s) and Role:     * Jose C Westbrook MD - Primary     * Natalia Anderson PA-C - Assisting  Anesthesia: spinal   Estimated blood loss: 100 ml  Drains:  None  Specimens: None  Complications: None    Plan: DC home POD1 w/family assist.  DVT prophylaxis w/ASA 325mg QD x6wks.    Specimens:   ID Type Source Tests Collected by Time Destination   1 : RIGHT KNEE CULTURES Other (specify in comments) Knee, Right ANAEROBIC BACTERIAL CULTURE, GRAM STAIN, TISSUE CULTURE AEROBIC BACTERIAL Jose C Westbrook MD 6/3/2021 11:57 AM      Implants:   Implant Name Type Inv. Item Serial No.  Lot No. LRB No. Used Action   TRIATHLON TIBIAL BEARING INSERT / SIZE #5 / TYP PS / THKNS 10MM    [employer] [id number] Right 1 Explanted   IMP INSERT TIBIAL HOWM TRI 5X13MM [id number] - [id number] Total Joint Component/Insert IMP INSERT TIBIAL HOWM TRI 5X13MM [id number]  [employer] [id number] Right 1 Implanted

## 2021-06-03 NOTE — ANESTHESIA POSTPROCEDURE EVALUATION
Patient: Dale Cuevas    Procedure(s):  RIGHT KNEE POLYETHYLENE EXCHANGE    Diagnosis:Aftercare following right knee joint replacement surgery [Z47.1, Z96.651]  Diagnosis Additional Information: No value filed.    Anesthesia Type:  Spinal    Note:  Disposition: Admission   Postop Pain Control: Uneventful            Sign Out: Well controlled pain   PONV: No   Neuro/Psych: Uneventful            Sign Out: Acceptable/Baseline neuro status   Airway/Respiratory: Uneventful            Sign Out: Acceptable/Baseline resp. status   CV/Hemodynamics: Uneventful            Sign Out: Acceptable CV status; No obvious hypovolemia; No obvious fluid overload   Other NRE: NONE   DID A NON-ROUTINE EVENT OCCUR? No           Last vitals:  Vitals:    06/03/21 1505 06/03/21 1535 06/03/21 1635   BP: (!) 157/99 (!) 150/90 (!) 153/90   Pulse: 67 67 65   Resp: 18 18 18   Temp:  36.4  C (97.5  F)    SpO2: 94% 96% 94%       Last vitals prior to Anesthesia Care Transfer:  CRNA VITALS  6/3/2021 1226 - 6/3/2021 1326      6/3/2021             Pulse:  71    SpO2:  97 %    Resp Rate (set):  10          Electronically Signed By: Jaylen Puentes MD  Acacia 3, 2021  5:23 PM

## 2021-06-04 ENCOUNTER — APPOINTMENT (OUTPATIENT)
Dept: OCCUPATIONAL THERAPY | Facility: CLINIC | Age: 71
End: 2021-06-04
Attending: ORTHOPAEDIC SURGERY
Payer: COMMERCIAL

## 2021-06-04 ENCOUNTER — APPOINTMENT (OUTPATIENT)
Dept: PHYSICAL THERAPY | Facility: CLINIC | Age: 71
End: 2021-06-04
Attending: ORTHOPAEDIC SURGERY
Payer: COMMERCIAL

## 2021-06-04 VITALS
WEIGHT: 173.6 LBS | BODY MASS INDEX: 25.71 KG/M2 | OXYGEN SATURATION: 93 % | HEART RATE: 56 BPM | SYSTOLIC BLOOD PRESSURE: 130 MMHG | TEMPERATURE: 98.2 F | DIASTOLIC BLOOD PRESSURE: 83 MMHG | RESPIRATION RATE: 16 BRPM | HEIGHT: 69 IN

## 2021-06-04 DIAGNOSIS — R03.0 ELEVATED BLOOD PRESSURE READING WITHOUT DIAGNOSIS OF HYPERTENSION: Primary | ICD-10-CM

## 2021-06-04 LAB
GLUCOSE BLDC GLUCOMTR-MCNC: 100 MG/DL (ref 70–99)
HGB BLD-MCNC: 11.8 G/DL (ref 13.3–17.7)

## 2021-06-04 PROCEDURE — 97535 SELF CARE MNGMENT TRAINING: CPT | Mod: GO | Performed by: OCCUPATIONAL THERAPIST

## 2021-06-04 PROCEDURE — 97116 GAIT TRAINING THERAPY: CPT | Mod: GP | Performed by: PHYSICAL THERAPIST

## 2021-06-04 PROCEDURE — 250N000013 HC RX MED GY IP 250 OP 250 PS 637: Performed by: ORTHOPAEDIC SURGERY

## 2021-06-04 PROCEDURE — 36415 COLL VENOUS BLD VENIPUNCTURE: CPT | Performed by: ORTHOPAEDIC SURGERY

## 2021-06-04 PROCEDURE — 250N000011 HC RX IP 250 OP 636: Performed by: ORTHOPAEDIC SURGERY

## 2021-06-04 PROCEDURE — 82962 GLUCOSE BLOOD TEST: CPT

## 2021-06-04 PROCEDURE — 97165 OT EVAL LOW COMPLEX 30 MIN: CPT | Mod: GO | Performed by: OCCUPATIONAL THERAPIST

## 2021-06-04 PROCEDURE — 97530 THERAPEUTIC ACTIVITIES: CPT | Mod: GP | Performed by: PHYSICAL THERAPIST

## 2021-06-04 PROCEDURE — 85018 HEMOGLOBIN: CPT | Performed by: ORTHOPAEDIC SURGERY

## 2021-06-04 RX ORDER — OXYCODONE HYDROCHLORIDE 5 MG/1
TABLET ORAL
Qty: 30 TABLET | Refills: 0 | Status: SHIPPED | OUTPATIENT
Start: 2021-06-04 | End: 2021-10-14

## 2021-06-04 RX ADMIN — ACETAMINOPHEN 975 MG: 325 TABLET, FILM COATED ORAL at 08:39

## 2021-06-04 RX ADMIN — SENNOSIDES AND DOCUSATE SODIUM 1 TABLET: 8.6; 5 TABLET ORAL at 08:39

## 2021-06-04 RX ADMIN — DOCUSATE SODIUM 100 MG: 100 CAPSULE, LIQUID FILLED ORAL at 08:39

## 2021-06-04 RX ADMIN — ASPIRIN 325 MG: 325 TABLET, COATED ORAL at 08:39

## 2021-06-04 RX ADMIN — CEFAZOLIN 1 G: 1 INJECTION, POWDER, FOR SOLUTION INTRAMUSCULAR; INTRAVENOUS at 02:34

## 2021-06-04 RX ADMIN — KETOROLAC TROMETHAMINE 15 MG: 15 INJECTION, SOLUTION INTRAMUSCULAR; INTRAVENOUS at 08:39

## 2021-06-04 RX ADMIN — POLYETHYLENE GLYCOL 3350 17 G: 17 POWDER, FOR SOLUTION ORAL at 08:40

## 2021-06-04 RX ADMIN — KETOROLAC TROMETHAMINE 15 MG: 15 INJECTION, SOLUTION INTRAMUSCULAR; INTRAVENOUS at 02:34

## 2021-06-04 ASSESSMENT — ACTIVITIES OF DAILY LIVING (ADL): PREVIOUS_RESPONSIBILITIES: MEAL PREP;HOUSEKEEPING;LAUNDRY;SHOPPING;YARDWORK;MEDICATION MANAGEMENT;DRIVING;WORK

## 2021-06-04 NOTE — PLAN OF CARE
Patient vital signs are at baseline: Yes  Patient able to ambulate as they were prior to admission or with assist devices provided by therapies during their stay:  Yes  Patient MUST void prior to discharge:  Yes  Patient able to tolerate oral intake:  Yes  Pain has adequate pain control using Oral analgesics:  Yes     A&Ox4. Assist X1 with gait belt and walker. VSS on RA. CMS intact. Dressing C/D/I. Pain controlled with Toradol and Tylenol. Tolerating Reg Diet. Progressing per POC. Will Cont to monitor.

## 2021-06-04 NOTE — PLAN OF CARE
Patient vital signs are at baseline: Yes  Patient able to ambulate as they were prior to admission or with assist devices provided by therapies during their stay:  Yes  Patient MUST void prior to discharge:  Yes  Patient able to tolerate oral intake:  Yes  Pain has adequate pain control using Oral analgesics:  Yes  Educated pt on discharge instructions, pt did teach back and pt has met all his goals.

## 2021-06-04 NOTE — PROGRESS NOTES
[facility]      OUTPATIENT OCCUPATIONAL THERAPY  EVALUATION  PLAN OF TREATMENT FOR OUTPATIENT REHABILITATION  (COMPLETE FOR INITIAL CLAIMS ONLY)  Patient's Last Name, First Name, M.I.  YOB: 1950  Dale Cuevas                          Provider's Name  [facility] Medical Record No.  [id number]                               Onset Date:  06/03/21   Start of Care Date:  (P) 06/04/21     Type:     ___PT   _X_OT   ___SLP Medical Diagnosis:                           OT Diagnosis:  Decreased ADL independence   Visits from SOC:  1   _________________________________________________________________________________  Plan of Treatment/Functional Goals    Planned Interventions: ADL retraining, transfer training   Goals: See Occupational Therapy Goals on Care Plan in [facility] electronic health record.    Therapy Frequency: (S) 1x eval and treat  Predicted Duration of Therapy Intervention: 1 day  _________________________________________________________________________________    I CERTIFY THE NEED FOR THESE SERVICES FURNISHED UNDER        THIS PLAN OF TREATMENT AND WHILE UNDER MY CARE     (Physician co-signature of this document indicates review and certification of the therapy plan).                Certification date from: (P) 06/04/21, Certification date to: (P) 06/04/21    Referring Physician: Jose C Westbrook MD            Initial Assessment        See Occupational Therapy evaluation dated (P) 06/04/21 in Epic electronic health record.

## 2021-06-04 NOTE — PROGRESS NOTES
"Orthopedic Surgery  6/4/2021  POD #1    S: Patient voices no complaints today.     O: Blood pressure 130/83, pulse 56, temperature 98.2  F (36.8  C), temperature source Oral, resp. rate 16, height 1.753 m (5' 9\"), weight 78.7 kg (173 lb 9.6 oz), SpO2 93 %.  Lab Results   Component Value Date    HGB 11.8 06/04/2021     Neurovascularly intact.  Calves are negative bilaterally, both soft and nontender.  The dressing is C/D/I.      A: Mr. Cuevas is doing well status post Procedure(s):  RIGHT KNEE POLYETHYLENE EXCHANGE.    P: No dressing change, patient to remove outer dressing on POD3, leaving mesh adhesive in place.  Continue physical therapy. Discharge to home today.    Jose C Hernandez  [phone]    "

## 2021-06-04 NOTE — PLAN OF CARE
Physical Therapy Discharge Summary    Reason for therapy discharge:    Discharged to home.    Progress towards therapy goal(s). See goals on Care Plan in [facility] electronic health record for goal details.  Goals met    Therapy recommendation(s):    Continue home exercise program.

## 2021-06-04 NOTE — PLAN OF CARE
~ Patient vital signs are at baseline, - met   ~ Patient able to ambulate as they were prior to admission or with assist devices provided by therapies during their stay. - met   ~ Patient MUST void prior to discharge,- met    ~ Patient able to tolerate oral intake to maintain hydration status, - met   ~ Patient has adequate pain control using Oral analgesics, - met, has not needed oral pain meds  ~ Hypercapnia, hypoventilation or hypoxia resolved for at least 2 hours without supplemental oxygen, - met    ~ Deficits in sensation, mobility or coordination have resolved if spinal or regional anesthesia was used, - met, baseline neuropathy   ~ Patient has returned to baseline mental status, - met

## 2021-06-04 NOTE — PROGRESS NOTES
06/04/21 1023   Quick Adds   Type of Visit Initial Occupational Therapy Evaluation   Living Environment   People in home spouse   Current Living Arrangements house   Home Accessibility stairs to enter home   Transportation Anticipated family or friend will provide   Self-Care   Usual Activity Tolerance excellent   Current Activity Tolerance good   Equipment Currently Used at Home grab bar, toilet   Activity/Exercise/Self-Care Comment TKA in 3/21. Pt reports independence w/ I/ADLs at baseline w/o device.   Disability/Function   Hearing Difficulty or Deaf no   Wear Glasses or Blind yes   Concentrating, Remembering or Making Decisions Difficulty no   Difficulty Communicating no   Fall history within last six months no   General Information   Onset of Illness/Injury or Date of Surgery 06/03/21   Referring Physician Jose C Westbrook MD   Patient/Family Therapy Goal Statement (OT) Return home today   Additional Occupational Profile Info/Pertinent History of Current Problem s/p R TKA   Existing Precautions/Restrictions fall;no pivoting or twisting   Right Lower Extremity (Weight-bearing Status) weight-bearing as tolerated (WBAT)   Cognitive Status Examination   Orientation Status orientation to person, place and time   Visual Perception   Visual Impairment/Limitations WNL   Pain Assessment   Patient Currently in Pain No   Range of Motion Comprehensive   General Range of Motion no range of motion deficits identified   Strength Comprehensive (MMT)   General Manual Muscle Testing (MMT) Assessment no strength deficits identified   Coordination   Upper Extremity Coordination No deficits were identified   Bed Mobility   Bed Mobility supine-sit   Supine-Sit [place] (Bed Mobility) independent   Transfers   Transfers sit-stand transfer   Sit-Stand Transfer   Sit-Stand [place] (Transfers) independent   Activities of Daily Living   BADL Assessment/Intervention lower body dressing   Lower Body Dressing  Assessment/Training   [place] (Lower Body Dressing) supervision;verbal cues   Position (Lower Body Dressing) edge of bed sitting   Instrumental Activities of Daily Living (IADL)   Previous Responsibilities meal prep;housekeeping;laundry;shopping;yardwork;medication management;driving;work   Clinical Impression   Criteria for Skilled Therapeutic Interventions Met (OT) yes;meets criteria   OT Diagnosis Decreased ADL independence   OT Problem List-Impairments impacting ADL problems related to;post-surgical precautions   Assessment of Occupational Performance 1-3 Performance Deficits   Identified Performance Deficits Dressing, home management   Planned Therapy Interventions (OT) ADL retraining;transfer training   Clinical Decision Making Complexity (OT) low complexity   Therapy Frequency (OT) 1x eval and treat   Predicted Duration of Therapy 1 day   Risk & Benefits of therapy have been explained evaluation/treatment results reviewed;care plan/treatment goals reviewed;risks/benefits reviewed;current/potential barriers reviewed;participants voiced agreement with care plan;participants included;patient   OT Discharge Planning    OT Rationale for DC Rec Pt independent to supervision w/ ADLs and functional mobility. Safe to discharge home w/ spouse.   Therapy Certification   Start of Care Date 06/04/21   Certification date from 06/04/21   Certification date to 06/04/21   Total Evaluation Time (Minutes)   Total Evaluation Time (Minutes) 5

## 2021-06-04 NOTE — PROVIDER NOTIFICATION
Hospitalist service cross cover:    Paged by nursing staff regarding patient request for melatonin. My colleague signed off today.     Interventions/plan:  -- 1 mg PO melatonin ordered.    Please page with additional concerns,     DANNY Blanchard, CNP  Hospitalist - [facility]  Text Page  [phone])

## 2021-06-05 LAB
BACTERIA SPEC CULT: NO GROWTH
Lab: NORMAL
SPECIMEN SOURCE: NORMAL

## 2021-06-09 NOTE — TELEPHONE ENCOUNTER
NOTES Status Details   OFFICE NOTE from referring provider Internal 05.20.2021 Mario Alberto Ricardo MD   OFFICE NOTE from other specialist Internal 06.01.2021 Artis Espinoza MD    04.15.2021 Emiliano King, APRN CNP    02.04.2021 Car Browning MD    07.22.2019 Lopez Stanley MD    04.17.2019    Mili Enciso, [facility]        DISCHARGE SUMMARY from hospital Internal    DISCHARGE REPORT from the ER N/A    MEDICATION LIST Care Everywhere /Internal    LABS (Any and all labs)      Internal    Biopsy/pathology (Anything related to diagnoses I.e. fluid aspirations, lip biopsy, muscle biopsy)      N/A     N/A    Imaging (All imaging related to diagnoses)     Echo N/A    HRCT N/A    CXR N/A    EMG N/A                   Scleroderma/Dermatomyositis diagnoses     Previous Cardiology notes  N/A    Previous Pulmonary notes N/A    Previous Dermatology notes N/A    Previous GI notes N/A    Lupus diagnoses     Previous Nephrology notes N/A    Previous Dermatology notes N/A    Previous Cardiology notes N/A

## 2021-06-10 LAB
BACTERIA SPEC CULT: NORMAL
Lab: NORMAL
SPECIMEN SOURCE: NORMAL

## 2021-06-11 ENCOUNTER — PRE VISIT (OUTPATIENT)
Dept: RHEUMATOLOGY | Facility: CLINIC | Age: 71
End: 2021-06-11

## 2021-06-11 ENCOUNTER — OFFICE VISIT (OUTPATIENT)
Dept: RHEUMATOLOGY | Facility: CLINIC | Age: 71
End: 2021-06-11
Attending: INTERNAL MEDICINE
Payer: COMMERCIAL

## 2021-06-11 VITALS — HEART RATE: 84 BPM | OXYGEN SATURATION: 96 % | DIASTOLIC BLOOD PRESSURE: 92 MMHG | SYSTOLIC BLOOD PRESSURE: 149 MMHG

## 2021-06-11 DIAGNOSIS — L40.9 PSORIASIS: ICD-10-CM

## 2021-06-11 DIAGNOSIS — L40.50 PSORIATIC ARTHRITIS (H): ICD-10-CM

## 2021-06-11 PROCEDURE — 99215 OFFICE O/P EST HI 40 MIN: CPT | Performed by: INTERNAL MEDICINE

## 2021-06-11 PROCEDURE — G0463 HOSPITAL OUTPT CLINIC VISIT: HCPCS

## 2021-06-11 NOTE — NURSING NOTE
Chief Complaint   Patient presents with     RECHECK     follow up - former JACQUELIN King         BP (!) 149/92 (BP Location: Right arm, Patient Position: Sitting, Cuff Size: Adult Regular)   Pulse 84   SpO2 96%       Emiliano Carney, EMT

## 2021-06-11 NOTE — LETTER
6/11/2021       RE: Dale Cuevas  [address] [facility] [address]     Dear Colleague,    Thank you for referring your patient, Dale Cuevas, to the [facility] RHEUMATOLOGY [facility]INIC [place] [facility]. Please see a copy of my visit note below.    Follow up visit for psoriatic arthritis. Previously seen by Emiliano King.    CC: New Problem: joint pain in the MCPs and elbows    HPI He is 70 years old and was diagnosed with psoriatic arthritis about 1 year ago and was started on Humira. He did well but he was off Humira from January to March due to coverage issues and then for his knee surgery in March 2021. He was restarted on Humira late March. Just two weeks ago he had a renewal of his Humira and he is now taking it every week in place of every 2 weeks. One weeks ago he had another smaller orthopedic procedure at the same knee. He also received a short course of Medrol dose pack that he has finished. It did not make much difference. He still has swollen MCPs bilaterally, albeit somewhat better as before.    PMH gout, psoriatic arthritis, OA, knee replacement.    socHx daily alcohol intake    ROS: Constitutional, HEENT, cardiovascular, pulmonary, GI, musculoskeletal, neuro, skin, and psych systems are negative, except as otherwise noted in the HPI.    Reviewed previous notes in Epic  Reviewed blood test results in Epic good GFR, nl CBC, B27 pos, RF and CCP neg    Vitals reviewed and noted elevated BP  Joint exam shows inflammation of the MCPs right > left and elbows with pain at the radial head right > left. Able to use his hands for the most part.  Knee surgery site looks clean. No rash other than mild psoriasis patches.    1. Psoriatic arthritis flare. After a 3 month hiatus he started Humira 2.5 months ago but only 2 weeks ago the dose was doubled to 40 mg weekly.  2. His swelling has improved somewhat and he is managing with tylenol and ibuprofen,  and given that the Humira dose was double 2 weeks ago, I advised to wait 4 more weeks before deciding that Humira. If the inflammation is getting worse I plan to switch to Enbrel earlier.  3. Possibly he has developed anti-Humira antibodies.  4. After review of the chart the diagnosis of psoriatic arthritis is more convincing than that of rheumatoid arthritis despite the fact that he now presents bilateral MCP inflammation.   5. F/u 4 weeks or earlier.    I spent 40 minutes face to face with the patient, with 25 minutes on counseling and coordination of care.      Jamari Mead MD

## 2021-06-11 NOTE — PROGRESS NOTES
Follow up visit for psoriatic arthritis. Previously seen by Emiliano King.    CC: New Problem: joint pain in the MCPs and elbows    HPI He is 70 years old and was diagnosed with psoriatic arthritis about 1 year ago and was started on Humira. He did well but he was off Humira from January to March due to coverage issues and then for his knee surgery in March 2021. He was restarted on Humira late March. Just two weeks ago he had a renewal of his Humira and he is now taking it every week in place of every 2 weeks. One weeks ago he had another smaller orthopedic procedure at the same knee. He also received a short course of Medrol dose pack that he has finished. It did not make much difference. He still has swollen MCPs bilaterally, albeit somewhat better as before.    PMH gout, psoriatic arthritis, OA, knee replacement.    socHx daily alcohol intake    ROS: Constitutional, HEENT, cardiovascular, pulmonary, GI, musculoskeletal, neuro, skin, and psych systems are negative, except as otherwise noted in the HPI.    Reviewed previous notes in Epic  Reviewed blood test results in Epic good GFR, nl CBC, B27 pos, RF and CCP neg    Vitals reviewed and noted elevated BP  Joint exam shows inflammation of the MCPs right > left and elbows with pain at the radial head right > left. Able to use his hands for the most part.  Knee surgery site looks clean. No rash other than mild psoriasis patches.    1. Psoriatic arthritis flare. After a 3 month hiatus he started Humira 2.5 months ago but only 2 weeks ago the dose was doubled to 40 mg weekly.  2. His swelling has improved somewhat and he is managing with tylenol and ibuprofen, and given that the Humira dose was double 2 weeks ago, I advised to wait 4 more weeks before deciding that Humira. If the inflammation is getting worse I plan to switch to Enbrel earlier.  3. Possibly he has developed anti-Humira antibodies.  4. After review of the chart the diagnosis of psoriatic arthritis is  more convincing than that of rheumatoid arthritis despite the fact that he now presents bilateral MCP inflammation.   5. F/u 4 weeks or earlier.    I spent 40 minutes face to face with the patient, with 25 minutes on counseling and coordination of care.

## 2021-06-15 ENCOUNTER — TRANSFERRED RECORDS (OUTPATIENT)
Dept: HEALTH INFORMATION MANAGEMENT | Facility: CLINIC | Age: 71
End: 2021-06-15

## 2021-07-04 DIAGNOSIS — N40.0 BENIGN PROSTATIC HYPERPLASIA WITHOUT LOWER URINARY TRACT SYMPTOMS: ICD-10-CM

## 2021-07-04 DIAGNOSIS — E78.2 MIXED HYPERLIPIDEMIA: ICD-10-CM

## 2021-07-05 RX ORDER — TAMSULOSIN HYDROCHLORIDE 0.4 MG/1
CAPSULE ORAL
Qty: 90 CAPSULE | Refills: 3 | Status: SHIPPED | OUTPATIENT
Start: 2021-07-05 | End: 2022-06-08

## 2021-07-05 RX ORDER — ATORVASTATIN CALCIUM 40 MG/1
TABLET, FILM COATED ORAL
Qty: 90 TABLET | Refills: 0 | Status: SHIPPED | OUTPATIENT
Start: 2021-07-05 | End: 2021-07-19

## 2021-07-05 NOTE — TELEPHONE ENCOUNTER
Failed protocol. LDL  please route to  team if patient needs an appointment     Rashmi LEMONRN BSN  [facility]  [phone]

## 2021-07-05 NOTE — TELEPHONE ENCOUNTER
Routing refill request to provider for review/approval because:  Routed to PCP listed (came to  provider-MM)  Lety Anderson RN, BSN  Message handled by CLINIC NURSE.

## 2021-07-06 ENCOUNTER — TELEPHONE (OUTPATIENT)
Dept: INTERNAL MEDICINE | Facility: CLINIC | Age: 71
End: 2021-07-06

## 2021-07-06 NOTE — LETTER
[facility]  [address] [address]  [phone]  July 6, 2021  Dale Cuevas  [address] [facility] [address]    Dear Dale,    We care about your health and based on a review of your medical records, recommend the the following, to better manage your health:      You are in particular need of attention regarding:  -Colon Cancer Screening  -Wellness (Physical) Visit     I am recommending that you:     -schedule a WELLNESS (Physical) APPOINTMENT with me.   I will check fasting labs the same day - nothing to eat except water and meds for 8-10 hours prior.    -schedule a COLONOSCOPY to look for colon cancer (due every 10 years or 5 years in higher risk situations.)        Colon cancer is now the second leading cause of cancer-related deaths in the [place] for both men and women and there are over 130,000 new cases and 50,000 deaths per year from colon cancer.  Colonoscopies can prevent 90-95% of these deaths.  Problem lesions can be removed before they ever become cancer.  This test is not only looking for cancer, but also getting rid of precancerious lesions.    If you are under/uninsured, we recommend you contact the [employer] program. [employer] is a free colorectal cancer screening program that provides colonoscopies for eligible under/uninsured [place] men and women. If you are interested in receiving a free colonoscopy, please call [employer] at [phone] (mention code [employer]) to see if you re eligible.      If you do not wish to do a colonoscopy or cannot afford to do one, at this time, there is another option. It is called a FIT test or Fecal Immunochemical Occult Blood Test (take home stool sample kit).  It does not replace the colonoscopy for colorectal cancer screening, but it can detect hidden bleeding in the lower colon.  It does need to be repeated every year and if a positive result is obtained, you would  be referred for a colonoscopy.          If you have completed either one of these tests at another facility, please call with the details of when and where the tests were done and if they were normal or not. Or have the records sent to our clinic so that we can best coordinate your care.      Here is a list of Health Maintenance topics that are due now or due soon:  Health Maintenance Due   Topic Date Due     Zoster (Shingles) Vaccine (1 of 2) Never done     AORTIC ANEURYSM SCREENING (SYSTEM ASSIGNED)  Never done     Colorectal Cancer Screening  11/25/2018     Discuss Advance Care Planning  02/04/2020     Annual Wellness Visit  03/25/2020     Cholesterol Lab  03/25/2020     FALL RISK ASSESSMENT  03/25/2020       Please call us at [phone] or [phone] (or use [email or web address]) to address the above recommendations.     Thank you for trusting [facility].  We appreciate the opportunity to serve you and look forward to supporting your healthcare needs in the future.    If you have (or plan to have) any of these tests done at a facility other than a [facility] or a [facility], please have the results from these tests sent to your primary physician at [facility].    Healthy Regards,    Dr. Espinoza/Ivana Hernandez, [facility]

## 2021-07-06 NOTE — TELEPHONE ENCOUNTER
Patient Quality Outreach      Summary:    Patient Active Problem List   Diagnosis     Elevated blood pressure reading without diagnosis of hypertension     Rosacea     Erectile dysfunction     Psoriasis     Hyperlipidemia LDL goal <100     Benign prostatic hyperplasia     Family hx of prostate cancer     Diastasis recti     Psoriatic arthritis (H)     Immunosuppression (H)     Medication monitoring encounter     Status post total knee replacement, right     Status post revision of total knee, right       Patient is due/failing the following:   Colonoscopy and Annual wellness, date due: Now    Type of outreach:    Sent letter.    Questions for provider review:    None                                                                                                                                     Ivana Hernandez, CMA

## 2021-07-08 ENCOUNTER — TELEPHONE (OUTPATIENT)
Dept: RHEUMATOLOGY | Facility: CLINIC | Age: 71
End: 2021-07-08

## 2021-07-08 ENCOUNTER — OFFICE VISIT (OUTPATIENT)
Dept: RHEUMATOLOGY | Facility: CLINIC | Age: 71
End: 2021-07-08
Attending: INTERNAL MEDICINE
Payer: COMMERCIAL

## 2021-07-08 VITALS
OXYGEN SATURATION: 95 % | WEIGHT: 171 LBS | SYSTOLIC BLOOD PRESSURE: 153 MMHG | HEART RATE: 88 BPM | DIASTOLIC BLOOD PRESSURE: 84 MMHG | BODY MASS INDEX: 25.25 KG/M2

## 2021-07-08 DIAGNOSIS — L40.50 PSORIATIC ARTHRITIS (H): ICD-10-CM

## 2021-07-08 DIAGNOSIS — L40.50 PSORIATIC ARTHRITIS (H): Primary | ICD-10-CM

## 2021-07-08 LAB
ALBUMIN SERPL-MCNC: 3.6 G/DL (ref 3.4–5)
ALP SERPL-CCNC: 124 U/L (ref 40–150)
ALT SERPL W P-5'-P-CCNC: 25 U/L (ref 0–70)
ANION GAP SERPL CALCULATED.3IONS-SCNC: 6 MMOL/L (ref 3–14)
AST SERPL W P-5'-P-CCNC: 20 U/L (ref 0–45)
BASOPHILS # BLD AUTO: 0 10E9/L (ref 0–0.2)
BASOPHILS NFR BLD AUTO: 0.4 %
BILIRUB SERPL-MCNC: 0.5 MG/DL (ref 0.2–1.3)
BUN SERPL-MCNC: 15 MG/DL (ref 7–30)
CALCIUM SERPL-MCNC: 8.7 MG/DL (ref 8.5–10.1)
CHLORIDE SERPL-SCNC: 110 MMOL/L (ref 94–109)
CO2 SERPL-SCNC: 25 MMOL/L (ref 20–32)
CREAT SERPL-MCNC: 0.76 MG/DL (ref 0.66–1.25)
CRP SERPL-MCNC: 11.3 MG/L (ref 0–8)
DIFFERENTIAL METHOD BLD: ABNORMAL
EOSINOPHIL # BLD AUTO: 0.1 10E9/L (ref 0–0.7)
EOSINOPHIL NFR BLD AUTO: 1.3 %
ERYTHROCYTE [DISTWIDTH] IN BLOOD BY AUTOMATED COUNT: 13.2 % (ref 10–15)
ERYTHROCYTE [SEDIMENTATION RATE] IN BLOOD BY WESTERGREN METHOD: 16 MM/H (ref 0–20)
GFR SERPL CREATININE-BSD FRML MDRD: >90 ML/MIN/{1.73_M2}
GLUCOSE SERPL-MCNC: 112 MG/DL (ref 70–99)
HCT VFR BLD AUTO: 39.8 % (ref 40–53)
HGB BLD-MCNC: 13.2 G/DL (ref 13.3–17.7)
IMM GRANULOCYTES # BLD: 0 10E9/L (ref 0–0.4)
IMM GRANULOCYTES NFR BLD: 0.2 %
LYMPHOCYTES # BLD AUTO: 1.7 10E9/L (ref 0.8–5.3)
LYMPHOCYTES NFR BLD AUTO: 30.4 %
MCH RBC QN AUTO: 28.2 PG (ref 26.5–33)
MCHC RBC AUTO-ENTMCNC: 33.2 G/DL (ref 31.5–36.5)
MCV RBC AUTO: 85 FL (ref 78–100)
MONOCYTES # BLD AUTO: 0.4 10E9/L (ref 0–1.3)
MONOCYTES NFR BLD AUTO: 7 %
NEUTROPHILS # BLD AUTO: 3.3 10E9/L (ref 1.6–8.3)
NEUTROPHILS NFR BLD AUTO: 60.7 %
NRBC # BLD AUTO: 0 10*3/UL
NRBC BLD AUTO-RTO: 0 /100
PLATELET # BLD AUTO: 224 10E9/L (ref 150–450)
POTASSIUM SERPL-SCNC: 3.9 MMOL/L (ref 3.4–5.3)
PROT SERPL-MCNC: 7.7 G/DL (ref 6.8–8.8)
RBC # BLD AUTO: 4.68 10E12/L (ref 4.4–5.9)
SODIUM SERPL-SCNC: 141 MMOL/L (ref 133–144)
WBC # BLD AUTO: 5.5 10E9/L (ref 4–11)

## 2021-07-08 PROCEDURE — 80053 COMPREHEN METABOLIC PANEL: CPT | Performed by: PATHOLOGY

## 2021-07-08 PROCEDURE — G0463 HOSPITAL OUTPT CLINIC VISIT: HCPCS

## 2021-07-08 PROCEDURE — 36415 COLL VENOUS BLD VENIPUNCTURE: CPT | Performed by: PATHOLOGY

## 2021-07-08 PROCEDURE — 99215 OFFICE O/P EST HI 40 MIN: CPT | Performed by: INTERNAL MEDICINE

## 2021-07-08 PROCEDURE — 86140 C-REACTIVE PROTEIN: CPT | Performed by: PATHOLOGY

## 2021-07-08 PROCEDURE — 85652 RBC SED RATE AUTOMATED: CPT | Performed by: PATHOLOGY

## 2021-07-08 PROCEDURE — 85025 COMPLETE CBC W/AUTO DIFF WBC: CPT | Performed by: PATHOLOGY

## 2021-07-08 RX ORDER — PREDNISONE 5 MG/1
TABLET ORAL
Qty: 30 TABLET | Refills: 2 | Status: SHIPPED | OUTPATIENT
Start: 2021-07-08 | End: 2021-10-14

## 2021-07-08 RX ORDER — FOLIC ACID 1 MG/1
1 TABLET ORAL DAILY
Qty: 90 TABLET | Refills: 3 | Status: SHIPPED | OUTPATIENT
Start: 2021-07-08 | End: 2022-07-08

## 2021-07-08 RX ORDER — MEDROXYPROGESTERONE ACETATE 150 MG/ML
50 INJECTION, SUSPENSION INTRAMUSCULAR WEEKLY
Qty: 4 ML | Refills: 12 | Status: SHIPPED | OUTPATIENT
Start: 2021-07-08 | End: 2022-06-08

## 2021-07-08 NOTE — TELEPHONE ENCOUNTER
PA Initiation    Medication: ENBREL  Insurance Company: [facility] - Phone [phone] Fax [phone]  Pharmacy Filling the Rx: [facility] - [place], [place] - [facility] [address]  Filling Pharmacy Phone:    Filling Pharmacy Fax:    Start Date: 7/8/2021      FRANCISCO JAVIER RUELAS (Fuentes: [id number])  Enbrel SureClick 50MG/ML auto-injectors

## 2021-07-08 NOTE — PROGRESS NOTES
Follow-up for psoriatic arthritis.    CC: Continued joint pain and swelling    HPI: He was here a month ago and at that time we had decided to see any effect of increased dose of Humira, but he has not experienced any improvement in his joints.  Humira was increased to 1 injection weekly about 6 weeks ago, but he still struggles with morning stiffness and pain and swelling in the MCPs bilaterally as well as the wrists.  He is also recovering from his right knee surgery about a month ago, but that is improving as expected.  Specifically he has never been on methotrexate, and there is no clear contraindication other than his alcohol intake.    ROS: He denies any chest pain or shortness of breath, and he has not had a recent infection.  His main problem is joint pain as per HPI, the remainder of the ROS is negative in detail.    Physical examination  I reviewed his vitals and he has an elevated blood pressure of 153/84 and his BMI is 25  Examination of the hands shows that he has MCP inflammation bilaterally, as well as active synovitis in the wrists.  He has reduced range of motion in the right elbow especially reduced extension.  Shoulder exam is okay.  He has no major synovitis in the lower extremities he does have some swelling in the right knee as result of the surgery.  He has psoriatic patches on his skin especially at the elbows that have not changed since his last visit.    I have reviewed his laboratory tests that include negative tests for hepatitis B and C and TB.    Impression/plan  1.  He has a diagnosis of psoriatic arthritis that initially was well controlled with Humira but now he has active inflammation in multiple joints, despite increased dose of Humira weekly injections.  The conclusion should be that Humira is failing to control his disease at this time.  There are several possible explanations however that is more of an academic exercise, but practically we should move onto another medication.   In addition on the previous visit we were somewhat limited in therapeutic options, because he was just recovering from his knee surgery.  Also, he is currently active disease distribution is somewhat reminiscent of rheumatoid arthritis but he has negative rheumatoid factor and CCP antibody.  Either way, the medication plan as outlined below would work for either diagnosis.  2.  Start prednisone 15 mg daily with a plan to taper by 2.5 mg every 1 to 2 weeks, with a goal to be off in about 6 to 8 weeks.  This is so-called bridging therapy that hopefully will help quickly to improve his joints.  3.  Start methotrexate 15 mg weekly, equal to 6 tablets weekly.  This takes about 6 to 8 weeks to kick in.  This medication works for psoriatic arthritis as well as rheumatoid arthritis.  4.  Start folic acid 1 tablet daily.  5.  Stop Humira and start Enbrel 1 injection weekly.  We will need to get prior approval for this medication followed by application for coverage of the co-pay through the patient assistance program.  Below I have copied the details of Tiera Chaparro, who is our pharmacy liaison.  6.  He usually has about 3 drinks a day, and I asked him to reduce this to 1-2 a day as restarting him on methotrexate.  The combination of methotrexate and a statin and alcohol intake may be a burden on his liver, and we should closely follow him with blood test monitoring to ensure that his liver function will stay normal.  7.  I would like to see him back in 6 weeks to see how he is doing at his joints, but I encouraged him to contact me earlier if he is not experiencing any significant improvement on the prednisone.  Tiera Chaparro MBA, [facility]  Pharmacy Liaison   [facility]   [address] [place] [address].[place] [address]   [email or web address]    [phone] (phone) [phone] (fax)     [facility] - [place], [place] - [facility] [address]  Phone:  [phone]        I spent 45 minutes face to face  with the patient, with 30 minutes on counseling and coordination of care.

## 2021-07-08 NOTE — PATIENT INSTRUCTIONS
Start methotrexate 6 tabs once weekly  Start folic acid 1 tab daily  Stop Humira  Start Enbrel injection once weekly  Blood test today and every 2-3 months

## 2021-07-08 NOTE — LETTER
7/8/2021       RE: Dale Cuevas  [address] [facility] [address]     Dear Colleague,    Thank you for referring your patient, Dale Cuevas, to the [facility]HEUMATOLOGY [facility]INIC [place] at [facility]. Please see a copy of my visit note below.    Follow-up for psoriatic arthritis.    CC: Continued joint pain and swelling    HPI: He was here a month ago and at that time we had decided to see any effect of increased dose of Humira, but he has not experienced any improvement in his joints.  Humira was increased to 1 injection weekly about 6 weeks ago, but he still struggles with morning stiffness and pain and swelling in the MCPs bilaterally as well as the wrists.  He is also recovering from his right knee surgery about a month ago, but that is improving as expected.  Specifically he has never been on methotrexate, and there is no clear contraindication other than his alcohol intake.    ROS: He denies any chest pain or shortness of breath, and he has not had a recent infection.  His main problem is joint pain as per HPI, the remainder of the ROS is negative in detail.    Physical examination  I reviewed his vitals and he has an elevated blood pressure of 153/84 and his BMI is 25  Examination of the hands shows that he has MCP inflammation bilaterally, as well as active synovitis in the wrists.  He has reduced range of motion in the right elbow especially reduced extension.  Shoulder exam is okay.  He has no major synovitis in the lower extremities he does have some swelling in the right knee as result of the surgery.  He has psoriatic patches on his skin especially at the elbows that have not changed since his last visit.    I have reviewed his laboratory tests that include negative tests for hepatitis B and C and TB.    Impression/plan  1.  He has a diagnosis of psoriatic arthritis that initially was well controlled with Humira but now he has active  inflammation in multiple joints, despite increased dose of Humira weekly injections.  The conclusion should be that Humira is failing to control his disease at this time.  There are several possible explanations however that is more of an academic exercise, but practically we should move onto another medication.  In addition on the previous visit we were somewhat limited in therapeutic options, because he was just recovering from his knee surgery.  Also, he is currently active disease distribution is somewhat reminiscent of rheumatoid arthritis but he has negative rheumatoid factor and CCP antibody.  Either way, the medication plan as outlined below would work for either diagnosis.  2.  Start prednisone 15 mg daily with a plan to taper by 2.5 mg every 1 to 2 weeks, with a goal to be off in about 6 to 8 weeks.  This is so-called bridging therapy that hopefully will help quickly to improve his joints.  3.  Start methotrexate 15 mg weekly, equal to 6 tablets weekly.  This takes about 6 to 8 weeks to kick in.  This medication works for psoriatic arthritis as well as rheumatoid arthritis.  4.  Start folic acid 1 tablet daily.  5.  Stop Humira and start Enbrel 1 injection weekly.  We will need to get prior approval for this medication followed by application for coverage of the co-pay through the patient assistance program.  Below I have copied the details of Tiera Chaparro, who is our pharmacy liaison.  6.  He usually has about 3 drinks a day, and I asked him to reduce this to 1-2 a day as restarting him on methotrexate.  The combination of methotrexate and a statin and alcohol intake may be a burden on his liver, and we should closely follow him with blood test monitoring to ensure that his liver function will stay normal.  7.  I would like to see him back in 6 weeks to see how he is doing at his joints, but I encouraged him to contact me earlier if he is not experiencing any significant improvement on the  prednisone.  Tiera Chaparro MBA, [facility]  Pharmacy Liaison   [facility]   [address] [place] [address].[place] [address]   [email or web address]    [phone] (phone) [phone] (fax)     [facility] - [place], [place] - [address] Phone:  [phone]        I spent 45 minutes face to face with the patient, with 30 minutes on counseling and coordination of care.      Again, thank you for allowing me to participate in the care of your patient.      Sincerely,    Jamari Mead MD

## 2021-07-08 NOTE — NURSING NOTE
Chief Complaint   Patient presents with     RECHECK     4 week follow up     BP (!) 153/84 (BP Location: Right arm, Patient Position: Sitting, Cuff Size: Adult Regular)   Pulse 88   Wt 77.6 kg (171 lb)   SpO2 95%   BMI 25.25 kg/m        Emiliano Carney, EMT

## 2021-07-09 NOTE — TELEPHONE ENCOUNTER
Prior Authorization Approval    Authorization Effective Date: 7/9/2021  Authorization Expiration Date: 10/6/2021  Medication: ENBREL - APPROVED, HIGH COPAY   Approved Dose/Quantity:  4 FOR 28 DAYS   Reference #:     Insurance Company: [employer] - Phone [phone] Fax [phone]  Expected CoPay: $1700     CoPay Card Available:      Foundation Assistance Needed:    Which Pharmacy is filling the prescription (Not needed for infusion/clinic administered): [facility] - [place], [place] - [address]  Pharmacy Notified: No  Patient Notified: Yes

## 2021-07-13 NOTE — TELEPHONE ENCOUNTER
Free Drug Application Initiated  Medication - ENBREL  Sponsor - [employer]  Phone #   Fax #   Additional Information  FAXED ALL PAPERWORK

## 2021-07-15 NOTE — OP NOTE
Procedure Date: 06/03/2021    PREOPERATIVE DIAGNOSIS:  Right knee flexion instability, status post total knee arthroplasty.    POSTOPERATIVE DIAGNOSIS:  Right knee flexion instability, status post total knee arthroplasty.    PROCEDURE:  Right knee polyethylene exchange.    SURGEON:  Jose C Hernandez MD    Assistant:   Natalia Anderson PA-C    DESCRIPTION OF PROCEDURE:  The patient was brought to the Operating Room.  After satisfactory anesthesia, the right lower extremity was prepped and draped in the usual sterile fashion.  The patient received a weight-based antibiotic dosing preoperatively.  The right leg was prepped and draped in the usual sterile fashion.  The straight and previously made incision was opened.  Dissection was carried down to the extensor mechanism.  Medial arthrotomy was made.  On examination preoperatively while under anesthetic, the patient was somewhat lax in flexion and the patient had complained of a clicking- type sensation in his knee.  The medial arthrotomy was made.  The knee was exposed.  Both tibial and femoral components were examined.  There was no evidence of loosening of the components or failure of the components.  The polyethylene was removed.  Trial polyethylene components were placed and ultimately a 13 mm CS component was selected.  He has had previously a 10 mm PS component.  The 13 mm CS component gave excellent motion including full flexion, slight flexion contracture, excellent stability to ML as well as anterior-posterior planes. The CS was selected based upon restored stability and decreased mechanical clicking.  The knee was thoroughly irrigated.  The 13 mm CS insert was selected and was then placed and impacted in.     The knee was then closed in sequential layers including interrupted 0 Ethibond in the extensor mechanism and augmented with running #1 Stratafix suture and interrupted Vicryl sutures.  Interrupted 2-0 Vicryl was placed followed by 2-0 Strattafix, 3-0  subcuticular, Dermabond, Prineo.  Sterile dressing was applied.  The patient left the operating room in satisfactory condition.    Jose C Westbrook MD        D: 07/15/2021   T: 07/15/2021   MT: NAVA    Name:     FRANCISCO JAVIER RUELAS  MRN:      [id number]        Account:        [id number]   :      1950           Procedure Date: 2021     Document: [id number]    cc:  Jose C Westbrook MD

## 2021-07-16 ENCOUNTER — MYC MEDICAL ADVICE (OUTPATIENT)
Dept: INTERNAL MEDICINE | Facility: CLINIC | Age: 71
End: 2021-07-16

## 2021-07-16 ENCOUNTER — MYC MEDICAL ADVICE (OUTPATIENT)
Dept: RHEUMATOLOGY | Facility: CLINIC | Age: 71
End: 2021-07-16

## 2021-07-16 DIAGNOSIS — E78.2 MIXED HYPERLIPIDEMIA: ICD-10-CM

## 2021-07-18 ENCOUNTER — MYC MEDICAL ADVICE (OUTPATIENT)
Dept: RHEUMATOLOGY | Facility: CLINIC | Age: 71
End: 2021-07-18

## 2021-07-19 RX ORDER — ATORVASTATIN CALCIUM 40 MG/1
TABLET, FILM COATED ORAL
Qty: 90 TABLET | Refills: 0 | Status: SHIPPED | OUTPATIENT
Start: 2021-07-19 | End: 2022-02-07

## 2021-07-19 NOTE — TELEPHONE ENCOUNTER
Spoke to Dale and told him that the pharmacy will have his prednisone ready for him to  today.    Lyndsay Vincent MSN, RN  Rheumatology RN Care Coordinator   [facility]

## 2021-07-20 NOTE — TELEPHONE ENCOUNTER
Patient Quality Outreach 2nd Attempt      Summary:    Type of outreach:    Sent [employer] message.    Next Steps:  Reach out within 90 days via Phone.    Max number of attempts reached: Yes. Will try again in 90 days if patient still on fail list.    Questions for provider review:    None                                                                                                                    Ivana Hernandez, [facility]

## 2021-07-20 NOTE — TELEPHONE ENCOUNTER
Called [employer] - spoke to [facility]. Inquired about application, was told the case is pending due to benefit verification.  No further information is needed from clinic and/or patient. No ETA is available, they are processed in order of received.

## 2021-07-27 ENCOUNTER — TRANSFERRED RECORDS (OUTPATIENT)
Dept: HEALTH INFORMATION MANAGEMENT | Facility: CLINIC | Age: 71
End: 2021-07-27

## 2021-07-27 NOTE — TELEPHONE ENCOUNTER
Called Markell at [employer] - inquired about status of application.  He stated that the case is still pending and determination should be within a couple of days

## 2021-07-29 NOTE — TELEPHONE ENCOUNTER
Free Drug Application Approved  Effective Dates - Until 12/2021  Patient notified? Yes  Additional Information SEE BELOW

## 2021-08-19 ENCOUNTER — OFFICE VISIT (OUTPATIENT)
Dept: RHEUMATOLOGY | Facility: CLINIC | Age: 71
End: 2021-08-19
Attending: INTERNAL MEDICINE
Payer: COMMERCIAL

## 2021-08-19 VITALS
DIASTOLIC BLOOD PRESSURE: 85 MMHG | BODY MASS INDEX: 25.16 KG/M2 | OXYGEN SATURATION: 100 % | HEART RATE: 88 BPM | WEIGHT: 170.4 LBS | SYSTOLIC BLOOD PRESSURE: 133 MMHG

## 2021-08-19 DIAGNOSIS — M06.9 RHEUMATOID ARTHRITIS INVOLVING MULTIPLE JOINTS (H): ICD-10-CM

## 2021-08-19 DIAGNOSIS — M19.90 INFLAMMATORY ARTHRITIS: Primary | ICD-10-CM

## 2021-08-19 PROCEDURE — G0463 HOSPITAL OUTPT CLINIC VISIT: HCPCS

## 2021-08-19 PROCEDURE — 99214 OFFICE O/P EST MOD 30 MIN: CPT | Performed by: INTERNAL MEDICINE

## 2021-08-19 NOTE — NURSING NOTE
Chief Complaint   Patient presents with     RECHECK     follow up         /85 (BP Location: Right arm, Patient Position: Sitting, Cuff Size: Adult Regular)   Pulse 88   Wt 77.3 kg (170 lb 6.4 oz)   SpO2 100%   BMI 25.16 kg/m          Emiliano Carney, EMT

## 2021-08-19 NOTE — PROGRESS NOTES
Rheumatology consultation note    CC follow-up for rheumatoid arthritis/psoriatic arthritis    HPI: Dale is here for follow-up visit and today he is in much better shape with regards to his joints.  About 5 weeks ago he was started on prednisone taper as well as methotrexate and we switched his Humira to Enbrel.  Unfortunately he did not get to start his Enbrel until just a few days ago.  His improvement is just entirely due to his prednisone as well as his methotrexate.  He has no major medication side effects and he feels he is in much better position with regards to his joints.  He feels close to baseline.  The other issue was his right knee surgery that has healed up nicely by now.    He is ready to start several of his projects such as remodeling of [place].  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    On physical examination he has significant improvement of the MCPs and PIPs that were swollen and inflamed on the last visit.  There is also no active inflammation of the lower extremities.    Assessment/plan  1.  Officially diagnosed as psoriatic arthritis, but is symmetric bilateral distribution is more reminiscent of rheumatoid arthritis.  He has a good response to redness on burst and taper in the methotrexate and I suspect he will get further improvement with the Enbrel.  2.  He is currently on 5 mg of prednisone and I suggest that he can taper this slowly by reducing the dose in half for another 2 weeks or so and then stop based on his symptoms.  3.  I would like to see him back in 2 months when he should have full effect of the Enbrel.  4.  He is vaccinated for Covid with a Yan & Yan vaccination, and I suspect he will need another booster shot, but the CDC has not made any recommendations for those patients that received Yan & Yan vaccination.  I suspect that we will have no information coming out in the  next few weeks and will have a discussion at that time whether he should have another Yan & Yan vaccination or get the Pfizer shot instead.  The idea is to stop his medications for a few weeks to allow a good immune response to the vaccination, and then restart the medications 2 weeks after the vaccination.  5.  In about 3 weeks or so he would need blood work including CBC liver and kidney function ESR CRP to make sure he is tolerating his methotrexate.  6.  Follow-up in 2 months.    I spent 30 minutes face to face with the patient, with 25 minutes on counseling and coordination of care.

## 2021-08-19 NOTE — LETTER
8/19/2021       RE: Dale Cuevas  [address] [facility] [address]     Dear Colleague,    Thank you for referring your patient, Dale Cuevas, to the [facility] RHEUMATOLOGY CLINIC [place] at [facility]. Please see a copy of my visit note below.    Rheumatology consultation note    CC follow-up for rheumatoid arthritis/psoriatic arthritis    HPI: Dale is here for follow-up visit and today he is in much better shape with regards to his joints.  About 5 weeks ago he was started on prednisone taper as well as methotrexate and we switched his Humira to Enbrel.  Unfortunately he did not get to start his Enbrel until just a few days ago.  His improvement is just entirely due to his prednisone as well as his methotrexate.  He has no major medication side effects and he feels he is in much better position with regards to his joints.  He feels close to baseline.  The other issue was his right knee surgery that has healed up nicely by now.    He is ready to start several of his projects such as remodeling of [place].  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    On physical examination he has significant improvement of the MCPs and PIPs that were swollen and inflamed on the last visit.  There is also no active inflammation of the lower extremities.    Assessment/plan  1.  Officially diagnosed as psoriatic arthritis, but is symmetric bilateral distribution is more reminiscent of rheumatoid arthritis.  He has a good response to redness on burst and taper in the methotrexate and I suspect he will get further improvement with the Enbrel.  2.  He is currently on 5 mg of prednisone and I suggest that he can taper this slowly by reducing the dose in half for another 2 weeks or so and then stop based on his symptoms.  3.  I would like to see him back in 2 months when he should have  full effect of the Enbrel.  4.  He is vaccinated for Covid with a Yan & Yan vaccination, and I suspect he will need another booster shot, but the CDC has not made any recommendations for those patients that received Yan & Yan vaccination.  I suspect that we will have no information coming out in the next few weeks and will have a discussion at that time whether he should have another Yan & Yan vaccination or get the Pfizer shot instead.  The idea is to stop his medications for a few weeks to allow a good immune response to the vaccination, and then restart the medications 2 weeks after the vaccination.  5.  In about 3 weeks or so he would need blood work including CBC liver and kidney function ESR CRP to make sure he is tolerating his methotrexate.  6.  Follow-up in 2 months.    I spent 30 minutes face to face with the patient, with 25 minutes on counseling and coordination of care.    Again, thank you for allowing me to participate in the care of your patient.      Sincerely,    Jamari Mead MD

## 2021-10-14 ENCOUNTER — OFFICE VISIT (OUTPATIENT)
Dept: RHEUMATOLOGY | Facility: CLINIC | Age: 71
End: 2021-10-14
Attending: INTERNAL MEDICINE
Payer: COMMERCIAL

## 2021-10-14 VITALS
SYSTOLIC BLOOD PRESSURE: 158 MMHG | HEART RATE: 85 BPM | WEIGHT: 172.2 LBS | TEMPERATURE: 97.9 F | OXYGEN SATURATION: 96 % | BODY MASS INDEX: 25.51 KG/M2 | DIASTOLIC BLOOD PRESSURE: 97 MMHG | HEIGHT: 69 IN

## 2021-10-14 DIAGNOSIS — M06.9 RHEUMATOID ARTHRITIS INVOLVING MULTIPLE SITES, UNSPECIFIED WHETHER RHEUMATOID FACTOR PRESENT (H): Primary | ICD-10-CM

## 2021-10-14 PROCEDURE — 99214 OFFICE O/P EST MOD 30 MIN: CPT | Performed by: INTERNAL MEDICINE

## 2021-10-14 PROCEDURE — G0463 HOSPITAL OUTPT CLINIC VISIT: HCPCS

## 2021-10-14 RX ORDER — IBUPROFEN 200 MG
200 TABLET ORAL EVERY 4 HOURS PRN
COMMUNITY
End: 2021-10-14

## 2021-10-14 ASSESSMENT — MIFFLIN-ST. JEOR: SCORE: 1516.47

## 2021-10-14 ASSESSMENT — PAIN SCALES - GENERAL: PAINLEVEL: NO PAIN (0)

## 2021-10-14 NOTE — PROGRESS NOTES
Rheumatology consultation    CC: Follow-up for rheumatoid arthritis    HPI: Dale reports that he is doing well on the combination of methotrexate and Enbrel injections.  He is accompanied by his wife during this visit.  He does not have any major morning stiffness, joint swelling, but he has noticed mild ulnar deviation of his digits 3 4 and 5 bilaterally at the MCPs.  He feels the Enbrel is working albeit not as good as the Humira that he was on in the past.  He is not on any prednisone currently.  He has questions today about the booster Covid vaccination.  He is due for annual blood test soon.    Social history family history no new additions and ROS otherwise unremarkable in detail.    Physical examination  I reviewed his vital signs his blood pressure is elevated at 158/97 and he has a BMI of 25.  Joint examination shows that he has no active synovitis in the MCPs or PIPs and he is able to make full fist without difficulty.  Wrists are also unremarkable without synovitis.  There is some bony enlargement of the MCPs 2 and 3 but no tenderness.    His last blood tests are from July 2021.    Impression/plan  1.  Although his diagnosis is officially psoriatic arthritis I tend to reclassify him to rheumatoid arthritis given the symmetric joint involvement in the hands.  His disease is better controlled at this time with the addition of Enbrel 2 months ago.  I suggest we continue with methotrexate at same dose of 15 mg weekly.  2.  He is due for blood test soon but he cannot do it today as he is off to a cultural event.  3.  We discussed the booster vaccine and flu shot.  It is probably best to hold off on his methotrexate and Humira for at least 1 to 2 weeks then proceed with the vaccination make another 1 to 2 weeks before restarting the medications.  Holding the medications for a few weeks allows for a more robust immune response to the vaccinations.  At this time we do not have yet clear guidance what to do with  the booster for Yan & Yan vaccines, but I suppose in a few weeks we will get clarification from the FDA and CDC.  4.  I plan follow-up visit for 6 months.  I suggest he contact me if there is any problem with his joints in the subtle inflammation or if he has any other questions and certainly we can see him earlier if needed.    I spent 30 minutes face to face with the patient, with 25 minutes on counseling and coordination of care.

## 2021-10-14 NOTE — NURSING NOTE
"Chief Complaint   Patient presents with     RECHECK     2 month f/u     BP (!) 158/97   Pulse 85   Temp 97.9  F (36.6  C) (Oral)   Ht 1.753 m (5' 9\")   Wt 78.1 kg (172 lb 3.2 oz)   SpO2 96%   BMI 25.43 kg/m       Renata Miles MA  "

## 2021-10-14 NOTE — LETTER
10/14/2021      RE: Dale Cuevas  [address] [facility] [address]       Rheumatology consultation    CC: Follow-up for rheumatoid arthritis    HPI: Dale reports that he is doing well on the combination of methotrexate and Enbrel injections.  He is accompanied by his wife during this visit.  He does not have any major morning stiffness, joint swelling, but he has noticed mild ulnar deviation of his digits 3 4 and 5 bilaterally at the MCPs.  He feels the Enbrel is working albeit not as good as the Humira that he was on in the past.  He is not on any prednisone currently.  He has questions today about the booster Covid vaccination.  He is due for annual blood test soon.    Social history family history no new additions and ROS otherwise unremarkable in detail.    Physical examination  I reviewed his vital signs his blood pressure is elevated at 158/97 and he has a BMI of 25.  Joint examination shows that he has no active synovitis in the MCPs or PIPs and he is able to make full fist without difficulty.  Wrists are also unremarkable without synovitis.  There is some bony enlargement of the MCPs 2 and 3 but no tenderness.    His last blood tests are from July 2021.    Impression/plan  1.  Although his diagnosis is officially psoriatic arthritis I tend to reclassify him to rheumatoid arthritis given the symmetric joint involvement in the hands.  His disease is better controlled at this time with the addition of Enbrel 2 months ago.  I suggest we continue with methotrexate at same dose of 15 mg weekly.  2.  He is due for blood test soon but he cannot do it today as he is off to a cultural event.  3.  We discussed the booster vaccine and flu shot.  It is probably best to hold off on his methotrexate and Humira for at least 1 to 2 weeks then proceed with the vaccination make another 1 to 2 weeks before restarting the medications.  Holding the medications for a few weeks allows for a more robust immune response  to the vaccinations.  At this time we do not have yet clear guidance what to do with the booster for Yan & Yan vaccines, but I suppose in a few weeks we will get clarification from the FDA and CDC.  4.  I plan follow-up visit for 6 months.  I suggest he contact me if there is any problem with his joints in the subtle inflammation or if he has any other questions and certainly we can see him earlier if needed.    I spent 30 minutes face to face with the patient, with 25 minutes on counseling and coordination of care.      Jamari Mead MD

## 2021-10-14 NOTE — LETTER
10/14/2021       RE: Dale Cuevas  [address] [facility] [id number]     Dear Colleague,    Thank you for referring your patient, Dale Cuevas, to the [facility] RHEUMATOLOGY CLINIC [place] at [facility]. Please see a copy of my visit note below.    Rheumatology consultation    CC: Follow-up for rheumatoid arthritis    HPI: Dale reports that he is doing well on the combination of methotrexate and Enbrel injections.  He is accompanied by his wife during this visit.  He does not have any major morning stiffness, joint swelling, but he has noticed mild ulnar deviation of his digits 3 4 and 5 bilaterally at the MCPs.  He feels the Enbrel is working albeit not as good as the Humira that he was on in the past.  He is not on any prednisone currently.  He has questions today about the booster Covid vaccination.  He is due for annual blood test soon.    Social history family history no new additions and ROS otherwise unremarkable in detail.    Physical examination  I reviewed his vital signs his blood pressure is elevated at 158/97 and he has a BMI of 25.  Joint examination shows that he has no active synovitis in the MCPs or PIPs and he is able to make full fist without difficulty.  Wrists are also unremarkable without synovitis.  There is some bony enlargement of the MCPs 2 and 3 but no tenderness.    His last blood tests are from July 2021.    Impression/plan  1.  Although his diagnosis is officially psoriatic arthritis I tend to reclassify him to rheumatoid arthritis given the symmetric joint involvement in the hands.  His disease is better controlled at this time with the addition of Enbrel 2 months ago.  I suggest we continue with methotrexate at same dose of 15 mg weekly.  2.  He is due for blood test soon but he cannot do it today as he is off to a cultural event.  3.  We discussed the booster vaccine and flu shot.  It is probably best to hold off on  his methotrexate and Humira for at least 1 to 2 weeks then proceed with the vaccination make another 1 to 2 weeks before restarting the medications.  Holding the medications for a few weeks allows for a more robust immune response to the vaccinations.  At this time we do not have yet clear guidance what to do with the booster for Yan & Yan vaccines, but I suppose in a few weeks we will get clarification from the FDA and CDC.  4.  I plan follow-up visit for 6 months.  I suggest he contact me if there is any problem with his joints in the subtle inflammation or if he has any other questions and certainly we can see him earlier if needed.    I spent 30 minutes face to face with the patient, with 25 minutes on counseling and coordination of care.      Again, thank you for allowing me to participate in the care of your patient.      Sincerely,    Jamari Mead MD

## 2021-10-23 ENCOUNTER — HEALTH MAINTENANCE LETTER (OUTPATIENT)
Age: 71
End: 2021-10-23

## 2021-11-16 ENCOUNTER — IMMUNIZATION (OUTPATIENT)
Dept: NURSING | Facility: CLINIC | Age: 71
End: 2021-11-16
Payer: COMMERCIAL

## 2021-11-16 PROCEDURE — 91306 COVID-19,PF,MODERNA (18+ YRS BOOSTER .25ML): CPT

## 2021-11-16 PROCEDURE — 0064A COVID-19,PF,MODERNA (18+ YRS BOOSTER .25ML): CPT

## 2021-12-12 DIAGNOSIS — L40.50 PSORIATIC ARTHRITIS (H): ICD-10-CM

## 2021-12-17 ENCOUNTER — TRANSFERRED RECORDS (OUTPATIENT)
Dept: HEALTH INFORMATION MANAGEMENT | Facility: CLINIC | Age: 71
End: 2021-12-17
Payer: COMMERCIAL

## 2021-12-29 RX ORDER — METHOTREXATE 2.5 MG/1
15 TABLET ORAL
Qty: 72 TABLET | Refills: 2 | Status: SHIPPED | OUTPATIENT
Start: 2021-12-29 | End: 2022-09-06

## 2022-02-03 DIAGNOSIS — E78.2 MIXED HYPERLIPIDEMIA: ICD-10-CM

## 2022-02-03 NOTE — TELEPHONE ENCOUNTER
Last OV 6/1/21 - Dr Espinoza     LDL Cholesterol Calculated   Date Value Ref Range Status   03/25/2019 115 (H) <100 mg/dL Final     Comment:     Above desirable:  100-129 mg/dl  Borderline High:  130-159 mg/dL  High:             160-189 mg/dL  Very high:       >189 mg/dl       Patient overdue for visit, needs to schedule fasting OV to establish with new provider - sent MyChart message    Romina BANEGAS, Triage RN  [facility]

## 2022-02-07 RX ORDER — ATORVASTATIN CALCIUM 40 MG/1
TABLET, FILM COATED ORAL
Qty: 90 TABLET | Refills: 0 | Status: SHIPPED | OUTPATIENT
Start: 2022-02-07 | End: 2022-05-10

## 2022-02-07 NOTE — TELEPHONE ENCOUNTER
Routing refill request to provider for review/approval because:   LDL on file in past 12 months  LDL Cholesterol Calculated   Date Value Ref Range Status   03/25/2019 115 (H) <100 mg/dL Final     Comment:     Above desirable:  100-129 mg/dl  Borderline High:  130-159 mg/dL  High:             160-189 mg/dL  Very high:       >189 mg/dl       Medication last filled by Dr. Espinoza. [employer] message sent to patient to establish care with new PCP-sounds like patient wants to see someone closer to his home in [place].    Geno Morrison RN

## 2022-02-12 ENCOUNTER — HEALTH MAINTENANCE LETTER (OUTPATIENT)
Age: 72
End: 2022-02-12

## 2022-05-09 DIAGNOSIS — E78.2 MIXED HYPERLIPIDEMIA: ICD-10-CM

## 2022-05-10 NOTE — TELEPHONE ENCOUNTER
Pt needs to est care with new PCP.   Pt was notified 3 months ago, (2/3/22)  Fon message sent to pt 5/10.    Routing refill request to provider for review/approval because:  Sandy given x1 and patient did not follow up, please advise

## 2022-05-11 RX ORDER — ATORVASTATIN CALCIUM 40 MG/1
TABLET, FILM COATED ORAL
Qty: 30 TABLET | Refills: 0 | Status: SHIPPED | OUTPATIENT
Start: 2022-05-11 | End: 2022-06-08

## 2022-05-25 ENCOUNTER — IMMUNIZATION (OUTPATIENT)
Dept: NURSING | Facility: CLINIC | Age: 72
End: 2022-05-25
Payer: COMMERCIAL

## 2022-05-25 PROCEDURE — 0064A COVID-19,PF,MODERNA (18+ YRS BOOSTER .25ML): CPT

## 2022-05-25 PROCEDURE — 91306 COVID-19,PF,MODERNA (18+ YRS BOOSTER .25ML): CPT

## 2022-06-08 ENCOUNTER — TELEPHONE (OUTPATIENT)
Dept: FAMILY MEDICINE | Facility: CLINIC | Age: 72
End: 2022-06-08

## 2022-06-08 ENCOUNTER — OFFICE VISIT (OUTPATIENT)
Dept: FAMILY MEDICINE | Facility: CLINIC | Age: 72
End: 2022-06-08

## 2022-06-08 ENCOUNTER — LAB (OUTPATIENT)
Dept: LAB | Facility: CLINIC | Age: 72
End: 2022-06-08
Payer: COMMERCIAL

## 2022-06-08 VITALS
SYSTOLIC BLOOD PRESSURE: 160 MMHG | TEMPERATURE: 98.5 F | HEIGHT: 69 IN | DIASTOLIC BLOOD PRESSURE: 80 MMHG | RESPIRATION RATE: 16 BRPM | WEIGHT: 184 LBS | HEART RATE: 71 BPM | OXYGEN SATURATION: 97 % | BODY MASS INDEX: 27.25 KG/M2

## 2022-06-08 DIAGNOSIS — Z12.11 COLON CANCER SCREENING: ICD-10-CM

## 2022-06-08 DIAGNOSIS — M19.90 INFLAMMATORY ARTHRITIS: ICD-10-CM

## 2022-06-08 DIAGNOSIS — Z13.220 SCREENING FOR HYPERLIPIDEMIA: ICD-10-CM

## 2022-06-08 DIAGNOSIS — Z76.89 ESTABLISHING CARE WITH NEW DOCTOR, ENCOUNTER FOR: Primary | ICD-10-CM

## 2022-06-08 DIAGNOSIS — N40.0 BENIGN PROSTATIC HYPERPLASIA WITHOUT LOWER URINARY TRACT SYMPTOMS: ICD-10-CM

## 2022-06-08 DIAGNOSIS — Z96.651 STATUS POST TOTAL KNEE REPLACEMENT, RIGHT: ICD-10-CM

## 2022-06-08 DIAGNOSIS — D84.9 IMMUNOSUPPRESSION (H): ICD-10-CM

## 2022-06-08 DIAGNOSIS — Z12.11 SCREEN FOR COLON CANCER: ICD-10-CM

## 2022-06-08 DIAGNOSIS — L40.50 PSORIATIC ARTHRITIS (H): ICD-10-CM

## 2022-06-08 DIAGNOSIS — E78.2 MIXED HYPERLIPIDEMIA: ICD-10-CM

## 2022-06-08 DIAGNOSIS — Z96.651 STATUS POST REVISION OF TOTAL KNEE, RIGHT: ICD-10-CM

## 2022-06-08 DIAGNOSIS — N40.0 BENIGN PROSTATIC HYPERPLASIA, UNSPECIFIED WHETHER LOWER URINARY TRACT SYMPTOMS PRESENT: ICD-10-CM

## 2022-06-08 DIAGNOSIS — M06.9 RHEUMATOID ARTHRITIS INVOLVING MULTIPLE JOINTS (H): ICD-10-CM

## 2022-06-08 DIAGNOSIS — N52.9 ERECTILE DYSFUNCTION, UNSPECIFIED ERECTILE DYSFUNCTION TYPE: ICD-10-CM

## 2022-06-08 LAB
ALBUMIN SERPL-MCNC: 3.9 G/DL (ref 3.4–5)
ALP SERPL-CCNC: 94 U/L (ref 40–150)
ALT SERPL W P-5'-P-CCNC: 46 U/L (ref 0–70)
ANION GAP SERPL CALCULATED.3IONS-SCNC: 7 MMOL/L (ref 3–14)
AST SERPL W P-5'-P-CCNC: 32 U/L (ref 0–45)
BASOPHILS # BLD AUTO: 0 10E3/UL (ref 0–0.2)
BASOPHILS NFR BLD AUTO: 1 %
BILIRUB SERPL-MCNC: 0.5 MG/DL (ref 0.2–1.3)
BUN SERPL-MCNC: 10 MG/DL (ref 7–30)
CALCIUM SERPL-MCNC: 9 MG/DL (ref 8.5–10.1)
CHLORIDE BLD-SCNC: 112 MMOL/L (ref 94–109)
CHOLEST SERPL-MCNC: 142 MG/DL
CO2 SERPL-SCNC: 24 MMOL/L (ref 20–32)
CREAT SERPL-MCNC: 0.81 MG/DL (ref 0.66–1.25)
CRP SERPL-MCNC: <2.9 MG/L (ref 0–8)
EOSINOPHIL # BLD AUTO: 0 10E3/UL (ref 0–0.7)
EOSINOPHIL NFR BLD AUTO: 1 %
ERYTHROCYTE [DISTWIDTH] IN BLOOD BY AUTOMATED COUNT: 12.4 % (ref 10–15)
ERYTHROCYTE [SEDIMENTATION RATE] IN BLOOD BY WESTERGREN METHOD: 5 MM/HR (ref 0–20)
GFR SERPL CREATININE-BSD FRML MDRD: >90 ML/MIN/1.73M2
GLUCOSE BLD-MCNC: 131 MG/DL (ref 70–99)
HCT VFR BLD AUTO: 44 % (ref 40–53)
HDLC SERPL-MCNC: 34 MG/DL
HGB BLD-MCNC: 15.2 G/DL (ref 13.3–17.7)
IMM GRANULOCYTES # BLD: 0 10E3/UL
IMM GRANULOCYTES NFR BLD: 0 %
LDLC SERPL CALC-MCNC: 46 MG/DL
LYMPHOCYTES # BLD AUTO: 1.5 10E3/UL (ref 0.8–5.3)
LYMPHOCYTES NFR BLD AUTO: 38 %
MCH RBC QN AUTO: 30.6 PG (ref 26.5–33)
MCHC RBC AUTO-ENTMCNC: 34.5 G/DL (ref 31.5–36.5)
MCV RBC AUTO: 89 FL (ref 78–100)
MONOCYTES # BLD AUTO: 0.3 10E3/UL (ref 0–1.3)
MONOCYTES NFR BLD AUTO: 7 %
NEUTROPHILS # BLD AUTO: 2.1 10E3/UL (ref 1.6–8.3)
NEUTROPHILS NFR BLD AUTO: 53 %
NONHDLC SERPL-MCNC: 108 MG/DL
NRBC # BLD AUTO: 0 10E3/UL
NRBC BLD AUTO-RTO: 0 /100
PLATELET # BLD AUTO: 170 10E3/UL (ref 150–450)
POTASSIUM BLD-SCNC: 3.8 MMOL/L (ref 3.4–5.3)
PROT SERPL-MCNC: 7.5 G/DL (ref 6.8–8.8)
RBC # BLD AUTO: 4.96 10E6/UL (ref 4.4–5.9)
SODIUM SERPL-SCNC: 143 MMOL/L (ref 133–144)
TRIGL SERPL-MCNC: 312 MG/DL
WBC # BLD AUTO: 4 10E3/UL (ref 4–11)

## 2022-06-08 PROCEDURE — 80061 LIPID PANEL: CPT | Performed by: PATHOLOGY

## 2022-06-08 PROCEDURE — 99215 OFFICE O/P EST HI 40 MIN: CPT | Performed by: FAMILY MEDICINE

## 2022-06-08 PROCEDURE — 36415 COLL VENOUS BLD VENIPUNCTURE: CPT | Performed by: PATHOLOGY

## 2022-06-08 PROCEDURE — 86140 C-REACTIVE PROTEIN: CPT | Performed by: PATHOLOGY

## 2022-06-08 PROCEDURE — 80053 COMPREHEN METABOLIC PANEL: CPT | Performed by: PATHOLOGY

## 2022-06-08 PROCEDURE — 85652 RBC SED RATE AUTOMATED: CPT | Performed by: PATHOLOGY

## 2022-06-08 PROCEDURE — 85025 COMPLETE CBC W/AUTO DIFF WBC: CPT | Performed by: PATHOLOGY

## 2022-06-08 RX ORDER — ATORVASTATIN CALCIUM 40 MG/1
40 TABLET, FILM COATED ORAL DAILY
Qty: 90 TABLET | Refills: 3 | Status: SHIPPED | OUTPATIENT
Start: 2022-06-08 | End: 2023-06-05

## 2022-06-08 RX ORDER — TADALAFIL 10 MG/1
10 TABLET ORAL DAILY PRN
Qty: 20 TABLET | Refills: 0 | Status: SHIPPED | OUTPATIENT
Start: 2022-06-08 | End: 2022-07-08

## 2022-06-08 RX ORDER — TAMSULOSIN HYDROCHLORIDE 0.4 MG/1
CAPSULE ORAL
Qty: 90 CAPSULE | Refills: 3 | Status: SHIPPED | OUTPATIENT
Start: 2022-06-08 | End: 2023-05-16

## 2022-06-08 NOTE — PROGRESS NOTES
"SUBJECTIVE  Dale Cuevas is a 71 year old male here for need to establish care with another provider    PCP retired- he needs to establish care    Answers for HPI/ROS submitted by the patient on 6/6/2022  What is the reason for your visit today? : looking for new provider  How many servings of fruits and vegetables do you eat daily?: 2-3  On average, how many sweetened beverages do you drink each day (Examples: soda, juice, sweet tea, etc.  Do NOT count diet or artificially sweetened beverages)?: 1  How many minutes a day do you exercise enough to make your heart beat faster?: 10 to 19  How many days a week do you exercise enough to make your heart beat faster?: 4  How many days per week do you miss taking your medication?: 0    Hx psoriasis/rheumatoid arthritis: follows with rheumatology, Dr. Mead. On methotrexate.    Hx of two remote episodes of cardiac arrhythmias- due to diuretic effect/electrolyte imbalance from coffee and also due to caffeine from coffee. No known CAD.  On statin    Hx BPH/ED: on flomax. Has not been sexually active recently but interested in treatment for ED. Former smoker- quit. EtOH use. Tried sample of medication for ED years ago- was a sample from previous PCP but has not been on anything recently.     Elevated BP without diagnosis of hypertension. Has never checked ambulatory readings. Interested in trying non-pharmacologic treatment options first.     Hx of herniated disc in back s/p major spine surgery. No residual pain. Minor mobility issues.    Hx of R knee replacement s/p revision.         ROS  See HPI    Reviewed Past Medical History, Medications, Family History and Social History in Epic and up to date with no new changes.    OBJECTIVE  BP (!) 160/80   Pulse 71   Temp 98.5  F (36.9  C) (Oral)   Resp 16   Ht 1.74 m (5' 8.5\")   Wt 83.5 kg (184 lb)   SpO2 97%   BMI 27.57 kg/m       General: Cooperative, pleasant, in no acute distress      LABS & IMAGES   Results for orders " placed or performed in visit on 06/08/22   Comprehensive metabolic panel     Status: Abnormal   Result Value Ref Range    Sodium 143 133 - 144 mmol/L    Potassium 3.8 3.4 - 5.3 mmol/L    Chloride 112 (H) 94 - 109 mmol/L    Carbon Dioxide (CO2) 24 20 - 32 mmol/L    Anion Gap 7 3 - 14 mmol/L    Urea Nitrogen 10 7 - 30 mg/dL    Creatinine 0.81 0.66 - 1.25 mg/dL    Calcium 9.0 8.5 - 10.1 mg/dL    Glucose 131 (H) 70 - 99 mg/dL    Alkaline Phosphatase 94 40 - 150 U/L    AST 32 0 - 45 U/L    ALT 46 0 - 70 U/L    Protein Total 7.5 6.8 - 8.8 g/dL    Albumin 3.9 3.4 - 5.0 g/dL    Bilirubin Total 0.5 0.2 - 1.3 mg/dL    GFR Estimate >90 >60 mL/min/1.73m2   CBC with platelets differential     Status: None    Narrative    The following orders were created for panel order CBC with platelets differential.  Procedure                               Abnormality         Status                     ---------                               -----------         ------                     CBC with platelets and d...[[id number]]                      Final result                 Please view results for these tests on the individual orders.   ESR     Status: Normal   Result Value Ref Range    Erythrocyte Sedimentation Rate 5 0 - 20 mm/hr   CRP inflammation     Status: Normal   Result Value Ref Range    CRP Inflammation <2.9 0.0 - 8.0 mg/L   CBC with platelets and differential     Status: None   Result Value Ref Range    WBC Count 4.0 4.0 - 11.0 10e3/uL    RBC Count 4.96 4.40 - 5.90 10e6/uL    Hemoglobin 15.2 13.3 - 17.7 g/dL    Hematocrit 44.0 40.0 - 53.0 %    MCV 89 78 - 100 fL    MCH 30.6 26.5 - 33.0 pg    MCHC 34.5 31.5 - 36.5 g/dL    RDW 12.4 10.0 - 15.0 %    Platelet Count 170 150 - 450 10e3/uL    % Neutrophils 53 %    % Lymphocytes 38 %    % Monocytes 7 %    % Eosinophils 1 %    % Basophils 1 %    % Immature Granulocytes 0 %    NRBCs per 100 WBC 0 <1 /100    Absolute Neutrophils 2.1 1.6 - 8.3 10e3/uL    Absolute Lymphocytes 1.5 0.8 - 5.3  10e3/uL    Absolute Monocytes 0.3 0.0 - 1.3 10e3/uL    Absolute Eosinophils 0.0 0.0 - 0.7 10e3/uL    Absolute Basophils 0.0 0.0 - 0.2 10e3/uL    Absolute Immature Granulocytes 0.0 <=0.4 10e3/uL    Absolute NRBCs 0.0 10e3/uL   Lipid panel reflex to direct LDL Fasting     Status: Abnormal   Result Value Ref Range    Cholesterol 142 <200 mg/dL    Triglycerides 312 (H) <150 mg/dL    Direct Measure HDL 34 (L) >=40 mg/dL    LDL Cholesterol Calculated 46 <=100 mg/dL    Non HDL Cholesterol 108 <130 mg/dL    Narrative    Cholesterol  Desirable:  <200 mg/dL    Triglycerides  Normal:  Less than 150 mg/dL  Borderline High:  150-199 mg/dL  High:  200-499 mg/dL  Very High:  Greater than or equal to 500 mg/dL    Direct Measure HDL  Female:  Greater than or equal to 50 mg/dL   Male:  Greater than or equal to 40 mg/dL    LDL Cholesterol  Desirable:  <100mg/dL  Above Desirable:  100-129 mg/dL   Borderline High:  130-159 mg/dL   High:  160-189 mg/dL   Very High:  >= 190 mg/dL    Non HDL Cholesterol  Desirable:  130 mg/dL  Above Desirable:  130-159 mg/dL  Borderline High:  160-189 mg/dL  High:  190-219 mg/dL  Very High:  Greater than or equal to 220 mg/dL         ASSESSMENT/PLAN:   Dale was seen today for establish care.    Diagnoses and all orders for this visit:    Establishing care with new doctor, encounter for: Reviewed all medical history in epic and updated.     Elevated BP without diagnosis of hypertension: Reviewed BP at recent visits- always borderline or elevated BP. Family history of HTN in mother. Former smoker. Reviewed options for treatment including ambulatory readings vs lifestyle modification (reduce sodium intake, increase aerobic exercise, maintain healthy weight) vs starting antihypertensive. Patient elects to work on lifestyle modifications- will recheck in 3 months. If still elevated, consider medication to reduce future cardiovascular risk.    Status post total knee replacement, right  Status post revision of  total knee, right: Chronic issue.    Psoriatic arthritis (H)  Immunosuppression (H): On methotrexate- follows with rheumatology.    Benign prostatic hyperplasia, unspecified whether lower urinary tract symptoms present: Continue flomax.  -     tamsulosin (FLOMAX) 0.4 MG capsule; TAKE 1 CAPSULE BY MOUTH EVERY DAY       Mixed hyperlipidemia: On statin.  -     atorvastatin (LIPITOR) 40 MG tablet; Take 1 tablet (40 mg) by mouth daily  -     Lipid panel reflex to direct LDL Fasting; Future    Colon cancer screening: History of colonoscopy- no polyps. Reviewed options- prefers non-invasive testing with cologuard.  -     COLOGUARD([employer])    Erectile dysfunction, unspecified erectile dysfunction type: Reviewed options. Former smoker. Limit EtOH use. Will trial PDE5 inhibitor. Reviewed risks of hypotension.  -     tadalafil (CIALIS) 10 MG tablet; Take 1 tablet (10 mg) by mouth daily as needed    Other orders  -     REVIEW OF HEALTH MAINTENANCE PROTOCOL ORDERS          60 minutes spent on the date of the encounter doing chart review, history and exam, documentation and further activities as noted above    RTC in 3 months for BP check    Options for treatment and follow-up care were reviewed with the patient. Dale Cuevas and/or guardian was engaged and actively involved in the decision making process. Dale Cuevas and/or guardian verbalized understanding of the options discussed and was satisfied with the final plan.      Romina Perez MD

## 2022-06-09 NOTE — TELEPHONE ENCOUNTER
Central Prior Authorization Team   Phone: [phone]      Prior Authorization Not Needed per Insurance    Medication: tamsulosin (FLOMAX) 0.4 MG capsule--INITIATED  Insurance Company: [employer] - Phone [phone] Fax [phone]  Expected CoPay:      Pharmacy Filling the Rx: [id number] [place] [address]  Pharmacy Notified: Yes  Patient Notified: Yes PHARMACY WILL CONTACT WHEN FILLED

## 2022-06-10 RX ORDER — ATORVASTATIN CALCIUM 40 MG/1
TABLET, FILM COATED ORAL
Qty: 30 TABLET | Refills: 0
Start: 2022-06-10

## 2022-06-30 DIAGNOSIS — L40.50 PSORIATIC ARTHRITIS (H): ICD-10-CM

## 2022-07-06 ENCOUNTER — MYC MEDICAL ADVICE (OUTPATIENT)
Dept: FAMILY MEDICINE | Facility: CLINIC | Age: 72
End: 2022-07-06

## 2022-07-07 NOTE — TELEPHONE ENCOUNTER
Medication: folic acid (FOLVITE) 1 MG tablet    Last Written Prescription Date:  7/8/2021  Last Fill Quantity: 90,   # refills: 3  Last Office Visit: 10/14/2021  Future Office visit:  No Future Appointment Scheduled    Routing refill request to RN for review.    Irene Mcgrath CMA   7/7/2022 10:48 AM

## 2022-07-08 DIAGNOSIS — N52.9 ERECTILE DYSFUNCTION, UNSPECIFIED ERECTILE DYSFUNCTION TYPE: Primary | ICD-10-CM

## 2022-07-08 RX ORDER — FOLIC ACID 1 MG/1
TABLET ORAL
Qty: 90 TABLET | Refills: 3 | Status: SHIPPED | OUTPATIENT
Start: 2022-07-08 | End: 2023-07-19

## 2022-07-08 RX ORDER — AVANAFIL 100 MG/1
100 TABLET ORAL PRN
Qty: 20 TABLET | Refills: 1 | Status: SHIPPED | OUTPATIENT
Start: 2022-07-08 | End: 2022-07-20

## 2022-07-08 NOTE — TELEPHONE ENCOUNTER
Dr. Perez,   Please review patient message via [employer] message requesting an alternative less expensive medication for impotence dysfunctional.    Thank you    Nitin Myers, ROBINN, RN  [facility]    
Sent my chart message- see other response.  
Ambulatory

## 2022-07-19 ENCOUNTER — MYC MEDICAL ADVICE (OUTPATIENT)
Dept: FAMILY MEDICINE | Facility: CLINIC | Age: 72
End: 2022-07-19

## 2022-07-19 DIAGNOSIS — N52.9 ERECTILE DYSFUNCTION, UNSPECIFIED ERECTILE DYSFUNCTION TYPE: ICD-10-CM

## 2022-07-19 DIAGNOSIS — Z76.0 ENCOUNTER FOR MEDICATION REFILL: Primary | ICD-10-CM

## 2022-07-20 RX ORDER — SILDENAFIL CITRATE 20 MG/1
20 TABLET ORAL DAILY PRN
Qty: 20 TABLET | Refills: 1 | Status: SHIPPED | OUTPATIENT
Start: 2022-07-20 | End: 2022-10-12

## 2022-09-03 DIAGNOSIS — L40.50 PSORIATIC ARTHRITIS (H): ICD-10-CM

## 2022-09-06 RX ORDER — METHOTREXATE 2.5 MG/1
15 TABLET ORAL
Qty: 72 TABLET | Refills: 1 | Status: SHIPPED | OUTPATIENT
Start: 2022-09-06 | End: 2023-02-17

## 2022-09-06 NOTE — TELEPHONE ENCOUNTER
Medication/Dose: methotrexate sodium 2.5 MG TABS    Last Written : 12/29/21  Last Quantity: 72, # refills: 2  Last Office Visit :  10/14/21  Pending appointment: None scheduled    WBC   Date Value Ref Range Status   07/08/2021 5.5 4.0 - 11.0 10e9/L Final     WBC Count   Date Value Ref Range Status   06/08/2022 4.0 4.0 - 11.0 10e3/uL Final     RBC Count   Date Value Ref Range Status   06/08/2022 4.96 4.40 - 5.90 10e6/uL Final   07/08/2021 4.68 4.4 - 5.9 10e12/L Final     Hemoglobin   Date Value Ref Range Status   06/08/2022 15.2 13.3 - 17.7 g/dL Final   07/08/2021 13.2 (L) 13.3 - 17.7 g/dL Final     Hematocrit   Date Value Ref Range Status   06/08/2022 44.0 40.0 - 53.0 % Final   07/08/2021 39.8 (L) 40.0 - 53.0 % Final     MCV   Date Value Ref Range Status   06/08/2022 89 78 - 100 fL Final   07/08/2021 85 78 - 100 fl Final     MCH   Date Value Ref Range Status   06/08/2022 30.6 26.5 - 33.0 pg Final   07/08/2021 28.2 26.5 - 33.0 pg Final     MCHC   Date Value Ref Range Status   06/08/2022 34.5 31.5 - 36.5 g/dL Final   07/08/2021 33.2 31.5 - 36.5 g/dL Final     RDW   Date Value Ref Range Status   06/08/2022 12.4 10.0 - 15.0 % Final   07/08/2021 13.2 10.0 - 15.0 % Final     Platelet Count   Date Value Ref Range Status   06/08/2022 170 150 - 450 10e3/uL Final   07/08/2021 224 150 - 450 10e9/L Final     AST   Date Value Ref Range Status   06/08/2022 32 0 - 45 U/L Final   07/08/2021 20 0 - 45 U/L Final     ALT   Date Value Ref Range Status   06/08/2022 46 0 - 70 U/L Final   07/08/2021 25 0 - 70 U/L Final     Creatinine   Date Value Ref Range Status   06/08/2022 0.81 0.66 - 1.25 mg/dL Final   07/08/2021 0.76 0.66 - 1.25 mg/dL Final     Albumin   Date Value Ref Range Status   06/08/2022 3.9 3.4 - 5.0 g/dL Final   07/08/2021 3.6 3.4 - 5.0 g/dL Final     CRP Inflammation   Date Value Ref Range Status   06/08/2022 <2.9 0.0 - 8.0 mg/L Final   07/08/2021 11.3 (H) 0.0 - 8.0 mg/L Final     No components found for:  ESR      Prescription set up and routed to provider per Refill Protocol.    Naseem Reynolds, ROBINN, RN

## 2022-09-12 LAB — NONINV COLON CA DNA+OCC BLD SCRN STL QL: NEGATIVE

## 2022-09-14 ENCOUNTER — OFFICE VISIT (OUTPATIENT)
Dept: FAMILY MEDICINE | Facility: CLINIC | Age: 72
End: 2022-09-14
Payer: COMMERCIAL

## 2022-09-14 VITALS
SYSTOLIC BLOOD PRESSURE: 150 MMHG | HEART RATE: 67 BPM | RESPIRATION RATE: 12 BRPM | BODY MASS INDEX: 25.59 KG/M2 | DIASTOLIC BLOOD PRESSURE: 70 MMHG | HEIGHT: 69 IN | WEIGHT: 172.75 LBS | TEMPERATURE: 98.3 F | OXYGEN SATURATION: 98 %

## 2022-09-14 DIAGNOSIS — I10 HYPERTENSION, UNSPECIFIED TYPE: Primary | ICD-10-CM

## 2022-09-14 DIAGNOSIS — Z23 NEED FOR IMMUNIZATION AGAINST INFLUENZA: ICD-10-CM

## 2022-09-14 PROCEDURE — 90662 IIV NO PRSV INCREASED AG IM: CPT | Performed by: FAMILY MEDICINE

## 2022-09-14 PROCEDURE — 99213 OFFICE O/P EST LOW 20 MIN: CPT | Mod: 25 | Performed by: FAMILY MEDICINE

## 2022-09-14 PROCEDURE — G0008 ADMIN INFLUENZA VIRUS VAC: HCPCS | Performed by: FAMILY MEDICINE

## 2022-09-14 RX ORDER — LISINOPRIL 5 MG/1
5 TABLET ORAL DAILY
Qty: 30 TABLET | Refills: 11 | Status: SHIPPED | OUTPATIENT
Start: 2022-09-14 | End: 2022-10-12

## 2022-09-14 NOTE — PROGRESS NOTES
"  Dale was seen today for follwo up .    Diagnoses and all orders for this visit:    Hypertension, unspecified type: Not at goal despite lifestyle modification- reviewed options and plan to start low dose ACE-inhibitor- had recent labs. Plan to recheck labs with clinic visit in 1 month.  -     lisinopril (ZESTRIL) 5 MG tablet; Take 1 tablet (5 mg) by mouth daily    Need for immunization against influenza  -     INFLUENZA, QUAD, HIGH DOSE, PF, 65YR + (FLUZONE HD)          Subjective   Dale is a 72 year old, presenting for the following health issues:  follwo up  (Bp )      History of Present Illness       Reason for visit:  Followup to initial visit    He eats 2-3 servings of fruits and vegetables daily.He consumes 1 sweetened beverage(s) daily.He exercises with enough effort to increase his heart rate 10 to 19 minutes per day.  He exercises with enough effort to increase his heart rate 4 days per week.   He is taking medications regularly.     Does not check ambulatory readings  BP consistently elevated at medical visits  Asymptomatic  He exercises regularly  Eats a fair amount of lunch meat- ham            Review of Systems         Objective    BP (!) 148/70 (BP Location: Right arm, Patient Position: Sitting, Cuff Size: Adult Regular)   Pulse 67   Temp 98.3  F (36.8  C) (Oral)   Resp 12   Ht 1.74 m (5' 8.5\")   Wt 78.4 kg (172 lb 12 oz)   SpO2 98%   BMI 25.88 kg/m    Body mass index is 25.88 kg/m .  Physical Exam   Gen: well appearing  CV: RRR no m/r/g      Wt Readings from Last 3 Encounters:   09/14/22 78.4 kg (172 lb 12 oz)   06/08/22 83.5 kg (184 lb)   10/14/21 78.1 kg (172 lb 3.2 oz)                       "

## 2022-10-10 ENCOUNTER — MYC MEDICAL ADVICE (OUTPATIENT)
Dept: FAMILY MEDICINE | Facility: CLINIC | Age: 72
End: 2022-10-10

## 2022-10-10 DIAGNOSIS — I10 HYPERTENSION, UNSPECIFIED TYPE: ICD-10-CM

## 2022-10-10 RX ORDER — LISINOPRIL 5 MG/1
TABLET ORAL
Qty: 30 TABLET | Refills: 11 | OUTPATIENT
Start: 2022-10-10

## 2022-10-11 ENCOUNTER — LAB (OUTPATIENT)
Dept: LAB | Facility: CLINIC | Age: 72
End: 2022-10-11
Payer: COMMERCIAL

## 2022-10-11 DIAGNOSIS — R19.7 DIARRHEA, UNSPECIFIED TYPE: ICD-10-CM

## 2022-10-11 DIAGNOSIS — R73.09 ELEVATED GLUCOSE: ICD-10-CM

## 2022-10-11 DIAGNOSIS — R42 DIZZINESS: ICD-10-CM

## 2022-10-11 DIAGNOSIS — R42 DIZZINESS: Primary | ICD-10-CM

## 2022-10-11 DIAGNOSIS — R05.9 COUGH, UNSPECIFIED TYPE: ICD-10-CM

## 2022-10-11 LAB
ALBUMIN SERPL BCG-MCNC: 4.6 G/DL (ref 3.5–5.2)
ALP SERPL-CCNC: 88 U/L (ref 40–129)
ALT SERPL W P-5'-P-CCNC: 36 U/L (ref 10–50)
ANION GAP SERPL CALCULATED.3IONS-SCNC: 11 MMOL/L (ref 7–15)
AST SERPL W P-5'-P-CCNC: 37 U/L (ref 10–50)
BILIRUB SERPL-MCNC: 1 MG/DL
BUN SERPL-MCNC: 13.6 MG/DL (ref 8–23)
CALCIUM SERPL-MCNC: 9.8 MG/DL (ref 8.8–10.2)
CHLORIDE SERPL-SCNC: 107 MMOL/L (ref 98–107)
CREAT SERPL-MCNC: 0.83 MG/DL (ref 0.67–1.17)
DEPRECATED HCO3 PLAS-SCNC: 24 MMOL/L (ref 22–29)
ERYTHROCYTE [DISTWIDTH] IN BLOOD BY AUTOMATED COUNT: 12.7 % (ref 10–15)
GFR SERPL CREATININE-BSD FRML MDRD: >90 ML/MIN/1.73M2
GLUCOSE SERPL-MCNC: 107 MG/DL (ref 70–99)
HBA1C MFR BLD: 5.6 %
HCT VFR BLD AUTO: 46.2 % (ref 40–53)
HGB BLD-MCNC: 15.5 G/DL (ref 13.3–17.7)
MCH RBC QN AUTO: 30.8 PG (ref 26.5–33)
MCHC RBC AUTO-ENTMCNC: 33.5 G/DL (ref 31.5–36.5)
MCV RBC AUTO: 92 FL (ref 78–100)
PLATELET # BLD AUTO: 190 10E3/UL (ref 150–450)
POTASSIUM SERPL-SCNC: 4.7 MMOL/L (ref 3.4–5.3)
PROT SERPL-MCNC: 7.8 G/DL (ref 6.4–8.3)
RBC # BLD AUTO: 5.04 10E6/UL (ref 4.4–5.9)
SODIUM SERPL-SCNC: 142 MMOL/L (ref 136–145)
T4 FREE SERPL-MCNC: 1.29 NG/DL (ref 0.9–1.7)
TSH SERPL DL<=0.005 MIU/L-ACNC: 1.33 UIU/ML (ref 0.3–4.2)
WBC # BLD AUTO: 5.4 10E3/UL (ref 4–11)

## 2022-10-11 PROCEDURE — 99000 SPECIMEN HANDLING OFFICE-LAB: CPT | Performed by: PATHOLOGY

## 2022-10-11 PROCEDURE — 84439 ASSAY OF FREE THYROXINE: CPT | Mod: 90 | Performed by: PATHOLOGY

## 2022-10-11 PROCEDURE — 80053 COMPREHEN METABOLIC PANEL: CPT | Performed by: PATHOLOGY

## 2022-10-11 PROCEDURE — 85027 COMPLETE CBC AUTOMATED: CPT | Performed by: PATHOLOGY

## 2022-10-11 PROCEDURE — 84443 ASSAY THYROID STIM HORMONE: CPT | Mod: 90 | Performed by: PATHOLOGY

## 2022-10-11 PROCEDURE — 86481 TB AG RESPONSE T-CELL SUSP: CPT | Mod: 90 | Performed by: PATHOLOGY

## 2022-10-11 PROCEDURE — 36415 COLL VENOUS BLD VENIPUNCTURE: CPT | Performed by: PATHOLOGY

## 2022-10-11 PROCEDURE — 83036 HEMOGLOBIN GLYCOSYLATED A1C: CPT | Mod: 90 | Performed by: PATHOLOGY

## 2022-10-11 NOTE — TELEPHONE ENCOUNTER
Dr. Monsalve,  Patient of Dr. Perez is requesting ahead (if applicable) lab work to check for symptoms before his appointment with Dr. Perez tomorrow.  Please see patient message below in correlation of symptoms and lab work requested.     [place], I am concerned about some symptoms I've been having, i.e., headaches, mild dizziness/disorientation, chronic diarrhea, and a dry persistent cough. No fever/chills.  I was thinking it would be a good idea to have some blood lab work done before our appointment on Wed. 10/12/22.  Could you maybe order that for me?  - at [facility] on [place]?  Thanks   [facility]    Thank you    Nitin

## 2022-10-11 NOTE — TELEPHONE ENCOUNTER
I called pt.  Weight decreased 10 # recently.  He tried stopping lisinopril for a few days, but did not have much change in symptoms.  I ordered labs.  sarwat

## 2022-10-11 NOTE — TELEPHONE ENCOUNTER
Last Written Prescription Date:  9/14/22  Last Fill Quantity: 30,  # refills: 11     Dot Chaparro RN on 10/10/2022 at 8:23 PM

## 2022-10-12 ENCOUNTER — OFFICE VISIT (OUTPATIENT)
Dept: FAMILY MEDICINE | Facility: CLINIC | Age: 72
End: 2022-10-12
Payer: COMMERCIAL

## 2022-10-12 VITALS
BODY MASS INDEX: 24.71 KG/M2 | OXYGEN SATURATION: 98 % | HEIGHT: 69 IN | SYSTOLIC BLOOD PRESSURE: 124 MMHG | DIASTOLIC BLOOD PRESSURE: 70 MMHG | WEIGHT: 166.8 LBS | TEMPERATURE: 98.4 F | HEART RATE: 60 BPM | RESPIRATION RATE: 14 BRPM

## 2022-10-12 DIAGNOSIS — Z23 NEED FOR VACCINATION: ICD-10-CM

## 2022-10-12 DIAGNOSIS — I10 HYPERTENSION, UNSPECIFIED TYPE: Primary | ICD-10-CM

## 2022-10-12 DIAGNOSIS — Z23 HIGH PRIORITY FOR 2019 NOVEL CORONAVIRUS VACCINATION: ICD-10-CM

## 2022-10-12 DIAGNOSIS — L40.50 PSORIATIC ARTHRITIS (H): ICD-10-CM

## 2022-10-12 DIAGNOSIS — N52.9 ERECTILE DYSFUNCTION, UNSPECIFIED ERECTILE DYSFUNCTION TYPE: ICD-10-CM

## 2022-10-12 DIAGNOSIS — Z76.0 ENCOUNTER FOR MEDICATION REFILL: ICD-10-CM

## 2022-10-12 PROCEDURE — 0134A COVID-19,PF,MODERNA BIVALENT: CPT | Performed by: FAMILY MEDICINE

## 2022-10-12 PROCEDURE — 91313 COVID-19,PF,MODERNA BIVALENT: CPT | Performed by: FAMILY MEDICINE

## 2022-10-12 PROCEDURE — 99214 OFFICE O/P EST MOD 30 MIN: CPT | Performed by: FAMILY MEDICINE

## 2022-10-12 RX ORDER — SILDENAFIL CITRATE 20 MG/1
20 TABLET ORAL DAILY PRN
Qty: 20 TABLET | Refills: 1 | Status: SHIPPED | OUTPATIENT
Start: 2022-10-12 | End: 2022-12-12

## 2022-10-12 NOTE — PROGRESS NOTES
Dale was seen today for hypertension.    Diagnoses and all orders for this visit:    Hypertension, unspecified type: Has lost 10 lbs recently due to ?side effects from lisinopril vs viral gastroenteritis. Symptoms resolving, but he still reports poor appetite and looser stools. BP at goal today- stopped lisinopril about 2 weeks ago. Added lisinopril to allergy list due to: vertigo, diarrhea, dry cough. Will avoid ACE-I in future. Will hold off on adding antihypertensive since normotensive today, likely 2/2 weight loss. Recheck in 2 months. Of note, he did cut back on EtOH, which also may have helped his blood pressure/weight loss. If he continues to lose weight and having loose stools, would start workup.     High priority for 2019 novel coronavirus vaccination  -     COVID-19,PF,MODERNA BIVALENT (18+YRS)    Erectile dysfunction, unspecified erectile dysfunction type: Reviewed this likely will not be covered by insurance.  -     sildenafil (REVATIO) 20 MG tablet; Take 1 tablet (20 mg) by mouth daily as needed    Diarrhea: Resolving. No blood in stool. Colon cancer screening normal. Possible recent viral gastroenteritis, although he reports symptoms have been on and off for 1 year. Reviewed dietary fiber, fiber supplements. If continues in 1 month, consider further workup with stool studies. I reviewed that it is unlikely the diarrhea was related to the lisinopril. Reviewed recent lab work from yesterday- unremarkable.    Subjective   Dale is a 72 year old, presenting for the following health issues:  Hypertension      History of Present Illness       Reason for visit:  Follow up to [id number] visit    He eats 2-3 servings of fruits and vegetables daily.He consumes 1 sweetened beverage(s) daily.He exercises with enough effort to increase his heart rate 10 to 19 minutes per day.  He exercises with enough effort to increase his heart rate 4 days per week.   He is taking medications regularly.     Started lisinopril 5  "mg about 1 month ago and then after about 12 days, developed vertigo, disoriented feeling, diarrhea (non-bloody) and dry cough. He stopped taking lisinopril and the symptoms are resolving, albeit slowly. Still with poor appetite and 10 lb weight loss. He cut back EtOH use as well. Had lab work done yesterday.     Review of Systems         Objective    /70   Pulse 60   Temp 98.4  F (36.9  C) (Oral)   Resp 14   Ht 1.74 m (5' 8.5\")   Wt 75.7 kg (166 lb 12.8 oz)   SpO2 98%   BMI 24.99 kg/m    Body mass index is 24.99 kg/m .  Physical Exam   Gen: well appearing  CV: RRR no m/r/g  Resp: CTAB  Abd: soft, non-tender, non-distended, BS normal                "

## 2022-10-13 LAB
GAMMA INTERFERON BACKGROUND BLD IA-ACNC: 0.09 IU/ML
M TB IFN-G BLD-IMP: NEGATIVE
M TB IFN-G CD4+ BCKGRND COR BLD-ACNC: 9.91 IU/ML
MITOGEN IGNF BCKGRD COR BLD-ACNC: 0.01 IU/ML
MITOGEN IGNF BCKGRD COR BLD-ACNC: 0.01 IU/ML
QUANTIFERON MITOGEN: 10 IU/ML
QUANTIFERON NIL TUBE: 0.09 IU/ML
QUANTIFERON TB1 TUBE: 0.1 IU/ML
QUANTIFERON TB2 TUBE: 0.1

## 2022-10-18 NOTE — TELEPHONE ENCOUNTER
FUTURE VISIT INFORMATION      FUTURE VISIT INFORMATION:    Date: 4/20/20    Time: 1:40 PM    Location: [facility] [place]  REFERRAL INFORMATION:    Referring provider:  Dr. Abdi    Referring providers clinic:  [facility]    Reason for visit/diagnosis: Throat Losing notes at low tone    RECORDS REQUESTED FROM:       Clinic name Comments Records Status Imaging Status   [facility] 8/14/17, 2/10/20 - OV with Dr. Abdi Scanned In    [facility] 2/3/20 - [facility] OV with Dr. Espinoza  6/19/18 - OV with Dr. Espinoza  2/1/18 - OV with [facility] Segundo [facility] 4/12/19 - ORTHO OV with Dr. Marcell Allen    [facility] - Imaging 4/15/19 - MRI Thoracic Spine WO  3/1/15 - MRA Neck W/WO [facility] PACs   [facility] [place] OV with Boy H[facility] Everywhere               Patient baseline mental status/Awake

## 2022-10-19 NOTE — PROGRESS NOTES
.    Consent yet to be signed.    IV started and labs reviewed.    Patient intends to drive self home, but will call for a ride if needed  Pre procedure plan of care reviewed with patient prior to procedure. All questions answered and patient verbalizes acceptance of plan.     Yes

## 2022-10-25 ENCOUNTER — MYC MEDICAL ADVICE (OUTPATIENT)
Dept: FAMILY MEDICINE | Facility: CLINIC | Age: 72
End: 2022-10-25

## 2022-12-12 ENCOUNTER — OFFICE VISIT (OUTPATIENT)
Dept: FAMILY MEDICINE | Facility: CLINIC | Age: 72
End: 2022-12-12
Payer: COMMERCIAL

## 2022-12-12 VITALS
BODY MASS INDEX: 26 KG/M2 | HEART RATE: 72 BPM | HEIGHT: 69 IN | DIASTOLIC BLOOD PRESSURE: 76 MMHG | RESPIRATION RATE: 16 BRPM | SYSTOLIC BLOOD PRESSURE: 136 MMHG | TEMPERATURE: 98.6 F | OXYGEN SATURATION: 97 % | WEIGHT: 175.5 LBS

## 2022-12-12 DIAGNOSIS — Z76.0 ENCOUNTER FOR MEDICATION REFILL: ICD-10-CM

## 2022-12-12 DIAGNOSIS — I10 HYPERTENSION, UNSPECIFIED TYPE: Primary | ICD-10-CM

## 2022-12-12 DIAGNOSIS — N52.9 ERECTILE DYSFUNCTION, UNSPECIFIED ERECTILE DYSFUNCTION TYPE: ICD-10-CM

## 2022-12-12 DIAGNOSIS — L40.50 PSORIATIC ARTHRITIS (H): ICD-10-CM

## 2022-12-12 PROCEDURE — 99214 OFFICE O/P EST MOD 30 MIN: CPT | Performed by: FAMILY MEDICINE

## 2022-12-12 RX ORDER — SILDENAFIL CITRATE 20 MG/1
20 TABLET ORAL DAILY PRN
Qty: 20 TABLET | Refills: 1 | Status: SHIPPED | OUTPATIENT
Start: 2022-12-12

## 2022-12-12 RX ORDER — LISINOPRIL 5 MG/1
5 TABLET ORAL DAILY
Qty: 90 TABLET | Refills: 3 | Status: SHIPPED | OUTPATIENT
Start: 2022-12-12 | End: 2023-12-08

## 2022-12-12 NOTE — PROGRESS NOTES
Dale was seen today for blood pressure check.    Diagnoses and all orders for this visit:    Hypertension, unspecified type: BP elevated today, and on recheck. There was some concern for possible side effect of diarrhea to lisinopril, however, it is more likely that was related to viral gastroenteritis. No anaphylaxis, so discussed options with patient and agreed to re-trial ACE-inhibitor as first-line medication for his blood pressure. If any recurrence of diarrhea or other side effects, would discontinue and try another class of medication. Recheck BP in 1-2 weeks at nurse only visit.   -     lisinopril (ZESTRIL) 5 MG tablet; Take 1 tablet (5 mg) by mouth daily    Erectile dysfunction, unspecified erectile dysfunction type: Sent prescription to [facility] pharmacy, as patient was advised by his other pharmacy that this medication has lower cost at [facility].  -     sildenafil (REVATIO) 20 MG tablet; Take 1 tablet (20 mg) by mouth daily as needed    Psoriatic arthritis (H): Has seen rheumatology previously but his rheumatologist has left clinic- placed another referral due to significant worsening symptoms of arthralgias, mostly in hands and MCP joints. He does have upcoming appointment with orthopedics for evaluation for steroid injection in MCP joint, as this has helped in the past.   -     Adult Rheumatology  Referral; Future          Subjective   Dale is a 72 year old, presenting for the following health issues:  Blood Pressure Check      History of Present Illness       Hypertension: He presents for follow up of hypertension.  He does not check blood pressure  regularly outside of the clinic. Outside blood pressures have been over 140/90. He follows a low salt diet.       He reports that diarrhea has completely resolved- only had two episodes within the last few months  Not entirely sure if the diarrhea was related to lisinopril, since he did eventually resume the lisinopril and did not have any  "recurrence of symptoms.   He ran out of lisinopril.   Wondering if he should retry lisinopril or try another medication.    ED- he was advised by pharmacist to try [facility] to fill his medication, as it likely will be cheaper    History of psoriatic arthritis  Previously follows with rheumatology but his provider left clinic  His joint pain has worsened  Has appointment with orthopedics 12/20/22 for MCP joint injections  Previously on Humira but that stopped working for him    Review of Systems         Objective    BP (!) 154/88   Pulse 68   Temp 98.6  F (37  C) (Oral)   Resp 16   Ht 1.74 m (5' 8.5\")   Wt 79.6 kg (175 lb 8 oz)   SpO2 96%   BMI 26.30 kg/m    Body mass index is 26.3 kg/m .  Physical Exam   Gen: well appearing, no distress                    "

## 2022-12-19 ENCOUNTER — OFFICE VISIT (OUTPATIENT)
Dept: RHEUMATOLOGY | Facility: CLINIC | Age: 72
End: 2022-12-19
Payer: COMMERCIAL

## 2022-12-19 VITALS
DIASTOLIC BLOOD PRESSURE: 68 MMHG | HEART RATE: 84 BPM | SYSTOLIC BLOOD PRESSURE: 130 MMHG | OXYGEN SATURATION: 96 % | WEIGHT: 174.1 LBS | BODY MASS INDEX: 26.09 KG/M2

## 2022-12-19 DIAGNOSIS — M06.9 RHEUMATOID ARTHRITIS INVOLVING MULTIPLE JOINTS (H): ICD-10-CM

## 2022-12-19 DIAGNOSIS — L40.9 PSORIASIS: ICD-10-CM

## 2022-12-19 DIAGNOSIS — Z79.899 HIGH RISK MEDICATION USE: ICD-10-CM

## 2022-12-19 DIAGNOSIS — L40.50 PSORIATIC ARTHRITIS (H): Primary | ICD-10-CM

## 2022-12-19 PROCEDURE — 99214 OFFICE O/P EST MOD 30 MIN: CPT | Performed by: PHYSICIAN ASSISTANT

## 2022-12-19 NOTE — PATIENT INSTRUCTIONS
After Visit Instructions:     Thank you for coming to [facility] for your care. It is my goal to partner with you to help you reach your optimal state of health.       Plan:     Schedule follow-up with Karma Richardson PA-C in 6 months.   Labs: CBC, Creatinine, Albumin, CRP, Sed rate, AST and Alt every 3 months, next due mid January 2023  Medication recommendations:   Consider Orencia (subQ injection) or Sulfasalazine (oral medication)  # Abatacept (Orencia) Risks and Benefits: The risks and benefits of abatacept were discussed in detail and the patient verbalized understanding.  The risks discussed include, but are not limited to, the risk for hypersensitivity, anaphylaxis, anaphylactoid reactions, an increased risk for serious infections leading to hospitalization or death, and an increased risk for more frequent respiratory adverse events in patients with COPD.  If subcutaneous injections are used, then injection site reactions and/or pain may occur at the site of injection.  The most common side effects were discussed that include headache, upper respiratory tract infections, nasopharyngitis, and nausea.  It was discussed that the medication would need to be discontinued if a serious infection develops.  It was discussed that live vaccinations should not be received while using abatacept or within 30 days prior to starting abatacept.  I encouraged reviewing the package insert and asking any questions about the medication.    # Sulfasalazine Risks and Benefits: The risks and benefits of sulfasalazine were discussed in detail and the patient verbalized understanding.  The risks discussed include, but are not limited to, the risk for hypersensitivity, anaphylaxis, anaphylactoid reactions, infections, bone marrow suppression,  hepatotoxicity, nausea, vomiting, and GI upset.  Oligospermia may occur in males.  I encouraged reviewing the package insert and asking any questions about the medication.       Continue Methotrexate 15mg (6 tablets) weekly. We can increase this dose as well.   -check labs (ALT/AST, albumin, CBC with platelets and creatinine) every 3 months  -Limit alcohol to 2 drinks weekly  -Take Folic Acid 1mg daily   -Tylenol 500-1000mg can be used as needed up to three times daily for nausea/headache associated with dosing      Karma Richardson PA-C  [facility]  [facility]    Contact information: [facility]  Clinic Number:  [phone]  Please call or send a [employer] message with any questions about your care

## 2022-12-19 NOTE — PROGRESS NOTES
Rheumatology Clinic Visit  [facility]  Karma Richardson, GERTRUDE     Dale Cuevas MRN# [id number]   YOB: 1950 Age: 72 year old   Date of Visit: 12/19/2022  Primary care provider: Romina Perez          Assessment and Plan:     1.  Psoriatic arthritis  2.  Rheumatoid arthritis  3.  Psoriasis  4.  High-risk medication use    Patient presents today for follow-up regarding his inflammatory arthritis.  There has been question in the past on if this is psoriatic arthritis versus a rheumatoid arthritis.  His presentation has been suggestive of both.  His psoriasis has overall been well controlled.  He does have a small patch on his right elbow.  He has not been utilizing the Enbrel and does not feel that there has been much of a change to his joints without it.  He does continue on the methotrexate weekly.  He is scheduled to get injections into his CMC's given increased pain.  He goes to orthopedics for this.  Physical examination did not show any significant synovitis.  There is no dactylitis, tenosynovitis or enthesopathy.  He does have squaring of his CMC's bilaterally.  He does have decreased fist formation bilaterally.  With a slight decrease in  strength bilaterally.  He does have slight ulnar deviation.  Previous laboratory evaluations and imaging studies were reviewed.  Results below.    Patient presents today to establish care for his inflammatory arthritis.  Overall he does feel that he has good control over his joint symptoms.  He has been having increased symptoms in his CMC's and will be getting injections.  He has been off the Enbrel and did not notice a significant change being off of it compared to when he was on it.  We discussed different options today including continuing on the methotrexate 15 mg weekly and see how he does with the injections and versus adding in a different biologic versus adding in another oral medication versus increasing his methotrexate.  He elected to  continue on the current dose and we will see how the injections work.  He was given information on Orencia and sulfasalazine.  We will continue to monitor for any medication toxicity with routine blood work.  He will be due for his next blood work in January.    Plan:     1. Schedule follow-up with Karma Richardson PA-C in 6 months.   2. Labs: CBC, Creatinine, Albumin, CRP, Sed rate, AST and Alt every 3 months, next due mid January 2023  3. Medication recommendations:   a. Consider Orencia (subQ injection) or Sulfasalazine (oral medication)  i. # Abatacept (Orencia) Risks and Benefits: The risks and benefits of abatacept were discussed in detail and the patient verbalized understanding.  The risks discussed include, but are not limited to, the risk for hypersensitivity, anaphylaxis, anaphylactoid reactions, an increased risk for serious infections leading to hospitalization or death, and an increased risk for more frequent respiratory adverse events in patients with COPD.  If subcutaneous injections are used, then injection site reactions and/or pain may occur at the site of injection.  The most common side effects were discussed that include headache, upper respiratory tract infections, nasopharyngitis, and nausea.  It was discussed that the medication would need to be discontinued if a serious infection develops.  It was discussed that live vaccinations should not be received while using abatacept or within 30 days prior to starting abatacept.  I encouraged reviewing the package insert and asking any questions about the medication.    ii. # Sulfasalazine Risks and Benefits: The risks and benefits of sulfasalazine were discussed in detail and the patient verbalized understanding.  The risks discussed include, but are not limited to, the risk for hypersensitivity, anaphylaxis, anaphylactoid reactions, infections, bone marrow suppression,  hepatotoxicity, nausea, vomiting, and GI upset.  Oligospermia may occur in  males.  I encouraged reviewing the package insert and asking any questions about the medication.      b. Continue Methotrexate 15mg (6 tablets) weekly. We can increase this dose as well.   i. -check labs (ALT/AST, albumin, CBC with platelets and creatinine) every 3 months  ii. -Limit alcohol to 2 drinks weekly  iii. -Take Folic Acid 1mg daily   iv. -Tylenol 500-1000mg can be used as needed up to three times daily for nausea/headache associated with dosing    GERTRUDE Vilchis  Rheumatology         History of Present Illness:   Dale Cuevas presents for evaluation of psoriatic arthritis.      He reports that his joints are doing pretty well. He does have decreased strength in his thumbs. He has been noticing his fingers moving as well towards his pinky. He has never been higher than 15mg weekly on the Methotrexate. He is tolerating it well. He states that initially Humira was significantly helpful. He states that within days of starting he had significant improvement. He then had his knee replaced, and it quit working after that. He does have about 1 hour or less of morning stiffness. He does do some exercises on his hands when he wakes up. 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     No frequent infections. No shortness of breath or chest pain.     Rheumatological history:  Provider(s): DANNY Baum CNP, Dr. Mead  Last office visit: 10/14/2021  Pertinent lab history: Elevated CRP, Positive HLA-B27, negative RF and CCP, - Hep B/C, -MTB QG  Previous medications tried: Humira (initially effective), Celebrex (not effective), Prednisone (helpful but has side effects: increased appetite), Enbrel (not effective, difficult to get refilled)  Current medications: Methotrexate 15mg weekly,          Review of Systems:     Constitutional: negative  Skin: negative  Eyes: negative  Ears/Nose/Throat: negative  Respiratory: No shortness of breath, dyspnea on exertion, cough, or  hemoptysis  Cardiovascular: negative  Gastrointestinal: negative  Genitourinary: negative  Musculoskeletal: as above  Neurologic: negative  Psychiatric: negative  Hematologic/Lymphatic/Immunologic: negative  Endocrine: negative         Active Problem List:     Patient Active Problem List    Diagnosis Date Noted     Status post revision of total knee, right 06/03/2021     Priority: Medium     Status post total knee replacement, right 03/04/2021     Priority: Medium     Medication monitoring encounter 03/01/2021     Priority: Medium     Immunosuppression (H) 02/10/2021     Priority: Medium     Psoriatic arthritis (H) 07/15/2019     Priority: Medium     Diastasis recti 06/15/2016     Priority: Medium     Family hx of prostate cancer 06/07/2016     Priority: Medium     Benign prostatic hyperplasia 09/08/2014     Priority: Medium     Hyperlipidemia LDL goal <100 02/05/2014     Priority: Medium     Psoriasis 11/23/2013     Priority: Medium     Erectile dysfunction 05/06/2013     Priority: Medium            Past Medical History:     Past Medical History:   Diagnosis Date     Benign prostatic hyperplasia 09/08/2014     Diastasis recti 06/15/2016     Elevated blood pressure reading without diagnosis of hypertension 05/06/2013     Erectile dysfunction 05/06/2013     Family hx of prostate cancer 06/07/2016     Hyperlipidemia LDL goal <100 02/05/2014     Psoriasis 11/23/2013     Psoriatic arthritis (H)      Past Surgical History:   Procedure Laterality Date     ARTHROPLASTY KNEE Right 3/4/2021    Procedure: RIGHT TOTAL KNEE ARTHROPLASTY;  Surgeon: Jose C Westbrook MD;  Location:  OR     ARTHROPLASTY REVISION KNEE Right 6/3/2021    Procedure: RIGHT KNEE POLYETHYLENE EXCHANGE;  Surgeon: Jose C Westbrook MD;  Location:  OR     BACK SURGERY       Hx Vitrectomy/Membrane Peel/Laser/Air Right 08/06/2018    Dr. Medrano / Dr. Mendiola       ORTHOPEDIC SURGERY  Jan 12, 2015    left wrist     ORTHOPEDIC SURGERY  1989    spine  sugery-with disc fragment removed     ORTHOPEDIC SURGERY  2014    right knee arthroscopy     SEPTOPLASTY              Social History:     Social History     Socioeconomic History     Marital status: Single     Spouse name: Not on file     Number of children: Not on file     Years of education: Not on file     Highest education level: Not on file   Occupational History     Not on file   Tobacco Use     Smoking status: Former     Types: Cigarettes     Quit date: 1973     Years since quittin.6     Smokeless tobacco: Never   Substance and Sexual Activity     Alcohol use: Yes     Comment: every night -      Drug use: No     Sexual activity: Not Currently   Other Topics Concern     Parent/sibling w/ CABG, MI or angioplasty before 65F 55M? No   Social History Narrative     Not on file     Social Determinants of Health     Financial Resource Strain: Not on file   Food Insecurity: Not on file   Transportation Needs: Not on file   Physical Activity: Not on file   Stress: Not on file   Social Connections: Not on file   Intimate Partner Violence: Not on file   Housing Stability: Not on file          Family History:     Family History   Problem Relation Age of Onset     Hypertension Mother      Prostate Cancer Father 72     Breast Cancer Sister      Thyroid Cancer Brother      HIV/AIDS Brother             Allergies:     Allergies   Allergen Reactions     Levofloxacin      Other reaction(s): *Unknown            Medications:     Current Outpatient Medications   Medication Sig Dispense Refill     atorvastatin (LIPITOR) 40 MG tablet Take 1 tablet (40 mg) by mouth daily 90 tablet 3     folic acid (FOLVITE) 1 MG tablet TAKE 1 TABLET BY MOUTH EVERY DAY 90 tablet 3     lisinopril (ZESTRIL) 5 MG tablet Take 1 tablet (5 mg) by mouth daily 90 tablet 3     methotrexate sodium 2.5 MG TABS Take 6 tablets (15 mg) by mouth every 7 days LABS REQUIRED EVERY 8-12 WEEKS WHILE TAKING THIS MEDICATION. Please call to schedule a follow-up  appointment with one of the Rheumatology providers, [phone].  Thank you. 72 tablet 1     sildenafil (REVATIO) 20 MG tablet Take 1 tablet (20 mg) by mouth daily as needed 20 tablet 1     tamsulosin (FLOMAX) 0.4 MG capsule TAKE 1 CAPSULE BY MOUTH EVERY DAY 90 capsule 3            Physical Exam:   There were no vitals taken for this visit.  Wt Readings from Last 6 Encounters:   22 79.6 kg (175 lb 8 oz)   10/12/22 75.7 kg (166 lb 12.8 oz)   22 78.4 kg (172 lb 12 oz)   22 83.5 kg (184 lb)   10/14/21 78.1 kg (172 lb 3.2 oz)   21 77.3 kg (170 lb 6.4 oz)     Constitutional: well-developed, appearing stated age; cooperative  Eyes: nl PERRLA, conjunctiva, sclera  ENT: nl external ears, nose, hearing, lips, teeth, gums, throat. No mucositis.   No mucous membrane lesions, normal saliva pool  Neck: no mass or thyroid enlargement  Resp: lungs clear to auscultation  CV: RRR, no murmurs, rubs or gallops, no edema  Lymph: no cervical, supraclavicular or epitrochlear nodes  MS: Squaring of the CMC's bilaterally.  Decreased fist formation and decreased  strength bilaterally.  No overt synovitis.  No dactylitis, tenosynovitis or enthesopathy.  Tenderness to range of motion of the CMC's.  Skin: no nail pitting, alopecia, rash, nodules or lesions.  He does have a small psoriatic plaque on the right elbow.  Neuro: nl cranial nerves.   Psych: nl judgement, orientation, memory, affect.           Data:   Imagin2019 x-ray bilateral hands  Impression:   1. Scattered degenerative changes, severe in the bilateral first  carpometacarpal joints. Additionally there are possible erosive  changes at this location suggestive of erosive osteoarthritis.  2. Bilateral narrowing of first carpometacarpal joints with prominent  osteophytes as described. Differential consideration includes CPPD and Hemochromatosis.    2019 x-ray bilateral foot  Impression: No definite erosions.     Laboratory:  2019 hepatitis B  core antibody nonreactive, hepatitis C antibody nonreactive    10/11/2022  Creatinine 0.83, GFR greater than 90  Albumin 4.6  ALT 36, AST 37  TSH 1.33, T4 1.29  With blood cell count 5.4, hemoglobin 15.5, platelet count 190  QuantiFERON TB Gold negative

## 2022-12-20 ENCOUNTER — TRANSFERRED RECORDS (OUTPATIENT)
Dept: HEALTH INFORMATION MANAGEMENT | Facility: CLINIC | Age: 72
End: 2022-12-20

## 2022-12-29 ENCOUNTER — ALLIED HEALTH/NURSE VISIT (OUTPATIENT)
Dept: FAMILY MEDICINE | Facility: CLINIC | Age: 72
End: 2022-12-29
Payer: COMMERCIAL

## 2022-12-29 VITALS — HEART RATE: 77 BPM | SYSTOLIC BLOOD PRESSURE: 112 MMHG | DIASTOLIC BLOOD PRESSURE: 62 MMHG | OXYGEN SATURATION: 96 %

## 2022-12-29 DIAGNOSIS — I10 HYPERTENSION, UNSPECIFIED TYPE: Primary | ICD-10-CM

## 2022-12-29 PROCEDURE — 99207 PR NO CHARGE NURSE ONLY: CPT

## 2022-12-29 NOTE — PROGRESS NOTES
I met with Dale Cuevas at the request of Dr Fong to recheck his blood pressure.  Blood pressure medications on the med list were reviewed with patient.    Patient has taken all medications as per usual regimen: Yes  Patient reports tolerating them without any issues or concerns: No    Vitals:    12/29/22 1323   BP: 112/62   Pulse: 77   SpO2: 96%       Blood pressure was taken, previous encounter was reviewed, recorded blood pressure below 140/90.  Patient was discharged and the note will be sent to the provider for final review.

## 2023-01-24 ENCOUNTER — MYC MEDICAL ADVICE (OUTPATIENT)
Dept: FAMILY MEDICINE | Facility: CLINIC | Age: 73
End: 2023-01-24
Payer: COMMERCIAL

## 2023-01-24 NOTE — TELEPHONE ENCOUNTER
Responded to patient via [employer].  It appeared patient had already scheduled a follow up with pcp for 2/22023. Patient reported he's into a lot of pain. Denied recent injury.  RN offered a sooner appointment with another provider for 1/30/2023.  Advised patient if new or worsening symptoms, please call or message via [employer].  Patient understood.    ROBIN HernandezN, RN  [facility]

## 2023-02-02 ENCOUNTER — OFFICE VISIT (OUTPATIENT)
Dept: FAMILY MEDICINE | Facility: CLINIC | Age: 73
End: 2023-02-02
Payer: COMMERCIAL

## 2023-02-02 VITALS
WEIGHT: 172.25 LBS | OXYGEN SATURATION: 98 % | DIASTOLIC BLOOD PRESSURE: 68 MMHG | SYSTOLIC BLOOD PRESSURE: 126 MMHG | TEMPERATURE: 98.3 F | HEART RATE: 74 BPM | HEIGHT: 67 IN | BODY MASS INDEX: 27.03 KG/M2

## 2023-02-02 DIAGNOSIS — D84.9 IMMUNOSUPPRESSION (H): ICD-10-CM

## 2023-02-02 DIAGNOSIS — M50.323 OTHER CERVICAL DISC DEGENERATION AT C6-C7 LEVEL: Primary | ICD-10-CM

## 2023-02-02 DIAGNOSIS — L40.50 PSORIATIC ARTHRITIS (H): ICD-10-CM

## 2023-02-02 PROCEDURE — 99214 OFFICE O/P EST MOD 30 MIN: CPT | Performed by: FAMILY MEDICINE

## 2023-02-02 ASSESSMENT — ENCOUNTER SYMPTOMS: BACK PAIN: 1

## 2023-02-02 NOTE — PROGRESS NOTES
Dale was seen today for back pain.    Diagnoses and all orders for this visit:    Cervical and thoracic disc degeneration: Last MRI I have records of was 4/2019 that showed multi-level disc degeneration, most marked at C5-6 and C6-7 with severe left C6-7 neural foraminal narrowing. He reports that he had another MRI done at [facility] in March 2021 prior to his knee surgery- I do not have records of this. Due to his history of multilevel disc degeneration with foraminal narrowing, and his recent episode of severe lower cervical/upper thoracic vertebral tenderness with left hand numbness, will refer to spine center for further evaluation. Hold on repeating MRI due to improved symptoms, however, if patient calls or messages back stating the severe pain has worsened or recurred, I would consider repeat MRI.  -     Spine  Referral; Future    Immunosuppression (H)  Psoriatic arthritis (H): On methotrexate, stable. Reviewed recent note from rheumatology 12/19/22. Stable chronic condition.      Subjective   Dale is a 72 year old, presenting for the following health issues:  Back Pain (Upper left)      Back Pain     History of Present Illness       Back Pain:  He presents for follow up of back pain. Patient's back pain is a recurring problem.  Location of back pain:  Left upper back  Description of back pain: dull ache, sharp and stabbing  Back pain spreads: nowhere    Since patient first noticed back pain, pain is: gradually improving  Does back pain interfere with his job:  Yes  He exercises with enough effort to increase his heart rate 10 to 19 minutes per day.  He exercises with enough effort to increase his heart rate 5 days per week.   He is taking medications regularly.     He had severe pain, midline, around lower cervical/upper thoracic vertebrae  Pain radiated into left trapezius region  No shoulder weakness or pain  He reported associated left hand numbness at the time of the pain-  "resolved  Pain has improved over the course of a few days  No known injury or exacerbating factors     Last MRI records 4/15/19    Impression:   1. Degenerative changes cervical spine most marked at C5-6 and C6-7  with severe right C5-C6 and severe left C6-7 neural foraminal  narrowing.  2. Congenital T8-T9 block vertebra with degenerative changes below  this level.  3. Old mild anterior wedge compression deformity of the L1 vertebral  Body.    Impression:   1. Degenerative changes cervical spine most marked at C5-6 and C6-7  with severe right C5-C6 and severe left C6-7 neural foraminal  narrowing.  2. Congenital T8-T9 block vertebra with degenerative changes below  this level.  3. Old mild anterior wedge compression deformity of the L1 vertebral  body.      Review of Systems   Musculoskeletal: Positive for back pain.            Objective    /68   Pulse 74   Temp 98.3  F (36.8  C) (Oral)   Ht 1.705 m (5' 7.13\")   Wt 78.1 kg (172 lb 4 oz)   SpO2 98%   BMI 26.88 kg/m    Body mass index is 26.88 kg/m .  Physical Exam   MSK: Normal range of motion in neck and shoulders. Mild midline bony tenderness over lower cervical/upper thoracic spinous processes. No trapezius tenderness or spasm. Normal strength.                "

## 2023-02-16 ENCOUNTER — MYC MEDICAL ADVICE (OUTPATIENT)
Dept: RHEUMATOLOGY | Facility: CLINIC | Age: 73
End: 2023-02-16
Payer: COMMERCIAL

## 2023-02-16 ENCOUNTER — MYC MEDICAL ADVICE (OUTPATIENT)
Dept: FAMILY MEDICINE | Facility: CLINIC | Age: 73
End: 2023-02-16
Payer: COMMERCIAL

## 2023-02-16 DIAGNOSIS — L40.50 PSORIATIC ARTHRITIS (H): ICD-10-CM

## 2023-02-16 NOTE — TELEPHONE ENCOUNTER
Responded to patient via [employer] request patient reach out to rhuematologist for refill of medication requested.    ROBIN HernandezN, RN  [facility]

## 2023-02-17 RX ORDER — METHOTREXATE 2.5 MG/1
15 TABLET ORAL
Qty: 72 TABLET | Refills: 1 | Status: SHIPPED | OUTPATIENT
Start: 2023-02-17 | End: 2023-06-26

## 2023-02-17 NOTE — TELEPHONE ENCOUNTER
METHOTREXATE 2.5 MG TABLET  Last Written Prescription Date:   9/6/2022  Last Fill Quantity: 72,   # refills: 1  Last Office Visit:  12/19/2022  Future Office visit:  None    CBC RESULTS: Recent Labs   Lab Test 10/11/22  1600   WBC 5.4   RBC 5.04   HGB 15.5   HCT 46.2   MCV 92   MCH 30.8   MCHC 33.5   RDW 12.7          Creatinine   Date Value Ref Range Status   10/11/2022 0.83 0.67 - 1.17 mg/dL Final   07/08/2021 0.76 0.66 - 1.25 mg/dL Final   ]    Liver Function Studies -   Recent Labs   Lab Test 10/11/22  1600   PROTTOTAL 7.8   ALBUMIN 4.6   BILITOTAL 1.0   ALKPHOS 88   AST 37   ALT 36       Routing refill request to provider for review/approval because:  Drug not on the [facility], P or [facility] refill protocol or controlled substance    Felisa Crystal RN  Central Triage Red Flags/Med Refills

## 2023-02-18 ENCOUNTER — HEALTH MAINTENANCE LETTER (OUTPATIENT)
Age: 73
End: 2023-02-18

## 2023-02-20 RX ORDER — METHOTREXATE 2.5 MG/1
15 TABLET ORAL
Qty: 72 TABLET | Refills: 1 | Status: CANCELLED | OUTPATIENT
Start: 2023-02-20

## 2023-05-02 ENCOUNTER — TRANSFERRED RECORDS (OUTPATIENT)
Dept: HEALTH INFORMATION MANAGEMENT | Facility: CLINIC | Age: 73
End: 2023-05-02
Payer: COMMERCIAL

## 2023-05-14 DIAGNOSIS — N40.0 BENIGN PROSTATIC HYPERPLASIA WITHOUT LOWER URINARY TRACT SYMPTOMS: ICD-10-CM

## 2023-05-14 NOTE — TELEPHONE ENCOUNTER
"Routing refill request to provider for review/approval because:  Med interaction alert  Early refill    Last Written Prescription Date:  6/8/2022  Last Fill Quantity: 90,  # refills: 3   Last office visit provider:  02/02/2023     Requested Prescriptions   Pending Prescriptions Disp Refills     tamsulosin (FLOMAX) 0.4 MG capsule [Pharmacy Med Name: TAMSULOSIN HCL 0.4 MG CAPSULE] 90 capsule 3     Sig: TAKE 1 CAPSULE BY MOUTH EVERY DAY       Alpha Blockers Failed - 5/14/2023 12:32 PM        Failed - Patient does not have Tadalafil, Vardenafil, or Sildenafil on their medication list        Passed - Blood pressure under 140/90 in past 12 months     BP Readings from Last 3 Encounters:   02/02/23 126/68   12/29/22 112/62   12/19/22 130/68                 Passed - Recent (12 mo) or future (30 days) visit within the authorizing provider's specialty     Patient has had an office visit with the authorizing provider or a provider within the authorizing providers department within the previous 12 mos or has a future within next 30 days. See \"Patient Info\" tab in inbasket, or \"Choose Columns\" in Meds & Orders section of the refill encounter.              Passed - Medication is active on med list        Passed - Patient is 18 years of age or older             Maty Adams RN 05/14/23 12:33 PM  "

## 2023-05-16 RX ORDER — TAMSULOSIN HYDROCHLORIDE 0.4 MG/1
CAPSULE ORAL
Qty: 90 CAPSULE | Refills: 3 | Status: SHIPPED | OUTPATIENT
Start: 2023-05-16 | End: 2024-05-06

## 2023-05-23 ENCOUNTER — TRANSFERRED RECORDS (OUTPATIENT)
Dept: HEALTH INFORMATION MANAGEMENT | Facility: CLINIC | Age: 73
End: 2023-05-23
Payer: COMMERCIAL

## 2023-05-23 NOTE — TELEPHONE ENCOUNTER
Refill Request for HUMIRA    Please send rx to [facility] - [place], [place] - [address] [facility]    Refill request letter:      
Advanced care planning/counseling discussion

## 2023-06-01 NOTE — PROGRESS NOTES
"[facility] - Rheumatology Clinic Visit    Dale Cuevas  is a 68 year old here for follow-up newly dx osoriatic arthritis, Inflammatory arthritis history gout on allopurinol per PCP (uric acid 3.5 4-2019)     Review- +CRP 24 +HLA B27 -RF -CCP -ANTHONY -hep B/c, MTB QG   Past-celebrex not effective, aleve has helped some, prednisone 2018 dramatic improvement x 5-7 day course, adalimumab (humira) 4-, medrol dose pack, methotrexate/ leflunomide (arava) and aprimilast (otezla) contraindicated due to daily alcohol use and depression.       Initial visit April 11, 2019  Emiliano DELGADO:   Pain with swelling started 1-2 year ago in base of thumbs, hands, MCPs, PIPs, elbows, balls of the feet, swelling in the fingers (sausage) 2-5th bilateral and left 2-5th sausage toes, pain arms to forearm, not able to put rings on, right 4th sausage toes, not able to make a fist, pushes this hard to make a fist and not able to straighten the fingers which is worse in the mornings, lasts all day. Psoriasis on his [elbows, knees, scattered on legs,back of the ears] dry flakey. Reports this is making him depressed, and would be concerned of starting aprimilast (otezla) as this can worsen this. He has started drinking 2-3 drinks a day for the past years due to the pain. The whole hand swells, and then he cant use them. He has pain in the middle part of his spine then this shoots over to his arm, has past had a \"popped disc\" in 1989 then was paralzyed had to have ER surgery and learn to walk again thus suffers peripheral neuropathy as a result from his knees down, this is impacting his ability to lay flat as he gets severe pain and hard to sleep.      April 29, 2019  Will be starting adalimumab (humira) citrate free 80 mg loading dose, then every 14 days starting day 22 per derm. On celebrex 200 mg BID and allopurinol 100 mg day per PCP. Medrol dose pack with dramatic improvement. Local hand specialist injected " "bilateral 4th trigger fingers and bilateral CMC, dramatic steroid response. Injection of cervical spine last week, right arm is better but still the nerve shooting pain and right rib area. He will follow-up with them later this week with update 7-10 days later. Now able to make a fist, the dactylitis is much improved and less swelling. Would like to hold off on the shingrix and rather start the arthritis medication. Able to function better with all the medrol and cortisone injections. Psoriasis did not get worse on steroid. No leg weakness or loss of bowel and bladder.     Denies any fever, chills, SOB, QUEZADA, night sweats, or chest pain, or cough. Reports healthy. celebrex BID, tolerating. Not effective.  No nail pitting.     PMH:  Injury-right thumb hammer on this a few times   Medical-gout, BPH, HLD, psoriasis, hypertension, rosacea, ED, URI, chicken pox, right cataract, peripheral neuropathy feet to knee \"feel heat and pain, like my feet are asleep and sensitive to hot and cold) from prior spine injury 1989, depression, MRI cervical/thoracic spine 4-2019 per ortho moderate right neural foraminal narrowing predominantly secondary to his disc at C7-T1  Surgical-left wrist tendon sheath 1-2015, T8-9 (paralzyed for 30 min, learned to walk and stand, \"disc popped) removed rib spine surgery for disc fragment removed 1989 (DDD), right knee arthroscopy, right retinal surgery 2018 (wrinkle on retinal), injections base of thumbs     FH:  No autoimmune disorders, psoriasis, UC, crohn's, SLE, RA, PsA, gout, autoimmune thyroid.  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 Quit >50 yr ago smoking. No IVDU. No Children. Left handed. " Partner. Retired  and professional [employer]. Combat vet [place]       [facility] personally reviewed and updated by me.    ROS:  Negative raynaud s phenomena, hairloss, sun sensitivities, keratoconjunctivitis sicca, pleurisy, inflammatory joint symptoms, significant rashes like malar, oral/nasal or ulcerations, inflammatory eye disease, inflammatory bowel disease, tenosynovitis, or enthespathy. Negative for blood clots, gout, UC, crohn's. No temporal headache, no jaw claudication, no scalp tenderness, vision changes, carotidynia, cough. No Parotid swelling. Denies international travel. Denies history of pneumonia, hepatitis, tuberculosis, shingles, sepsis, tick bites or other infections.   +psoriasis   +see above   CONSTITUTIONAL: No fevers, night sweats or unintentional weight change. No acute distress, swollen glands  EYES: No vision change, diplopia, pain in eyes or red eyes   EARS, NOSE, MOUTH, THROAT: No tinnitus or hearing change, no epistaxis or nasal discharge, no oral lesions, throat clear. Normal saliva pool.  No drymouth. No thyroid  enlargement.   CARDIOVASCULAR: No chest pain, palpitations, or pain with walking, no orthopnea or PND   RESPIRATORY: No dyspnea, cough, shortness of breath or wheezing. No pleurisy.   GI: No nausea, vomiting, diarrhea or constipation, no abdominal pain, or blood in stools.   : No change in urine, no dysuria or hematuria   MUSCKL: No enthesitis, plantar fascitis or heel pain. +see above   INTEGUMENTARY: No concerning lesions or moles +see above   NEURO: No loss of strength or sensation, no numbness or tingling, no tremor, no dizziness, no headache. No falls   ENDO: No polyuria or polydipsia, no temperature intolerance   HEME/LYMPH:No concerning bumps, bleeding problems, or swollen lymph nodes. No recent infections, hospitalizations or new illnesses.   ALLERGY: No environmental allergies   PSYCH:No depression or anxiety, no sleep problems.  Otherwise 14 point ROS  "obtained, reviewed and found negative.     Physical exam:  Vitals: Blood pressure 176/90, pulse 81, temperature 97.8  F (36.6  C), temperature source Oral, height 1.753 m (5' 9\"), weight 81.1 kg (178 lb 11.2 oz), SpO2 96 %.  Wt Readings from Last 4 Encounters:   04/29/19 81.1 kg (178 lb 11.2 oz)   04/25/19 80.7 kg (178 lb)   04/22/19 82.1 kg (181 lb)   04/17/19 80.6 kg (177 lb 12.8 oz)     Constitutional: WD-WN-WG cooperative  Eyes: nl EOM, PERRLA, vision, conjunctiva, sclera, No Unilateral or bilateral external ear inflammation   ENT: nl external ears, nose, hearing, lips, teeth, gums, throat. No saddle nose   Neck: no mass or thyroid enlargement  Resp: lungs clear to auscultation, nl to palpation, nl effort  CV: RRR, no murmurs, rubs or gallops, no edema  GI: no ABD mass or tenderness, no HSM  : not tested  Lymph: no cervical or epitrochlear nodes  MS: bilateral swelling CMC, trace dactylitis in fingers and 4-5 toes. CMC squaring. Full fist formation. Hammertoes and bunions. Reduced ankle ROM. Elbow contracture 15 degree right and 10 on left. All TMJ, neck, shoulder, elbow, wrist, hand, spine, hip, knee, ankle, and foot joints were examined and otherwise found normal. Negative Lhermitte's sign. No periuncle erythema  4- 2+ swelling in all MCPs bilateral, 2-5th DIP with heberones, all PIPs, 2-3+ swelling at base of thumbs in CMCs, fist formation <1/3 with not able to straighten his fingers, reduced flexion of wrists, tailors bunions, bunions, hammertoes, dactylitis of 2-5th fingers, bilateral 4th toes, s/t MTPs, reduced flexion left ankle. Tender over thoracic spine. Right wrist s/t, tender in bicep tendons. Right elbow 15 degree contracture, left 10 degree.  Skin: no nail pitting, alopecia. +dry scaley psoriasis elbows, knees, scattered spots on legs, ears.   Neuro: nl cranial nerves, strength, sensation, DTRs.   Psych: nl judgement, orientation, memory, affect.      Labs/Imaging:  Results for orders " placed or performed during the hospital encounter of 04/25/19   XR Nerve Block Root Cerv/Thor Single Lvl    Narrative    XR NERVE BLOCK ROOT CERVICAL/THORACIC SINGLE LEVEL       4/25/2019  3:47 PM       History:  Cervical radiculopathy at C8 on the right. Multilevel  cervical degenerative disease. The ordering provider has specifically  requested a right C8 nerve root injection    Procedure:  The risks (bleeding, infection, reaction to contrast and  medications) and benefits of the procedure were explained to the  patient and consent was obtained. Images were reviewed as well as a  symptomatology. . Using sterile technique and fluoroscopic guidance a  #25 gauge spinal needle was placed adjacent to the right C8 exiting  nerve root using a anterior-lateral approach.  A small amount of  contrast was injected confirming satisfactory position of the needle  tip.  1.0 mL of dexamethasone mixed with 1.0 mL Lidocaine 1% were  injected.  No initial complication.        Fluoroscopy time: 0.5 minutes  Number of Images: 4    Meds given: 1.5mL 1% Lidocaine local, 1 mL Isovue M 200,  1 mL  Dexamethazone, 1 mL 1% Lidocaine    The patient's pain levels (0-10 scale) were as follows:                          PRE INJECTION  Neck         6    RIght arm  01     POST INJECTION          Neck         7-8    RIght arm  8       Impression    IMPRESSION:  Technically successful right C8 selective nerve root  injection.  Long term results pending. Discussed with the patient that  if this injection is ineffective, may consider cervical epidural  steroid injection as the next step.    VERONA JHAVERI PA-C       Impression/Plan:  1. Psoriatic arthritis with evidence of rheumatoid arthritis (RA) dactylitis, synovitis, tendonitis, enthesitis, contractures in elbows, hammertoes, tailors bunions, reduced wrist ROM, EAS,psoriasis, elevated CRP with +HLA B27 with dramatic prednisone response. I agree with use of adalimumab (humira) for his psoriatic  "arthritis with goals of improved ROM, function and reduced swelling and psoriasis.     Review- Discussed methotrexate (see below) contraindicated, hydroxychloroquine (plaquenil) contraindicated as may worsen his psoriasis, aprimilast (otezla) contraindicated (see below), thus discussed sulfasalazine (this would not control his severe arthritis and would not help his psoriasis).     2. Psoriasis, per dermatology. Will start adalimumab (humira)   3. Immunosuppression, labs 4-2019 normal. Did advise shingrix prior to start of biologics   4. Depression w/daily alcohol use. Reports he is not ready to quit alcohol use, I did give him Dr. Juno Gloria contact information for psychology if he wishes or his PCP. Methotrexate contraindicated due to daily alcohol use as this is risk of liver toxicity and he would need to quit, which he does not feel he is able to at this time. This may also worsen depression. Aprimilast (otezla) contraindicated as may worsen depression and cause suicidal ideations and this is concern as well.   5. Cervical radiculopathy C8-T1  with thoracic pain. S/p FRANTZ . History disc \"pop\" and surgery residual peripheral neuropathy. Per orthopedics       RTC 6-8 weeks    The patient understood the rationale for the diagnosis and treatment plan. Patient shared in the decision making. All questions were answered to best of my ability and the patient's satisfaction  Emiliano DELGADO, CNP, MSN  [facility]  Department of Rheumatology & Autoimmune Disorders    Education:  1. Immunization: Reviewed CDC guidelines with patient updated, information provided to patient based on CDC guidelines for vaccination recommendations, patient will discuss with primary provider, he will get the shingrix vaccine   2. Bone Health:Educated on adequate calcium and vitamin D intake, Educated on exercise, Glucocorticoid education discussed risks, benefits & potential long term side effects from use  3. Discussed " routine annual physicals, cancer screening, cardiac risk monitoring, bone health and primary care with primary care provider.   4. Educated on diagnosis, prognosis, labs, imaging, treatment options (including risks, benefits, risk of no treatment), medications (use, dose, side-effects, risks of medications including infection/cancer/bone marrow suppression, lab monitoring), infections what to do, plan of cares, goals of treatment.        TT 30 min CT 25 min face to face with patient discussion under impression/plan and education             Carac Pregnancy And Lactation Text: This medication is Pregnancy Category X and contraindicated in pregnancy and in women who may become pregnant. It is unknown if this medication is excreted in breast milk.

## 2023-06-05 DIAGNOSIS — E78.2 MIXED HYPERLIPIDEMIA: ICD-10-CM

## 2023-06-05 RX ORDER — ATORVASTATIN CALCIUM 40 MG/1
TABLET, FILM COATED ORAL
Qty: 90 TABLET | Refills: 3 | Status: SHIPPED | OUTPATIENT
Start: 2023-06-05 | End: 2024-06-12

## 2023-06-05 NOTE — TELEPHONE ENCOUNTER
"Last Written Prescription Date:  6/8/22  Last Fill Quantity: 90,  # refills: 3   Last office visit provider:  2/2/23, PCP     Requested Prescriptions   Pending Prescriptions Disp Refills     atorvastatin (LIPITOR) 40 MG tablet [Pharmacy Med Name: ATORVASTATIN 40 MG TABLET] 90 tablet 3     Sig: TAKE 1 TABLET BY MOUTH EVERY DAY       Statins Protocol Passed - 6/5/2023 12:45 AM        Passed - LDL on file in past 12 months     Recent Labs   Lab Test 06/08/22  1058   LDL 46             Passed - No abnormal creatine kinase in past 12 months     No lab results found.             Passed - Recent (12 mo) or future (30 days) visit within the authorizing provider's specialty     Patient has had an office visit with the authorizing provider or a provider within the authorizing providers department within the previous 12 mos or has a future within next 30 days. See \"Patient Info\" tab in inbasket, or \"Choose Columns\" in Meds & Orders section of the refill encounter.              Passed - Medication is active on med list        Passed - Patient is age 18 or older             Sophy Javier RN 06/05/23 12:06 PM  "

## 2023-06-26 ENCOUNTER — LAB (OUTPATIENT)
Dept: LAB | Facility: CLINIC | Age: 73
End: 2023-06-26
Payer: COMMERCIAL

## 2023-06-26 ENCOUNTER — OFFICE VISIT (OUTPATIENT)
Dept: RHEUMATOLOGY | Facility: CLINIC | Age: 73
End: 2023-06-26
Payer: COMMERCIAL

## 2023-06-26 ENCOUNTER — TELEPHONE (OUTPATIENT)
Dept: RHEUMATOLOGY | Facility: CLINIC | Age: 73
End: 2023-06-26

## 2023-06-26 VITALS — HEART RATE: 76 BPM | DIASTOLIC BLOOD PRESSURE: 70 MMHG | SYSTOLIC BLOOD PRESSURE: 138 MMHG

## 2023-06-26 DIAGNOSIS — Z79.899 HIGH RISK MEDICATION USE: ICD-10-CM

## 2023-06-26 DIAGNOSIS — M06.9 RHEUMATOID ARTHRITIS INVOLVING MULTIPLE JOINTS (H): ICD-10-CM

## 2023-06-26 DIAGNOSIS — L40.9 PSORIASIS: ICD-10-CM

## 2023-06-26 DIAGNOSIS — Z11.59 ENCOUNTER FOR SCREENING FOR OTHER VIRAL DISEASES: ICD-10-CM

## 2023-06-26 DIAGNOSIS — L40.50 PSORIATIC ARTHRITIS (H): Primary | ICD-10-CM

## 2023-06-26 LAB
ALBUMIN SERPL BCG-MCNC: 4.1 G/DL (ref 3.5–5.2)
ALT SERPL W P-5'-P-CCNC: 18 U/L (ref 0–70)
AST SERPL W P-5'-P-CCNC: 28 U/L (ref 0–45)
CREAT SERPL-MCNC: 0.93 MG/DL (ref 0.67–1.17)
CRP SERPL-MCNC: 4.36 MG/L
ERYTHROCYTE [DISTWIDTH] IN BLOOD BY AUTOMATED COUNT: 13 % (ref 10–15)
ERYTHROCYTE [SEDIMENTATION RATE] IN BLOOD BY WESTERGREN METHOD: 11 MM/HR (ref 0–20)
GFR SERPL CREATININE-BSD FRML MDRD: 86 ML/MIN/1.73M2
HBV CORE AB SERPL QL IA: NONREACTIVE
HBV SURFACE AG SERPL QL IA: NONREACTIVE
HCT VFR BLD AUTO: 40.2 % (ref 40–53)
HCV AB SERPL QL IA: NONREACTIVE
HGB BLD-MCNC: 13.8 G/DL (ref 13.3–17.7)
MCH RBC QN AUTO: 31.6 PG (ref 26.5–33)
MCHC RBC AUTO-ENTMCNC: 34.3 G/DL (ref 31.5–36.5)
MCV RBC AUTO: 92 FL (ref 78–100)
PLATELET # BLD AUTO: 187 10E3/UL (ref 150–450)
RBC # BLD AUTO: 4.37 10E6/UL (ref 4.4–5.9)
WBC # BLD AUTO: 5.4 10E3/UL (ref 4–11)

## 2023-06-26 PROCEDURE — 86481 TB AG RESPONSE T-CELL SUSP: CPT

## 2023-06-26 PROCEDURE — 85027 COMPLETE CBC AUTOMATED: CPT

## 2023-06-26 PROCEDURE — 84460 ALANINE AMINO (ALT) (SGPT): CPT

## 2023-06-26 PROCEDURE — 36415 COLL VENOUS BLD VENIPUNCTURE: CPT

## 2023-06-26 PROCEDURE — 84450 TRANSFERASE (AST) (SGOT): CPT

## 2023-06-26 PROCEDURE — 85652 RBC SED RATE AUTOMATED: CPT

## 2023-06-26 PROCEDURE — 99214 OFFICE O/P EST MOD 30 MIN: CPT | Performed by: PHYSICIAN ASSISTANT

## 2023-06-26 PROCEDURE — 82040 ASSAY OF SERUM ALBUMIN: CPT

## 2023-06-26 PROCEDURE — 86704 HEP B CORE ANTIBODY TOTAL: CPT

## 2023-06-26 PROCEDURE — 86140 C-REACTIVE PROTEIN: CPT

## 2023-06-26 PROCEDURE — 87340 HEPATITIS B SURFACE AG IA: CPT

## 2023-06-26 PROCEDURE — 82565 ASSAY OF CREATININE: CPT

## 2023-06-26 PROCEDURE — 86803 HEPATITIS C AB TEST: CPT

## 2023-06-26 RX ORDER — METHOTREXATE 2.5 MG/1
15 TABLET ORAL
Qty: 72 TABLET | Refills: 1 | Status: SHIPPED | OUTPATIENT
Start: 2023-06-26 | End: 2023-10-12

## 2023-06-26 NOTE — TELEPHONE ENCOUNTER
PA Initiation    Medication: ORENCIA CLICKJECT 125 MG/ML SC SOAJ  Insurance Company: [facility] - Phone [phone] Fax [phone]  Pharmacy Filling the Rx: [facility] - [place], [place] - [facility] [address]  Filling Pharmacy Phone:    Filling Pharmacy Fax:    Start Date: 6/26/2023    Dale Cuevas Fuentes: [id number] - PA Case ID: [id number]        Thank You,     Ritika Live [facility]  Specialty [facility]  [email or web address]  Phone: [phone]  Fax: [phone]

## 2023-06-26 NOTE — PROGRESS NOTES
Rheumatology Clinic Visit  [facility]  GERTRUDE Vilchis     Dale Cuevas MRN# [id number]   YOB: 1950 Age: 73 year old   Date of Visit: 06/26/2023  Primary care provider: Romina Perez          Assessment and Plan:     1.  Psoriatic arthritis  2.  Rheumatoid arthritis  3.  Psoriasis  4.  High-risk medication use    Patient presents today for follow-up regarding his inflammatory arthritis.  He reports that he has had increasing pain and swelling in his right hand.  He does also have some pain and swelling of the left second MCP.  He has continued on the methotrexate and is tolerating it well.  He does not do well with prednisone therefore he has not been taking any.  Physical examination today showed significant synovitis over his second through fourth MCP on the right and the second MCP on the left.  He has subluxation of the third MCP on the right.  Previous after evaluations are reviewed and are below.    Would recommend restarting biologic therapy.  Humira was initially significantly beneficial for him and then once he stopped it for his knee surgery may have lost its effectiveness.  Enbrel was not effective.  We discussed potentially Cimzia versus Orencia.  After further discussion we decided to start him on Orencia.  Side effects were reviewed with the patient today.  He will continue on the methotrexate.  We will check labs today and again every 3 months.    Plan:     1. Schedule follow-up with Karma Richardson PA-C in 3 months.   2. Labs: CBC, creatinine, Albumin, AST, ALT, CRP and Sed Rate every 3 months, hepatitis serologies and TB check today  3. Medication recommendations:   a. Start Orencia 125mg/ml SubQ weekly  i. # Abatacept (Orencia) Risks and Benefits: The risks and benefits of abatacept were discussed in detail and the patient verbalized understanding.  The risks discussed include, but are not limited to, the risk for hypersensitivity, anaphylaxis, anaphylactoid reactions,  an increased risk for serious infections leading to hospitalization or death, and an increased risk for more frequent respiratory adverse events in patients with COPD.  If subcutaneous injections are used, then injection site reactions and/or pain may occur at the site of injection.  The most common side effects were discussed that include headache, upper respiratory tract infections, nasopharyngitis, and nausea.  It was discussed that the medication would need to be discontinued if a serious infection develops.  It was discussed that live vaccinations should not be received while using abatacept or within 30 days prior to starting abatacept.  I encouraged reviewing the package insert and asking any questions about the medication.      b. Methotrexate 15mg (6 tablets) weekly. We can increase this dose as well.   c. -check labs (ALT/AST, albumin, CBC with platelets and creatinine) every 3 months  d. -Limit alcohol to 2 drinks weekly  e. -Take Folic Acid 1mg daily   f. -Tylenol 500-1000mg can be used as needed up to three times daily for nausea/headache associated with dosing    Karma Richardson, GERTRUDE  Rheumatology         History of Present Illness:   Dale Cuevas presents for evaluation of psoriatic arthritis.      Interval history June 26, 2023:    He states that his fingers are moving on the right hand. He has had more pain and swelling. He has continued on the Methotrexate.  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    HPI from consult:   He reports that his joints are doing pretty well. He does have decreased strength in his thumbs. He has been noticing his fingers moving as well towards his pinky. He has never been higher than 15mg weekly on the Methotrexate. He is tolerating it well. He states that initially Humira was significantly helpful. He states that within days of starting he had significant improvement. He then had his knee replaced, and it quit working after that. He  does have about 1 hour or less of morning stiffness. He does do some exercises on his hands when he wakes up. 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     No frequent infections. No shortness of breath or chest pain.     Rheumatological history:  Provider(s): DANNY Baum CNP, Dr. Mead  Last office visit: 10/14/2021  Pertinent lab history: Elevated CRP, Positive HLA-B27, negative RF and CCP, - Hep B/C, -MTB QG  Previous medications tried: Humira (initially effective), Celebrex (not effective), Prednisone (helpful but has side effects: increased appetite, changes to his voice), Enbrel (not effective, difficult to get refilled)  Current medications: Methotrexate 15mg weekly,          Review of Systems:     Constitutional: negative  Skin: negative  Eyes: negative  Ears/Nose/Throat: negative  Respiratory: No shortness of breath, dyspnea on exertion, cough, or hemoptysis  Cardiovascular: negative  Gastrointestinal: negative  Genitourinary: negative  Musculoskeletal: as above  Neurologic: negative  Psychiatric: negative  Hematologic/Lymphatic/Immunologic: negative  Endocrine: negative         Active Problem List:     Patient Active Problem List    Diagnosis Date Noted     Status post revision of total knee, right 06/03/2021     Priority: Medium     Status post total knee replacement, right 03/04/2021     Priority: Medium     Medication monitoring encounter 03/01/2021     Priority: Medium     Immunosuppression (H) 02/10/2021     Priority: Medium     Psoriatic arthritis (H) 07/15/2019     Priority: Medium     Diastasis recti 06/15/2016     Priority: Medium     Family hx of prostate cancer 06/07/2016     Priority: Medium     Benign prostatic hyperplasia 09/08/2014     Priority: Medium     Hyperlipidemia LDL goal <100 02/05/2014     Priority: Medium     Psoriasis 11/23/2013     Priority: Medium     Erectile dysfunction 05/06/2013     Priority: Medium            Past Medical  History:     Past Medical History:   Diagnosis Date     Benign prostatic hyperplasia 2014     Diastasis recti 06/15/2016     Elevated blood pressure reading without diagnosis of hypertension 2013     Erectile dysfunction 2013     Family hx of prostate cancer 2016     Hyperlipidemia LDL goal <100 2014     Psoriasis 2013     Psoriatic arthritis (H)      Past Surgical History:   Procedure Laterality Date     ARTHROPLASTY KNEE Right 3/4/2021    Procedure: RIGHT TOTAL KNEE ARTHROPLASTY;  Surgeon: Jose C Westbrook MD;  Location: [facility] OR     ARTHROPLASTY REVISION KNEE Right 6/3/2021    Procedure: RIGHT KNEE POLYETHYLENE EXCHANGE;  Surgeon: Jose C Westbrook MD;  Location: [facility] OR     BACK SURGERY       Hx Vitrectomy/Membrane Peel/Laser/Air Right 2018    Dr. Medrano / Dr. Mendiola       ORTHOPEDIC SURGERY  2015    left wrist     ORTHOPEDIC SURGERY      spine sugery-with disc fragment removed     ORTHOPEDIC SURGERY      right knee arthroscopy     SEPTOPLASTY              Social History:     Social History     Socioeconomic History     Marital status: Single     Spouse name: Not on file     Number of children: Not on file     Years of education: Not on file     Highest education level: Not on file   Occupational History     Not on file   Tobacco Use     Smoking status: Former     Types: Cigarettes     Quit date: 1973     Years since quittin.1     Smokeless tobacco: Never   Substance and Sexual Activity     Alcohol use: Yes     Comment: every night -      Drug use: No     Sexual activity: Not Currently   Other Topics Concern     Parent/sibling w/ CABG, MI or angioplasty before 65F 55M? No   Social History Narrative     Not on file     Social Determinants of Health     Financial Resource Strain: Not on file   Food Insecurity: Not on file   Transportation Needs: Not on file   Physical Activity: Not on file   Stress: Not on file   Social Connections: Not on file    Intimate Partner Violence: Not on file   Housing Stability: Not on file          Family History:     Family History   Problem Relation Age of Onset     Hypertension Mother      Prostate Cancer Father 72     Breast Cancer Sister      Thyroid Cancer Brother      HIV/AIDS Brother             Allergies:     Allergies   Allergen Reactions     Levofloxacin      Other reaction(s): *Unknown            Medications:     Current Outpatient Medications   Medication Sig Dispense Refill     atorvastatin (LIPITOR) 40 MG tablet TAKE 1 TABLET BY MOUTH EVERY DAY 90 tablet 3     folic acid (FOLVITE) 1 MG tablet TAKE 1 TABLET BY MOUTH EVERY DAY 90 tablet 3     lisinopril (ZESTRIL) 5 MG tablet Take 1 tablet (5 mg) by mouth daily 90 tablet 3     methotrexate sodium 2.5 MG TABS Take 6 tablets (15 mg) by mouth every 7 days LABS REQUIRED EVERY 8-12 WEEKS WHILE TAKING THIS MEDICATION. 72 tablet 1     sildenafil (REVATIO) 20 MG tablet Take 1 tablet (20 mg) by mouth daily as needed 20 tablet 1     tamsulosin (FLOMAX) 0.4 MG capsule TAKE 1 CAPSULE BY MOUTH EVERY DAY 90 capsule 3            Physical Exam:   There were no vitals taken for this visit.  Wt Readings from Last 6 Encounters:   02/02/23 78.1 kg (172 lb 4 oz)   12/19/22 79 kg (174 lb 1.6 oz)   12/12/22 79.6 kg (175 lb 8 oz)   10/12/22 75.7 kg (166 lb 12.8 oz)   09/14/22 78.4 kg (172 lb 12 oz)   06/08/22 83.5 kg (184 lb)     Constitutional: well-developed, appearing stated age; cooperative  Eyes: nl conjunctiva, sclera  ENT: nl external ears, nose, hearing, lips  Neck: no mass or thyroid enlargement  Resp: No shortness of breath with normal conversation  MS: Squaring of the CMC's bilaterally.  Decreased fist formation and decreased  strength bilaterally.  He has active synovitis over the second through fourth MCPs on the right.  Subluxation of the third MCP on the right.  Synovitis to the second MCP on the left   skin:  small psoriatic plaque on the right elbow.  Psych: nl  judgement, orientation, memory, affect.           Data:   Imagin2019 x-ray bilateral hands  Impression:   1. Scattered degenerative changes, severe in the bilateral first  carpometacarpal joints. Additionally there are possible erosive  changes at this location suggestive of erosive osteoarthritis.  2. Bilateral narrowing of first carpometacarpal joints with prominent  osteophytes as described. Differential consideration includes CPPD and Hemochromatosis.    2019 x-ray bilateral foot  Impression: No definite erosions.     Laboratory:   hepatitis B core antibody nonreactive, hepatitis C antibody nonreactive    10/11/2022  Creatinine 0.83, GFR greater than 90  Albumin 4.6  ALT 36, AST 37  TSH 1.33, T4 1.29  With blood cell count 5.4, hemoglobin 15.5, platelet count 190  QuantiFERON TB Gold negative

## 2023-06-26 NOTE — PATIENT INSTRUCTIONS
After Visit Instructions:     Thank you for coming to [facility] for your care. It is my goal to partner with you to help you reach your optimal state of health.       Plan:     Schedule follow-up with Karma Richardson PA-C in 3 months.   Labs: CBC, creatinine, Albumin, AST, ALT, CRP and Sed Rate every 3 months  Medication recommendations:   Start Orencia 125mg/ml SubQ weekly  # Abatacept (Orencia) Risks and Benefits: The risks and benefits of abatacept were discussed in detail and the patient verbalized understanding.  The risks discussed include, but are not limited to, the risk for hypersensitivity, anaphylaxis, anaphylactoid reactions, an increased risk for serious infections leading to hospitalization or death, and an increased risk for more frequent respiratory adverse events in patients with COPD.  If subcutaneous injections are used, then injection site reactions and/or pain may occur at the site of injection.  The most common side effects were discussed that include headache, upper respiratory tract infections, nasopharyngitis, and nausea.  It was discussed that the medication would need to be discontinued if a serious infection develops.  It was discussed that live vaccinations should not be received while using abatacept or within 30 days prior to starting abatacept.  I encouraged reviewing the package insert and asking any questions about the medication.      Methotrexate 15mg (6 tablets) weekly. We can increase this dose as well.   -check labs (ALT/AST, albumin, CBC with platelets and creatinine) every 3 months  -Limit alcohol to 2 drinks weekly  -Take Folic Acid 1mg daily   -Tylenol 500-1000mg can be used as needed up to three times daily for nausea/headache associated with dosing        Karma Richardson PA-C  [facility]  [facility]    Contact information: [facility]  Clinic Number:  [phone]  Please call or send a [employer]  message with any questions about your care

## 2023-06-28 LAB
GAMMA INTERFERON BACKGROUND BLD IA-ACNC: 0.06 IU/ML
M TB IFN-G BLD-IMP: NEGATIVE
M TB IFN-G CD4+ BCKGRND COR BLD-ACNC: 9.94 IU/ML
MITOGEN IGNF BCKGRD COR BLD-ACNC: 0 IU/ML
MITOGEN IGNF BCKGRD COR BLD-ACNC: 0.01 IU/ML
QUANTIFERON MITOGEN: 10 IU/ML
QUANTIFERON NIL TUBE: 0.06 IU/ML
QUANTIFERON TB1 TUBE: 0.06 IU/ML
QUANTIFERON TB2 TUBE: 0.07

## 2023-06-28 NOTE — TELEPHONE ENCOUNTER
Prior Authorization Approval    Medication: ORENCIA CLICKJECT 125 MG/ML SC SOAJ  Authorization Effective Date: 6/27/2023  Authorization Expiration Date: 12/31/2023  Approved Dose/Quantity: 4 / 28 days  Reference #: Key: [id number] - PA Case ID: [id number]   Insurance Company: [employer] - Phone [phone] Fax [phone]  Expected CoPay: $1676.71     CoPay Card Available: No    Financial Assistance Needed: [email or web address]  [email or web address]  Which Pharmacy is filling the prescription:    Pharmacy Notified:    Patient Notified:  Yes - phone    Pursue free drug - patient received both Humira and Enbrel previously through Free Drug          Deductible $345  teir 5: 27% co-insurance      HIGH COST ALERT- Pursue Free Drug    FREE DRUG APPLICATION INITIATED    Medication: ORENCIA CLICKJECT 125 MG/ML SC SOAJ  Free Drug Program Name: [employer]  Start Date: 06/28/2023  Phone #: [phone]  Fax #: [phone]  Additional Information: spoke to patient on the phone for 31 minutes going over Free Drug application for Orencia. Patient has previously been approved through [employer] and [employer] so he is familiar with process and eligible.     Patient has copy of [facility] enrollment form and intends to mail his portion along with proof of income and insurance verification to foundation as we would like to get started as soon as possible. Completing provider portion and copy of prior authorization to fax 06/28/2023.    Patient was very understanding and appreciated call and discussion. He has liaison information to contact and follow up with any questions that may arise by either [employer] or phone.     Confirm receipt of fax by [facility] 06/30    Thank You,     Ritika Live, [facility]  Specialty Pharmacy Clinic Liaison [facility] Specialty  [email or web address]  [email or web address]  Phone: [phone]   Fax: [phone]

## 2023-06-28 NOTE — TELEPHONE ENCOUNTER
Free Drug application scanned into media tab as patient business / insurance card 06/28/2023    Emailed provider portion to complete    Patient mailing in his portion + eligibility verification and copy of insurance cards    Eliceo-PAP-carlito    Thank You,     Eren Live, [facility]  Specialty Pharmacy Clinic [facility]  [email or web address]  [email or web address]  Phone: [phone]  Fax: [phone]

## 2023-06-29 NOTE — TELEPHONE ENCOUNTER
Karma-PAC completed and signed forms, form faxed back to [facility] and sent to be scanned into chart.

## 2023-06-30 NOTE — TELEPHONE ENCOUNTER
Patient heading over to clinic with application and tax documents after speaking to [facility] regarding income eligibility.    Wants to ensure application is sent before holiday.     Once faxed to foundation - application pending determination from [facility].    Thanks!

## 2023-06-30 NOTE — TELEPHONE ENCOUNTER
FREE DRUG APPLICATION INITIATED    Medication: ORENCIA CLICKJECT 125 MG/ML SC SOAJ  Free Drug Program Name: [employer]  Start Date: 06/29/2023  Phone #: [phone]  Fax #: [phone]  Additional Information: 06/30 FREE DRUG PENDING - called and spoke to pt to update on status.  - PT WANTS TO BRING HIS SIGNED FORMS INTO CLINIC TO BE FAXED        Thank You,     Ritika Live, [facility]  Specialty Pharmacy Clinic [facility]  [email or web address]  [email or web address]  Phone: [phone]  Fax: [phone]

## 2023-06-30 NOTE — TELEPHONE ENCOUNTER
Pt notified he could drop forms off at [facility] today so staff could fax these or next Wednesday 7/6. Pt told to drop off at 2nd floor  at [place] and [facility] staff can fax forms.

## 2023-07-06 NOTE — TELEPHONE ENCOUNTER
FREE DRUG APPLICATION APPROVED    Medication: ORENCIA CLICKJECT 125 MG/ML SC SOAJ  Program Name: [place] Patient Assistance [employer]  Effective Date: 7/6/2023  Expiration Date: 12/31/2023  Pharmacy Filling the Rx: [facility] [address]   Patient Notified: YES -  Called and spoke to patient to notify of approval. Dale is taking bike ride in celebration of application approval!  Additional Information:         Thank You,     Eren Live, [facility]  Specialty Pharmacy Clinic [facility] Specialty  [email or web address]  [email or web address]  Phone: [phone]  Fax: [phone]

## 2023-07-18 DIAGNOSIS — L40.50 PSORIATIC ARTHRITIS (H): ICD-10-CM

## 2023-07-21 RX ORDER — FOLIC ACID 1 MG/1
1000 TABLET ORAL DAILY
Qty: 90 TABLET | Refills: 3 | Status: SHIPPED | OUTPATIENT
Start: 2023-07-21

## 2023-07-21 NOTE — TELEPHONE ENCOUNTER
folic acid (FOLVITE) 1 MG tablet      Last Written Prescription Date:  7/8/22  Last Fill Quantity: 90,   # refills: 3  Last Office Visit : 6/26/23  Future Office visit:  9/28/23    Routing refill request to provider for review/approval because:  Overdue for office visit - n/a, was seen on 6/26/23 by Karma Richardson PA-C

## 2023-09-25 ENCOUNTER — IMMUNIZATION (OUTPATIENT)
Dept: FAMILY MEDICINE | Facility: CLINIC | Age: 73
End: 2023-09-25
Payer: COMMERCIAL

## 2023-09-25 ENCOUNTER — LAB (OUTPATIENT)
Dept: LAB | Facility: CLINIC | Age: 73
End: 2023-09-25
Payer: COMMERCIAL

## 2023-09-25 DIAGNOSIS — L40.50 PSORIATIC ARTHRITIS (H): ICD-10-CM

## 2023-09-25 DIAGNOSIS — Z23 NEED FOR IMMUNIZATION AGAINST INFLUENZA: Primary | ICD-10-CM

## 2023-09-25 LAB
ALBUMIN SERPL BCG-MCNC: 4.4 G/DL (ref 3.5–5.2)
ALT SERPL W P-5'-P-CCNC: 22 U/L (ref 0–70)
AST SERPL W P-5'-P-CCNC: 28 U/L (ref 0–45)
CREAT SERPL-MCNC: 0.79 MG/DL (ref 0.67–1.17)
CRP SERPL-MCNC: 3.61 MG/L
EGFRCR SERPLBLD CKD-EPI 2021: >90 ML/MIN/1.73M2
ERYTHROCYTE [DISTWIDTH] IN BLOOD BY AUTOMATED COUNT: 13.2 % (ref 10–15)
ERYTHROCYTE [SEDIMENTATION RATE] IN BLOOD BY WESTERGREN METHOD: 10 MM/HR (ref 0–20)
HCT VFR BLD AUTO: 41.9 % (ref 40–53)
HGB BLD-MCNC: 14.1 G/DL (ref 13.3–17.7)
MCH RBC QN AUTO: 30.5 PG (ref 26.5–33)
MCHC RBC AUTO-ENTMCNC: 33.7 G/DL (ref 31.5–36.5)
MCV RBC AUTO: 91 FL (ref 78–100)
PLATELET # BLD AUTO: 188 10E3/UL (ref 150–450)
RBC # BLD AUTO: 4.63 10E6/UL (ref 4.4–5.9)
WBC # BLD AUTO: 5.2 10E3/UL (ref 4–11)

## 2023-09-25 PROCEDURE — 84460 ALANINE AMINO (ALT) (SGPT): CPT

## 2023-09-25 PROCEDURE — 84450 TRANSFERASE (AST) (SGOT): CPT

## 2023-09-25 PROCEDURE — 85652 RBC SED RATE AUTOMATED: CPT

## 2023-09-25 PROCEDURE — 90662 IIV NO PRSV INCREASED AG IM: CPT

## 2023-09-25 PROCEDURE — 82040 ASSAY OF SERUM ALBUMIN: CPT

## 2023-09-25 PROCEDURE — 86140 C-REACTIVE PROTEIN: CPT

## 2023-09-25 PROCEDURE — 36415 COLL VENOUS BLD VENIPUNCTURE: CPT

## 2023-09-25 PROCEDURE — 85027 COMPLETE CBC AUTOMATED: CPT

## 2023-09-25 PROCEDURE — 82565 ASSAY OF CREATININE: CPT

## 2023-09-25 PROCEDURE — G0008 ADMIN INFLUENZA VIRUS VAC: HCPCS

## 2023-09-25 NOTE — PROGRESS NOTES
Prior to immunization administration, verified patients identity using patient s name and date of birth. Please see Immunization Activity for additional information.     Screening Questionnaire for Adult Immunization    Are you sick today?   No   Do you have allergies to medications, food, a vaccine component or latex?   Yes   Have you ever had a serious reaction after receiving a vaccination?   No   Do you have a long-term health problem with heart, lung, kidney, or metabolic disease (e.g., diabetes), asthma, a blood disorder, no spleen, complement component deficiency, a cochlear implant, or a spinal fluid leak?  Are you on long-term aspirin therapy?   No   Do you have cancer, leukemia, HIV/AIDS, or any other immune system problem?   No   Do you have a parent, brother, or sister with an immune system problem?   No   In the past 3 months, have you taken medications that affect  your immune system, such as prednisone, other steroids, or anticancer drugs; drugs for the treatment of rheumatoid arthritis, Crohn s disease, or psoriasis; or have you had radiation treatments?   No   Have you had a seizure, or a brain or other nervous system problem?   No   During the past year, have you received a transfusion of blood or blood    products, or been given immune (gamma) globulin or antiviral drug?   No   For women: Are you pregnant or is there a chance you could become       pregnant during the next month?   No   Have you received any vaccinations in the past 4 weeks?   No     Immunization questionnaire answers were all negative.    I have reviewed the following standing orders:   This patient is due and qualifies for the Influenza vaccine.    Click here for Influenza Vaccine Standing Order    I have reviewed the vaccines inclusion and exclusion criteria; No concerns regarding eligibility.     Patient instructed to remain in clinic for 15 minutes afterwards, and to report any adverse reactions.     Screening performed by  Romina Saini on 9/25/2023 at 10:44 AM.

## 2023-10-12 ENCOUNTER — OFFICE VISIT (OUTPATIENT)
Dept: RHEUMATOLOGY | Facility: CLINIC | Age: 73
End: 2023-10-12
Payer: COMMERCIAL

## 2023-10-12 VITALS
SYSTOLIC BLOOD PRESSURE: 136 MMHG | WEIGHT: 174.3 LBS | DIASTOLIC BLOOD PRESSURE: 70 MMHG | OXYGEN SATURATION: 97 % | BODY MASS INDEX: 27.2 KG/M2 | HEART RATE: 65 BPM

## 2023-10-12 DIAGNOSIS — Z79.899 HIGH RISK MEDICATION USE: ICD-10-CM

## 2023-10-12 DIAGNOSIS — L40.50 PSORIATIC ARTHRITIS (H): Primary | ICD-10-CM

## 2023-10-12 DIAGNOSIS — L40.9 PSORIASIS: ICD-10-CM

## 2023-10-12 DIAGNOSIS — M06.9 RHEUMATOID ARTHRITIS INVOLVING MULTIPLE JOINTS (H): ICD-10-CM

## 2023-10-12 PROCEDURE — 99214 OFFICE O/P EST MOD 30 MIN: CPT | Performed by: PHYSICIAN ASSISTANT

## 2023-10-12 RX ORDER — METHOTREXATE 2.5 MG/1
20 TABLET ORAL
Qty: 96 TABLET | Refills: 0 | Status: SHIPPED | OUTPATIENT
Start: 2023-10-12 | End: 2023-11-25

## 2023-10-12 NOTE — PATIENT INSTRUCTIONS
After Visit Instructions:     Thank you for coming to [facility] for your care. It is my goal to partner with you to help you reach your optimal state of health.       Plan:     Schedule follow-up with Karma Richardson PA-C in 3 months.   Labs: CBC, creatinine, Albumin, AST, ALT, CRP and Sed Rate in 1 month  Medication recommendations:   Continue Orencia 125mg/ml SubQ weekly   Increase Methotrexate 20mg (8 tablets) weekly.   -check labs (ALT/AST, albumin, CBC with platelets and creatinine) every 3 months  -Limit alcohol to 2 drinks weekly  -Take Folic Acid 1mg daily   -Tylenol 500-1000mg can be used as needed up to three times daily for nausea/headache associated with dosing        Karma Richardson PA-C  [facility]  [facility]    Contact information: [facility]  Clinic Number:  [phone]  Please call or send a [employer] message with any questions about your care

## 2023-10-12 NOTE — PROGRESS NOTES
Rheumatology Clinic Visit  [facility]  GERTRUDE Vilchis     Dale Cuevas MRN# [id number]   YOB: 1950 Age: 73 year old   Date of Visit: 10/12/2023  Primary care provider: Romina Perez          Assessment and Plan:     1.  Psoriatic arthritis  2.  Rheumatoid arthritis  3.  Psoriasis  4.  High-risk medication use    Patient presents today for follow-up regarding his inflammatory arthritis.  He has had increased swelling on his knuckles on the left. He notes improvement in the right hand swelling. He is tolerating his medications well.  Physical examination today does show active synovitis over the second and third MCPs on the left.  Some mild synovitis on the right but this has improved from his last visit.  Previous laboratory evaluations were reviewed.    He started the Orencia and is overall tolerating it well.  He has had improvement in the synovitis in his right hand but unfortunately has had worsening in the left hand.  At this time I will increase his methotrexate to 8 tablets weekly.  He will continue on the Orencia weekly.  If he still has persistent synovitis at his next visit, I would recommend changing his biologic therapy.  We will check labs in 1 month given the increased dose of methotrexate and every 3 months after that.      Plan:     Schedule follow-up with Karma Richardson PA-C in 3 months.   Labs: CBC, creatinine, Albumin, AST, ALT, CRP and Sed Rate in 1 month  Medication recommendations:   Continue Orencia 125mg/ml SubQ weekly   Increase Methotrexate 20mg (8 tablets) weekly.   -check labs (ALT/AST, albumin, CBC with platelets and creatinine) every 3 months  -Limit alcohol to 2 drinks weekly  -Take Folic Acid 1mg daily   -Tylenol 500-1000mg can be used as needed up to three times daily for nausea/headache associated with dosing    GERTRUDE Vilchis  Rheumatology         History of Present Illness:   Dale Cuevas presents for evaluation of psoriatic arthritis.       Interval history October 12, 2023:  He states that his left hand has been swollen for the last couple of weeks. The trouble he was having with the right hand has improved. He is tolerating the Orencia, but he is not sure that it is working. He continues on the Methotrexate weekly (on Thursdays).     Interval history June 26, 2023:    He states that his fingers are moving on the right hand. He has had more pain and swelling. He has continued on the Methotrexate.  He prefers to avoid prednisone as it affects his voice and he is a hernandez.    HPI from consult:   He reports that his joints are doing pretty well. He does have decreased strength in his thumbs. He has been noticing his fingers moving as well towards his pinky. He has never been higher than 15mg weekly on the Methotrexate. He is tolerating it well. He states that initially Humira was significantly helpful. He states that within days of starting he had significant improvement. He then had his knee replaced, and it quit working after that. He does have about 1 hour or less of morning stiffness. He does do some exercises on his hands when he wakes up. 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     No frequent infections. No shortness of breath or chest pain.     Rheumatological history:  Provider(s): DANNY Baum CNP, Dr. Mead  Pertinent lab history: Elevated CRP, Positive HLA-B27, negative RF and CCP, - Hep B/C, -MTB QG  Previous medications tried: Humira (initially effective), Celebrex (not effective), Prednisone (helpful but has side effects: increased appetite, changes to his voice), Enbrel (not effective, difficult to get refilled)  Current medications: Methotrexate 15mg weekly, Orencia 125mg SubQ weekly         Review of Systems:     Constitutional: negative  Skin: negative  Eyes: negative  Ears/Nose/Throat: negative  Respiratory: No shortness of breath, dyspnea on exertion, cough, or  hemoptysis  Cardiovascular: negative  Gastrointestinal: negative  Genitourinary: negative  Musculoskeletal: as above  Neurologic: negative  Psychiatric: negative  Hematologic/Lymphatic/Immunologic: negative  Endocrine: negative         Active Problem List:     Patient Active Problem List    Diagnosis Date Noted    Status post revision of total knee, right 06/03/2021     Priority: Medium    Status post total knee replacement, right 03/04/2021     Priority: Medium    Medication monitoring encounter 03/01/2021     Priority: Medium    Immunosuppression (H24) 02/10/2021     Priority: Medium    Psoriatic arthritis (H) 07/15/2019     Priority: Medium    Diastasis recti 06/15/2016     Priority: Medium    Family hx of prostate cancer 06/07/2016     Priority: Medium    Benign prostatic hyperplasia 09/08/2014     Priority: Medium    Hyperlipidemia LDL goal <100 02/05/2014     Priority: Medium    Psoriasis 11/23/2013     Priority: Medium    Erectile dysfunction 05/06/2013     Priority: Medium            Past Medical History:     Past Medical History:   Diagnosis Date    Benign prostatic hyperplasia 09/08/2014    Diastasis recti 06/15/2016    Elevated blood pressure reading without diagnosis of hypertension 05/06/2013    Erectile dysfunction 05/06/2013    Family hx of prostate cancer 06/07/2016    Hyperlipidemia LDL goal <100 02/05/2014    Psoriasis 11/23/2013    Psoriatic arthritis (H)      Past Surgical History:   Procedure Laterality Date    ARTHROPLASTY KNEE Right 3/4/2021    Procedure: RIGHT TOTAL KNEE ARTHROPLASTY;  Surgeon: Jose C Westbrook MD;  Location:  OR    ARTHROPLASTY REVISION KNEE Right 6/3/2021    Procedure: RIGHT KNEE POLYETHYLENE EXCHANGE;  Surgeon: Jose C Westbrook MD;  Location:  OR    BACK SURGERY      Hx Vitrectomy/Membrane Peel/Laser/Air Right 08/06/2018    Dr. Medrano / Dr. Mendiola      ORTHOPEDIC SURGERY  Jan 12, 2015    left wrist    ORTHOPEDIC SURGERY  1989    spine sugery-with disc fragment  removed    ORTHOPEDIC SURGERY  2014    right knee arthroscopy    SEPTOPLASTY              Social History:     Social History     Socioeconomic History    Marital status: Single     Spouse name: Not on file    Number of children: Not on file    Years of education: Not on file    Highest education level: Not on file   Occupational History    Not on file   Tobacco Use    Smoking status: Former     Types: Cigarettes     Quit date: 1973     Years since quittin.4    Smokeless tobacco: Never   Substance and Sexual Activity    Alcohol use: Yes     Comment: every night -     Drug use: No    Sexual activity: Not Currently   Other Topics Concern    Parent/sibling w/ CABG, MI or angioplasty before 65F 55M? No   Social History Narrative    Not on file     Social Determinants of Health     Financial Resource Strain: Not on file   Food Insecurity: Not on file   Transportation Needs: Not on file   Physical Activity: Not on file   Stress: Not on file   Social Connections: Not on file   Interpersonal Safety: Not on file   Housing Stability: Not on file          Family History:     Family History   Problem Relation Age of Onset    Hypertension Mother     Prostate Cancer Father 72    Breast Cancer Sister     Thyroid Cancer Brother     HIV/AIDS Brother             Allergies:     Allergies   Allergen Reactions    Levofloxacin      Other reaction(s): *Unknown            Medications:     Current Outpatient Medications   Medication Sig Dispense Refill    Abatacept (ORENCIA) 125 MG/ML SOAJ auto-injector Inject 1 mL (125 mg) Subcutaneous every 7 days Hold for signs of infection, and seek medical attention. 4 mL 2    atorvastatin (LIPITOR) 40 MG tablet TAKE 1 TABLET BY MOUTH EVERY DAY 90 tablet 3    folic acid (FOLVITE) 1 MG tablet Take 1 tablet (1,000 mcg) by mouth daily 90 tablet 3    lisinopril (ZESTRIL) 5 MG tablet Take 1 tablet (5 mg) by mouth daily 90 tablet 3    methotrexate sodium 2.5 MG TABS Take 8 tablets (20 mg) by mouth  every 7 days LABS REQUIRED EVERY 8-12 WEEKS WHILE TAKING THIS MEDICATION. 96 tablet 0    sildenafil (REVATIO) 20 MG tablet Take 1 tablet (20 mg) by mouth daily as needed 20 tablet 1    tamsulosin (FLOMAX) 0.4 MG capsule TAKE 1 CAPSULE BY MOUTH EVERY DAY 90 capsule 3            Physical Exam:   Blood pressure 136/70, pulse 65, weight 79.1 kg (174 lb 4.8 oz), SpO2 97%.  Wt Readings from Last 6 Encounters:   10/12/23 79.1 kg (174 lb 4.8 oz)   23 78.1 kg (172 lb 4 oz)   22 79 kg (174 lb 1.6 oz)   22 79.6 kg (175 lb 8 oz)   10/12/22 75.7 kg (166 lb 12.8 oz)   22 78.4 kg (172 lb 12 oz)     Constitutional: well-developed, appearing stated age; cooperative  Eyes: nl conjunctiva, sclera  ENT: nl external ears, nose, hearing, lips  Neck: no mass or thyroid enlargement  Resp: No shortness of breath with normal conversation  MS: Squaring of the CMC's bilaterally.  Decreased fist formation bilaterally, worse on the left.  He has active synovitis over the second and 3rd MCPs on the left. Improved synovitis on the right.    Psych: nl judgement, orientation, memory, affect.           Data:   Imagin2019 x-ray bilateral hands  Impression:   1. Scattered degenerative changes, severe in the bilateral first  carpometacarpal joints. Additionally there are possible erosive  changes at this location suggestive of erosive osteoarthritis.  2. Bilateral narrowing of first carpometacarpal joints with prominent  osteophytes as described. Differential consideration includes CPPD and Hemochromatosis.    2019 x-ray bilateral foot  Impression: No definite erosions.     Laboratory:  2019 hepatitis B core antibody nonreactive, hepatitis C antibody nonreactive    10/11/2022  Creatinine 0.83, GFR greater than 90  Albumin 4.6  ALT 36, AST 37  TSH 1.33, T4 1.29  With blood cell count 5.4, hemoglobin 15.5, platelet count 190  QuantiFERON TB Gold negative    2023   TB negative  Hep B negative  Hep C  negative    9/25/2023  Creatinine 0.79, GFR greater than 90  Albumin 4.4  ALT 22, AST 28  CRP 3.61  White blood cell count 5.2, hemoglobin 14.1, platelet count 188  Sed rate

## 2023-10-25 ENCOUNTER — APPOINTMENT (OUTPATIENT)
Dept: GENERAL RADIOLOGY | Facility: CLINIC | Age: 73
End: 2023-10-25
Attending: EMERGENCY MEDICINE
Payer: COMMERCIAL

## 2023-10-25 ENCOUNTER — HOSPITAL ENCOUNTER (EMERGENCY)
Facility: CLINIC | Age: 73
Discharge: HOME | End: 2023-10-25
Attending: EMERGENCY MEDICINE | Admitting: EMERGENCY MEDICINE
Payer: COMMERCIAL

## 2023-10-25 ENCOUNTER — APPOINTMENT (OUTPATIENT)
Dept: CT IMAGING | Facility: CLINIC | Age: 73
End: 2023-10-25
Attending: EMERGENCY MEDICINE
Payer: COMMERCIAL

## 2023-10-25 VITALS
BODY MASS INDEX: 27.15 KG/M2 | RESPIRATION RATE: 18 BRPM | SYSTOLIC BLOOD PRESSURE: 163 MMHG | DIASTOLIC BLOOD PRESSURE: 89 MMHG | WEIGHT: 174 LBS | HEART RATE: 80 BPM | OXYGEN SATURATION: 97 % | TEMPERATURE: 97.4 F
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
## 2023-10-25 DIAGNOSIS — S40.011A CONTUSION OF RIGHT SHOULDER, INITIAL ENCOUNTER: ICD-10-CM

## 2023-10-25 PROCEDURE — 250N000013 HC RX MED GY IP 250 OP 250 PS 637: Performed by: EMERGENCY MEDICINE

## 2023-10-25 PROCEDURE — 96375 TX/PRO/DX INJ NEW DRUG ADDON: CPT | Performed by: EMERGENCY MEDICINE

## 2023-10-25 PROCEDURE — 96374 THER/PROPH/DIAG INJ IV PUSH: CPT | Performed by: EMERGENCY MEDICINE

## 2023-10-25 PROCEDURE — 71101 X-RAY EXAM UNILAT RIBS/CHEST: CPT | Mod: RT

## 2023-10-25 PROCEDURE — 99285 EMERGENCY DEPT VISIT HI MDM: CPT | Mod: 25 | Performed by: EMERGENCY MEDICINE

## 2023-10-25 PROCEDURE — 96376 TX/PRO/DX INJ SAME DRUG ADON: CPT | Performed by: EMERGENCY MEDICINE

## 2023-10-25 PROCEDURE — 250N000011 HC RX IP 250 OP 636: Mod: JZ | Performed by: EMERGENCY MEDICINE

## 2023-10-25 PROCEDURE — 73030 X-RAY EXAM OF SHOULDER: CPT | Mod: RT

## 2023-10-25 PROCEDURE — 70450 CT HEAD/BRAIN W/O DYE: CPT

## 2023-10-25 PROCEDURE — 99285 EMERGENCY DEPT VISIT HI MDM: CPT | Performed by: EMERGENCY MEDICINE

## 2023-10-25 RX ORDER — OXYCODONE HYDROCHLORIDE 5 MG/1
5 TABLET ORAL ONCE
Status: COMPLETED | OUTPATIENT
Start: 2023-10-25 | End: 2023-10-25

## 2023-10-25 RX ORDER — OXYCODONE HYDROCHLORIDE 5 MG/1
5 TABLET ORAL EVERY 6 HOURS PRN
Qty: 12 TABLET | Refills: 0 | Status: SHIPPED | OUTPATIENT
Start: 2023-10-25 | End: 2023-10-28

## 2023-10-25 RX ORDER — ONDANSETRON 2 MG/ML
4 INJECTION INTRAMUSCULAR; INTRAVENOUS ONCE
Status: COMPLETED | OUTPATIENT
Start: 2023-10-25 | End: 2023-10-25

## 2023-10-25 RX ORDER — MORPHINE SULFATE 4 MG/ML
4 INJECTION, SOLUTION INTRAMUSCULAR; INTRAVENOUS ONCE
Status: COMPLETED | OUTPATIENT
Start: 2023-10-25 | End: 2023-10-25

## 2023-10-25 RX ADMIN — OXYCODONE HYDROCHLORIDE 5 MG: 5 TABLET ORAL at 20:17

## 2023-10-25 RX ADMIN — MORPHINE SULFATE 4 MG: 4 INJECTION INTRAVENOUS at 18:32

## 2023-10-25 RX ADMIN — ONDANSETRON 4 MG: 2 INJECTION INTRAMUSCULAR; INTRAVENOUS at 17:47

## 2023-10-25 RX ADMIN — MORPHINE SULFATE 4 MG: 4 INJECTION INTRAVENOUS at 17:47

## 2023-10-25 ASSESSMENT — ACTIVITIES OF DAILY LIVING (ADL)
ADLS_ACUITY_SCORE: 35
ADLS_ACUITY_SCORE: 33

## 2023-10-25 NOTE — ED PROVIDER NOTES
ED Provider Note  [facility]      History     Chief Complaint   Patient presents with    Fall     Fell off a ladder about 4ft off the ground. Landed on his right shoulder and hit his head. Pain in right shoulder and right rib cage. No LOC.     HPI  Dale Cuevas is a 73 year old male, no anticoagulants, who had a slip off of a ladder approximately 4 feet off the ground. He landed directly on his right shoulder, ribs, and head.  No loss of consciousness. Severe pain on right shoulder, as well as right chest wall. Patient denies generalized headache, nausea, or neck pain, but reports pain on the scalp.  This part of the medical record was transcribed by Sam Rizzo Medical Scribe, from a dictation done by Temo Wiggins MD.     Past Medical History  Past Medical History:   Diagnosis Date    Benign prostatic hyperplasia 09/08/2014    Diastasis recti 06/15/2016    Elevated blood pressure reading without diagnosis of hypertension 05/06/2013    Erectile dysfunction 05/06/2013    Family hx of prostate cancer 06/07/2016    Hyperlipidemia LDL goal <100 02/05/2014    Psoriasis 11/23/2013    Psoriatic arthritis (H)      Past Surgical History:   Procedure Laterality Date    ARTHROPLASTY KNEE Right 3/4/2021    Procedure: RIGHT TOTAL KNEE ARTHROPLASTY;  Surgeon: Jose C Westbrook MD;  Location: [facility] OR    ARTHROPLASTY REVISION KNEE Right 6/3/2021    Procedure: RIGHT KNEE POLYETHYLENE EXCHANGE;  Surgeon: Jose C Westbrook MD;  Location: [facility] OR    BACK SURGERY      Hx Vitrectomy/Membrane Peel/Laser/Air Right 08/06/2018    Dr. Medrano / Dr. Mendiola      ORTHOPEDIC SURGERY  Jan 12, 2015    left wrist    ORTHOPEDIC SURGERY  1989    spine sugery-with disc fragment removed    ORTHOPEDIC SURGERY  2014    right knee arthroscopy    SEPTOPLASTY       Abatacept (ORENCIA) 125 MG/ML SOAJ auto-injector  atorvastatin (LIPITOR) 40 MG tablet  folic acid (FOLVITE) 1 MG tablet  lisinopril (ZESTRIL) 5 MG  tablet  methotrexate sodium 2.5 MG TABS  sildenafil (REVATIO) 20 MG tablet  tamsulosin (FLOMAX) 0.4 MG capsule      Allergies   Allergen Reactions    Levofloxacin      Other reaction(s): *Unknown     Family History  Family History   Problem Relation Age of Onset    Hypertension Mother     Prostate Cancer Father 72    Breast Cancer Sister     Thyroid Cancer Brother     HIV/AIDS Brother      Social History   Social History     Tobacco Use    Smoking status: Former     Types: Cigarettes     Quit date: 1973     Years since quittin.5    Smokeless tobacco: Never   Substance Use Topics    Alcohol use: Yes     Comment: every night -     Drug use: No      Past medical history, past surgical history, medications, allergies, family history, and social history were reviewed with the patient. No additional pertinent items.      A complete review of systems was performed with pertinent positives and negatives noted in the HPI, and all other systems negative.    Physical Exam   BP: (!) 173/98  Pulse: 78  Temp: 99.1  F (37.3  C)  Resp: 16  Weight: 78.9 kg (174 lb)  SpO2: 97 %  Physical Exam  Tender hematoma on top of the scalp without laceration   Neck is supple without C-spine tenderness   Alert and awake   Right wrist and elbow nontender and range of motion normal   Right shoulder reduced range of motion, tenderness to palpation, sensation and hand  strong  Full pulses on right arm   Strong pelvis   Stable moving lower extremities   This part of the medical record was transcribed by Sam Rizzo Medical Scribe, from a dictation done by Temo Wiggins MD.     ED Course, Procedures, & Data      Procedures                     No results found for any visits on 10/25/23.  Medications - No data to display  Labs Ordered and Resulted from Time of ED Arrival to Time of ED Departure - No data to display  CT Head w/o Contrast    (Results Pending)   XR Shoulder Right G/E 3 Views    (Results Pending)   Ribs XR, unilat 3  views + PA chest, right    (Results Pending)          Critical care was not performed.     Medical Decision Making  The patient's presentation was of high complexity (an acute health issue posing potential threat to life or bodily function).    The patient's evaluation involved:  ordering and/or review of 3+ test(s) in this encounter (see separate area of note for details)  independent interpretation of testing performed by another health professional (CXR and CT head)    The patient's management necessitated high risk (a parenteral controlled substance).    Assessment & Plan    A&P   concern for intracranial bleed or fracture and right shoulder fracture/dislocation.     We will provide IV morphine, prophylactic Zofran, CT head, and x-ray of the chest and right shoulder. Reevaluation     I have independently reviewed the chest x-ray CT head imaging showing no signs of an acute pneumothorax or intracranial bleed.  Prior to discharge I reevaluated the patient who is able to fully range his right shoulder still with some pain and had point tenderness upon the right posterior portion of the shoulder  I provided follow-up though patient will follow-up with his orthopedics at [facility].  Oxycodone for extreme pain      This part of the medical record was transcribed by Sam Rizzo, Medical Scribe, from a dictation done by Temo Wiggins MD.     I have reviewed the nursing notes. I have reviewed the findings, diagnosis, plan and need for follow up with the patient.    New Prescriptions    No medications on file       Final diagnoses:   None   Sam CHING, am serving as a trained medical scribe to document services personally performed by Temo Wiggins MD based on the provider's statements to me on October 25, 2023.  This document has been checked and approved by the attending provider.    ITemo MD, was physically present and have reviewed and verified the accuracy of this note documented by  Sam Rizzo, medical scribe.      Temo Wiggins MD  [facility] EMERGENCY DEPARTMENT  10/25/2023     Temo Wiggins MD  10/25/23 2124

## 2023-10-26 ENCOUNTER — MYC MEDICAL ADVICE (OUTPATIENT)
Dept: FAMILY MEDICINE | Facility: CLINIC | Age: 73
End: 2023-10-26
Payer: COMMERCIAL

## 2023-10-26 NOTE — DISCHARGE INSTRUCTIONS
You can take 500mg naproxen (aleve) every 12 hours for pain  Please call today to schedule a follow-up appointment with your primary medical doctor in 3-5 days for reevaluation, which is important after today's emergency department visit.  Follow-up with [facility] orthopedics for follow-up shoulder specialist appointment, as discussed. Please make an appointment to follow up with Sports Medicine (phone: [phone]) if [facility] doesn't have any.  For severe pain you may take oxycodone as prescribed

## 2023-10-27 ENCOUNTER — OFFICE VISIT (OUTPATIENT)
Dept: FAMILY MEDICINE | Facility: CLINIC | Age: 73
End: 2023-10-27
Payer: COMMERCIAL

## 2023-10-27 ENCOUNTER — TRANSFERRED RECORDS (OUTPATIENT)
Dept: HEALTH INFORMATION MANAGEMENT | Facility: CLINIC | Age: 73
End: 2023-10-27

## 2023-10-27 VITALS
HEART RATE: 70 BPM | SYSTOLIC BLOOD PRESSURE: 138 MMHG | TEMPERATURE: 98.1 F | HEIGHT: 67 IN | OXYGEN SATURATION: 96 % | RESPIRATION RATE: 20 BRPM | DIASTOLIC BLOOD PRESSURE: 78 MMHG | WEIGHT: 172.5 LBS | BODY MASS INDEX: 27.07 KG/M2

## 2023-10-27 DIAGNOSIS — R07.81 RIB PAIN ON RIGHT SIDE: ICD-10-CM

## 2023-10-27 DIAGNOSIS — M25.511 ACUTE PAIN OF RIGHT SHOULDER: Primary | ICD-10-CM

## 2023-10-27 PROCEDURE — 99212 OFFICE O/P EST SF 10 MIN: CPT | Performed by: FAMILY MEDICINE

## 2023-10-27 NOTE — PROGRESS NOTES
"  Assessment & Plan     Acute pain of right shoulder    Ortho is arranging MRI to evaluate for possible rotator cuff injury.      Rib pain on right side    Contusion.  Possible pain where rib articulates with vertebra.    He has oxycodone from ER.             MED REC REQUIRED  Post Medication Reconciliation Status: patient was not discharged from an inpatient facility or TCU  BMI:   Estimated body mass index is 26.91 kg/m  as calculated from the following:    Height as of this encounter: 1.705 m (5' 7.13\").    Weight as of this encounter: 78.2 kg (172 lb 8 oz).           Lopez Monsalve MD  [facility] EZRA Hunter is a 73 year old, presenting for the following health issues:  RECHECK (ER Follow up due to fall )        10/27/2023     3:45 PM   Additional Questions   Roomed by Ravi GARCIA           Dony was winterizing his boat, mis-stepped from ladder to back of boat, fell to ground, striking right shoulder and ribs.  ER at [place]: no fracture.  He went to [facility] today.  They will do MRI of shoulder.    He had T8-9 fusion in past.    Current Outpatient Medications   Medication Sig Dispense Refill    Abatacept (ORENCIA) 125 MG/ML SOAJ auto-injector Inject 1 mL (125 mg) Subcutaneous every 7 days Hold for signs of infection, and seek medical attention. 4 mL 2    atorvastatin (LIPITOR) 40 MG tablet TAKE 1 TABLET BY MOUTH EVERY DAY 90 tablet 3    folic acid (FOLVITE) 1 MG tablet Take 1 tablet (1,000 mcg) by mouth daily 90 tablet 3    lisinopril (ZESTRIL) 5 MG tablet Take 1 tablet (5 mg) by mouth daily 90 tablet 3    methotrexate sodium 2.5 MG TABS Take 8 tablets (20 mg) by mouth every 7 days LABS REQUIRED EVERY 8-12 WEEKS WHILE TAKING THIS MEDICATION. 96 tablet 0    sildenafil (REVATIO) 20 MG tablet Take 1 tablet (20 mg) by mouth daily as needed 20 tablet 1    tamsulosin (FLOMAX) 0.4 MG capsule TAKE 1 CAPSULE BY MOUTH EVERY DAY 90 capsule 3         Review of Systems   No " "fever or cough.      Objective    /78   Pulse 70   Temp 98.1  F (36.7  C) (Oral)   Resp 20   Ht 1.705 m (5' 7.13\")   Wt 78.2 kg (172 lb 8 oz)   SpO2 96%   BMI 26.91 kg/m    Body mass index is 26.91 kg/m .  Physical Exam   Heart normal  Lungs normal  Right shoulder: limited movement due to pain.  Chest wall: no palpable rib deformity.                      "

## 2023-10-27 NOTE — TELEPHONE ENCOUNTER
Per our conversation:  Your appointment is scheduled with Dr. Monsalve today 10/27 at 3:45 pm.  Follow up with Dr. Perez on 11/16.     Alicia CASANOVA

## 2023-11-02 ENCOUNTER — TRANSFERRED RECORDS (OUTPATIENT)
Dept: HEALTH INFORMATION MANAGEMENT | Facility: CLINIC | Age: 73
End: 2023-11-02
Payer: COMMERCIAL

## 2023-11-07 ENCOUNTER — TRANSFERRED RECORDS (OUTPATIENT)
Dept: HEALTH INFORMATION MANAGEMENT | Facility: CLINIC | Age: 73
End: 2023-11-07
Payer: COMMERCIAL

## 2023-11-16 ENCOUNTER — OFFICE VISIT (OUTPATIENT)
Dept: FAMILY MEDICINE | Facility: CLINIC | Age: 73
End: 2023-11-16
Payer: COMMERCIAL

## 2023-11-16 VITALS
HEIGHT: 67 IN | WEIGHT: 168.6 LBS | BODY MASS INDEX: 26.46 KG/M2 | RESPIRATION RATE: 20 BRPM | DIASTOLIC BLOOD PRESSURE: 78 MMHG | HEART RATE: 74 BPM | OXYGEN SATURATION: 98 % | SYSTOLIC BLOOD PRESSURE: 132 MMHG | TEMPERATURE: 97.6 F

## 2023-11-16 DIAGNOSIS — S20.211D RIB CONTUSION, RIGHT, SUBSEQUENT ENCOUNTER: ICD-10-CM

## 2023-11-16 DIAGNOSIS — S42.124D CLOSED NONDISPLACED FRACTURE OF ACROMIAL PROCESS OF RIGHT SCAPULA WITH ROUTINE HEALING, SUBSEQUENT ENCOUNTER: Primary | ICD-10-CM

## 2023-11-16 PROCEDURE — 90480 ADMN SARSCOV2 VAC 1/ONLY CMP: CPT | Performed by: FAMILY MEDICINE

## 2023-11-16 PROCEDURE — 99213 OFFICE O/P EST LOW 20 MIN: CPT | Performed by: FAMILY MEDICINE

## 2023-11-16 PROCEDURE — 91320 SARSCV2 VAC 30MCG TRS-SUC IM: CPT | Performed by: FAMILY MEDICINE

## 2023-11-16 NOTE — PROGRESS NOTES
Dale was seen today for recheck.    Diagnoses and all orders for this visit:    Closed nondisplaced fracture of acromial process of right scapula with routine healing, subsequent encounter: Reviewed all notes from [facility]- scanned in media, including MRI results. Pain management with acetaminophen. Overall range of motion and strength improving but still with significant decreased abduction. Plan to continue PT, conservative treatment.     Rib contusion, right, subsequent encounter: Posterior discomfort, difficulty with lying flat at night. He has modified his positioning at night for comfort, continue with acetaminophen. Reviewed imaging and notes from [facility].     Other orders  -     COVID-19 12+ (2023-24) (PFIZER)      Subjective   Dale is a 73 year old, presenting for the following health issues:  RECHECK (Severe right shoulder and ribcage injury sustained in fall.  Extreme pain and restriction of movement.  )      11/16/2023    12:10 PM   Additional Questions   Roomed by paw p   Accompanied by self       History of Present Illness       Back Pain:  He presents for follow up of back pain. Patient's back pain is a new problem.    Original cause of back pain: a fall  First noticed back pain: 1-4 weeks ago  Patient feels back pain: constantlyLocation of back pain:  Right middle of back  Description of back pain: sharp and shooting  Back pain spreads: nowhere    Since patient first noticed back pain, pain is: always present, but gets better and worse  Does back pain interfere with his job:  Yes  On a scale of 1-10 (10 being the worst), patient describes pain as:  9  What makes back pain worse: bending, coughing, lying down and twisting   Acupuncture: not tried  Acetaminophen: helpful  Activity or exercise: helpful  Chiropractor:  Not tried  Cold: helpful  Heat: helpful  Massage: not tried  Muscle relaxants: not tried  NSAIDS: not tried  Opioids: not helpful  Physical Therapy: not tried  Rest: helpful  Steroid Injection:  "not tried  Surgery: helpful  TENS unit: not helpful  Topical pain relievers: not helpful  Other healthcare providers patient is seeing for back pain: Other    He eats 2-3 servings of fruits and vegetables daily.He consumes 0 sweetened beverage(s) daily. He exercises with enough effort to increase his heart rate 3 or less days per week.   He is taking medications regularly.                 Review of Systems         Objective    /78   Pulse 74   Temp 97.6  F (36.4  C) (Oral)   Resp 20   Ht 1.702 m (5' 7\")   Wt 76.5 kg (168 lb 9.6 oz)   SpO2 98%   BMI 26.41 kg/m    Body mass index is 26.41 kg/m .  Physical Exam   MSK: R shoulder with decreased abduction and external rotation. Tenderness over rib posteriorly, inferior to scapula.               "

## 2023-11-25 ENCOUNTER — MYC REFILL (OUTPATIENT)
Dept: RHEUMATOLOGY | Facility: CLINIC | Age: 73
End: 2023-11-25
Payer: COMMERCIAL

## 2023-11-25 DIAGNOSIS — Z79.899 HIGH RISK MEDICATION USE: ICD-10-CM

## 2023-11-25 DIAGNOSIS — L40.50 PSORIATIC ARTHRITIS (H): ICD-10-CM

## 2023-11-25 DIAGNOSIS — M06.9 RHEUMATOID ARTHRITIS INVOLVING MULTIPLE JOINTS (H): ICD-10-CM

## 2023-11-27 RX ORDER — METHOTREXATE 2.5 MG/1
20 TABLET ORAL
Qty: 96 TABLET | Refills: 0 | Status: SHIPPED | OUTPATIENT
Start: 2023-11-27 | End: 2024-02-07

## 2023-11-28 ENCOUNTER — TELEPHONE (OUTPATIENT)
Dept: RHEUMATOLOGY | Facility: CLINIC | Age: 73
End: 2023-11-28

## 2023-11-28 NOTE — TELEPHONE ENCOUNTER
FREE DRUG APPLICATION INITIATED    Medication: ORENCIA CLICKJECT 125 MG/ML SC SOAJ  Free Drug Program Name:  [employer]  Date Submitted: 11/28/2023  9:21 AM  Phone #: [phone]  Fax #: [phone]  Additional Information: Sent MyChart to see if patient received packet from [facility]. E-mailed provider form to Almaz

## 2023-11-29 ENCOUNTER — TELEPHONE (OUTPATIENT)
Dept: FAMILY MEDICINE | Facility: CLINIC | Age: 73
End: 2023-11-29

## 2023-11-29 NOTE — TELEPHONE ENCOUNTER
Patient Quality Outreach    Patient is due for the following:   Physical Annual Wellness Visit      Topic Date Due    Zoster (Shingles) Vaccine (1 of 2) Never done    Diptheria Tetanus Pertussis (DTAP/TDAP/TD) Vaccine (2 - Td or Tdap) 11/23/2023       Next Steps:   Schedule a Annual Wellness Visit    Type of outreach:    Phone, spoke to patient/parent. He has been seen by a few doctors recently and he know he is not well since he has an injury so he would like us to reach out in 90 days to check in again.     Next Steps:  Reach out within 90 days via Phone.    Max number of attempts reached: No. Will try again in 90 days if patient still on fail list.    Questions for provider review:    None           Snehal Gusman MA  Chart routed to Care Team.

## 2023-12-01 ENCOUNTER — TRANSFERRED RECORDS (OUTPATIENT)
Dept: HEALTH INFORMATION MANAGEMENT | Facility: CLINIC | Age: 73
End: 2023-12-01
Payer: COMMERCIAL

## 2023-12-12 NOTE — TELEPHONE ENCOUNTER
Called [facility] for status: patient portions have not been received     Were patient portions faxed to foundation or uploaded to chart?    Thank You,     Ritika Live, [facility]  Specialty Pharmacy Clinic [facility] Specialty  [email or web address]  [email or web address]  Phone: [phone]  Fax: [phone]

## 2023-12-14 NOTE — TELEPHONE ENCOUNTER
Forms received and scanned into patient chart.    Thank You,     Ritika Diaz [facility]  Specialty Pharmacy Clinic [facility] Specialty  [email or web address]  [email or web address]  Phone: [phone]  Fax: [phone]

## 2023-12-15 NOTE — TELEPHONE ENCOUNTER
Prior Authorization Approval    Medication: ORENCIA CLICKJECT 125 MG/ML SC SOAJ  Authorization Effective Date: 11/15/2023  Authorization Expiration Date: 12/14/2024  Approved Dose/Quantity: 4 / 28  Reference #: JESSENIA Key: [id number]   Insurance Company: [employer] - Phone [phone] Fax [phone]  Expected CoPay: $    CoPay Card Available: No    Financial Assistance Needed: PAP PENDINIG  [facility]   Pharmacy Notified: [facility]  Patient Notified: renewal for PAP

## 2023-12-20 NOTE — TELEPHONE ENCOUNTER
Called [facility] for status [phone]:    Forms received 12/15/2023. Patient initialed for soft credit check.    Review will begin 01/02/2023 determination.    Called patient to notify of status: he appreciated phone call greatly and said [facility] is already calling him with 2024 approval.    Liaison check status after 01/02/2024 for determination    Thank You,     Ritika Diaz [facility]  Specialty Pharmacy Clinic [facility]  [email or web address]  [email or web address]  Phone: [phone]  Fax: [phone]

## 2024-01-02 NOTE — TELEPHONE ENCOUNTER
Called [facility] for status [phone]:    Application in process.     Patient income verification missing per rep. Requested she check if patient initialed for soft credit check.    Household income and size. Letter was sent out 12/27 for proof of income of household. Previous year tax return or past 2 pay stubs.    Called patient to request AI. Left voicemail.    Thank You,     Ritika Diaz [facility]  Specialty Pharmacy Clinic [facility] Specialty  [email or web address]  [email or web address]  Phone: [phone]  Fax: [phone]

## 2024-01-06 ENCOUNTER — HEALTH MAINTENANCE LETTER (OUTPATIENT)
Age: 74
End: 2024-01-06

## 2024-01-11 NOTE — TELEPHONE ENCOUNTER
Called [facility] for status:    *soft credit check did not work. BMSPAF needs proof of income.    Federal income tax  Or proof of income documents.     Sent [employer] message for missing information / contact foundation.    Thank You,     Ritika Diaz [facility]  Specialty Pharmacy Clinic [facility]  [email or web address]  [email or web address]  Phone: [phone]  Fax: [phone]

## 2024-01-23 ENCOUNTER — TELEPHONE (OUTPATIENT)
Dept: RHEUMATOLOGY | Facility: CLINIC | Age: 74
End: 2024-01-23
Payer: COMMERCIAL

## 2024-01-23 DIAGNOSIS — L40.50 PSORIATIC ARTHRITIS (H): Primary | ICD-10-CM

## 2024-01-23 DIAGNOSIS — M06.9 RHEUMATOID ARTHRITIS INVOLVING MULTIPLE JOINTS (H): ICD-10-CM

## 2024-01-23 NOTE — TELEPHONE ENCOUNTER
[facility] Call Center    Phone Message    May a detailed message be left on voicemail: yes     Reason for Call: Order(s): Other:   Reason for requested: Routine lab orders  Date needed: ASAP  Provider name: Dylan    Pt was told he would need lab tests done prior to his 24 appt with Karma Richardson PA-C-however, his lab orders have all . Pt is hoping we can get new lab orders entered, and then he would like a message sent to him via [employer] letting him know when this has been taken care of so he can go ahead and get this scheduled.     Action Taken: Other: Rheum    Travel Screening: Not Applicable

## 2024-01-24 ENCOUNTER — LAB (OUTPATIENT)
Dept: LAB | Facility: CLINIC | Age: 74
End: 2024-01-24
Payer: COMMERCIAL

## 2024-01-24 DIAGNOSIS — M06.9 RHEUMATOID ARTHRITIS INVOLVING MULTIPLE JOINTS (H): ICD-10-CM

## 2024-01-24 DIAGNOSIS — L40.50 PSORIATIC ARTHRITIS (H): ICD-10-CM

## 2024-01-24 LAB
ALBUMIN SERPL BCG-MCNC: 4.2 G/DL (ref 3.5–5.2)
ALT SERPL W P-5'-P-CCNC: 42 U/L (ref 0–70)
AST SERPL W P-5'-P-CCNC: 33 U/L (ref 0–45)
CREAT SERPL-MCNC: 0.85 MG/DL (ref 0.67–1.17)
CRP SERPL-MCNC: <3 MG/L
EGFRCR SERPLBLD CKD-EPI 2021: >90 ML/MIN/1.73M2
ERYTHROCYTE [DISTWIDTH] IN BLOOD BY AUTOMATED COUNT: 13.3 % (ref 10–15)
ERYTHROCYTE [SEDIMENTATION RATE] IN BLOOD BY WESTERGREN METHOD: 8 MM/HR (ref 0–20)
HCT VFR BLD AUTO: 41.6 % (ref 40–53)
HGB BLD-MCNC: 14.4 G/DL (ref 13.3–17.7)
MCH RBC QN AUTO: 31.7 PG (ref 26.5–33)
MCHC RBC AUTO-ENTMCNC: 34.6 G/DL (ref 31.5–36.5)
MCV RBC AUTO: 92 FL (ref 78–100)
PLATELET # BLD AUTO: 181 10E3/UL (ref 150–450)
RBC # BLD AUTO: 4.54 10E6/UL (ref 4.4–5.9)
WBC # BLD AUTO: 4.5 10E3/UL (ref 4–11)

## 2024-01-24 PROCEDURE — 84460 ALANINE AMINO (ALT) (SGPT): CPT

## 2024-01-24 PROCEDURE — 85652 RBC SED RATE AUTOMATED: CPT

## 2024-01-24 PROCEDURE — 86140 C-REACTIVE PROTEIN: CPT

## 2024-01-24 PROCEDURE — 85027 COMPLETE CBC AUTOMATED: CPT

## 2024-01-24 PROCEDURE — 36415 COLL VENOUS BLD VENIPUNCTURE: CPT

## 2024-01-24 PROCEDURE — 84450 TRANSFERASE (AST) (SGOT): CPT

## 2024-01-24 PROCEDURE — 82040 ASSAY OF SERUM ALBUMIN: CPT

## 2024-01-24 PROCEDURE — 82565 ASSAY OF CREATININE: CPT

## 2024-01-25 ENCOUNTER — TELEPHONE (OUTPATIENT)
Dept: RHEUMATOLOGY | Facility: CLINIC | Age: 74
End: 2024-01-25

## 2024-01-25 NOTE — TELEPHONE ENCOUNTER
Therapy change to Rinvoq. CLOSING ENCOUNTER.    Thank You,     Ritika Diaz CPhT  Specialty Pharmacy Clinic [facility] Specialty  [email or web address]  [email or web address]  Phone: [phone]  Fax: [phone]

## 2024-01-25 NOTE — TELEPHONE ENCOUNTER
PA Initiation    Medication: RINVOQ 15 MG PO TB24  Insurance Company: [employer] - Phone [phone] Fax [phone]  Pharmacy Filling the Rx: [facility] - [address]  Filling Pharmacy Phone: [phone]  Filling Pharmacy Fax: [phone]  Start Date: 1/25/2024    JESSENIA (Key: [id number])          Sent patient [employer] message with PAP sign up link [email or web address]

## 2024-01-29 NOTE — TELEPHONE ENCOUNTER
Prior Authorization Approval    Medication: RINVOQ 15 MG PO TB24  Authorization Effective Date: 1/26/2024  Authorization Expiration Date: 1/26/2025  Approved Dose/Quantity: 30 / 30  Reference #: JESSENIA (Key: [id number])   Insurance Company: [employer] - Phone [phone] Fax [phone]  Expected CoPay: $ 1,926.08  CoPay Card Available: No    Financial Assistance Needed: Floqq Assist - Pursue PAP  Which Pharmacy is filling the prescription: [facility] - [address]  Pharmacy Notified: yes  Patient Notified: yes          Thank You,     Eren Diaz Javan  Specialty Pharmacy Clinic [facility] Specialty  [email or web address]  [email or web address]  Phone: [phone]  Fax: [phone]       No

## 2024-01-29 NOTE — TELEPHONE ENCOUNTER
FREE DRUG APPLICATION INITIATED    Medication: RINVOQ 15 MG PO TB24  Free Drug Program Name:  [facility] [facility]  Date Submitted: 1/29/2024  1:34 PM  Phone #: [phone]  Fax #: [phone]  Additional Information: called and spoke to Dale    Emailed patient application. Dale will print and sign and return via email.    Faxed prescriber application + PA approval + copy of ins cards    Thank You,     Eren Diaz [facility]  Specialty Pharmacy Clinic [facility] Specialty  [email or web address]  [email or web address]  Phone: [phone]  Fax: [phone]

## 2024-01-30 NOTE — TELEPHONE ENCOUNTER
Faxed patient application to [facility]:        Thank You,     Ritika Diaz CP  Specialty Pharmacy Clinic [facility] Specialty  [email or web address]  [email or web address]  Phone: [phone]  Fax: [phone]

## 2024-02-05 NOTE — TELEPHONE ENCOUNTER
Called Nicole for status:    *no missing information     Application in processing  1-2 business days for review.     Thank You,     Eren Diaz Javan  Specialty Pharmacy Clinic [facility] Specialty  [email or web address]  www.[email or web address]  Phone: [phone]  Fax: [phone]

## 2024-02-07 ENCOUNTER — MYC REFILL (OUTPATIENT)
Dept: RHEUMATOLOGY | Facility: CLINIC | Age: 74
End: 2024-02-07
Payer: COMMERCIAL

## 2024-02-07 DIAGNOSIS — M06.9 RHEUMATOID ARTHRITIS INVOLVING MULTIPLE JOINTS (H): ICD-10-CM

## 2024-02-07 DIAGNOSIS — Z79.899 HIGH RISK MEDICATION USE: ICD-10-CM

## 2024-02-07 DIAGNOSIS — L40.50 PSORIATIC ARTHRITIS (H): ICD-10-CM

## 2024-02-07 RX ORDER — METHOTREXATE 2.5 MG/1
20 TABLET ORAL
Qty: 32 TABLET | Refills: 0 | Status: SHIPPED | OUTPATIENT
Start: 2024-02-07 | End: 2024-03-07

## 2024-02-07 NOTE — TELEPHONE ENCOUNTER
Plan:      Schedule follow-up with Karma Richardson PA-C in 3 months.   Labs: CBC, creatinine, Albumin, AST, ALT, CRP and Sed Rate in 1 month then every 3 months; lipid panel in 3 months  Increase Methotrexate 20mg (8 tablets) weekly.   -check labs (ALT/AST, albumin, CBC with platelets and creatinine) every 3 months

## 2024-02-09 NOTE — TELEPHONE ENCOUNTER
Called Nicole for status [phone]:    *pending review    Thank You,     Ritika Diaz [facility]  Specialty Pharmacy Clinic [facility]ty  [email or web address]  [email or web address]  Phone: [phone]  Fax: [phone]

## 2024-02-12 ENCOUNTER — TRANSFERRED RECORDS (OUTPATIENT)
Dept: HEALTH INFORMATION MANAGEMENT | Facility: CLINIC | Age: 74
End: 2024-02-12
Payer: COMMERCIAL

## 2024-02-13 NOTE — TELEPHONE ENCOUNTER
"FREE DRUG APPLICATION APPROVED    Medication: RINVOQ 15 MG PO TB24  Program Name:  [facility]  Effective Date: 2/12/2024  Expiration Date: 12/31/2024  Pharmacy Filling the Rx: [facility] - [address]  Patient Notified: yes - phone - spoke to patient  Additional Information: Scanned into media tab \"Rinvoq PAP Approval 2024 - [employer]\"        Thank You,     iRtika Diaz [facility]  Specialty Pharmacy Clinic [facility] Specialty  [email or web address]  [email or web address]  Phone: [phone]  Fax: [phone]    "

## 2024-03-07 ENCOUNTER — MYC REFILL (OUTPATIENT)
Dept: RHEUMATOLOGY | Facility: CLINIC | Age: 74
End: 2024-03-07
Payer: COMMERCIAL

## 2024-03-07 DIAGNOSIS — Z79.899 HIGH RISK MEDICATION USE: ICD-10-CM

## 2024-03-07 DIAGNOSIS — M06.9 RHEUMATOID ARTHRITIS INVOLVING MULTIPLE JOINTS (H): ICD-10-CM

## 2024-03-07 DIAGNOSIS — L40.50 PSORIATIC ARTHRITIS (H): ICD-10-CM

## 2024-03-07 RX ORDER — METHOTREXATE 2.5 MG/1
20 TABLET ORAL
Qty: 32 TABLET | Refills: 0 | Status: SHIPPED | OUTPATIENT
Start: 2024-03-07 | End: 2024-06-12

## 2024-03-07 NOTE — TELEPHONE ENCOUNTER
Increase Methotrexate 20mg (8 tablets) weekly.   -check labs (ALT/AST, albumin, CBC with platelets and creatinine) every 3 months    Last labs checked 1/24/2024  Patient requesting refill.   Refilled per Rheum RN guidelines    Duarte CHINCHILLA RN  [facility] Specialty [facility]

## 2024-03-16 ENCOUNTER — HEALTH MAINTENANCE LETTER (OUTPATIENT)
Age: 74
End: 2024-03-16

## 2024-05-02 ENCOUNTER — OFFICE VISIT (OUTPATIENT)
Dept: RHEUMATOLOGY | Facility: CLINIC | Age: 74
End: 2024-05-02
Payer: COMMERCIAL

## 2024-05-02 ENCOUNTER — LAB (OUTPATIENT)
Dept: LAB | Facility: CLINIC | Age: 74
End: 2024-05-02
Payer: COMMERCIAL

## 2024-05-02 VITALS
OXYGEN SATURATION: 95 % | DIASTOLIC BLOOD PRESSURE: 68 MMHG | WEIGHT: 175.1 LBS | SYSTOLIC BLOOD PRESSURE: 130 MMHG | BODY MASS INDEX: 27.42 KG/M2 | HEART RATE: 89 BPM

## 2024-05-02 DIAGNOSIS — L40.9 PSORIASIS: ICD-10-CM

## 2024-05-02 DIAGNOSIS — M06.9 RHEUMATOID ARTHRITIS INVOLVING MULTIPLE JOINTS (H): ICD-10-CM

## 2024-05-02 DIAGNOSIS — L40.50 PSORIATIC ARTHRITIS (H): ICD-10-CM

## 2024-05-02 DIAGNOSIS — Z79.899 HIGH RISK MEDICATION USE: ICD-10-CM

## 2024-05-02 DIAGNOSIS — L40.50 PSORIATIC ARTHRITIS (H): Primary | ICD-10-CM

## 2024-05-02 LAB
ERYTHROCYTE [DISTWIDTH] IN BLOOD BY AUTOMATED COUNT: 12.6 % (ref 10–15)
ERYTHROCYTE [SEDIMENTATION RATE] IN BLOOD BY WESTERGREN METHOD: 26 MM/HR (ref 0–20)
HCT VFR BLD AUTO: 41.4 % (ref 40–53)
HGB BLD-MCNC: 14 G/DL (ref 13.3–17.7)
MCH RBC QN AUTO: 31.2 PG (ref 26.5–33)
MCHC RBC AUTO-ENTMCNC: 33.8 G/DL (ref 31.5–36.5)
MCV RBC AUTO: 92 FL (ref 78–100)
PLATELET # BLD AUTO: 235 10E3/UL (ref 150–450)
RBC # BLD AUTO: 4.49 10E6/UL (ref 4.4–5.9)
WBC # BLD AUTO: 4 10E3/UL (ref 4–11)

## 2024-05-02 PROCEDURE — 99214 OFFICE O/P EST MOD 30 MIN: CPT | Performed by: PHYSICIAN ASSISTANT

## 2024-05-02 PROCEDURE — 36415 COLL VENOUS BLD VENIPUNCTURE: CPT

## 2024-05-02 PROCEDURE — 85652 RBC SED RATE AUTOMATED: CPT

## 2024-05-02 PROCEDURE — 86140 C-REACTIVE PROTEIN: CPT

## 2024-05-02 PROCEDURE — 85027 COMPLETE CBC AUTOMATED: CPT

## 2024-05-02 PROCEDURE — 84460 ALANINE AMINO (ALT) (SGPT): CPT

## 2024-05-02 PROCEDURE — 82565 ASSAY OF CREATININE: CPT

## 2024-05-02 PROCEDURE — 84450 TRANSFERASE (AST) (SGOT): CPT

## 2024-05-02 NOTE — PROGRESS NOTES
Rheumatology Clinic Visit  [facility]  GERTRUDE Vilchis     Dale Cuevas MRN# [id number]   YOB: 1950 Age: 73 year old   Date of Visit: 05/02/2024  Primary care provider: Romina Perez          Assessment and Plan:     1.  Psoriatic arthritis  2.  Rheumatoid arthritis  3.  Psoriasis  4.  High-risk medication use    Patient presents today for follow-up regarding his inflammatory arthritis.  He has noticed benefit from utilizing the remote.  He has stopped the methotrexate as it has not seemed to been giving him any benefit.  He does not have any swelling over his MCPs.  He has however noticed a persistent sensation in his throat as if he needs to have a dry cough.  No productive cough.  In many evenings he will can feel rundown and get chills.  He will take his temperature and it has hovered around 100 degrees.  In the morning he feels much better.  This did seem to start with starting the Rinvoq.  1 recommendation would be to hold the Rinvoq.  He is very hesitant to do this as it is providing benefit and he is concerned that with holding that it will not have the same benefit.  Will start with checking labs today to verify where his bone marrow exam.  Follow-up with me in 3 months, sooner if needed.      Plan:     Schedule follow-up with Karma Rihcardson PA-C in 3 months.   Labs: CBC, creatinine, Albumin, AST, ALT, CRP and Sed Rate today and then every 3 months; lipid panel in 3 months  Medication recommendations:   Continue Rinvoq 15mg: Take 1 tablet daily daily    GERTRUDE Vilchis  Rheumatology         History of Present Illness:   Dale Cuevas presents for evaluation of psoriatic arthritis.     Rheumatological history:  Provider(s): DANNY Baum CNP, Dr. Mead  Pertinent lab history: Elevated CRP, Positive HLA-B27, negative RF and CCP, - Hep B/C, -MTB QG  Previous medications tried: Humira (initially effective), Celebrex (not effective), Prednisone (helpful but has side effects:  increased appetite, changes to his voice), Enbrel (not effective, difficult to get refilled), Orencia (not effective), Methotrexate (not effective)  Current medications: Rinvoq    Interval history May 2, 2024:  He states that he does feel that the Rinvoq is helping. He has been able to see his knuckles again. He states that almost nightly he has felt down and gets chills. Temps are around 100. H feels good in the mornings, but after supper he just feels off. He also feels like he has a cough, like a slight irritation his wind pipe. No productive cough. He stopped the Methotrexate as it was not helping.     Interval history January 25, 2024:  He has been off the Orencia for a couple of weeks, and has not noticed any difference in his joints. He has increased swelling in his fingers. He did break 2 bones and cracked some ribs at the end of October from falling off a step ladder.     No history of MI or stroke, no history blood clots. No history of diverticulitis.     Interval history October 12, 2023:  He states that his left hand has been swollen for the last couple of weeks. The trouble he was having with the right hand has improved. He is tolerating the Orencia, but he is not sure that it is working. He continues on the Methotrexate weekly (on Thursdays).     Interval history June 26, 2023:  He states that his fingers are moving on the right hand. He has had more pain and swelling. He has continued on the Methotrexate.  He prefers to avoid prednisone as it affects his voice and he is a hernandez.    HPI from consult:   He reports that his joints are doing pretty well. He does have decreased strength in his thumbs. He has been noticing his fingers moving as well towards his pinky. He has never been higher than 15mg weekly on the Methotrexate. He is tolerating it well. He states that initially Humira was significantly helpful. He states that within days of starting he had significant improvement. He then had his knee  replaced, and it quit working after that. He does have about 1 hour or less of morning stiffness. He does do some exercises on his hands when he wakes up. 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     No frequent infections. No shortness of breath or chest pain.              Review of Systems:     Constitutional: negative  Skin: negative  Eyes: negative  Ears/Nose/Throat: negative  Respiratory: No shortness of breath, dyspnea on exertion, cough, or hemoptysis  Cardiovascular: negative  Gastrointestinal: negative  Genitourinary: negative  Musculoskeletal: as above  Neurologic: negative  Psychiatric: negative  Hematologic/Lymphatic/Immunologic: negative  Endocrine: negative         Active Problem List:     Patient Active Problem List    Diagnosis Date Noted    Status post revision of total knee, right 06/03/2021     Priority: Medium    Status post total knee replacement, right 03/04/2021     Priority: Medium    Medication monitoring encounter 03/01/2021     Priority: Medium    Immunosuppression (H24) 02/10/2021     Priority: Medium    Psoriatic arthritis (H) 07/15/2019     Priority: Medium    Diastasis recti 06/15/2016     Priority: Medium    Family hx of prostate cancer 06/07/2016     Priority: Medium    Benign prostatic hyperplasia 09/08/2014     Priority: Medium    Hyperlipidemia LDL goal <100 02/05/2014     Priority: Medium    Psoriasis 11/23/2013     Priority: Medium    Erectile dysfunction 05/06/2013     Priority: Medium            Past Medical History:     Past Medical History:   Diagnosis Date    Benign prostatic hyperplasia 09/08/2014    Diastasis recti 06/15/2016    Elevated blood pressure reading without diagnosis of hypertension 05/06/2013    Erectile dysfunction 05/06/2013    Family hx of prostate cancer 06/07/2016    Hyperlipidemia LDL goal <100 02/05/2014    Psoriasis 11/23/2013    Psoriatic arthritis (H)      Past Surgical History:   Procedure Laterality Date     ARTHROPLASTY KNEE Right 3/4/2021    Procedure: RIGHT TOTAL KNEE ARTHROPLASTY;  Surgeon: Jose C Westbrook MD;  Location: [facility] OR    ARTHROPLASTY REVISION KNEE Right 6/3/2021    Procedure: RIGHT KNEE POLYETHYLENE EXCHANGE;  Surgeon: Jose C Westbrook MD;  Location: [facility] OR    BACK SURGERY      Hx Vitrectomy/Membrane Peel/Laser/Air Right 2018    Dr. Medrano / Dr. Mendiola      ORTHOPEDIC SURGERY  2015    left wrist    ORTHOPEDIC SURGERY      spine sugery-with disc fragment removed    ORTHOPEDIC SURGERY      right knee arthroscopy    SEPTOPLASTY              Social History:     Social History     Socioeconomic History    Marital status: Single     Spouse name: Not on file    Number of children: Not on file    Years of education: Not on file    Highest education level: Not on file   Occupational History    Not on file   Tobacco Use    Smoking status: Former     Current packs/day: 0.00     Types: Cigarettes     Quit date: 1973     Years since quittin.0    Smokeless tobacco: Never   Substance and Sexual Activity    Alcohol use: Yes     Comment: every night -     Drug use: No    Sexual activity: Not Currently   Other Topics Concern    Parent/sibling w/ CABG, MI or angioplasty before 65F 55M? No   Social History Narrative    Not on file     Social Determinants of Health     Financial Resource Strain: Low Risk  (11/15/2023)    Financial Resource Strain     Within the past 12 months, have you or your family members you live with been unable to get utilities (heat, electricity) when it was really needed?: No   Food Insecurity: Low Risk  (11/15/2023)    Food Insecurity     Within the past 12 months, did you worry that your food would run out before you got money to buy more?: No     Within the past 12 months, did the food you bought just not last and you didn t have money to get more?: No   Transportation Needs: Low Risk  (11/15/2023)    Transportation Needs     Within the past 12 months, has lack  of transportation kept you from medical appointments, getting your medicines, non-medical meetings or appointments, work, or from getting things that you need?: No   Physical Activity: Not on file   Stress: Not on file   Social Connections: Not on file   Interpersonal Safety: Low Risk  (10/27/2023)    Interpersonal Safety     Do you feel physically and emotionally safe where you currently live?: Yes     Within the past 12 months, have you been hit, slapped, kicked or otherwise physically hurt by someone?: No     Within the past 12 months, have you been humiliated or emotionally abused in other ways by your partner or ex-partner?: No   Housing Stability: Low Risk  (11/15/2023)    Housing Stability     Do you have housing? : Yes     Are you worried about losing your housing?: No          Family History:     Family History   Problem Relation Age of Onset    Hypertension Mother     Prostate Cancer Father 72    Breast Cancer Sister     Thyroid Cancer Brother     HIV/AIDS Brother             Allergies:     Allergies   Allergen Reactions    Levofloxacin      Other reaction(s): *Unknown            Medications:     Current Outpatient Medications   Medication Sig Dispense Refill    atorvastatin (LIPITOR) 40 MG tablet TAKE 1 TABLET BY MOUTH EVERY DAY 90 tablet 3    folic acid (FOLVITE) 1 MG tablet Take 1 tablet (1,000 mcg) by mouth daily 90 tablet 3    lisinopril (ZESTRIL) 5 MG tablet TAKE 1 TABLET BY MOUTH EVERY DAY 90 tablet 1    methotrexate 2.5 MG tablet Take 8 tablets (20 mg) by mouth every 7 days LABS REQUIRED EVERY 8-12 WEEKS WHILE TAKING THIS MEDICATION. 32 tablet 0    sildenafil (REVATIO) 20 MG tablet Take 1 tablet (20 mg) by mouth daily as needed 20 tablet 1    tamsulosin (FLOMAX) 0.4 MG capsule TAKE 1 CAPSULE BY MOUTH EVERY DAY 90 capsule 3    upadacitinib ER (RINVOQ) 15 MG tablet Take 1 tablet (15 mg) by mouth daily 30 tablet 2            Physical Exam:   There were no vitals taken for this visit.  Wt Readings from  Last 6 Encounters:   24 78.9 kg (174 lb)   23 76.5 kg (168 lb 9.6 oz)   10/27/23 78.2 kg (172 lb 8 oz)   10/25/23 78.9 kg (174 lb)   10/12/23 79.1 kg (174 lb 4.8 oz)   23 78.1 kg (172 lb 4 oz)     Constitutional: well-developed, appearing stated age; cooperative  Eyes: nl conjunctiva, sclera  ENT: nl external ears, nose, hearing, lips  Neck: no mass or thyroid enlargement  Resp: No shortness of breath with normal conversation  MS: Squaring of the CMC's bilaterally.  Improved synovitis over the second and 3rd MCPs on the left.  Psych: nl judgement, orientation, memory, affect.           Data:   Imagin2019 x-ray bilateral hands  Impression:   1. Scattered degenerative changes, severe in the bilateral first  carpometacarpal joints. Additionally there are possible erosive  changes at this location suggestive of erosive osteoarthritis.  2. Bilateral narrowing of first carpometacarpal joints with prominent  osteophytes as described. Differential consideration includes CPPD and Hemochromatosis.    2019 x-ray bilateral foot  Impression: No definite erosions.     Laboratory:   hepatitis B core antibody nonreactive, hepatitis C antibody nonreactive    10/11/2022  Creatinine 0.83, GFR greater than 90  Albumin 4.6  ALT 36, AST 37  TSH 1.33, T4 1.29  With blood cell count 5.4, hemoglobin 15.5, platelet count 190  QuantiFERON TB Gold negative    2023   TB negative  Hep B negative  Hep C negative    2023  Creatinine 0.79, GFR greater than 90  Albumin 4.4  ALT 22, AST 28  CRP 3.61  White blood cell count 5.2, hemoglobin 14.1, platelet count 188  Sed rate    2024  Creatinine 0.85, GFR greater than 90  Albumin 4.2  ALT 42, AST 33  CRP less than 3.00  White blood cell count 4.5, hemoglobin 14.4, platelet count 181  Sed rate 8

## 2024-05-02 NOTE — PATIENT INSTRUCTIONS
After Visit Instructions:     Thank you for coming to [facility] for your care. It is my goal to partner with you to help you reach your optimal state of health.       Plan:     Schedule follow-up with Karma Richardson PA-C in 3 months.   Labs: CBC, creatinine, Albumin, AST, ALT, CRP and Sed Rate today and then every 3 months; lipid panel in 3 months  Medication recommendations:   Continue Rinvoq 15mg: Take 1 tablet daily daily      Karma Richardson PA-C  [facility]  [facility]    Contact information: [facility]  Clinic Number:  [phone]  Please call or send a [employer] message with any questions about your care

## 2024-05-03 LAB
ALT SERPL W P-5'-P-CCNC: 23 U/L (ref 0–70)
AST SERPL W P-5'-P-CCNC: 26 U/L (ref 0–45)
CREAT SERPL-MCNC: 1.07 MG/DL (ref 0.67–1.17)
CRP SERPL-MCNC: 4.81 MG/L
EGFRCR SERPLBLD CKD-EPI 2021: 72 ML/MIN/1.73M2

## 2024-05-06 DIAGNOSIS — N40.0 BENIGN PROSTATIC HYPERPLASIA WITHOUT LOWER URINARY TRACT SYMPTOMS: ICD-10-CM

## 2024-05-06 RX ORDER — TAMSULOSIN HYDROCHLORIDE 0.4 MG/1
CAPSULE ORAL
Qty: 90 CAPSULE | Refills: 3 | Status: SHIPPED | OUTPATIENT
Start: 2024-05-06

## 2024-06-10 DIAGNOSIS — I10 HYPERTENSION, UNSPECIFIED TYPE: ICD-10-CM

## 2024-06-10 DIAGNOSIS — E78.2 MIXED HYPERLIPIDEMIA: ICD-10-CM

## 2024-06-12 ENCOUNTER — MYC MEDICAL ADVICE (OUTPATIENT)
Dept: FAMILY MEDICINE | Facility: CLINIC | Age: 74
End: 2024-06-12
Payer: COMMERCIAL

## 2024-06-12 ENCOUNTER — MYC MEDICAL ADVICE (OUTPATIENT)
Dept: RHEUMATOLOGY | Facility: CLINIC | Age: 74
End: 2024-06-12
Payer: COMMERCIAL

## 2024-06-12 RX ORDER — ATORVASTATIN CALCIUM 40 MG/1
TABLET, FILM COATED ORAL
Qty: 90 TABLET | Refills: 3 | Status: SHIPPED | OUTPATIENT
Start: 2024-06-12

## 2024-06-12 RX ORDER — LISINOPRIL 5 MG/1
5 TABLET ORAL DAILY
Qty: 90 TABLET | Refills: 1 | Status: SHIPPED | OUTPATIENT
Start: 2024-06-12

## 2024-06-12 NOTE — TELEPHONE ENCOUNTER
Patient states sx are mild at present but would like antiviral tx given increased risk factors  Endorses congestion, fatigue, and cough, low grade fever    RN COVID TREATMENT VISIT  06/12/24    The patient has been triaged and does not require a higher level of care.    Dale Cuevas  73 year old  Current weight? 175lb     Has the patient been seen by a primary care provider at an [facility] or [facility] within the past two years? Yes.   Have you been in close proximity to/do you have a known exposure to a person with a confirmed case of influenza? No.     General treatment eligibility:  Date of positive COVID test (PCR or at home)?  6/11/24    Are you or have you been hospitalized for this COVID-19 infection? No.   Have you received monoclonal antibodies or antiviral treatment for COVID-19 since this positive test? No.   Do you have any of the following conditions that place you at risk of being very sick from COVID-19?   - Age 50 years or older  - Patient reports taking medicines that suppress the immune system such as: Greater than or equal to 20mg prednisone or equivalent per day, cyclophosphamide or cisplatin, methotrexate, cancer chemotherapeutic agents classified as severely immunosuppressive, tumor-necrosis (TNF) blockers, and other biologics  Yes, patient has at least one high risk condition as noted above.     Current COVID symptoms:   - cough  - congestion or runny nose  Yes. Patient has at least one symptom as selected.     How many days since symptoms started? 5 days or less. Established patient, 12 years or older weighing at least 88.2 lbs, who has symptoms that started in the past 5 days, has not been hospitalized nor received treatment already, and is at risk for being very sick from COVID-19.     Treatment eligibility by RN:  Are you currently pregnant or nursing? No  Do you have a clinically significant hypersensitivity to nirmatrelvir or ritonavir, or toxic epidermal  necrolysis (TEN) or King-Yan Syndrome? No  Do you have a history of hepatitis, any hepatic impairment on the Problem List (such as Child-Ashby Class C, cirrhosis, fatty liver disease, alcoholic liver disease), or was the last liver lab (hepatic panel, ALT, AST, ALK Phos, bilirubin) elevated in the past 6 months? No  Do you have any history of severe renal impairment (eGFR < 30mL/min)? No    Is patient eligible to continue? Yes, patient meets all eligibility requirements for the RN COVID treatment (as denoted by all no responses above).     Current Outpatient Medications   Medication Sig Dispense Refill    atorvastatin (LIPITOR) 40 MG tablet TAKE 1 TABLET BY MOUTH EVERY DAY 90 tablet 3    folic acid (FOLVITE) 1 MG tablet Take 1 tablet (1,000 mcg) by mouth daily 90 tablet 3    lisinopril (ZESTRIL) 5 MG tablet TAKE 1 TABLET BY MOUTH EVERY DAY 90 tablet 1    sildenafil (REVATIO) 20 MG tablet Take 1 tablet (20 mg) by mouth daily as needed 20 tablet 1    tamsulosin (FLOMAX) 0.4 MG capsule TAKE 1 CAPSULE BY MOUTH EVERY DAY 90 capsule 3    upadacitinib ER (RINVOQ) 15 MG tablet Take 1 tablet (15 mg) by mouth daily 30 tablet 2       Medications from List 1 of the standing order (on medications that exclude the use of Paxlovid) that patient is taking: NONE. Is patient taking Glory's Wort? No  Is patient taking Glory's Wort or any meds from List 1? No.   Medications from List 2 of the standing order (on meds that provider needs to adjust) that patient is taking: upadacitinib (Rinvoq), explained a provider visit is necessary to discuss medication adjustments while taking Paxlovid. Is patient on any of the meds from List 2? Yes. Patient will be scheduled or transferred to a  at the end of this call.     Patient scheduled for virtual UC tx tomorrow morning. Conservative home care measures discussed in the interim for pt care.    Ant Perdomo RN

## 2024-06-13 NOTE — TELEPHONE ENCOUNTER
Writer responded to patient message via [employer].     Wesley Conway, MSN, RN   [facility]

## 2024-06-13 NOTE — TELEPHONE ENCOUNTER
Patient is requesting a call back from clinic ASAP.  RN contacted patient via telephone call.  Patient was clarified the provider telephone virtual is scheduled for 6/14/24.  RN educated patient as long as patient is being treated with antiviral the first five days of symptoms, the medication would work just as well. Patient appeared congested.  RN advised to stay hydrated and eat well balanced meal to promote recovery.  Telephone verified with patient to be accurate for provider to contact patient. No further action for now from support staff.    Nitin Myers RN  [facility]

## 2024-06-14 ENCOUNTER — VIRTUAL VISIT (OUTPATIENT)
Dept: URGENT CARE | Facility: CLINIC | Age: 74
End: 2024-06-14
Payer: COMMERCIAL

## 2024-06-14 DIAGNOSIS — U07.1 SARS-COV-2 POSITIVE: Primary | ICD-10-CM

## 2024-06-14 PROCEDURE — 99442 PR PHYSICIAN TELEPHONE EVALUATION 11-20 MIN: CPT | Mod: 93

## 2024-06-14 NOTE — Clinical Note
This patient reported trouble getting a hold of his clinic ([facility]) and was pushed through 6 times and hung up on by the system accidentally as well as having trouble scheduling virtual. I wanted to pass this information along. Thank you.

## 2024-06-14 NOTE — PROGRESS NOTES
"Assessment:    SARS-CoV-2 positive    COVID-19 positive patient.  Encounter for consideration of medication intervention.    Patient does qualify for a prescription.   Full discussion with patient including medication options, risks and benefits.   Potential drug interactions reviewed with patient.      Plan:  Treatment planned   Paxlovid will be sent in to preferred pharmacy.     Temporary change to home medications:   Hold Lipitor    Inocencio Hunter  is a 73 year old  who has a confirmed new positive COVID-19 diagnosis.      He has been identified as high risk for complications of this infection, and is being evaluated via a billable     Phone visit.      Phone call duration: 11 minutes  Patient location: Home  Provider location: Home    Concern for COVID-19  When did symptoms begin? 6/11/24    Positive COVID test? Yes    Symptoms (Cough, trouble breathing, fever, chills, headache, sore throat, chest pain, diarrhea, body aches)?   Sore throat, congestion, fatigue, cough, low grade fever. 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            Constitutional, HEENT, cardiovascular, pulmonary, gi and gu systems are negative, except as otherwise noted.    Objective    Vitals:  No vitals were obtained today due to virtual visit.  Estimated body mass index is 27.42 kg/m  as calculated from the following:    Height as of 11/16/23: 1.702 m (5' 7\").    Weight as of 5/2/24: 79.4 kg (175 lb 1.6 oz).   General: Alert, no apparent distress  PSYCH: Alert; coherent speech, normal rate and volume, able to articulate logical thoughts, appropriate insight, no tangential thoughts, no hallucination or delusions.  Affect is appropriate  RESP: No cough, no audible wheezing, able to talk in full sentences  Remainder of exam unable to be completed due to telephone visit  GFR Estimate   Date Value Ref Range Status   05/02/2024 72 >60 mL/min/1.73m2 Final   07/08/2021 >90 >60 mL/min/[1.73_m2] Final     " "Comment:     Non  GFR Calc  Starting 12/18/2018, serum creatinine based estimated GFR (eGFR) will be   calculated using the Chronic Kidney Disease Epidemiology Collaboration   (CKD-EPI) equation.              The patient has been notified of following:     \"This telephone visit will be conducted via a call between you and your physician/provider. We have found that certain health care needs can be provided without the need for a physical exam.  This service lets us provide the care you need with a short phone conversation.  If a prescription is necessary we can send it directly to your pharmacy.  If lab work is needed we can place an order for that and you can then stop by our lab to have the test done at a later time.    Telephone visits are billed at different rates depending on your insurance coverage. During this emergency period, for some insurers they may be billed the same as an in-person visit.  Please reach out to your insurance provider with any questions.    If during the course of the call the physician/provider feels a telephone visit is not appropriate, you will not be charged for this service.\"    Patient has given verbal consent for Telephone visit?  Yes            "

## 2024-06-18 ENCOUNTER — TELEPHONE (OUTPATIENT)
Dept: FAMILY MEDICINE | Facility: CLINIC | Age: 74
End: 2024-06-18
Payer: COMMERCIAL

## 2024-06-18 NOTE — TELEPHONE ENCOUNTER
Patient Quality Outreach    Patient is due for the following:   Physical Annual Wellness Visit      Topic Date Due    Zoster (Shingles) Vaccine (1 of 2) Never done    Diptheria Tetanus Pertussis (DTAP/TDAP/TD) Vaccine (2 - Td or Tdap) 11/23/2023    COVID-19 Vaccine (4 - 2023-24 season) 01/11/2024       Next Steps:   Schedule a Annual Wellness Visit    Type of outreach:    Phone, left message for patient/parent to call back.      Questions for provider review:    None           Lynda De Guzman MA

## 2024-07-01 ENCOUNTER — TELEPHONE (OUTPATIENT)
Dept: FAMILY MEDICINE | Facility: CLINIC | Age: 74
End: 2024-07-01
Payer: COMMERCIAL

## 2024-07-01 NOTE — TELEPHONE ENCOUNTER
Patient Quality Outreach    Patient is due for the following:   Physical Annual Wellness Visit    Next Steps:   Schedule a Annual Wellness Visit    Type of outreach:    Sent [employer] message.      Questions for provider review:    None           Lynda De Guzman MA

## 2024-07-29 ENCOUNTER — MYC MEDICAL ADVICE (OUTPATIENT)
Dept: RHEUMATOLOGY | Facility: CLINIC | Age: 74
End: 2024-07-29
Payer: COMMERCIAL

## 2024-07-31 ENCOUNTER — LAB (OUTPATIENT)
Dept: LAB | Facility: CLINIC | Age: 74
End: 2024-07-31
Payer: COMMERCIAL

## 2024-07-31 DIAGNOSIS — Z79.899 HIGH RISK MEDICATION USE: ICD-10-CM

## 2024-07-31 DIAGNOSIS — M06.9 RHEUMATOID ARTHRITIS INVOLVING MULTIPLE JOINTS (H): ICD-10-CM

## 2024-07-31 DIAGNOSIS — L40.50 PSORIATIC ARTHRITIS (H): ICD-10-CM

## 2024-07-31 LAB
ALBUMIN SERPL BCG-MCNC: 4.3 G/DL (ref 3.5–5.2)
ALT SERPL W P-5'-P-CCNC: 35 U/L (ref 0–70)
AST SERPL W P-5'-P-CCNC: 32 U/L (ref 0–45)
CHOLEST SERPL-MCNC: 169 MG/DL
CREAT SERPL-MCNC: 0.96 MG/DL (ref 0.67–1.17)
CRP SERPL-MCNC: <3 MG/L
EGFRCR SERPLBLD CKD-EPI 2021: 82 ML/MIN/1.73M2
ERYTHROCYTE [DISTWIDTH] IN BLOOD BY AUTOMATED COUNT: 12.4 % (ref 10–15)
ERYTHROCYTE [SEDIMENTATION RATE] IN BLOOD BY WESTERGREN METHOD: 13 MM/HR (ref 0–20)
FASTING STATUS PATIENT QL REPORTED: YES
HCT VFR BLD AUTO: 44.6 % (ref 40–53)
HDLC SERPL-MCNC: 34 MG/DL
HGB BLD-MCNC: 15.1 G/DL (ref 13.3–17.7)
LDLC SERPL CALC-MCNC: 106 MG/DL
MCH RBC QN AUTO: 30.4 PG (ref 26.5–33)
MCHC RBC AUTO-ENTMCNC: 33.9 G/DL (ref 31.5–36.5)
MCV RBC AUTO: 90 FL (ref 78–100)
NONHDLC SERPL-MCNC: 135 MG/DL
PLATELET # BLD AUTO: 179 10E3/UL (ref 150–450)
RBC # BLD AUTO: 4.96 10E6/UL (ref 4.4–5.9)
TRIGL SERPL-MCNC: 145 MG/DL
WBC # BLD AUTO: 5.4 10E3/UL (ref 4–11)

## 2024-07-31 PROCEDURE — 80061 LIPID PANEL: CPT

## 2024-07-31 PROCEDURE — 82565 ASSAY OF CREATININE: CPT

## 2024-07-31 PROCEDURE — 84460 ALANINE AMINO (ALT) (SGPT): CPT

## 2024-07-31 PROCEDURE — 85652 RBC SED RATE AUTOMATED: CPT

## 2024-07-31 PROCEDURE — 82040 ASSAY OF SERUM ALBUMIN: CPT

## 2024-07-31 PROCEDURE — 36415 COLL VENOUS BLD VENIPUNCTURE: CPT

## 2024-07-31 PROCEDURE — 86140 C-REACTIVE PROTEIN: CPT

## 2024-07-31 PROCEDURE — 85027 COMPLETE CBC AUTOMATED: CPT

## 2024-07-31 PROCEDURE — 84450 TRANSFERASE (AST) (SGOT): CPT

## 2024-08-01 ENCOUNTER — OFFICE VISIT (OUTPATIENT)
Dept: RHEUMATOLOGY | Facility: CLINIC | Age: 74
End: 2024-08-01
Payer: COMMERCIAL

## 2024-08-01 VITALS
SYSTOLIC BLOOD PRESSURE: 158 MMHG | BODY MASS INDEX: 28.13 KG/M2 | OXYGEN SATURATION: 97 % | HEART RATE: 83 BPM | DIASTOLIC BLOOD PRESSURE: 80 MMHG | WEIGHT: 179.6 LBS

## 2024-08-01 DIAGNOSIS — L40.50 PSORIATIC ARTHRITIS (H): ICD-10-CM

## 2024-08-01 DIAGNOSIS — L40.9 PSORIASIS: Primary | ICD-10-CM

## 2024-08-01 PROCEDURE — 99214 OFFICE O/P EST MOD 30 MIN: CPT | Performed by: PHYSICIAN ASSISTANT

## 2024-08-01 NOTE — PATIENT INSTRUCTIONS
After Visit Instructions:     Thank you for coming to [facility] for your care. It is my goal to partner with you to help you reach your optimal state of health.       Plan:     Schedule follow-up with Karma Richardson PA-C in 4 months.   Labs: CBC, creatinine, Albumin, AST, ALT, CRP and Sed Rate  every 3 months-next due end of October; lipid panel in 6 months  Referral: to dermatology  Medication recommendations:   Continue Rinvoq 15mg: Take 1 tablet daily or every other day    Karma Richardson PA-C  [facility]  [facility]    Contact information: [facility]  Clinic Number:  [phone]  Please call or send a [employer] message with any questions about your care

## 2024-08-01 NOTE — PROGRESS NOTES
Rheumatology Clinic Visit  [facility]  GERTRUDE Vilchis     Dale Cuevas MRN# [id number]   YOB: 1950 Age: 74 year old   Date of Visit: 08/01/2024  Primary care provider: Romina Perez          Assessment and Plan:     1.  Psoriatic arthritis  2.  Rheumatoid arthritis  3.  Psoriasis  4.  High-risk medication use    Patient presents today for follow-up regarding his inflammatory arthritis.  He states that he has been taking the Rinvoq every other day and his joints continue to remain under excellent control.  Unfortunately his skin has been getting worse.  He is not established with a dermatologist, therefore referral has been placed.  Physical examination today shows extensive psoriatic plaques along the base of his scalp as well as on his elbows.  He does have some extending down the middle of his back as well.  Joints do not show any active synovitis or dactylitis.  He has full fist formation.  Labs have been stable.  Continue to monitor every 3 months.  Will recheck his cholesterol in 6 months.  Follow-up with me in 4 months, sooner if needed.      Plan:     Schedule follow-up with Karma Richardson PA-C in 4 months.   Labs: CBC, creatinine, Albumin, AST, ALT, CRP and Sed Rate  every 3 months-next due end of October; lipid panel in 6 months  Referral: to dermatology  Medication recommendations:   Continue Rinvoq 15mg: Take 1 tablet daily or every other day    GERTRUDE Vilchis  Rheumatology         History of Present Illness:   Dale Cuevas presents for evaluation of psoriatic arthritis.     Rheumatological history:  Provider(s): Emiliano King, DANNY [place], Dr. Mead  Pertinent lab history: Elevated CRP, Positive HLA-B27, negative RF and CCP, - Hep B/C, -MTB QG  Previous medications tried: Humira (initially effective), Celebrex (not effective), Prednisone (helpful but has side effects: increased appetite, changes to his voice), Enbrel (not effective, difficult to get refilled), Orencia (not  effective), Methotrexate (not effective)  Current medications: Rinvoq    Interval history August 1, 2024:  He had COVID and it took awhile to recover. He does think that he is doing better now. He does feel that the Rinvoq is helping. He is taking it every other day. He did this as he was having some GI upset (diarrhea). His skin is not controlled. He does not see dermatology.     Interval history May 2, 2024:  He states that he does feel that the Rinvoq is helping. He has been able to see his knuckles again. He states that almost nightly he has felt down and gets chills. Temps are around 100. H feels good in the mornings, but after supper he just feels off. He also feels like he has a cough, like a slight irritation his wind pipe. No productive cough. He stopped the Methotrexate as it was not helping.     Interval history January 25, 2024:  He has been off the Orencia for a couple of weeks, and has not noticed any difference in his joints. He has increased swelling in his fingers. He did break 2 bones and cracked some ribs at the end of October from falling off a step ladder.     No history of MI or stroke, no history blood clots. No history of diverticulitis.     Interval history October 12, 2023:  He states that his left hand has been swollen for the last couple of weeks. The trouble he was having with the right hand has improved. He is tolerating the Orencia, but he is not sure that it is working. He continues on the Methotrexate weekly (on Thursdays).     Interval history June 26, 2023:  He states that his fingers are moving on the right hand. He has had more pain and swelling. He has continued on the Methotrexate.  He prefers to avoid prednisone as it affects his voice and he is a hernandez.    HPI from consult:   He reports that his joints are doing pretty well. He does have decreased strength in his thumbs. He has been noticing his fingers moving as well towards his pinky. He has never been higher than 15mg  weekly on the Methotrexate. He is tolerating it well. He states that initially Humira was significantly helpful. He states that within days of starting he had significant improvement. He then had his knee replaced, and it quit working after that. He does have about 1 hour or less of morning stiffness. He does do some exercises on his hands when he wakes up. 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     No frequent infections. No shortness of breath or chest pain.              Review of Systems:     Constitutional: negative  Skin: negative  Eyes: negative  Ears/Nose/Throat: negative  Respiratory: No shortness of breath, dyspnea on exertion, cough, or hemoptysis  Cardiovascular: negative  Gastrointestinal: negative  Genitourinary: negative  Musculoskeletal: as above  Neurologic: negative  Psychiatric: negative  Hematologic/Lymphatic/Immunologic: negative  Endocrine: negative         Active Problem List:     Patient Active Problem List    Diagnosis Date Noted    Status post revision of total knee, right 06/03/2021     Priority: Medium    Status post total knee replacement, right 03/04/2021     Priority: Medium    Medication monitoring encounter 03/01/2021     Priority: Medium    Immunosuppression (H24) 02/10/2021     Priority: Medium    Psoriatic arthritis (H) 07/15/2019     Priority: Medium    Diastasis recti 06/15/2016     Priority: Medium    Family hx of prostate cancer 06/07/2016     Priority: Medium    Benign prostatic hyperplasia 09/08/2014     Priority: Medium    Hyperlipidemia LDL goal <100 02/05/2014     Priority: Medium    Psoriasis 11/23/2013     Priority: Medium    Erectile dysfunction 05/06/2013     Priority: Medium            Past Medical History:     Past Medical History:   Diagnosis Date    Benign prostatic hyperplasia 09/08/2014    Diastasis recti 06/15/2016    Elevated blood pressure reading without diagnosis of hypertension 05/06/2013    Erectile dysfunction  2013    Family hx of prostate cancer 2016    Hyperlipidemia LDL goal <100 2014    Psoriasis 2013    Psoriatic arthritis (H)      Past Surgical History:   Procedure Laterality Date    ARTHROPLASTY KNEE Right 3/4/2021    Procedure: RIGHT TOTAL KNEE ARTHROPLASTY;  Surgeon: Jose C Westbrook MD;  Location: [facility] OR    ARTHROPLASTY REVISION KNEE Right 6/3/2021    Procedure: RIGHT KNEE POLYETHYLENE EXCHANGE;  Surgeon: Jose C Westbrook MD;  Location: [facility] OR    BACK SURGERY      Hx Vitrectomy/Membrane Peel/Laser/Air Right 2018    Dr. Medrano / Dr. Mendiola      ORTHOPEDIC SURGERY  2015    left wrist    ORTHOPEDIC SURGERY      spine sugery-with disc fragment removed    ORTHOPEDIC SURGERY      right knee arthroscopy    SEPTOPLASTY              Social History:     Social History     Socioeconomic History    Marital status: Single     Spouse name: Not on file    Number of children: Not on file    Years of education: Not on file    Highest education level: Not on file   Occupational History    Not on file   Tobacco Use    Smoking status: Former     Current packs/day: 0.00     Types: Cigarettes     Quit date: 1973     Years since quittin.2    Smokeless tobacco: Never   Substance and Sexual Activity    Alcohol use: Yes     Comment: every night -     Drug use: No    Sexual activity: Not Currently   Other Topics Concern    Parent/sibling w/ CABG, MI or angioplasty before 65F 55M? No   Social History Narrative    Not on file     Social Determinants of Health     Financial Resource Strain: Low Risk  (11/15/2023)    Financial Resource Strain     Within the past 12 months, have you or your family members you live with been unable to get utilities (heat, electricity) when it was really needed?: No   Food Insecurity: Low Risk  (11/15/2023)    Food Insecurity     Within the past 12 months, did you worry that your food would run out before you got money to buy more?: No     Within the  past 12 months, did the food you bought just not last and you didn t have money to get more?: No   Transportation Needs: Low Risk  (11/15/2023)    Transportation Needs     Within the past 12 months, has lack of transportation kept you from medical appointments, getting your medicines, non-medical meetings or appointments, work, or from getting things that you need?: No   Physical Activity: Not on file   Stress: Not on file   Social Connections: Not on file   Interpersonal Safety: Low Risk  (10/27/2023)    Interpersonal Safety     Do you feel physically and emotionally safe where you currently live?: Yes     Within the past 12 months, have you been hit, slapped, kicked or otherwise physically hurt by someone?: No     Within the past 12 months, have you been humiliated or emotionally abused in other ways by your partner or ex-partner?: No   Housing Stability: Low Risk  (11/15/2023)    Housing Stability     Do you have housing? : Yes     Are you worried about losing your housing?: No          Family History:     Family History   Problem Relation Age of Onset    Hypertension Mother     Prostate Cancer Father 72    Breast Cancer Sister     Thyroid Cancer Brother     HIV/AIDS Brother             Allergies:     Allergies   Allergen Reactions    Levofloxacin      Other reaction(s): *Unknown            Medications:     Current Outpatient Medications   Medication Sig Dispense Refill    atorvastatin (LIPITOR) 40 MG tablet TAKE 1 TABLET BY MOUTH EVERY DAY 90 tablet 3    folic acid (FOLVITE) 1 MG tablet Take 1 tablet (1,000 mcg) by mouth daily 90 tablet 3    lisinopril (ZESTRIL) 5 MG tablet TAKE 1 TABLET BY MOUTH EVERY DAY 90 tablet 1    sildenafil (REVATIO) 20 MG tablet Take 1 tablet (20 mg) by mouth daily as needed 20 tablet 1    tamsulosin (FLOMAX) 0.4 MG capsule TAKE 1 CAPSULE BY MOUTH EVERY DAY 90 capsule 3    upadacitinib ER (RINVOQ) 15 MG tablet Take 1 tablet (15 mg) by mouth daily 30 tablet 2            Physical Exam:    There were no vitals taken for this visit.  Wt Readings from Last 6 Encounters:   24 79.4 kg (175 lb 1.6 oz)   24 78.9 kg (174 lb)   23 76.5 kg (168 lb 9.6 oz)   10/27/23 78.2 kg (172 lb 8 oz)   10/25/23 78.9 kg (174 lb)   10/12/23 79.1 kg (174 lb 4.8 oz)     Constitutional: well-developed, appearing stated age; cooperative  Eyes: nl conjunctiva, sclera  ENT: nl external ears, nose, hearing, lips  Neck: no mass or thyroid enlargement  Resp: No shortness of breath with normal conversation  MS: Squaring of the CMC's bilaterally.  Resolved synovitis  Skin: Significant psoriatic plaques over his bilateral elbows and base of scalp.  Psych: nl judgement, orientation, memory, affect.           Data:   Imagin2019 x-ray bilateral hands  Impression:   1. Scattered degenerative changes, severe in the bilateral first  carpometacarpal joints. Additionally there are possible erosive  changes at this location suggestive of erosive osteoarthritis.  2. Bilateral narrowing of first carpometacarpal joints with prominent  osteophytes as described. Differential consideration includes CPPD and Hemochromatosis.    2019 x-ray bilateral foot  Impression: No definite erosions.     Laboratory:   hepatitis B core antibody nonreactive, hepatitis C antibody nonreactive    10/11/2022  Creatinine 0.83, GFR greater than 90  Albumin 4.6  ALT 36, AST 37  TSH 1.33, T4 1.29  With blood cell count 5.4, hemoglobin 15.5, platelet count 190  QuantiFERON TB Gold negative    2023   TB negative  Hep B negative  Hep C negative    2023  Creatinine 0.79, GFR greater than 90  Albumin 4.4  ALT 22, AST 28  CRP 3.61  White blood cell count 5.2, hemoglobin 14.1, platelet count 188  Sed rate    2024  Creatinine 0.85, GFR greater than 90  Albumin 4.2  ALT 42, AST 33  CRP less than 3.00  White blood cell count 4.5, hemoglobin 14.4, platelet count 181  Sed rate 8    2024  Creatinine 0.96, GFR 82  Albumin 4.3  ALT  35, AST 32  CRP <3.00  Cholesterol is 169, HDL is 34 and LDL is 106  White blood cell count is 5.4, hemoglobin 15.1, platelet count 179  Sed rate 13

## 2024-09-20 DIAGNOSIS — I10 HYPERTENSION, UNSPECIFIED TYPE: ICD-10-CM

## 2024-09-22 RX ORDER — LISINOPRIL 5 MG/1
5 TABLET ORAL DAILY
Qty: 90 TABLET | Refills: 1 | OUTPATIENT
Start: 2024-09-22

## 2024-09-24 ENCOUNTER — OFFICE VISIT (OUTPATIENT)
Dept: DERMATOLOGY | Facility: CLINIC | Age: 74
End: 2024-09-24
Payer: COMMERCIAL

## 2024-09-24 DIAGNOSIS — L40.9 PSORIASIS: ICD-10-CM

## 2024-09-24 DIAGNOSIS — L40.50 PSORIATIC ARTHRITIS (H): ICD-10-CM

## 2024-09-24 DIAGNOSIS — L21.9 DERMATITIS, SEBORRHEIC: Primary | ICD-10-CM

## 2024-09-24 PROCEDURE — 99204 OFFICE O/P NEW MOD 45 MIN: CPT | Mod: GC | Performed by: DERMATOLOGY

## 2024-09-24 RX ORDER — KETOCONAZOLE 20 MG/ML
SHAMPOO TOPICAL DAILY PRN
Qty: 120 ML | Refills: 5 | Status: SHIPPED | OUTPATIENT
Start: 2024-09-24 | End: 2024-12-23

## 2024-09-24 RX ORDER — MOMETASONE FUROATE 1 MG/G
OINTMENT TOPICAL DAILY
Qty: 45 G | Refills: 11 | Status: SHIPPED | OUTPATIENT
Start: 2024-09-24 | End: 2024-10-24

## 2024-09-24 ASSESSMENT — PAIN SCALES - GENERAL: PAINLEVEL: NO PAIN (0)

## 2024-09-24 NOTE — NURSING NOTE
Dermatology Rooming Note    Dale Cuevas's goals for this visit include:   Chief Complaint   Patient presents with    Psoriasis     Dale is here today for a psoriasis      SIGRID Gregory - Dermatology

## 2024-09-24 NOTE — Clinical Note
9/24/2024       RE: Dale Cuevas  [address] [facility] [address]     Dear Colleague,    Thank you for referring your patient, Dale Cuevas, to the [facility] [place] at [facility]. Please see a copy of my visit note below.    [facility] Dermatology Note  Encounter Date: Sep 24, 2024  Office Visit     Dermatology Problem List:  Plaque psoriasis with psoriatic arthritis  - BSA <5%   - Current tx: Rinvoq (1/2024~ )  for PsA managed by rheumatology, mometasone PRN for elbows   - Previous therapy: Humira (initially effective), Celebrex (not effective), Prednisone (helpful but has side effects: increased appetite, changes to his voice), Enbrel (not effective, difficult to get refilled), Orencia (not effective), Methotrexate (not effective)   2. Seborrheic dermatitis   - Ketoconazole shampoo     ____________________________________________    Assessment & Plan:   # Plaque psoriasis with psoriatic arthritis on upadacitinib   -BSA <5%.  Limited plaque psoriasis on bilateral elbows and minimal activity on scalp/posterior neck.  Currently on Rinvoq for psoriatic arthritis and reports that both joint symptoms and skin are well-controlled.  Using hydrocortisone 1% for elbows and eyebrows.  Discussed other options for topical medications including stronger topical steroid for the elbows .   - Start mometasone PRN for bilateral elbow plaques, posterior scalp/neck    - Stable PsO, PsA on upadacitinib. Management per rheumatology. Recommend annual skin exam.   - Discussed sun protection     #Seborrheic dermatitis  -Mild scaly patches of eyebrows area and mild background erythema of the face with sparing of the periorbital area seen on exam.  Clinically suspicious for for seborrheic dermatitis and psoriasis overlap.    -Ketoconazole shampoo        Procedures Performed:   None      Follow-up: 1 year for psoriasis, skin check      Staff and Resident:     Resident:  I saw and discussed the patient with the attending physician, Dr. Nam Gusman,  PGY-2 Internal Medicine / Dermatology (#[id number])  [facility]      ***        ____________________________________________    CC: Psoriasis (Dale is here today for a psoriasis )      HPI:  Mr. Dale Cuevas is a(n) 74 year old male who presents today for psoriasis.   He was last seen in dermatology on 5/20/2021 by by Dr. Ricardo. At that time, he was on adalimumab. As skin was well controlled but he was getting more joint symptoms, he was referred to rheumatology. Since 2021 he has tried other medications including Enbrel, methotrexate, Orencia.  For the past year he has been on Rinvoq and he reports that his joint symptoms have been very well-controlled.     He is here today to discuss management of skin psoriasis.  He has well-demarcated psoriatic plaques on bilateral elbows.  He also had some scaly lesions on posterior scalp line.  He has mild scaly patches on eyebrows and he reports that he uses hydrocortisone 1% for his elbows and eyebrows.      Otherwise no lesions of concern today. No spots that are tender, bleeding, non-healing     Patient is otherwise feeling well, without additional skin concerns.    Labs Reviewed:  Reviewed labs from 8/1/2024    Physical Exam:  Vitals: There were no vitals taken for this visit.  SKIN: Focused examination of head and neck and trunk extremities was performed.  -Well-demarcated erythematous thick scaly plaques on bilateral elbows  -Thin scaly patches around eyebrows  -Mild generalized erythema of the face with sparing of the Marvin orbital area  -Mild scaly patches to plaques on occipital area/posterior neck.   -No noticeable nail changes or joint swelling on exam today  -light brown waxy, stuck on papules on trunk     Medications:  Current Outpatient Medications   Medication Sig Dispense Refill    atorvastatin  (LIPITOR) 40 MG tablet TAKE 1 TABLET BY MOUTH EVERY DAY 90 tablet 3    folic acid (FOLVITE) 1 MG tablet Take 1 tablet (1,000 mcg) by mouth daily 90 tablet 3    lisinopril (ZESTRIL) 5 MG tablet TAKE 1 TABLET BY MOUTH EVERY DAY 90 tablet 1    sildenafil (REVATIO) 20 MG tablet Take 1 tablet (20 mg) by mouth daily as needed 20 tablet 1    tamsulosin (FLOMAX) 0.4 MG capsule TAKE 1 CAPSULE BY MOUTH EVERY DAY 90 capsule 3    upadacitinib ER (RINVOQ) 15 MG tablet Take 1 tablet (15 mg) by mouth daily 30 tablet 2     No current facility-administered medications for this visit.        Past Medical History:   Patient Active Problem List   Diagnosis    Erectile dysfunction    Psoriasis    Hyperlipidemia LDL goal <100    Benign prostatic hyperplasia    Family hx of prostate cancer    Diastasis recti    Psoriatic arthritis (H)    Immunosuppression (H24)    Medication monitoring encounter    Status post total knee replacement, right    Status post revision of total knee, right     Past Medical History:   Diagnosis Date    Benign prostatic hyperplasia 09/08/2014    Diastasis recti 06/15/2016    Elevated blood pressure reading without diagnosis of hypertension 05/06/2013    Erectile dysfunction 05/06/2013    Family hx of prostate cancer 06/07/2016    Hyperlipidemia LDL goal <100 02/05/2014    Psoriasis 11/23/2013    Psoriatic arthritis (H)        CC Karma Richardson PA-C  [address] [place] [address] on close of this encounter.            Again, thank you for allowing me to participate in the care of your patient.      Sincerely,    Car Browning MD

## 2024-09-24 NOTE — PROGRESS NOTES
[facility] Dermatology Note  Encounter Date: Sep 24, 2024  Office Visit     Dermatology Problem List:  Plaque psoriasis with psoriatic arthritis  - BSA <5%   - Current tx: Rinvoq (1/2024~ )  for PsA managed by rheumatology, mometasone PRN for elbows   - Previous therapy: Humira (initially effective), Celebrex (not effective), Prednisone (helpful but has side effects: increased appetite, changes to his voice), Enbrel (not effective, difficult to get refilled), Orencia (not effective), Methotrexate (not effective)   2. Seborrheic dermatitis   - Ketoconazole shampoo     ____________________________________________    Assessment & Plan:   # Plaque psoriasis with psoriatic arthritis on upadacitinib   -BSA <5%.  Limited plaque psoriasis on bilateral elbows and minimal activity on scalp/posterior neck.  Currently on Rinvoq for psoriatic arthritis and reports that both joint symptoms and skin are well-controlled.  Using hydrocortisone 1% for elbows and eyebrows.  Discussed other options for topical medications including stronger topical steroid for the elbows .   - Start mometasone PRN for bilateral elbow plaques, posterior scalp/neck    - Stable PsO, PsA on upadacitinib. Management per rheumatology. Recommend annual skin exam.   - Discussed sun protection     #Seborrheic dermatitis  -Mild scaly patches of eyebrows area and mild background erythema of the face with sparing of the periorbital area seen on exam.  Clinically suspicious for for seborrheic dermatitis and psoriasis overlap.    -Ketoconazole shampoo        Procedures Performed:   None      Follow-up: 1 year for psoriasis, skin check     Staff and Resident:     Resident:  I saw and discussed the patient with the attending physician, Dr. Nam Gusman,  PGY-2 Internal Medicine / Dermatology (#[id number])  [facility]      ***        ____________________________________________    CC: Psoriasis (Dale  is here today for a psoriasis )      HPI:  Mr. Dale Cuevas is a(n) 74 year old male who presents today for psoriasis.   He was last seen in dermatology on 5/20/2021 by by Dr. Ricardo. At that time, he was on adalimumab. As skin was well controlled but he was getting more joint symptoms, he was referred to rheumatology. Since 2021 he has tried other medications including Enbrel, methotrexate, Orencia.  For the past year he has been on Rinvoq and he reports that his joint symptoms have been very well-controlled.     He is here today to discuss management of skin psoriasis.  He has well-demarcated psoriatic plaques on bilateral elbows.  He also had some scaly lesions on posterior scalp line.  He has mild scaly patches on eyebrows and he reports that he uses hydrocortisone 1% for his elbows and eyebrows.      Otherwise no lesions of concern today. No spots that are tender, bleeding, non-healing     Patient is otherwise feeling well, without additional skin concerns.    Labs Reviewed:  Reviewed labs from 8/1/2024    Physical Exam:  Vitals: There were no vitals taken for this visit.  SKIN: Focused examination of head and neck and trunk extremities was performed.  -Well-demarcated erythematous thick scaly plaques on bilateral elbows  -Thin scaly patches around eyebrows  -Mild generalized erythema of the face with sparing of the Marvin orbital area  -Mild scaly patches to plaques on occipital area/posterior neck.   -No noticeable nail changes or joint swelling on exam today  -light brown waxy, stuck on papules on trunk     Medications:  Current Outpatient Medications   Medication Sig Dispense Refill    atorvastatin (LIPITOR) 40 MG tablet TAKE 1 TABLET BY MOUTH EVERY DAY 90 tablet 3    folic acid (FOLVITE) 1 MG tablet Take 1 tablet (1,000 mcg) by mouth daily 90 tablet 3    lisinopril (ZESTRIL) 5 MG tablet TAKE 1 TABLET BY MOUTH EVERY DAY 90 tablet 1    sildenafil (REVATIO) 20 MG tablet Take 1 tablet (20 mg) by mouth daily as  needed 20 tablet 1    tamsulosin (FLOMAX) 0.4 MG capsule TAKE 1 CAPSULE BY MOUTH EVERY DAY 90 capsule 3    upadacitinib ER (RINVOQ) 15 MG tablet Take 1 tablet (15 mg) by mouth daily 30 tablet 2     No current facility-administered medications for this visit.        Past Medical History:   Patient Active Problem List   Diagnosis    Erectile dysfunction    Psoriasis    Hyperlipidemia LDL goal <100    Benign prostatic hyperplasia    Family hx of prostate cancer    Diastasis recti    Psoriatic arthritis (H)    Immunosuppression (H24)    Medication monitoring encounter    Status post total knee replacement, right    Status post revision of total knee, right     Past Medical History:   Diagnosis Date    Benign prostatic hyperplasia 09/08/2014    Diastasis recti 06/15/2016    Elevated blood pressure reading without diagnosis of hypertension 05/06/2013    Erectile dysfunction 05/06/2013    Family hx of prostate cancer 06/07/2016    Hyperlipidemia LDL goal <100 02/05/2014    Psoriasis 11/23/2013    Psoriatic arthritis (H)        CC CARMINA NicholeC  [address] [place] [address] on close of this encounter.

## 2024-09-26 ENCOUNTER — MYC MEDICAL ADVICE (OUTPATIENT)
Dept: FAMILY MEDICINE | Facility: CLINIC | Age: 74
End: 2024-09-26
Payer: COMMERCIAL

## 2024-09-26 NOTE — TELEPHONE ENCOUNTER
Responded to patient via [employer] requesting patient to contact the Rheumatology for refill of Folic Acid medication.  Specialty clinic information enclosed with email.    Nitin Myers RN  [facility]

## 2024-10-08 ENCOUNTER — TELEPHONE (OUTPATIENT)
Dept: FAMILY MEDICINE | Facility: CLINIC | Age: 74
End: 2024-10-08
Payer: COMMERCIAL

## 2024-10-08 NOTE — TELEPHONE ENCOUNTER
Patient Quality Outreach    Patient is due for the following:   Hypertension -  Hypertension follow-up visit  Physical Annual Wellness Visit    Next Steps:   Schedule a Annual Wellness Visit    Type of outreach:    Sent [employer] message.      Questions for provider review:    None           Valarie Conway MA

## 2024-12-05 ENCOUNTER — HOSPITAL ENCOUNTER (OUTPATIENT)
Dept: GENERAL RADIOLOGY | Facility: HOSPITAL | Age: 74
Discharge: HOME | End: 2024-12-05
Attending: PHYSICIAN ASSISTANT
Payer: COMMERCIAL

## 2024-12-05 ENCOUNTER — OFFICE VISIT (OUTPATIENT)
Dept: RHEUMATOLOGY | Facility: CLINIC | Age: 74
End: 2024-12-05
Payer: COMMERCIAL

## 2024-12-05 VITALS
DIASTOLIC BLOOD PRESSURE: 74 MMHG | BODY MASS INDEX: 28.98 KG/M2 | WEIGHT: 185 LBS | HEART RATE: 84 BPM | SYSTOLIC BLOOD PRESSURE: 146 MMHG

## 2024-12-05 DIAGNOSIS — L40.9 PSORIASIS: ICD-10-CM

## 2024-12-05 DIAGNOSIS — L40.50 PSORIATIC ARTHRITIS (H): Primary | ICD-10-CM

## 2024-12-05 DIAGNOSIS — L40.50 PSORIATIC ARTHRITIS (H): ICD-10-CM

## 2024-12-05 DIAGNOSIS — Z79.899 HIGH RISK MEDICATION USE: ICD-10-CM

## 2024-12-05 PROCEDURE — 73630 X-RAY EXAM OF FOOT: CPT | Mod: 50

## 2024-12-05 NOTE — PROGRESS NOTES
Rheumatology Clinic Visit  [facility]  GERTRUDE Vilchis     Dale Cuevas MRN# [id number]   YOB: 1950 Age: 74 year old   Date of Visit: 12/05/2024  Primary care provider: Romina Perez          Assessment and Plan:     1.  Psoriatic arthritis  2.  Rheumatoid arthritis  3.  Psoriasis  4.  High-risk medication use    Patient presents today for follow-up regarding his inflammatory arthritis.  Overall his joints have been under excellent control.  He has been having some increased foot pain however.  He states that it is not significantly prohibitive but it is noticeable.  He has met with dermatology but unfortunately the recommendations that they gave him for the skin lesions has not been helpful.  He is due for labs.  At this time we will check his monitoring labs and a cholesterol next week.  He would also like to have his glucose checked which I placed those orders as well.  Plan will be to continue on the Rinvoq daily.  Will update the x-rays of his feet today.  Follow-up with me in 6 months, sooner if needed.      Plan:     Schedule follow-up with Karma Richardson PA-C in 6 months.   Labs: CBC, creatinine, Albumin, AST, ALT, CRP and Sed Rate  next week, will check a cholesterol and Hgb A1c and glucose as well. Then they'll be due again in March 2025.   Imaging: xray feet  Medication recommendations:   Continue Rinvoq 15mg: Take 1 tablet daily or every other day    GERTRUDE Vilchis  Rheumatology         History of Present Illness:   Dale Cuevas presents for evaluation of psoriatic arthritis.     Rheumatological history:  Provider(s): DANNY Baum CNP, Dr. Mead  Pertinent lab history: Elevated CRP, Positive HLA-B27, negative RF and CCP, - Hep B/C, -MTB QG  Previous medications tried: Humira (initially effective), Celebrex (not effective), Prednisone (helpful but has side effects: increased appetite, changes to his voice), Enbrel (not effective, difficult to get refilled), Orencia  (not effective), Methotrexate (not effective)  Current medications: Rinvoq    Interval history December 5, 2024:  Joints are doing well. Having increased pain in a couple toes. Skin has not changed with derm's recommendations.     Interval history August 1, 2024:  He had COVID and it took awhile to recover. He does think that he is doing better now. He does feel that the Rinvoq is helping. He is taking it every other day. He did this as he was having some GI upset (diarrhea). His skin is not controlled. He does not see dermatology.     Interval history May 2, 2024:  He states that he does feel that the Rinvoq is helping. He has been able to see his knuckles again. He states that almost nightly he has felt down and gets chills. Temps are around 100. H feels good in the mornings, but after supper he just feels off. He also feels like he has a cough, like a slight irritation his wind pipe. No productive cough. He stopped the Methotrexate as it was not helping.     Interval history January 25, 2024:  He has been off the Orencia for a couple of weeks, and has not noticed any difference in his joints. He has increased swelling in his fingers. He did break 2 bones and cracked some ribs at the end of October from falling off a step ladder.     No history of MI or stroke, no history blood clots. No history of diverticulitis.     Interval history October 12, 2023:  He states that his left hand has been swollen for the last couple of weeks. The trouble he was having with the right hand has improved. He is tolerating the Orencia, but he is not sure that it is working. He continues on the Methotrexate weekly (on Thursdays).     Interval history June 26, 2023:  He states that his fingers are moving on the right hand. He has had more pain and swelling. He has continued on the Methotrexate.  He prefers to avoid prednisone as it affects his voice and he is a hernandez.    HPI from consult:   He reports that his joints are doing pretty  well. He does have decreased strength in his thumbs. He has been noticing his fingers moving as well towards his pinky. He has never been higher than 15mg weekly on the Methotrexate. He is tolerating it well. He states that initially Humira was significantly helpful. He states that within days of starting he had significant improvement. He then had his knee replaced, and it quit working after that. He does have about 1 hour or less of morning stiffness. He does do some exercises on his hands when he wakes up. 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     No frequent infections. No shortness of breath or chest pain.              Review of Systems:     Constitutional: negative  Skin: negative  Eyes: negative  Ears/Nose/Throat: negative  Respiratory: No shortness of breath, dyspnea on exertion, cough, or hemoptysis  Cardiovascular: negative  Gastrointestinal: negative  Genitourinary: negative  Musculoskeletal: as above  Neurologic: negative  Psychiatric: negative  Hematologic/Lymphatic/Immunologic: negative  Endocrine: negative         Active Problem List:     Patient Active Problem List    Diagnosis Date Noted    Status post revision of total knee, right 06/03/2021     Priority: Medium    Status post total knee replacement, right 03/04/2021     Priority: Medium    Medication monitoring encounter 03/01/2021     Priority: Medium    Immunosuppression (H) 02/10/2021     Priority: Medium    Psoriatic arthritis (H) 07/15/2019     Priority: Medium    Diastasis recti 06/15/2016     Priority: Medium    Family hx of prostate cancer 06/07/2016     Priority: Medium    Benign prostatic hyperplasia 09/08/2014     Priority: Medium    Hyperlipidemia LDL goal <100 02/05/2014     Priority: Medium    Psoriasis 11/23/2013     Priority: Medium    Erectile dysfunction 05/06/2013     Priority: Medium            Past Medical History:     Past Medical History:   Diagnosis Date    Benign prostatic  hyperplasia 2014    Diastasis recti 06/15/2016    Elevated blood pressure reading without diagnosis of hypertension 2013    Erectile dysfunction 2013    Family hx of prostate cancer 2016    Hyperlipidemia LDL goal <100 2014    Psoriasis 2013    Psoriatic arthritis (H)      Past Surgical History:   Procedure Laterality Date    ARTHROPLASTY KNEE Right 3/4/2021    Procedure: RIGHT TOTAL KNEE ARTHROPLASTY;  Surgeon: Jose C Westbrook MD;  Location: [facility] OR    ARTHROPLASTY REVISION KNEE Right 6/3/2021    Procedure: RIGHT KNEE POLYETHYLENE EXCHANGE;  Surgeon: Jose C Westbrook MD;  Location: [facility] OR    BACK SURGERY      Hx Vitrectomy/Membrane Peel/Laser/Air Right 2018    Dr. Medrano / Dr. Mendiola      ORTHOPEDIC SURGERY  2015    left wrist    ORTHOPEDIC SURGERY      spine sugery-with disc fragment removed    ORTHOPEDIC SURGERY      right knee arthroscopy    SEPTOPLASTY              Social History:     Social History     Socioeconomic History    Marital status: Single     Spouse name: Not on file    Number of children: Not on file    Years of education: Not on file    Highest education level: Not on file   Occupational History    Not on file   Tobacco Use    Smoking status: Former     Current packs/day: 0.00     Types: Cigarettes     Quit date: 1973     Years since quittin.6    Smokeless tobacco: Never   Substance and Sexual Activity    Alcohol use: Yes     Comment: every night -     Drug use: No    Sexual activity: Not Currently   Other Topics Concern    Parent/sibling w/ CABG, MI or angioplasty before 65F 55M? No   Social History Narrative    Not on file     Social Drivers of Health     Financial Resource Strain: Low Risk  (11/15/2023)    Financial Resource Strain     Within the past 12 months, have you or your family members you live with been unable to get utilities (heat, electricity) when it was really needed?: No   Food Insecurity: Low Risk   (11/15/2023)    Food Insecurity     Within the past 12 months, did you worry that your food would run out before you got money to buy more?: No     Within the past 12 months, did the food you bought just not last and you didn t have money to get more?: No   Transportation Needs: Low Risk  (11/15/2023)    Transportation Needs     Within the past 12 months, has lack of transportation kept you from medical appointments, getting your medicines, non-medical meetings or appointments, work, or from getting things that you need?: No   Physical Activity: Not on file   Stress: Not on file   Social Connections: Not on file   Interpersonal Safety: Low Risk  (10/27/2023)    Interpersonal Safety     Do you feel physically and emotionally safe where you currently live?: Yes     Within the past 12 months, have you been hit, slapped, kicked or otherwise physically hurt by someone?: No     Within the past 12 months, have you been humiliated or emotionally abused in other ways by your partner or ex-partner?: No   Housing Stability: Low Risk  (11/15/2023)    Housing Stability     Do you have housing? : Yes     Are you worried about losing your housing?: No          Family History:     Family History   Problem Relation Age of Onset    Hypertension Mother     Prostate Cancer Father 72    Breast Cancer Sister     Thyroid Cancer Brother     HIV/AIDS Brother             Allergies:     Allergies   Allergen Reactions    Levofloxacin      Other reaction(s): *Unknown            Medications:     Current Outpatient Medications   Medication Sig Dispense Refill    atorvastatin (LIPITOR) 40 MG tablet TAKE 1 TABLET BY MOUTH EVERY DAY 90 tablet 3    lisinopril (ZESTRIL) 5 MG tablet TAKE 1 TABLET BY MOUTH EVERY DAY 90 tablet 1    sildenafil (REVATIO) 20 MG tablet Take 1 tablet (20 mg) by mouth daily as needed 20 tablet 1    tamsulosin (FLOMAX) 0.4 MG capsule TAKE 1 CAPSULE BY MOUTH EVERY DAY 90 capsule 3    upadacitinib ER (RINVOQ) 15 MG tablet Take 1  tablet (15 mg) by mouth daily 30 tablet 2            Physical Exam:   Blood pressure (!) 146/74, pulse 84, weight 83.9 kg (185 lb).  Wt Readings from Last 6 Encounters:   24 83.9 kg (185 lb)   24 81.5 kg (179 lb 9.6 oz)   24 79.4 kg (175 lb 1.6 oz)   24 78.9 kg (174 lb)   23 76.5 kg (168 lb 9.6 oz)   10/27/23 78.2 kg (172 lb 8 oz)     Constitutional: well-developed, appearing stated age; cooperative  Eyes: nl conjunctiva, sclera  ENT: nl external ears, nose, hearing, lips  Neck: no mass or thyroid enlargement  Resp: No shortness of breath with normal conversation  Skin: Significant psoriatic plaques over his bilateral elbows and base of scalp.  Psych: nl judgement, orientation, memory, affect.           Data:   Imagin2019 x-ray bilateral hands  Impression:   1. Scattered degenerative changes, severe in the bilateral first  carpometacarpal joints. Additionally there are possible erosive  changes at this location suggestive of erosive osteoarthritis.  2. Bilateral narrowing of first carpometacarpal joints with prominent  osteophytes as described. Differential consideration includes CPPD and Hemochromatosis.    2019 x-ray bilateral foot  Impression: No definite erosions.     Laboratory:   hepatitis B core antibody nonreactive, hepatitis C antibody nonreactive    10/11/2022  Creatinine 0.83, GFR greater than 90  Albumin 4.6  ALT 36, AST 37  TSH 1.33, T4 1.29  With blood cell count 5.4, hemoglobin 15.5, platelet count 190  QuantiFERON TB Gold negative    2023   TB negative  Hep B negative  Hep C negative    2023  Creatinine 0.79, GFR greater than 90  Albumin 4.4  ALT 22, AST 28  CRP 3.61  White blood cell count 5.2, hemoglobin 14.1, platelet count 188  Sed rate    2024  Creatinine 0.85, GFR greater than 90  Albumin 4.2  ALT 42, AST 33  CRP less than 3.00  White blood cell count 4.5, hemoglobin 14.4, platelet count 181  Sed rate 8    2024  Creatinine  0.96, GFR 82  Albumin 4.3  ALT 35, AST 32  CRP <3.00  Cholesterol is 169, HDL is 34 and LDL is 106  White blood cell count is 5.4, hemoglobin 15.1, platelet count 179  Sed rate 13

## 2024-12-05 NOTE — PATIENT INSTRUCTIONS
After Visit Instructions:     Thank you for coming to [facility] for your care. It is my goal to partner with you to help you reach your optimal state of health.       Plan:     Schedule follow-up with Karma Richardson PA-C in 6 months.   Labs: CBC, creatinine, Albumin, AST, ALT, CRP and Sed Rate  next week, will check a cholesterol and Hgb A1c and glucose as well. Then they'll be due again in March 2025.   Imaging: xray feet  Medication recommendations:   Continue Rinvoq 15mg: Take 1 tablet daily or every other day    Karma Richardson PA-C  [facility]  [facility]    Contact information: [facility]  Clinic Number:  [phone]  Please call or send a [employer] message with any questions about your care

## 2024-12-10 ENCOUNTER — LAB (OUTPATIENT)
Dept: LAB | Facility: CLINIC | Age: 74
End: 2024-12-10
Payer: COMMERCIAL

## 2024-12-10 DIAGNOSIS — L40.50 PSORIATIC ARTHRITIS (H): ICD-10-CM

## 2024-12-10 DIAGNOSIS — Z79.899 HIGH RISK MEDICATION USE: ICD-10-CM

## 2024-12-10 DIAGNOSIS — L40.9 PSORIASIS: ICD-10-CM

## 2024-12-10 LAB
ALBUMIN SERPL BCG-MCNC: 4.3 G/DL (ref 3.5–5.2)
ALT SERPL W P-5'-P-CCNC: 34 U/L (ref 0–70)
AST SERPL W P-5'-P-CCNC: 36 U/L (ref 0–45)
BASOPHILS # BLD AUTO: 0 10E3/UL (ref 0–0.2)
BASOPHILS NFR BLD AUTO: 0 %
CHOLEST SERPL-MCNC: 166 MG/DL
CREAT SERPL-MCNC: 1.04 MG/DL (ref 0.67–1.17)
CRP SERPL-MCNC: <3 MG/L
EGFRCR SERPLBLD CKD-EPI 2021: 74 ML/MIN/1.73M2
EOSINOPHIL # BLD AUTO: 0 10E3/UL (ref 0–0.7)
EOSINOPHIL NFR BLD AUTO: 1 %
ERYTHROCYTE [DISTWIDTH] IN BLOOD BY AUTOMATED COUNT: 12.5 % (ref 10–15)
ERYTHROCYTE [SEDIMENTATION RATE] IN BLOOD BY WESTERGREN METHOD: 8 MM/HR (ref 0–20)
EST. AVERAGE GLUCOSE BLD GHB EST-MCNC: 105 MG/DL
FASTING STATUS PATIENT QL REPORTED: YES
FASTING STATUS PATIENT QL REPORTED: YES
GLUCOSE SERPL-MCNC: 100 MG/DL (ref 70–99)
HBA1C MFR BLD: 5.3 % (ref 0–5.6)
HCT VFR BLD AUTO: 41 % (ref 40–53)
HDLC SERPL-MCNC: 37 MG/DL
HGB BLD-MCNC: 14.2 G/DL (ref 13.3–17.7)
IMM GRANULOCYTES # BLD: 0 10E3/UL
IMM GRANULOCYTES NFR BLD: 0 %
LDLC SERPL CALC-MCNC: 79 MG/DL
LYMPHOCYTES # BLD AUTO: 1.1 10E3/UL (ref 0.8–5.3)
LYMPHOCYTES NFR BLD AUTO: 27 %
MCH RBC QN AUTO: 31.2 PG (ref 26.5–33)
MCHC RBC AUTO-ENTMCNC: 34.6 G/DL (ref 31.5–36.5)
MCV RBC AUTO: 90 FL (ref 78–100)
MONOCYTES # BLD AUTO: 0.3 10E3/UL (ref 0–1.3)
MONOCYTES NFR BLD AUTO: 8 %
NEUTROPHILS # BLD AUTO: 2.6 10E3/UL (ref 1.6–8.3)
NEUTROPHILS NFR BLD AUTO: 65 %
NONHDLC SERPL-MCNC: 129 MG/DL
PLATELET # BLD AUTO: 159 10E3/UL (ref 150–450)
RBC # BLD AUTO: 4.55 10E6/UL (ref 4.4–5.9)
TRIGL SERPL-MCNC: 250 MG/DL
WBC # BLD AUTO: 4.1 10E3/UL (ref 4–11)

## 2024-12-10 PROCEDURE — 36415 COLL VENOUS BLD VENIPUNCTURE: CPT

## 2024-12-10 PROCEDURE — 83036 HEMOGLOBIN GLYCOSYLATED A1C: CPT

## 2024-12-10 PROCEDURE — 85652 RBC SED RATE AUTOMATED: CPT

## 2024-12-10 PROCEDURE — 86140 C-REACTIVE PROTEIN: CPT

## 2024-12-10 PROCEDURE — 82947 ASSAY GLUCOSE BLOOD QUANT: CPT

## 2024-12-10 PROCEDURE — 84450 TRANSFERASE (AST) (SGOT): CPT

## 2024-12-10 PROCEDURE — 84460 ALANINE AMINO (ALT) (SGPT): CPT

## 2024-12-10 PROCEDURE — 82040 ASSAY OF SERUM ALBUMIN: CPT

## 2024-12-10 PROCEDURE — 80061 LIPID PANEL: CPT

## 2024-12-10 PROCEDURE — 82565 ASSAY OF CREATININE: CPT

## 2024-12-10 PROCEDURE — 85025 COMPLETE CBC W/AUTO DIFF WBC: CPT

## 2024-12-19 ENCOUNTER — MYC REFILL (OUTPATIENT)
Dept: FAMILY MEDICINE | Facility: CLINIC | Age: 74
End: 2024-12-19
Payer: COMMERCIAL

## 2024-12-19 DIAGNOSIS — I10 HYPERTENSION, UNSPECIFIED TYPE: ICD-10-CM

## 2024-12-19 RX ORDER — LISINOPRIL 5 MG/1
5 TABLET ORAL DAILY
Qty: 90 TABLET | Refills: 1 | OUTPATIENT
Start: 2024-12-19

## 2024-12-19 RX ORDER — LISINOPRIL 5 MG/1
5 TABLET ORAL DAILY
Qty: 90 TABLET | Refills: 3 | Status: SHIPPED | OUTPATIENT
Start: 2024-12-19

## 2025-01-02 ENCOUNTER — TELEPHONE (OUTPATIENT)
Dept: RHEUMATOLOGY | Facility: CLINIC | Age: 75
End: 2025-01-02
Payer: COMMERCIAL

## 2025-01-22 ENCOUNTER — TELEPHONE (OUTPATIENT)
Dept: FAMILY MEDICINE | Facility: CLINIC | Age: 75
End: 2025-01-22
Payer: COMMERCIAL

## 2025-01-22 ENCOUNTER — MYC MEDICAL ADVICE (OUTPATIENT)
Dept: FAMILY MEDICINE | Facility: CLINIC | Age: 75
End: 2025-01-22
Payer: COMMERCIAL

## 2025-01-22 NOTE — TELEPHONE ENCOUNTER
Patient Quality Outreach    Patient is due for the following:   Physical Annual Wellness Visit    Action(s) Taken:   Schedule a Annual Wellness Visit    Type of outreach:    Sent CloudTags message.    Questions for provider review:    None           Lynda De Guzman MA

## 2025-01-23 NOTE — TELEPHONE ENCOUNTER
Acknowledged.  Patient has declined to schedule AMW at this time.    Nitin Myers RN  [facility]

## 2025-02-19 ENCOUNTER — TELEPHONE (OUTPATIENT)
Dept: DERMATOLOGY | Facility: CLINIC | Age: 75
End: 2025-02-19
Payer: COMMERCIAL

## 2025-02-19 NOTE — TELEPHONE ENCOUNTER
2/19 Patient confirmed scheduled appointment:  Date: 2/27/2025  Time: 1:00pm  Visit type: Return Dermatology  Provider: Melinda   Location: [facility]  Testing/imaging: na  Additional notes: scheduled with Melinda per pt request of next available appt

## 2025-02-27 ENCOUNTER — OFFICE VISIT (OUTPATIENT)
Dept: DERMATOLOGY | Facility: CLINIC | Age: 75
End: 2025-02-27
Attending: DERMATOLOGY
Payer: COMMERCIAL

## 2025-02-27 DIAGNOSIS — L40.9 PSORIASIS: Primary | ICD-10-CM

## 2025-02-27 RX ORDER — CLOBETASOL PROPIONATE 0.5 MG/G
OINTMENT TOPICAL
Qty: 60 G | Refills: 3 | Status: SHIPPED | OUTPATIENT
Start: 2025-02-27

## 2025-02-27 RX ORDER — CLOBETASOL PROPIONATE 0.5 MG/ML
SOLUTION TOPICAL
Qty: 50 ML | Refills: 3 | Status: SHIPPED | OUTPATIENT
Start: 2025-02-27

## 2025-02-27 ASSESSMENT — PAIN SCALES - GENERAL: PAINLEVEL_OUTOF10: NO PAIN (0)

## 2025-02-27 NOTE — NURSING NOTE
Dermatology Rooming Note    Dale Cuevas's goals for this visit include:   Chief Complaint   Patient presents with    Psoriasis     Patient presents today for evaluation of his psoriasis. Patient states that his Psoriasis has been getting steadily worse      Jose Antonio Falcon, GEORGE  Clinic Support  [facility]     [phone]    Employed by [facility] [employer]

## 2025-02-27 NOTE — LETTER
2/27/2025       RE: Dale Cuevas  [address] [address]     Dear Colleague,    Thank you for referring your patient, Dale Cuevas, to the [facility] DERMATOLOGY CLINIC [place] at [facility]. Please see a copy of my visit note below.    [facility] Dermatology Note  Encounter Date: Feb 27, 2025  Office Visit     Dermatology Problem List:  Plaque psoriasis with psoriatic arthritis  - BSA 10-15%   - Current tx: Rinvoq (1/2024~ )  for PsA managed by rheumatology, mometasone PRN for elbows 15mg daily to 15mg every other day   - Previous therapy: Humira (initially effective), Celebrex (not effective), Prednisone (helpful but has side effects: increased appetite, changes to his voice), Enbrel (not effective, difficult to get refilled), Orencia (not effective), Methotrexate (not effective)   2. Seborrheic dermatitis   - Ketoconazole shampoo     Assessment & Plan:     # Plaque psoriasis with psoriatic arthritis on upadacitinib (Chronic flaring) (Continuing focal point for medical care services related to serious/complex condition)  -BSA 10-15%.  Limited plaque psoriasis on bilateral elbows and minimal activity on scalp/posterior neck. Had been on rinvoq 15mg daily. Resolved joint/skin symptoms. Had some GI upset he related to rinvoq and scaled back to 15mg every other day. Joints remain well controlled. Skin has been flaring with psoriatic plaques despite mometasone. Would like to trial other topicals and not increase rinvoq to daily  -clobetasol ointment BID PRN  -clobetasol solution BID PRN  - consider tazorac if fails  - consider oral naltrexone/otezla etc as add ons     #Seborrheic dermatitis  Improved. Has more psoriatic plaques  -clobetasol soln bid prn  -Ketoconazole shampoo 2% daily or every other day       Procedures Performed:   none    Follow-up: 3 month(s) in-person, or earlier for new or changing lesions  Reach out  via DoorDash in 6 weeks.     Staff and Resident:     Ant Orellana MD  Internal Medicine-Dermatology PGY-3    Staff: grant sorensen     ____________________________________________    CC: Psoriasis (Patient presents today for evaluation of his psoriasis. Patient states that his Psoriasis has been getting steadily worse )    HPI:  Mr. Dale Cuevas is a(n) 74 year old male who presents today as a return patient for psoriasis and PSA.    Last seen by Nam 9/2024. At taht time, doing well on rinvoq. Was also given keto shampoo for seb derm.    Today, he reports he pulled back on his rinvoq to 15mg every other day. This was based on his decision, not rheum's. He notes he had some diarrhea that resolved, but he is unsure if this was related to the rinvoq or not. His joints have been well controlled- denies pain in fingers, axial/back. However, his skin has flared. He uses mometasone daily.    He uses ketoconazole shampoo. This helped the dandruff but not the plaques    Patient is otherwise feeling well, without additional skin concerns.    Labs Reviewed:  N/A    Physical Exam:  Vitals: There were no vitals taken for this visit.  SKIN: face scalp elbows legs  -psoriatic scaly plaques on elbows, knees, posterior scalp/ post auric scalp and scattered smaller papules and plaques     - No other lesions of concern on areas examined.     Medications:  Current Outpatient Medications   Medication Sig Dispense Refill     atorvastatin (LIPITOR) 40 MG tablet TAKE 1 TABLET BY MOUTH EVERY DAY 90 tablet 3     lisinopril (ZESTRIL) 5 MG tablet TAKE 1 TABLET BY MOUTH EVERY DAY 90 tablet 3     sildenafil (REVATIO) 20 MG tablet Take 1 tablet (20 mg) by mouth daily as needed 20 tablet 1     tamsulosin (FLOMAX) 0.4 MG capsule TAKE 1 CAPSULE BY MOUTH EVERY DAY 90 capsule 3     upadacitinib ER (RINVOQ) 15 MG tablet Take 1 tablet (15 mg) by mouth daily 30 tablet 2     No current facility-administered medications for this visit.      Past Medical  History:   Patient Active Problem List   Diagnosis     Erectile dysfunction     Psoriasis     Hyperlipidemia LDL goal <100     Benign prostatic hyperplasia     Family hx of prostate cancer     Diastasis recti     Psoriatic arthritis (H)     Immunosuppression     Medication monitoring encounter     Status post total knee replacement, right     Status post revision of total knee, right     Past Medical History:   Diagnosis Date     Benign prostatic hyperplasia 09/08/2014     Diastasis recti 06/15/2016     Elevated blood pressure reading without diagnosis of hypertension 05/06/2013     Erectile dysfunction 05/06/2013     Family hx of prostate cancer 06/07/2016     Hyperlipidemia LDL goal <100 02/05/2014     Psoriasis 11/23/2013     Psoriatic arthritis (H)        CC Car Browning MD  [address]  [place] [address] on close of this encounter.      Attestation with edits by Walter Lawrence MD at 2/27/2025  2:25 PM:  I have personally examined this patient and agree with the resident doctor's documentation and plan of care. I have reviewed and amended the resident's note. The documentation accurately reflects my clinical observations, diagnoses, treatment and follow-up plans.     Walter Lawrence MD  Dermatology Staff       Again, thank you for allowing me to participate in the care of your patient.      Sincerely,    Walter Lawrence MD

## 2025-02-27 NOTE — PROGRESS NOTES
[facility] Dermatology Note  Encounter Date: Feb 27, 2025  Office Visit     Dermatology Problem List:  Plaque psoriasis with psoriatic arthritis  - BSA 10-15%   - Current tx: Rinvoq (1/2024~ )  for PsA managed by rheumatology, mometasone PRN for elbows 15mg daily to 15mg every other day   - Previous therapy: Humira (initially effective), Celebrex (not effective), Prednisone (helpful but has side effects: increased appetite, changes to his voice), Enbrel (not effective, difficult to get refilled), Orencia (not effective), Methotrexate (not effective)   2. Seborrheic dermatitis   - Ketoconazole shampoo     Assessment & Plan:     # Plaque psoriasis with psoriatic arthritis on upadacitinib (Chronic flaring) (Continuing focal point for medical care services related to serious/complex condition)  -BSA 10-15%.  Limited plaque psoriasis on bilateral elbows and minimal activity on scalp/posterior neck. Had been on rinvoq 15mg daily. Resolved joint/skin symptoms. Had some GI upset he related to rinvoq and scaled back to 15mg every other day. Joints remain well controlled. Skin has been flaring with psoriatic plaques despite mometasone. Would like to trial other topicals and not increase rinvoq to daily  -clobetasol ointment BID PRN  -clobetasol solution BID PRN  - consider tazorac if fails  - consider oral naltrexone/otezla etc as add ons     #Seborrheic dermatitis  Improved. Has more psoriatic plaques  -clobetasol soln bid prn  -Ketoconazole shampoo 2% daily or every other day       Procedures Performed:   none    Follow-up: 3 month(s) in-person, or earlier for new or changing lesions  Reach out via [employer] in 6 weeks.     Staff and Resident:     Ant Orellana MD  Internal Medicine-Dermatology PGY-3    Staff: grant sorensen     ____________________________________________    CC: Psoriasis (Patient presents today for evaluation of his psoriasis. Patient states that his Psoriasis has been getting steadily worse  )    HPI:  Mr. Dale Cuevas is a(n) 74 year old male who presents today as a return patient for psoriasis and PSA.    Last seen by Nam 9/2024. At taht time, doing well on rinvoq. Was also given keto shampoo for seb derm.    Today, he reports he pulled back on his rinvoq to 15mg every other day. This was based on his decision, not rheum's. He notes he had some diarrhea that resolved, but he is unsure if this was related to the rinvoq or not. His joints have been well controlled- denies pain in fingers, axial/back. However, his skin has flared. He uses mometasone daily.    He uses ketoconazole shampoo. This helped the dandruff but not the plaques    Patient is otherwise feeling well, without additional skin concerns.    Labs Reviewed:  N/A    Physical Exam:  Vitals: There were no vitals taken for this visit.  SKIN: face scalp elbows legs  -psoriatic scaly plaques on elbows, knees, posterior scalp/ post auric scalp and scattered smaller papules and plaques     - No other lesions of concern on areas examined.     Medications:  Current Outpatient Medications   Medication Sig Dispense Refill    atorvastatin (LIPITOR) 40 MG tablet TAKE 1 TABLET BY MOUTH EVERY DAY 90 tablet 3    lisinopril (ZESTRIL) 5 MG tablet TAKE 1 TABLET BY MOUTH EVERY DAY 90 tablet 3    sildenafil (REVATIO) 20 MG tablet Take 1 tablet (20 mg) by mouth daily as needed 20 tablet 1    tamsulosin (FLOMAX) 0.4 MG capsule TAKE 1 CAPSULE BY MOUTH EVERY DAY 90 capsule 3    upadacitinib ER (RINVOQ) 15 MG tablet Take 1 tablet (15 mg) by mouth daily 30 tablet 2     No current facility-administered medications for this visit.      Past Medical History:   Patient Active Problem List   Diagnosis    Erectile dysfunction    Psoriasis    Hyperlipidemia LDL goal <100    Benign prostatic hyperplasia    Family hx of prostate cancer    Diastasis recti    Psoriatic arthritis (H)    Immunosuppression    Medication monitoring encounter    Status post total knee replacement,  right    Status post revision of total knee, right     Past Medical History:   Diagnosis Date    Benign prostatic hyperplasia 09/08/2014    Diastasis recti 06/15/2016    Elevated blood pressure reading without diagnosis of hypertension 05/06/2013    Erectile dysfunction 05/06/2013    Family hx of prostate cancer 06/07/2016    Hyperlipidemia LDL goal <100 02/05/2014    Psoriasis 11/23/2013    Psoriatic arthritis (H)        CC Car Browning MD  [address]  [place] [address] on close of this encounter.

## 2025-03-05 ENCOUNTER — MYC MEDICAL ADVICE (OUTPATIENT)
Dept: FAMILY MEDICINE | Facility: CLINIC | Age: 75
End: 2025-03-05

## 2025-03-22 ENCOUNTER — HEALTH MAINTENANCE LETTER (OUTPATIENT)
Age: 75
End: 2025-03-22

## 2025-04-12 ENCOUNTER — HEALTH MAINTENANCE LETTER (OUTPATIENT)
Age: 75
End: 2025-04-12

## 2025-04-12 ENCOUNTER — MYC REFILL (OUTPATIENT)
Dept: RHEUMATOLOGY | Facility: CLINIC | Age: 75
End: 2025-04-12
Payer: COMMERCIAL

## 2025-04-12 DIAGNOSIS — M06.9 RHEUMATOID ARTHRITIS INVOLVING MULTIPLE JOINTS (H): ICD-10-CM

## 2025-04-12 DIAGNOSIS — Z79.899 HIGH RISK MEDICATION USE: ICD-10-CM

## 2025-04-12 DIAGNOSIS — L40.50 PSORIATIC ARTHRITIS (H): ICD-10-CM

## 2025-04-12 RX ORDER — UPADACITINIB 15 MG/1
15 TABLET, EXTENDED RELEASE ORAL DAILY
Qty: 30 TABLET | Refills: 2 | Status: CANCELLED | OUTPATIENT
Start: 2025-04-12

## 2025-04-23 NOTE — TELEPHONE ENCOUNTER
Refill request for RINVOQ    Last Office/video Visit: 12/5/2024  Next office Visit Due: 6/2025  Next Scheduled: 6/5/2025    Labs Date: 12/10/2024  Labs Due: 3/2025    ++Called the patient: Pt declined my call through his [employer] assistant. I tried to leave a message with the AI. ++     Plan:      Schedule follow-up with Karma Richardson PA-C in 6 months.   Labs: CBC, creatinine, Albumin, AST, ALT, CRP and Sed Rate  next week, will check a cholesterol and Hgb A1c and glucose as well. Then they'll be due again in March 2025.   Imaging: xray feet  Medication recommendations:             Continue Rinvoq 15mg: Take 1 tablet daily or every other day

## 2025-05-19 NOTE — TELEPHONE ENCOUNTER
Patient Quality Outreach    Patient is due for the following:   Physical Annual Wellness Visit      Topic Date Due    Diptheria Tetanus Pertussis (DTAP/TDAP/TD) Vaccine (2 - Td or Tdap) 11/23/2023    COVID-19 Vaccine (5 - 2024-25 season) 04/14/2025       Action(s) Taken:   Schedule a Annual Wellness Visit    Type of outreach:    Sent [employer] message.    Questions for provider review:    None         Zoë Samaniego MA  Chart routed to None.

## 2025-05-20 ENCOUNTER — OFFICE VISIT (OUTPATIENT)
Dept: DERMATOLOGY | Facility: CLINIC | Age: 75
End: 2025-05-20
Payer: COMMERCIAL

## 2025-05-20 DIAGNOSIS — L40.9 PSORIASIS: ICD-10-CM

## 2025-05-20 DIAGNOSIS — L40.50 PSORIATIC ARTHRITIS (H): ICD-10-CM

## 2025-05-20 DIAGNOSIS — L40.9 PSORIASIS: Primary | ICD-10-CM

## 2025-05-20 ASSESSMENT — PAIN SCALES - GENERAL: PAINLEVEL_OUTOF10: NO PAIN (0)

## 2025-05-20 NOTE — NURSING NOTE
Dermatology Rooming Note    Dale Cuevas's goals for this visit include:   Chief Complaint   Patient presents with    Derm Problem      Plaque psoriasis recheck; he reports minimal improvement.      Micky SALEH CMA - Dermatology

## 2025-05-20 NOTE — PATIENT INSTRUCTIONS
Use clobetasol ointment twice daily for 2 weeks, assess efficacy at that point     Meet with pharmacist about VTAMA (tapinarof)

## 2025-05-20 NOTE — LETTER
5/20/2025       RE: Dale Cuevas  [address] [id number]     Dear Colleague,    Thank you for referring your patient, Dale Cuevas, to the [facility] DERMATOLOGY CLINIC [place] at [facility]. Please see a copy of my visit note below.    [facility] Dermatology Note    Encounter Date: May 20, 2025    Dermatology Problem List:  # Plaque psoriasis with psoriatic arthritis  - BSA 10-15%   - Current tx: Rinvoq (1/2024~ )  for PsA managed by rheumatology, mometasone PRN for elbows 15mg daily to 15mg every other day   - Previous therapy: Humira (initially effective), Celebrex (not effective), Prednisone (helpful but has side effects: increased appetite, changes to his voice), Enbrel (not effective, difficult to get refilled), Orencia (not effective), Methotrexate (not effective)   # Seborrheic dermatitis   - Ketoconazole shampoo     Major PMHx  -   ______________________________________    Impression/Plan:  Dale was seen today for derm problem.    Diagnoses and all orders for this visit:    Psoriatic arthritis (H)  Psoriasis  -     Med Therapy Management Referral  -     Tapinarof 1 % CREA; Externally apply 1 Application topically daily.  -     Adult Dermatology Clinic Follow-Up Order (Blank); Future  - taking upadacitinib 15mg every other day, joints well controlled pso flaring   - using clob BID about 50% of the time  - rec committed BID application x14 days to assess effect  - submit for VTAMA, MTM consult  - consider tazorac as additional topical option, consider LDN as oral adjunctive     The longitudinal plan of care for the diagnosis(es)/condition(s) as documented were addressed during this visit. Due to the added complexity in care, I will continue to support Dale in the subsequent management and with ongoing continuity of care for psoriasis.        Other orders  -     Adult Dermatology Clinic Follow-Up Order  (Blank)          Follow-up in 3 mo.       Staff Involved:  Staff Only    Walter Lawrence MD   of Dermatology  Department of Dermatology  [facility]      CC:   Chief Complaint   Patient presents with     Derm Problem      Plaque psoriasis recheck; he reports minimal improvement since his last visit. He is on rinvoq and applies clobetasol ointment.        History of Present Illness:  Mr. Dale Cuevas is a 74 year old male who presents as a return patient.     will last last seen in February at that for plaque psoriasis and psoriatic arthritis on the [place] admitted.  Body surface area affected that time was 10 to 15% limited the elbows and scalp posterior neck.  He was completely clear and 50 mg daily but due to some GI upsets reduced to 50 mg every other day.  His joints remain well-controlled however his skin flared at that time.  He preferred to focus on topical therapy we recommended using clobetasol ointment for the body clobetasol solution for the scalp, we talked about the possibility of using Tazorac if those fail versus oral naltrexone or Otezla as mild add on oral adjunctive's.    For seborrheic dermatitis we recommended ketoconazole shampoo and clobetasol solution.    He presents today for follow-up.  He had minimal improvement since his last visit.    Labs:      Physical exam:  Vitals: There were no vitals taken for this visit.  GEN: well developed, well-nourished, in no acute distress, in a pleasant mood.     SKIN: Mcconnell phototype 1  - Focused examination of the arms and legs was performed.  - well demarcated psoriasiform plaques w/ overlying micaceous scale on elbows and LE   - No other lesions of concern on areas examined.     Past Medical History:   Past Medical History:   Diagnosis Date     Benign prostatic hyperplasia 09/08/2014     Diastasis recti 06/15/2016     Elevated blood pressure reading without diagnosis of hypertension 05/06/2013      Erectile dysfunction 05/06/2013     Family hx of prostate cancer 06/07/2016     Hyperlipidemia LDL goal <100 02/05/2014     Psoriasis 11/23/2013     Psoriatic arthritis (H)      Past Surgical History:   Procedure Laterality Date     ARTHROPLASTY KNEE Right 3/4/2021    Procedure: RIGHT TOTAL KNEE ARTHROPLASTY;  Surgeon: Jose C Westbrook MD;  Location: [facility] OR     ARTHROPLASTY REVISION KNEE Right 6/3/2021    Procedure: RIGHT KNEE POLYETHYLENE EXCHANGE;  Surgeon: Jose C Westbrook MD;  Location: [facility] OR     BACK SURGERY       Hx Vitrectomy/Membrane Peel/Laser/Air Right 08/06/2018    Dr. Medrano / Dr. Mendiola       ORTHOPEDIC SURGERY  Jan 12, 2015    left wrist     ORTHOPEDIC SURGERY  1989    spine sugery-with disc fragment removed     ORTHOPEDIC SURGERY  2014    right knee arthroscopy     SEPTOPLASTY         Social History:   reports that he quit smoking about 52 years ago. His smoking use included cigarettes. He has never used smokeless tobacco. He reports current alcohol use. He reports that he does not use drugs.    Family History:  Family History   Problem Relation Age of Onset     Hypertension Mother      Prostate Cancer Father 72     Breast Cancer Sister      Thyroid Cancer Brother      HIV/AIDS Brother        Medications:  Current Outpatient Medications   Medication Sig Dispense Refill     Tapinarof 1 % CREA Externally apply 1 Application topically daily. 60 g 4     atorvastatin (LIPITOR) 40 MG tablet TAKE 1 TABLET BY MOUTH EVERY DAY 90 tablet 3     clobetasol (TEMOVATE) 0.05 % external ointment Apply twice daily as needed for psoriasis 60 g 3     clobetasol (TEMOVATE) 0.05 % external solution Apply twice daily as needed for scalp involvement 50 mL 3     lisinopril (ZESTRIL) 5 MG tablet TAKE 1 TABLET BY MOUTH EVERY DAY 90 tablet 3     sildenafil (REVATIO) 20 MG tablet Take 1 tablet (20 mg) by mouth daily as needed 20 tablet 1     tamsulosin (FLOMAX) 0.4 MG capsule TAKE 1 CAPSULE BY MOUTH EVERY DAY 90 capsule 3      upadacitinib ER (RINVOQ) 15 MG tablet Take 1 tablet (15 mg) by mouth daily 30 tablet 2     Allergies   Allergen Reactions     Levofloxacin      Other reaction(s): *Unknown               Again, thank you for allowing me to participate in the care of your patient.      Sincerely,    Walter Lawrence MD

## 2025-05-20 NOTE — PROGRESS NOTES
[facility] Dermatology Note    Encounter Date: May 20, 2025    Dermatology Problem List:  # Plaque psoriasis with psoriatic arthritis  - BSA 10-15%   - Current tx: Rinvoq (1/2024~ )  for PsA managed by rheumatology, mometasone PRN for elbows 15mg daily to 15mg every other day   - Previous therapy: Humira (initially effective), Celebrex (not effective), Prednisone (helpful but has side effects: increased appetite, changes to his voice), Enbrel (not effective, difficult to get refilled), Orencia (not effective), Methotrexate (not effective)   # Seborrheic dermatitis   - Ketoconazole shampoo     Major PMHx  -   ______________________________________    Impression/Plan:  Dale was seen today for derm problem.    Diagnoses and all orders for this visit:    Psoriatic arthritis (H)  Psoriasis  -     Med Therapy Management Referral  -     Tapinarof 1 % CREA; Externally apply 1 Application topically daily.  -     Adult Dermatology Clinic Follow-Up Order (Blank); Future  - taking upadacitinib 15mg every other day, joints well controlled pso flaring   - using clob BID about 50% of the time  - rec committed BID application x14 days to assess effect  - submit for VTAMA, MTM consult  - consider tazorac as additional topical option, consider LDN as oral adjunctive     The longitudinal plan of care for the diagnosis(es)/condition(s) as documented were addressed during this visit. Due to the added complexity in care, I will continue to support Dale in the subsequent management and with ongoing continuity of care for psoriasis.        Other orders  -     Adult Dermatology Clinic Follow-Up Order (Blank)          Follow-up in 3 mo.       Staff Involved:  Staff Only    Walter Lawrence MD   of Dermatology  Department of Dermatology  [facility]      CC:   Chief Complaint   Patient presents with    Derm Problem      Plaque psoriasis recheck; he reports minimal  improvement since his last visit. He is on rinvoq and applies clobetasol ointment.        History of Present Illness:  Mr. Dale Cuevas is a 74 year old male who presents as a return patient.     will last last seen in February at that for plaque psoriasis and psoriatic arthritis on the [place] admitted.  Body surface area affected that time was 10 to 15% limited the elbows and scalp posterior neck.  He was completely clear and 50 mg daily but due to some GI upsets reduced to 50 mg every other day.  His joints remain well-controlled however his skin flared at that time.  He preferred to focus on topical therapy we recommended using clobetasol ointment for the body clobetasol solution for the scalp, we talked about the possibility of using Tazorac if those fail versus oral naltrexone or Otezla as mild add on oral adjunctive's.    For seborrheic dermatitis we recommended ketoconazole shampoo and clobetasol solution.    He presents today for follow-up.  He had minimal improvement since his last visit.    Labs:      Physical exam:  Vitals: There were no vitals taken for this visit.  GEN: well developed, well-nourished, in no acute distress, in a pleasant mood.     SKIN: Mcconnell phototype 1  - Focused examination of the arms and legs was performed.  - well demarcated psoriasiform plaques w/ overlying micaceous scale on elbows and LE   - No other lesions of concern on areas examined.     Past Medical History:   Past Medical History:   Diagnosis Date    Benign prostatic hyperplasia 09/08/2014    Diastasis recti 06/15/2016    Elevated blood pressure reading without diagnosis of hypertension 05/06/2013    Erectile dysfunction 05/06/2013    Family hx of prostate cancer 06/07/2016    Hyperlipidemia LDL goal <100 02/05/2014    Psoriasis 11/23/2013    Psoriatic arthritis (H)      Past Surgical History:   Procedure Laterality Date    ARTHROPLASTY KNEE Right 3/4/2021    Procedure: RIGHT TOTAL KNEE ARTHROPLASTY;  Surgeon:  Jose C Westbrook MD;  Location: [facility] OR    ARTHROPLASTY REVISION KNEE Right 6/3/2021    Procedure: RIGHT KNEE POLYETHYLENE EXCHANGE;  Surgeon: Jose C Westbrook MD;  Location:  OR    BACK SURGERY      Hx Vitrectomy/Membrane Peel/Laser/Air Right 08/06/2018    Dr. Medrano / Dr. Mendiola      ORTHOPEDIC SURGERY  Jan 12, 2015    left wrist    ORTHOPEDIC SURGERY  1989    spine sugery-with disc fragment removed    ORTHOPEDIC SURGERY  2014    right knee arthroscopy    SEPTOPLASTY         Social History:   reports that he quit smoking about 52 years ago. His smoking use included cigarettes. He has never used smokeless tobacco. He reports current alcohol use. He reports that he does not use drugs.    Family History:  Family History   Problem Relation Age of Onset    Hypertension Mother     Prostate Cancer Father 72    Breast Cancer Sister     Thyroid Cancer Brother     HIV/AIDS Brother        Medications:  Current Outpatient Medications   Medication Sig Dispense Refill    Tapinarof 1 % CREA Externally apply 1 Application topically daily. 60 g 4    atorvastatin (LIPITOR) 40 MG tablet TAKE 1 TABLET BY MOUTH EVERY DAY 90 tablet 3    clobetasol (TEMOVATE) 0.05 % external ointment Apply twice daily as needed for psoriasis 60 g 3    clobetasol (TEMOVATE) 0.05 % external solution Apply twice daily as needed for scalp involvement 50 mL 3    lisinopril (ZESTRIL) 5 MG tablet TAKE 1 TABLET BY MOUTH EVERY DAY 90 tablet 3    sildenafil (REVATIO) 20 MG tablet Take 1 tablet (20 mg) by mouth daily as needed 20 tablet 1    tamsulosin (FLOMAX) 0.4 MG capsule TAKE 1 CAPSULE BY MOUTH EVERY DAY 90 capsule 3    upadacitinib ER (RINVOQ) 15 MG tablet Take 1 tablet (15 mg) by mouth daily 30 tablet 2     Allergies   Allergen Reactions    Levofloxacin      Other reaction(s): *Unknown

## 2025-05-23 ENCOUNTER — TELEPHONE (OUTPATIENT)
Dept: DERMATOLOGY | Facility: CLINIC | Age: 75
End: 2025-05-23
Payer: COMMERCIAL

## 2025-05-23 NOTE — TELEPHONE ENCOUNTER
MTM referral from: [facility] visit (referral by provider)    MTM referral outreach attempt #2 on May 23, 2025 at 1:56 PM      Outcome: Patient not reachable after several attempts, routed to Pharmacist Team/Provider as an FYI    Use hbc for the carrier/Plan on the flowsheet      [employer] Message Sent    Araceli Agudelo CPhT  MTM

## 2025-05-27 ENCOUNTER — TELEPHONE (OUTPATIENT)
Dept: DERMATOLOGY | Facility: CLINIC | Age: 75
End: 2025-05-27
Payer: COMMERCIAL

## 2025-05-27 DIAGNOSIS — L40.9 PSORIASIS: Primary | ICD-10-CM

## 2025-05-27 RX ORDER — TAPINAROF 10 MG/1000MG
CREAM TOPICAL DAILY
Qty: 60 G | Refills: 4 | OUTPATIENT
Start: 2025-05-27

## 2025-05-27 NOTE — TELEPHONE ENCOUNTER
Prior Authorization Specialty Medication Request    Medication/Dose: Tapinarof 1 % CREA 60 g    Diagnosis and ICD code (if different than what is on RX):  Psoriasis L40.9    Insurance   Primary: [employer]  Insurance ID:  [id number]     Pharmacy Information (if different than what is on RX)  Name:  [facility] [place]  Phone:  [phone]  Fax:[phone]

## 2025-05-27 NOTE — TELEPHONE ENCOUNTER
Mi Davidson, RN  TB    5/27/25  4:05 PM  Note  Prior Authorization Specialty Medication Request     Medication/Dose: Tapinarof 1 % CREA60 g     Diagnosis and ICD code (if different than what is on RX):  Psoriasis L40.9     Insurance   Primary: [employer]  Insurance ID:  [id number]           Pharmacy Information (if different than what is on RX)  Name:  [facility] [place]  Phone:  [phone]  Fax:[phone]                    Claire Cuevas, RN to [facility]  [facility]      5/21/25  8:39 AM  Pharmacy comment: Alternative Requested:PRIOR AUTHORIZATION REQUIRED - NOT COVERED.

## 2025-05-30 NOTE — TELEPHONE ENCOUNTER
Detail Level: Detailed PRIOR AUTHORIZATION DENIED    Medication: VTAMA 1 % EX CREA    Insurance Company: [employer] - Phone [phone] Fax [phone]    Denial Date: 5/30/2025    Denial Reason(s): Patient needs to try and fail calcipotriene ointment/cream and tazarotene cream.     Appeal Information:          Quality 47: Advance Care Plan: Advance Care Planning discussed and documented; advance care plan or surrogate decision maker documented in the medical record. Name And Contact Information For Health Care Proxy: Yanci Milligan sister [phone] Quality 431: Preventive Care And Screening: Unhealthy Alcohol Use - Screening: Patient screened for unhealthy alcohol use using a single question and scores less than 2 times per year Quality 137: Melanoma: Continuity Of Care - Recall System: Patient information entered into a recall system that includes: target date for the next exam specified AND a process to follow up with patients regarding missed or unscheduled appointments Quality 224: Stage 0-Iic Melanoma: Overutilization Of Imaging Studies For Only Stage 0-Iic Melanoma: None of the following diagnostic imaging studies ordered: chest X-ray, CT, Ultrasound, MRI, PET, or nuclear medicine scans (ML) Quality 138: Melanoma: Coordination Of Care: A treatment plan was communicated to the physicians providing continuing care within one month of diagnosis outlining: diagnosis, tumor thickness and a plan for surgery or alternate care. Quality 111:Pneumonia Vaccination Status For Older Adults: Pneumococcal Vaccination Previously Received Quality 226: Preventive Care And Screening: Tobacco Use: Screening And Cessation Intervention: Patient screened for tobacco and is a smoker AND received Cessation Counseling Quality 130: Documentation Of Current Medications In The Medical Record: Current Medications Documented

## 2025-06-02 NOTE — TELEPHONE ENCOUNTER
MTM Referral outreach attempt #3 was made on 06/02/2025.    Outcome: Sent patient MyChart message explaining more in-depth what MTM is and how we can best support them. Attached ability to schedule from message, as well as offered opportunity to call me directly for help with scheduling.

## 2025-06-03 ENCOUNTER — PATIENT OUTREACH (OUTPATIENT)
Dept: CARE COORDINATION | Facility: CLINIC | Age: 75
End: 2025-06-03
Payer: COMMERCIAL

## 2025-06-10 ENCOUNTER — LAB (OUTPATIENT)
Dept: LAB | Facility: CLINIC | Age: 75
End: 2025-06-10
Payer: COMMERCIAL

## 2025-06-10 DIAGNOSIS — Z79.899 HIGH RISK MEDICATION USE: ICD-10-CM

## 2025-06-10 DIAGNOSIS — L40.9 PSORIASIS: ICD-10-CM

## 2025-06-10 DIAGNOSIS — L40.50 PSORIATIC ARTHRITIS (H): ICD-10-CM

## 2025-06-10 LAB
ALBUMIN SERPL BCG-MCNC: 4.5 G/DL (ref 3.5–5.2)
ALT SERPL W P-5'-P-CCNC: 31 U/L (ref 0–70)
AST SERPL W P-5'-P-CCNC: 33 U/L (ref 0–45)
BASOPHILS # BLD AUTO: 0 10E3/UL (ref 0–0.2)
BASOPHILS NFR BLD AUTO: 0 %
CREAT SERPL-MCNC: 0.96 MG/DL (ref 0.67–1.17)
CRP SERPL-MCNC: <3 MG/L
EGFRCR SERPLBLD CKD-EPI 2021: 82 ML/MIN/1.73M2
EOSINOPHIL # BLD AUTO: 0.1 10E3/UL (ref 0–0.7)
EOSINOPHIL NFR BLD AUTO: 1 %
ERYTHROCYTE [DISTWIDTH] IN BLOOD BY AUTOMATED COUNT: 12.5 % (ref 10–15)
ERYTHROCYTE [SEDIMENTATION RATE] IN BLOOD BY WESTERGREN METHOD: 12 MM/HR (ref 0–20)
HCT VFR BLD AUTO: 42.4 % (ref 40–53)
HGB BLD-MCNC: 14.5 G/DL (ref 13.3–17.7)
IMM GRANULOCYTES # BLD: 0 10E3/UL
IMM GRANULOCYTES NFR BLD: 0 %
LYMPHOCYTES # BLD AUTO: 1.1 10E3/UL (ref 0.8–5.3)
LYMPHOCYTES NFR BLD AUTO: 23 %
MCH RBC QN AUTO: 30.9 PG (ref 26.5–33)
MCHC RBC AUTO-ENTMCNC: 34.2 G/DL (ref 31.5–36.5)
MCV RBC AUTO: 90 FL (ref 78–100)
MONOCYTES # BLD AUTO: 0.4 10E3/UL (ref 0–1.3)
MONOCYTES NFR BLD AUTO: 9 %
NEUTROPHILS # BLD AUTO: 3.2 10E3/UL (ref 1.6–8.3)
NEUTROPHILS NFR BLD AUTO: 67 %
PLATELET # BLD AUTO: 173 10E3/UL (ref 150–450)
RBC # BLD AUTO: 4.69 10E6/UL (ref 4.4–5.9)
WBC # BLD AUTO: 4.9 10E3/UL (ref 4–11)

## 2025-06-10 PROCEDURE — 84460 ALANINE AMINO (ALT) (SGPT): CPT

## 2025-06-10 PROCEDURE — 84450 TRANSFERASE (AST) (SGOT): CPT

## 2025-06-10 PROCEDURE — 86140 C-REACTIVE PROTEIN: CPT

## 2025-06-10 PROCEDURE — 36415 COLL VENOUS BLD VENIPUNCTURE: CPT

## 2025-06-10 PROCEDURE — 82040 ASSAY OF SERUM ALBUMIN: CPT

## 2025-06-10 PROCEDURE — 82565 ASSAY OF CREATININE: CPT

## 2025-06-10 PROCEDURE — 85652 RBC SED RATE AUTOMATED: CPT

## 2025-06-10 PROCEDURE — 85025 COMPLETE CBC W/AUTO DIFF WBC: CPT

## 2025-06-11 ENCOUNTER — RESULTS FOLLOW-UP (OUTPATIENT)
Dept: RHEUMATOLOGY | Facility: CLINIC | Age: 75
End: 2025-06-11

## 2025-06-12 ENCOUNTER — OFFICE VISIT (OUTPATIENT)
Dept: RHEUMATOLOGY | Facility: CLINIC | Age: 75
End: 2025-06-12
Payer: COMMERCIAL

## 2025-06-12 ENCOUNTER — TELEPHONE (OUTPATIENT)
Dept: RHEUMATOLOGY | Facility: CLINIC | Age: 75
End: 2025-06-12

## 2025-06-12 VITALS
OXYGEN SATURATION: 97 % | HEART RATE: 73 BPM | WEIGHT: 180.4 LBS | DIASTOLIC BLOOD PRESSURE: 76 MMHG | SYSTOLIC BLOOD PRESSURE: 145 MMHG | BODY MASS INDEX: 28.25 KG/M2

## 2025-06-12 DIAGNOSIS — Z79.899 HIGH RISK MEDICATION USE: ICD-10-CM

## 2025-06-12 DIAGNOSIS — M06.9 RHEUMATOID ARTHRITIS INVOLVING MULTIPLE JOINTS (H): ICD-10-CM

## 2025-06-12 DIAGNOSIS — L40.9 PSORIASIS: ICD-10-CM

## 2025-06-12 DIAGNOSIS — L40.50 PSORIATIC ARTHRITIS (H): ICD-10-CM

## 2025-06-12 DIAGNOSIS — L40.50 PSORIATIC ARTHRITIS (H): Primary | ICD-10-CM

## 2025-06-12 NOTE — PATIENT INSTRUCTIONS
After Visit Instructions:     Thank you for coming to [facility] for your care. It is my goal to partner with you to help you reach your optimal state of health.       Plan:     Schedule follow-up with Karma Richardson PA-C in 6 months.   Labs: CBC, creatinine, Albumin, AST, ALT, CRP and Sed Rate in 3 and 6 months;  will check a cholesterol in December.   Medication recommendations:   Continue Rinvoq 15mg: Take 1 tablet daily or every other day    Karma Richardson PA-C  [facility]  [facility]    Contact information: [facility]  Clinic Number:  [phone]  Please call or send a [employer] message with any questions about your care

## 2025-06-12 NOTE — PROGRESS NOTES
Eduar Stinson  : 1949 [facility] at [address] [place] [address],[address], [address] [facility]  [phone]   [phone]           OUTPATIENT PHYSICAL THERAPY:Daily Note 2017    ICD-10: Treatment Diagnosis: Pain in left knee (M25.562); Stiffness of left knee, not elsewhere classified (M25.662); Difficulty in walking, not elsewhere classified (R26.2)  Precautions/Allergies:   Review of patient's allergies indicates no known allergies. Fall Risk Score: 1 (? 5 = High Risk)  MD Orders: Evaluate and Treat MEDICAL/REFERRING DIAGNOSIS:  Presence of left artificial knee joint [Z96.652]   DATE OF ONSET: Surgery 17  REFERRING PHYSICIAN: Eben Michelle, *  RETURN PHYSICIAN APPOINTMENT: 06-15-17     INITIAL ASSESSMENT:  Ms. Barraza presents with decreased ROM/strength L knee with increased pain leading to decreased functional status. Pt would benefit from skilled physical therapy services to address the above deficits and help patient return to prior level of function. PROBLEM LIST (Impacting functional limitations):  1. Decreased Strength  2. Decreased ADL/Functional Activities  3. Increased Pain  4. Decreased Flexibility/Joint Mobility  5. Decreased [place] with Home Exercise Program INTERVENTIONS PLANNED:  1. Balance Exercise  2. Cold  3. Cryotherapy  4. Electrical Stimulation  5. Gait Training  6. Home Exercise Program (HEP)  7. Manual Therapy  8. Range of Motion (ROM)  9. Therapeutic Exercise/Strengthening  10. Aquatic Therapy   TREATMENT PLAN:  Effective Dates: 17 TO 17. Frequency/Duration: 2 times a week for 3 months  GOALS: (Goals have been discussed and agreed upon with patient.)  Short-Term Functional Goals: Time Frame: 4 weeks  1. Pt will increase ROM L knee 0-120 degrees to assist with ascending/descending stairs  2.  Pt will increase strength L knee 4+/5 to assist with ascending/descending stairs  Discharge Goals: Time Rheumatology Clinic Visit  [facility]  GERTRUDE Vilchis     Dale Cuevas MRN# [id number]   YOB: 1950 Age: 74 year old   Date of Visit: 06/12/2025  Primary care provider: Romina Perez          Assessment and Plan:     1.  Psoriatic arthritis  2.  Psoriasis  2.  High-risk medication use    Patient presents today for follow-up regarding his inflammatory arthritis.  He states that he has been continuing to do well.  No health changes since her last visit.  He has worked with dermatology and was given specific recommendations on an ointment to use on his skin and states that this has been significantly helpful.  At this time we will have him continue on the written Holly.  He is not have any joint flares.  Will continue to monitor labs every 3 months.  Cholesterol yearly, next due in December.  Follow-up with me in 6 months, sooner if needed.    The longitudinal plan of care for the diagnosis(es)/condition(s) as documented were addressed during this visit. Due to the added complexity in care, I will continue to support Dale in the subsequent management and with ongoing continuity of care.    Plan:     Schedule follow-up with Karma Richardson PA-C in 6 months.   Labs: CBC, creatinine, Albumin, AST, ALT, CRP and Sed Rate in 3 and 6 months;  will check a cholesterol in December.   Medication recommendations:   Continue Rinvoq 15mg: Take 1 tablet daily or every other day    GERTRUDE Vilchis  Rheumatology         History of Present Illness:   Dale Cuevas presents for evaluation of psoriatic arthritis.     Rheumatological history:  Provider(s): Emiliano King, DANNY MONTANA, Dr. Mead  Pertinent lab history: Elevated CRP, Positive HLA-B27, negative RF and CCP, - Hep B/C, -MTB QG  Previous medications tried: Humira (initially effective), Celebrex (not effective), Prednisone (helpful but has side effects: increased appetite, changes to his voice), Enbrel (not effective, difficult to get refilled), Orencia  (not effective), Methotrexate (not effective)  Current medications: Rinvoq    Interval history June 12, 2025:   He is doing well. No joint flares since our last visit. His psoriasis seems to be going away. He has seen dermatology and they recommended that he be consistent with taking a topical ointment every morning and night for 2 weeks and that was helpful.     Interval history December 5, 2024:  Joints are doing well. Having increased pain in a couple toes. Skin has not changed with derm's recommendations.     Interval history August 1, 2024:  He had COVID and it took awhile to recover. He does think that he is doing better now. He does feel that the Rinvoq is helping. He is taking it every other day. He did this as he was having some GI upset (diarrhea). His skin is not controlled. He does not see dermatology.     Interval history May 2, 2024:  He states that he does feel that the Rinvoq is helping. He has been able to see his knuckles again. He states that almost nightly he has felt down and gets chills. Temps are around 100. H feels good in the mornings, but after supper he just feels off. He also feels like he has a cough, like a slight irritation his wind pipe. No productive cough. He stopped the Methotrexate as it was not helping.     Interval history January 25, 2024:  He has been off the Orencia for a couple of weeks, and has not noticed any difference in his joints. He has increased swelling in his fingers. He did break 2 bones and cracked some ribs at the end of October from falling off a step ladder.     No history of MI or stroke, no history blood clots. No history of diverticulitis.     Interval history October 12, 2023:  He states that his left hand has been swollen for the last couple of weeks. The trouble he was having with the right hand has improved. He is tolerating the Orencia, but he is not sure that it is working. He continues on the Methotrexate weekly (on Thursdays).     Interval history  Frame: 12 weeks  1. Pt will increase strength L knee 5/5 to assist with ascending/descending stairs  2. Pt will be independent with HEP  3. Pt will ascend/descend 10 eight inch steps independently with no c/o L knee pain  4. Pt will work perform 20 minutes household cooking and cleaning activities independently with min to no c/o L knee pain  Rehabilitation Potential For Stated Goals: Good  Regarding Dominick Rust's therapy, I certify that the treatment plan above will be carried out by a therapist or under their direction. Thank you for this referral,  Kelli Carbajal, PT     Referring Physician Signature: Lovely Ni., *              Date                    The information in this section was collected on 06-14-17 (except where otherwise noted). HISTORY:   History of Present Injury/Illness (Reason for Referral):  Pt reports insidious onset bilateral knee pain of several years duration. She had physical therapy with continued pain so she opted for Left TKA which was performed 05-12-17. Pt spent 2 nights in the hospital followed by home health PT until last Friday. She rates her current L knee pain 3-4/10, increasing to 10/10 at times. She is taking hydromorphone for pain. Pt is walking with a straight cane. She reports difficulties ascending/descending stairs due to her L knee pain. She is planning to have her R knee replaced in November 2017. Pt is currently having difficulties with all household cooking and cleaning activities due to her L knee surgery. Past Medical History/Comorbidities:   Ms. Lyly Ayers  has a past medical history of Anxiety; Arthritis; Breast cancer ([address] (1/18/2016); Breast cancer ([address] Cancer ([address] Chronic pain; Former smoker; GERD (gastroesophageal reflux disease); Hard of hearing; Headache; Hypertension; Hypothyroidism; Insomnia; and Status post total left knee replacement (5/12/2017). She also has no past medical history of Adverse effect of anesthesia;  Aneurysm ([address] Arrhythmia; Asthma; Autoimmune disease ([address] CAD (coronary artery disease); Chronic kidney disease; Chronic obstructive pulmonary disease ([address] Coagulation disorder ([address] Diabetes ([address] Difficult intubation; Endocarditis; Heart failure ([address] Ill-defined condition; Liver disease; Malignant hyperthermia due to anesthesia; Morbid obesity ([address] Pseudocholinesterase deficiency; Psychiatric disorder; PUD (peptic ulcer disease); Rheumatic fever; Seizures ([facility] Sleep apnea; Stroke [facility]); Thromboembolus ([facility] or Unspecified adverse effect of anesthesia. Ms. [facility]  has a past surgical history that includes colonoscopy; wisdom teeth extraction; tonsillectomy (1957); vascular access (Right); breast surgery procedure unlisted (Left, 7/2015); breast biopsy (Left, 1/18/2016); mastectomy, partial (Left, 01/18/2016); and breast lumpectomy (Left, 01/2016). Social History/Living Environment:     Pt lives with her spouse (currently has colon cancer) in a one-story house  Prior Level of Function/Work/Activity:  Independent with all household ADL's  Dominant Side:         RIGHT  Other Clinical Tests:          X-rays  Previous Treatment Approaches:          Left TKA 05-12-17  Personal Factors:          Sex:  female        Age:  77 y.o. Profession:  Pt is retired        Past/Current Experience:  Pt helps care for her  who has cancer  Current Medications:       Current Outpatient Prescriptions:     HYDROcodone-acetaminophen (NORCO) 7.5-325 mg per tablet, Take  by mouth., Disp: , Rfl:     temazepam (RESTORIL) 30 mg capsule, Take 1 Cap by mouth nightly. Max Daily Amount: 30 mg., Disp: 30 Cap, Rfl: 5    acetaminophen (TYLENOL) 500 mg tablet, Take 1,000 mg by mouth every six (6) hours as needed for Pain., Disp: , Rfl:     HYDROmorphone (DILAUDID) 2 mg tablet, Take 1 Tab by mouth every four (4) hours as needed.  Max Daily Amount: 12 mg., Disp: 40 Tab, Rfl: 0    OTHER,NON-FORMULARY,, Take 4 Tabs by mouth June 26, 2023:  He states that his fingers are moving on the right hand. He has had more pain and swelling. He has continued on the Methotrexate.  He prefers to avoid prednisone as it affects his voice and he is a hernandez.    HPI from consult:   He reports that his joints are doing pretty well. He does have decreased strength in his thumbs. He has been noticing his fingers moving as well towards his pinky. He has never been higher than 15mg weekly on the Methotrexate. He is tolerating it well. He states that initially Humira was significantly helpful. He states that within days of starting he had significant improvement. He then had his knee replaced, and it quit working after that. He does have about 1 hour or less of morning stiffness. He does do some exercises on his hands when he wakes up. 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     No frequent infections. No shortness of breath or chest pain.              Review of Systems:     Constitutional: negative  Skin: negative  Eyes: negative  Ears/Nose/Throat: negative  Respiratory: No shortness of breath, dyspnea on exertion, cough, or hemoptysis  Cardiovascular: negative  Gastrointestinal: negative  Genitourinary: negative  Musculoskeletal: as above  Neurologic: negative  Psychiatric: negative  Hematologic/Lymphatic/Immunologic: negative  Endocrine: negative         Active Problem List:     Patient Active Problem List    Diagnosis Date Noted    Status post revision of total knee, right 06/03/2021     Priority: Medium    Status post total knee replacement, right 03/04/2021     Priority: Medium    Medication monitoring encounter 03/01/2021     Priority: Medium    Immunosuppression 02/10/2021     Priority: Medium    Psoriatic arthritis (H) 07/15/2019     Priority: Medium    Diastasis recti 06/15/2016     Priority: Medium    Family hx of prostate cancer 06/07/2016     Priority: Medium    Benign prostatic hyperplasia 09/08/2014      Priority: Medium    Hyperlipidemia LDL goal <100 2014     Priority: Medium    Psoriasis 2013     Priority: Medium    Erectile dysfunction 2013     Priority: Medium            Past Medical History:     Past Medical History:   Diagnosis Date    Benign prostatic hyperplasia 2014    Diastasis recti 06/15/2016    Elevated blood pressure reading without diagnosis of hypertension 2013    Erectile dysfunction 2013    Family hx of prostate cancer 2016    Hyperlipidemia LDL goal <100 2014    Psoriasis 2013    Psoriatic arthritis (H)      Past Surgical History:   Procedure Laterality Date    ARTHROPLASTY KNEE Right 3/4/2021    Procedure: RIGHT TOTAL KNEE ARTHROPLASTY;  Surgeon: Jose C Westbrook MD;  Location: [facility] OR    ARTHROPLASTY REVISION KNEE Right 6/3/2021    Procedure: RIGHT KNEE POLYETHYLENE EXCHANGE;  Surgeon: Jose C Westbrook MD;  Location: [facility] OR    BACK SURGERY      Hx Vitrectomy/Membrane Peel/Laser/Air Right 2018    Dr. Medrano / Dr. Mendiola      ORTHOPEDIC SURGERY  2015    left wrist    ORTHOPEDIC SURGERY      spine sugery-with disc fragment removed    ORTHOPEDIC SURGERY      right knee arthroscopy    SEPTOPLASTY              Social History:     Social History     Socioeconomic History    Marital status: Single     Spouse name: Not on file    Number of children: Not on file    Years of education: Not on file    Highest education level: Not on file   Occupational History    Not on file   Tobacco Use    Smoking status: Former     Current packs/day: 0.00     Types: Cigarettes     Quit date: 1973     Years since quittin.1    Smokeless tobacco: Never   Substance and Sexual Activity    Alcohol use: Yes     Comment: every night -     Drug use: No    Sexual activity: Not Currently   Other Topics Concern    Parent/sibling w/ CABG, MI or angioplasty before 65F 55M? No   Social History Narrative    Not on file     Social Drivers of  daily. Relief Factor Tablets, Disp: , Rfl:     Biotin 2,500 mcg cap, Take 2 Tabs by mouth daily. , Disp: , Rfl:     OTHER,NON-FORMULARY,, Take 1 Tab by mouth daily. Air Borne Chewable tab, Disp: , Rfl:     polycarbophil calcium (EQUALACTIN) 500 mg chew, Take 1,500 mg by mouth daily. , Disp: , Rfl:     levothyroxine (SYNTHROID) 25 mcg tablet, TAKE ONE TABLET BY MOUTH ONE TIME DAILY BEFORE BREAKFAST, Disp: 30 Tab, Rfl: 6    hydroCHLOROthiazide (HYDRODIURIL) 25 mg tablet, TAKE ONE TABLET BY MOUTH ONE TIME DAILY, Disp: 30 Tab, Rfl: 5    multivitamin (ONE A DAY) tablet, Take 1 Tab by mouth daily. , Disp: , Rfl:     esomeprazole (NEXIUM) 20 mg capsule, Take 20 mg by mouth every morning. Take / use AM day of surgery  per anesthesia protocols. Indications: GASTROESOPHAGEAL REFLUX, Disp: , Rfl:    Date Last Reviewed:  06-14-17   Number of Personal Factors/Comorbidities that affect the Plan of Care: 1-2: MODERATE COMPLEXITY   EXAMINATION:   Observation/Orthostatic Postural Assessment:          Pt ambulates with mild stiff knee gait and uses a straight cane  Palpation:          Generalized tenderness throughout L knee  ROM:    R knee extension = -10 degrees  R knee flexion = 128 degrees    L knee extension = -5 degrees  L knee flexion = 123 degrees    Strength:    R knee extension = 5/5  R knee flexion = 5/5    L knee extension = 4/5  L knee flexion = 4/5    Neurological Screen:        Sensation: Intact to light touch L LE  Functional Mobility:         Gait/Ambulation:  Pt ambulates with straight cane        Transfers:  Pt transitions sit to supine and supine to sit independently     Body Structures Involved:  1. Bones  2. Joints  3. Muscles Body Functions Affected:  1. Sensory/Pain  2. Neuromusculoskeletal Activities and Participation Affected:  1. Mobility  2. Self Care  3.  Domestic Life   Number of elements (examined above) that affect the Plan of Care: 3: MODERATE COMPLEXITY   CLINICAL PRESENTATION:   Presentation: Evolving clinical presentation with changing clinical characteristics: MODERATE COMPLEXITY   CLINICAL DECISION MAKING:   Outcome Measure: Tool Used: Lower Extremity Functional Scale (LEFS)  Score:  Initial: 53/80 Most Recent: X/80 (Date: -- )   Interpretation of Score: 20 questions each scored on a 5 point scale with 0 representing \"extreme difficulty or unable to perform\" and 4 representing \"no difficulty\". The lower the score, the greater the functional disability. 80/80 represents no disability. Minimal detectable change is 9 points. Score 80 79-63 62-48 47-32 31-16 15-1 0   Modifier CH CI CJ CK CL CM CN     ? Mobility - Walking and Moving Around:     - CURRENT STATUS: CJ - 20%-39% impaired, limited or restricted    - GOAL STATUS: CI - 1%-19% impaired, limited or restricted    - D/C STATUS:  ---------------To be determined---------------    Medical Necessity:   · Patient is expected to demonstrate progress in strength, range of motion and functional technique to increase independence with household ADL's. · Patient demonstrates good rehab potential due to higher previous functional level. Reason for Services/Other Comments:  · Patient continues to require skilled intervention due to decreased ROM/strength L knee with increased pain leading to decreased functional status. Use of outcome tool(s) and clinical judgement create a POC that gives a: Questionable prediction of patient's progress: MODERATE COMPLEXITY            TREATMENT:   (In addition to Assessment/Re-Assessment sessions the following treatments were rendered)  Pre-treatment Symptoms/Complaints:  Pt states she was unable to come to PT for a couple visits due to food poisoning. She states her knee is sore today from doing a lot of walking yesterday to going shopping. Pain: Initial:     4-5/10 Post Session:  4-5/10     THERAPEUTIC EXERCISE: (30 minutes):  Exercises per grid below to improve mobility and strength.   Required Health     Financial Resource Strain: Low Risk  (11/15/2023)    Financial Resource Strain     Within the past 12 months, have you or your family members you live with been unable to get utilities (heat, electricity) when it was really needed?: No   Food Insecurity: Low Risk  (11/15/2023)    Food Insecurity     Within the past 12 months, did you worry that your food would run out before you got money to buy more?: No     Within the past 12 months, did the food you bought just not last and you didn t have money to get more?: No   Transportation Needs: Low Risk  (11/15/2023)    Transportation Needs     Within the past 12 months, has lack of transportation kept you from medical appointments, getting your medicines, non-medical meetings or appointments, work, or from getting things that you need?: No   Physical Activity: Not on file   Stress: Not on file   Social Connections: Not on file   Interpersonal Safety: Low Risk  (10/27/2023)    Interpersonal Safety     Do you feel physically and emotionally safe where you currently live?: Yes     Within the past 12 months, have you been hit, slapped, kicked or otherwise physically hurt by someone?: No     Within the past 12 months, have you been humiliated or emotionally abused in other ways by your partner or ex-partner?: No   Housing Stability: Low Risk  (11/15/2023)    Housing Stability     Do you have housing? : Yes     Are you worried about losing your housing?: No          Family History:     Family History   Problem Relation Age of Onset    Hypertension Mother     Prostate Cancer Father 72    Breast Cancer Sister     Thyroid Cancer Brother     HIV/AIDS Brother             Allergies:     Allergies   Allergen Reactions    Levofloxacin      Other reaction(s): *Unknown            Medications:     Current Outpatient Medications   Medication Sig Dispense Refill    atorvastatin (LIPITOR) 40 MG tablet TAKE 1 TABLET BY MOUTH EVERY DAY 90 tablet 3    clobetasol (TEMOVATE) 0.05 %  external ointment Apply twice daily as needed for psoriasis 60 g 3    clobetasol (TEMOVATE) 0.05 % external solution Apply twice daily as needed for scalp involvement 50 mL 3    lisinopril (ZESTRIL) 5 MG tablet TAKE 1 TABLET BY MOUTH EVERY DAY 90 tablet 3    sildenafil (REVATIO) 20 MG tablet Take 1 tablet (20 mg) by mouth daily as needed 20 tablet 1    tamsulosin (FLOMAX) 0.4 MG capsule TAKE 1 CAPSULE BY MOUTH EVERY DAY 90 capsule 3    Tapinarof 1 % CREA Externally apply 1 Application topically daily. 60 g 4    upadacitinib ER (RINVOQ) 15 MG tablet Take 1 tablet (15 mg) by mouth daily 30 tablet 2            Physical Exam:   Blood pressure (!) 145/76, pulse 73, weight 81.8 kg (180 lb 6.4 oz), SpO2 97%.  Wt Readings from Last 6 Encounters:   25 81.8 kg (180 lb 6.4 oz)   24 83.9 kg (185 lb)   24 81.5 kg (179 lb 9.6 oz)   24 79.4 kg (175 lb 1.6 oz)   24 78.9 kg (174 lb)   23 76.5 kg (168 lb 9.6 oz)     Constitutional: well-developed, appearing stated age; cooperative  Eyes: nl conjunctiva, sclera  ENT: nl external ears, nose, hearing, lips  Neck: no mass or thyroid enlargement  Resp: No shortness of breath with normal conversation  Psych: nl judgement, orientation, memory, affect.           Data:   Imagin2019 x-ray bilateral hands  Impression:   1. Scattered degenerative changes, severe in the bilateral first  carpometacarpal joints. Additionally there are possible erosive  changes at this location suggestive of erosive osteoarthritis.  2. Bilateral narrowing of first carpometacarpal joints with prominent  osteophytes as described. Differential consideration includes CPPD and Hemochromatosis.    2019 x-ray bilateral foot  Impression: No definite erosions.     Laboratory:   hepatitis B core antibody nonreactive, hepatitis C antibody nonreactive    10/11/2022  Creatinine 0.83, GFR greater than 90  Albumin 4.6  ALT 36, AST 37  TSH 1.33, T4 1.29  With blood cell count  minimal verbal cues to promote proper body alignment and promote proper body posture. Progressed resistance, range and repetitions as indicated. MANUAL THERAPY: (10 minutes): Joint mobilization and Soft tissue mobilization was utilized and necessary because of the patient's restricted joint motion. MODALITIES: (10 minutes):      Cold pack to L knee x10 minutes to decrease pain. Skin clear afterwards. Date  07-12-17   Activity/Exercise    Quad Sets with Towel Roll Under Heel    SLR Flexion 20 reps   SAQ's  LAQ 3 Lbs  20 reps each   Heel Slides with Strap for OP 20 reps   Gastroc Stretch with Strap On Slantboard  3 reps  20 sec holds   NuStep Level 4  10 minutes   Standing Heel/Toe Raises 20 reps   Step-Ups 4 inch   20 reps   TKE with T-band Black  20 reps   Nautilus Leg Press 35 Lbs  20 reps         · Treatment/Session Assessment:   Pt tolerated treatments well today. ROM and strength improving L knee. · Response to Treatment:    · Compliance with Program/Exercises: Compliant. · Recommendations/Intent for next treatment session: \"Next visit will focus on advancements to more challenging activities\".   Total Treatment Duration: 50 minutes  PT Patient Time In/Time Out  Time In: 1300  Time Out: 628 [facility] Renee Bright PT 5.4, hemoglobin 15.5, platelet count 190  QuantiFERON TB Gold negative    6/26/2023   TB negative  Hep B negative  Hep C negative    9/25/2023  Creatinine 0.79, GFR greater than 90  Albumin 4.4  ALT 22, AST 28  CRP 3.61  White blood cell count 5.2, hemoglobin 14.1, platelet count 188  Sed rate    1/24/2024  Creatinine 0.85, GFR greater than 90  Albumin 4.2  ALT 42, AST 33  CRP less than 3.00  White blood cell count 4.5, hemoglobin 14.4, platelet count 181  Sed rate 8    7/31/2024  Creatinine 0.96, GFR 82  Albumin 4.3  ALT 35, AST 32  CRP <3.00  Cholesterol is 169, HDL is 34 and LDL is 106  White blood cell count is 5.4, hemoglobin 15.1, platelet count 179  Sed rate 13    6/10/2025  Creatinine 0.96, GFR 82  Albumin 4.5  ALT 31, AST 33  CRP less than 3.0  White blood cell count 4.9, hemoglobin 14.5, platelet count 173  Sed rate 12

## 2025-06-12 NOTE — TELEPHONE ENCOUNTER
PA Initiation    Medication: RINVOQ 15 MG PO TB24  Insurance Company: [employer] - Phone [phone] Fax [phone]  Pharmacy Filling the Rx:    Filling Pharmacy Phone:    Filling Pharmacy Fax:    Start Date: 6/12/2025    [id number]

## 2025-06-13 RX ORDER — UPADACITINIB 15 MG/1
15 TABLET, EXTENDED RELEASE ORAL DAILY
Qty: 30 TABLET | Refills: 5 | OUTPATIENT
Start: 2025-06-13

## 2025-06-13 NOTE — TELEPHONE ENCOUNTER
Prior Authorization Approval    Medication: RINVOQ 15 MG PO TB24  Authorization Effective Date: 6/12/2025  Authorization Expiration Date: 6/12/2026  Approved Dose/Quantity: 30 tabs per 30 days  Reference #: [id number]   Insurance Company: [employer] - Phone [phone] Fax [phone]  Expected CoPay: $ 1,885  CoPay Card Available: No    Financial Assistance Needed: Yes, enrolling in FPAP  Which Pharmacy is filling the prescription: [facility] - [place], [place] - [address] [place] [address]  Pharmacy Notified: No  Patient Notified: Yes, called and spoke with Dale. Copay is not affordable. He was previously enrolled with PAP through [employer]. Went over FPAP and benefits over  program, agreeable to changing to FPAP. Requested email be sent to him with instructions and we will send proof of income to liaison. He was agreeable to all terms of FPAP.

## 2025-06-17 ENCOUNTER — TELEPHONE (OUTPATIENT)
Dept: DERMATOLOGY | Facility: CLINIC | Age: 75
End: 2025-06-17
Payer: COMMERCIAL

## 2025-06-17 DIAGNOSIS — L40.9 PSORIASIS: ICD-10-CM

## 2025-06-17 RX ORDER — TAZAROTENE 0.5 MG/G
CREAM TOPICAL DAILY
Qty: 60 G | Refills: 3 | Status: SHIPPED | OUTPATIENT
Start: 2025-06-17

## 2025-06-17 RX ORDER — CALCIPOTRIENE 50 UG/G
CREAM TOPICAL 2 TIMES DAILY
Qty: 120 G | Refills: 3 | Status: SHIPPED | OUTPATIENT
Start: 2025-06-17

## 2025-06-17 RX ORDER — TAZAROTENE 0.5 MG/G
CREAM TOPICAL DAILY
Qty: 60 G | Refills: 3 | OUTPATIENT
Start: 2025-06-17

## 2025-06-17 NOTE — TELEPHONE ENCOUNTER
Prior Authorization Retail Medication Request    Medication/Dose: tazarotene (TAZORAC) 0.05 % external cream  Diagnosis and ICD code (if different than what is on RX):  Psoriasis L40.9    Insurance   Primary: [employer]   Insurance ID: [id number]    Pharmacy Information (if different than what is on RX)  Name:  [facility] [place]  Phone:  [phone]  Fax:[phone]

## 2025-06-18 NOTE — TELEPHONE ENCOUNTER
PA Initiation    Medication: TAZAROTENE 0.05 % EX CREA  Insurance Company: [employer] - Phone [phone] Fax [phone]  Pharm[facility]illing [id number] [place] [address]  Filling Pharmacy Phone: [phone]  Filling Pharmacy Fax: [phone]  Start Date: 6/18/2025

## 2025-06-21 DIAGNOSIS — E78.2 MIXED HYPERLIPIDEMIA: ICD-10-CM

## 2025-06-22 RX ORDER — ATORVASTATIN CALCIUM 40 MG/1
40 TABLET, FILM COATED ORAL DAILY
Qty: 90 TABLET | Refills: 3 | Status: SHIPPED | OUTPATIENT
Start: 2025-06-22

## 2025-06-24 NOTE — TELEPHONE ENCOUNTER
PRIOR AUTHORIZATION DENIED    Medication: TAZAROTENE 0.05 % EX CREA  Insurance Company: [employer] - Phone [phone] Fax [phone]  Denial Date: 6/21/2025  Denial Reason(s):     Appeal Information:     Patient Notified: No, care team must notify patient so an informed decision can be made. This is out of the scope of practice for the PA Team.

## 2025-07-01 ENCOUNTER — TELEPHONE (OUTPATIENT)
Dept: FAMILY MEDICINE | Facility: CLINIC | Age: 75
End: 2025-07-01
Payer: COMMERCIAL

## 2025-07-01 NOTE — TELEPHONE ENCOUNTER
Patient Quality Outreach    Patient is due for the following:   Physical Annual Wellness Visit      Topic Date Due    Diptheria Tetanus Pertussis (DTAP/TDAP/TD) Vaccine (2 - Td or Tdap) 11/23/2023    COVID-19 Vaccine (5 - 2024-25 season) 04/14/2025       Action(s) Taken:   Patient was scheduled for AWV 7/7/2025    Type of outreach:    Phone, spoke to patient/parent. PATIENT    Questions for provider review:    None         Lynda De Guzman MA  Chart routed to None.

## 2025-07-01 NOTE — TELEPHONE ENCOUNTER
Called and left voicemail for patient to call liaison back or reply to email with proof of income.

## 2025-07-06 SDOH — HEALTH STABILITY: PHYSICAL HEALTH: ON AVERAGE, HOW MANY MINUTES DO YOU ENGAGE IN EXERCISE AT THIS LEVEL?: 50 MIN

## 2025-07-06 SDOH — HEALTH STABILITY: PHYSICAL HEALTH: ON AVERAGE, HOW MANY DAYS PER WEEK DO YOU ENGAGE IN MODERATE TO STRENUOUS EXERCISE (LIKE A BRISK WALK)?: 3 DAYS

## 2025-07-07 ENCOUNTER — OFFICE VISIT (OUTPATIENT)
Dept: FAMILY MEDICINE | Facility: CLINIC | Age: 75
End: 2025-07-07
Payer: COMMERCIAL

## 2025-07-07 VITALS
SYSTOLIC BLOOD PRESSURE: 135 MMHG | RESPIRATION RATE: 16 BRPM | BODY MASS INDEX: 27.2 KG/M2 | OXYGEN SATURATION: 98 % | DIASTOLIC BLOOD PRESSURE: 81 MMHG | TEMPERATURE: 97.4 F | HEART RATE: 65 BPM | WEIGHT: 179.5 LBS | HEIGHT: 68 IN

## 2025-07-07 DIAGNOSIS — Z79.899 HIGH RISK MEDICATION USE: ICD-10-CM

## 2025-07-07 DIAGNOSIS — L40.9 PSORIASIS: ICD-10-CM

## 2025-07-07 DIAGNOSIS — Z00.00 ENCOUNTER FOR MEDICARE ANNUAL WELLNESS EXAM: Primary | ICD-10-CM

## 2025-07-07 DIAGNOSIS — R26.89 IMBALANCE: ICD-10-CM

## 2025-07-07 DIAGNOSIS — Z51.81 MEDICATION MONITORING ENCOUNTER: ICD-10-CM

## 2025-07-07 DIAGNOSIS — N40.0 BENIGN PROSTATIC HYPERPLASIA, UNSPECIFIED WHETHER LOWER URINARY TRACT SYMPTOMS PRESENT: ICD-10-CM

## 2025-07-07 DIAGNOSIS — E78.5 HYPERLIPIDEMIA LDL GOAL <100: ICD-10-CM

## 2025-07-07 DIAGNOSIS — L40.50 PSORIATIC ARTHRITIS (H): ICD-10-CM

## 2025-07-07 LAB
ANION GAP SERPL CALCULATED.3IONS-SCNC: 11 MMOL/L (ref 7–15)
BUN SERPL-MCNC: 12.6 MG/DL (ref 8–23)
CALCIUM SERPL-MCNC: 9.2 MG/DL (ref 8.8–10.4)
CHLORIDE SERPL-SCNC: 106 MMOL/L (ref 98–107)
CHOLEST SERPL-MCNC: 169 MG/DL
CREAT SERPL-MCNC: 1.01 MG/DL (ref 0.67–1.17)
EGFRCR SERPLBLD CKD-EPI 2021: 77 ML/MIN/1.73M2
FASTING STATUS PATIENT QL REPORTED: YES
FASTING STATUS PATIENT QL REPORTED: YES
GLUCOSE SERPL-MCNC: 103 MG/DL (ref 70–99)
HCO3 SERPL-SCNC: 24 MMOL/L (ref 22–29)
HDLC SERPL-MCNC: 37 MG/DL
LDLC SERPL CALC-MCNC: 104 MG/DL
NONHDLC SERPL-MCNC: 132 MG/DL
POTASSIUM SERPL-SCNC: 4 MMOL/L (ref 3.4–5.3)
SODIUM SERPL-SCNC: 141 MMOL/L (ref 135–145)
TRIGL SERPL-MCNC: 138 MG/DL

## 2025-07-07 PROCEDURE — 90480 ADMN SARSCOV2 VAC 1/ONLY CMP: CPT | Performed by: FAMILY MEDICINE

## 2025-07-07 PROCEDURE — 3079F DIAST BP 80-89 MM HG: CPT | Performed by: FAMILY MEDICINE

## 2025-07-07 PROCEDURE — 36415 COLL VENOUS BLD VENIPUNCTURE: CPT | Performed by: FAMILY MEDICINE

## 2025-07-07 PROCEDURE — G0439 PPPS, SUBSEQ VISIT: HCPCS | Performed by: FAMILY MEDICINE

## 2025-07-07 PROCEDURE — 80048 BASIC METABOLIC PNL TOTAL CA: CPT | Performed by: FAMILY MEDICINE

## 2025-07-07 PROCEDURE — 3075F SYST BP GE 130 - 139MM HG: CPT | Performed by: FAMILY MEDICINE

## 2025-07-07 PROCEDURE — 80061 LIPID PANEL: CPT | Performed by: FAMILY MEDICINE

## 2025-07-07 PROCEDURE — 91320 SARSCV2 VAC 30MCG TRS-SUC IM: CPT | Performed by: FAMILY MEDICINE

## 2025-07-07 NOTE — PROGRESS NOTES
"Preventive Care Visit  [facility] EZRA Perez MD, Family Medicine  Jul 7, 2025      Assessment & Plan     Encounter for Medicare annual wellness exam    Medication monitoring encounter  - BASIC METABOLIC PANEL  - BASIC METABOLIC PANEL    Psoriatic arthritis (H)/psoriasis: follows with rheumatology and dermatology- on rinvoq.   - Lipid panel reflex to direct LDL Fasting    Hyperlipidemia LDL goal <100: continue statin    Benign prostatic hyperplasia, unspecified whether lower urinary tract symptoms present: continue tamsulosin.     High risk medication use  - Lipid panel reflex to direct LDL Fasting    Imbalance: short episodes of imbalance while standing and singing that self-resolve-situational, and do not occur at any other time. Non-exertional.  No associated symptoms. Based on history, does not seem consistent with pre-syncopal or cardiac in nature. I offered imaging, however, based on the situational nature of these brief episodes, we agreed to proceed with lab work, and trialing compression stockings and modifications while singing (having chair to balance on). If symptoms worsen or become more recurrent, would plan for imaging.        BMI  Estimated body mass index is 27.29 kg/m  as calculated from the following:    Height as of this encounter: 1.727 m (5' 8\").    Weight as of this encounter: 81.4 kg (179 lb 8 oz).     Reviewed preventive health counseling, as reflected in patient instructions  Counseling  Appropriate preventive services were addressed with this patient via screening, questionnaire, or discussion as appropriate for fall prevention, nutrition, physical activity, Tobacco-use cessation, social engagement, weight loss and cognition.  Checklist reviewing preventive services available has been given to the patient.  Reviewed patient's diet, addressing concerns and/or questions.   He is at risk for lack of exercise and has been provided with information to increase " physical activity for the benefit of his well-being.   The patient was instructed to see the dentist every 6 months.   Discussed possible causes of fatigue. The patient was provided with written information regarding signs of hearing loss.   Information on urinary incontinence and treatment options given to patientAbhishek Hunter is a 75 year old, presenting for the following:  Annual Visit (AWV)        7/7/2025     8:48 AM   Additional Questions   Roomed by Snehal Gusman CMA   Accompanied by self          HPI    Short episodes of feeling like his feet are rounded, imbalanced, when he starts singing   No symptoms when he gets up from standing, but symptoms start when he sings, then self-resolves after a minute and does not recur  Only occurs when he is singing and standing- he does not have any other symptoms  Non-exertional. He bikes and denies any dyspnea or chest pain.  No leg pain or swelling    Advance Care Planning    Not on file        7/6/2025   General Health   How would you rate your overall physical health? Good   Feel stress (tense, anxious, or unable to sleep) Only a little   (!) STRESS CONCERN      7/6/2025   Nutrition   Diet: Regular (no restrictions)         7/6/2025   Exercise   Days per week of moderate/strenous exercise 3 days   Average minutes spent exercising at this level 50 min         7/6/2025   Social Factors   Worry food won't last until get money to buy more No   Food not last or not have enough money for food? No   Do you have housing? (Housing is defined as stable permanent housing and does not include staying outside in a car, in a tent, in an abandoned building, in an overnight shelter, or couch-surfing.) Yes   Are you worried about losing your housing? No   Lack of transportation? No   Unable to get utilities (heat,electricity)? No         7/7/2025   Fall Risk   Gait Speed Test (Document in seconds) 3   Gait Speed Test Interpretation Less than or equal to 5.00  seconds - PASS          2025   Activities of Daily Living- Home Safety   Needs help with the following daily activites None of the above   Safety concerns in the home None of the above         2025   Dental   Dentist two times every year? (!) NO         2025   Hearing Screening   Hearing concerns? (!) TROUBLE UNDESTANDING A SPEAKER IN A PUBLIC MEETING OR [employer] SERVICE.   Would you like a referral for hearing testing? No         2025   Driving Risk Screening   Patient/family members have concerns about driving No         2025   General Alertness/Fatigue Screening   Have you been more tired than usual lately? (!) YES         2025   Urinary Incontinence Screening   Bothered by leaking urine in past 6 months Yes         Today's PHQ-2 Score:       2025     5:23 PM   PHQ-2 (  [employer])   Q1: Little interest or pleasure in doing things 1   Q2: Feeling down, depressed or hopeless 0   PHQ-2 Score 1    Q1: Little interest or pleasure in doing things Several days   Q2: Feeling down, depressed or hopeless Not at all   PHQ-2 Score 1       Patient-reported           2025   Substance Use   Alcohol more than 3/day or more than 7/wk No   Do you have a current opioid prescription? No   How severe/bad is pain from 1 to 10? 2/10   Do you use any other substances recreationally? No     Social History     Tobacco Use    Smoking status: Former     Current packs/day: 0.00     Average packs/day: 1 pack/day for 10.0 years (10.0 ttl pk-yrs)     Types: Cigarettes, Pipe     Start date: 1966     Quit date: 1976     Years since quittin.2     Passive exposure: Never    Smokeless tobacco: Never   Substance Use Topics    Alcohol use: Yes     Comment: every night -     Drug use: No       ASCVD Risk   The 10-year ASCVD risk score (Kathleen MCKEON, et al., 2019) is: 35.4%    Values used to calculate the score:      Age: 75 years      Sex: Male      Is Non- : No       "Diabetic: No      Tobacco smoker: No      Systolic Blood Pressure: 135 mmHg      Is BP treated: Yes      HDL Cholesterol: 37 mg/dL      Total Cholesterol: 166 mg/dL            Reviewed and updated as needed this visit by Provider                      Current providers sharing in care for this patient include:  Patient Care Team:  Romina Perez MD as PCP - General (Family Medicine)  Mili Enciso [facility] as Pharmacist (Pharmacist Ambulatory Care)  Romina Perez MD as Assigned PCP  Karma Richardson PA-C as Assigned Rheumatology Provider  Car Browning MD as MD (Dermapathology)  Walter Lawrence MD as Assigned Dermatology Provider    The following health maintenance items are reviewed in Epic and correct as of today:  Health Maintenance   Topic Date Due    RSV VACCINE (1 - 1-dose 75+ series) Never done    BMP  10/11/2023    DTAP/TDAP/TD VACCINE (2 - Td or Tdap) 11/23/2023    INFLUENZA VACCINE (1) 09/01/2025    COVID-19 VACCINE (6 - 2024-25 season) 09/01/2025    LIPID  12/10/2025    ADVANCE CARE PLANNING  06/01/2026    MEDICARE ANNUAL WELLNESS VISIT  07/07/2026    FALL RISK ASSESSMENT  07/07/2026    DIABETES SCREENING  12/10/2027    HEPATITIS C SCREENING  Completed    PHQ-2 (once per calendar year)  Completed    PNEUMOCOCCAL VACCINE 50+ YEARS  Completed    ZOSTER VACCINE  Completed    HPV VACCINE  Aged Out    MENINGITIS VACCINE  Aged Out    COLORECTAL CANCER SCREENING  Discontinued            Objective    Exam  /81 (BP Location: Left arm, Patient Position: Sitting, Cuff Size: Adult Regular)   Pulse 65   Temp 97.4  F (36.3  C) (Temporal)   Resp 16   Ht 1.727 m (5' 8\")   Wt 81.4 kg (179 lb 8 oz)   SpO2 98%   BMI 27.29 kg/m     Estimated body mass index is 27.29 kg/m  as calculated from the following:    Height as of this encounter: 1.727 m (5' 8\").    Weight as of this encounter: 81.4 kg (179 lb 8 oz).    Physical Exam  GENERAL: alert and no distress  EYES: Eyes grossly normal to " inspection, PERRL and conjunctivae and sclerae normal  HENT: ear canals and TM's normal, nose and mouth without ulcers or lesions  NECK: no adenopathy, no asymmetry, masses, or scars, no carotid bruits  RESP: lungs clear to auscultation - no rales, rhonchi or wheezes  CV: regular rate and rhythm, normal S1 S2, no S3 or S4, no murmur, click or rub, no peripheral edema  MS: no gross musculoskeletal defects noted, no edema  NEURO: Normal strength and tone, mentation intact and speech normal  PSYCH: mentation appears normal, affect normal/bright  Gait and balance assessed per Gait Speed Test.  Result as above.        7/7/2025   Mini Cog   Clock Draw Score 2 Normal   3 Item Recall 3 objects recalled   Mini Cog Total Score 5              Signed Electronically by: Romina Perez MD

## 2025-07-07 NOTE — PATIENT INSTRUCTIONS
Consider RSV and Tdap vaccine at pharmacy    Try compression stockings    Patient Education   Preventive Care Advice   This is general advice given by our system to help you stay healthy. However, your care team may have specific advice just for you. Please talk to your care team about your preventive care needs.  Nutrition  Eat 5 or more servings of fruits and vegetables each day.  Try wheat bread, brown rice and whole grain pasta (instead of white bread, rice, and pasta).  Get enough calcium and vitamin D. Check the label on foods and aim for 100% of the RDA (recommended daily allowance).  Lifestyle  Exercise at least 150 minutes each week  (30 minutes a day, 5 days a week).  Do muscle strengthening activities 2 days a week. These help control your weight and prevent disease.  No smoking.  Wear sunscreen to prevent skin cancer.  Have a dental exam and cleaning every 6 months.  Yearly exams  See your health care team every year to talk about:  Any changes in your health.  Any medicines your care team has prescribed.  Preventive care, family planning, and ways to prevent chronic diseases.  Shots (vaccines)   HPV shots (up to age 26), if you've never had them before.  Hepatitis B shots (up to age 59), if you've never had them before.  COVID-19 shot: Get this shot when it's due.  Flu shot: Get a flu shot every year.  Tetanus shot: Get a tetanus shot every 10 years.  Pneumococcal, hepatitis A, and RSV shots: Ask your care team if you need these based on your risk.  Shingles shot (for age 50 and up)  General health tests  Diabetes screening:  Starting at age 35, Get screened for diabetes at least every 3 years.  If you are younger than age 35, ask your care team if you should be screened for diabetes.  Cholesterol test: At age 39, start having a cholesterol test every 5 years, or more often if advised.  Bone density scan (DEXA): At age 50, ask your care team if you should have this scan for osteoporosis (brittle  bones).  Hepatitis C: Get tested at least once in your life.  STIs (sexually transmitted infections)  Before age 24: Ask your care team if you should be screened for STIs.  After age 24: Get screened for STIs if you're at risk. You are at risk for STIs (including HIV) if:  You are sexually active with more than one person.  You don't use condoms every time.  You or a partner was diagnosed with a sexually transmitted infection.  If you are at risk for HIV, ask about PrEP medicine to prevent HIV.  Get tested for HIV at least once in your life, whether you are at risk for HIV or not.  Cancer screening tests  Cervical cancer screening: If you have a cervix, begin getting regular cervical cancer screening tests starting at age 21.  Breast cancer scan (mammogram): If you've ever had breasts, begin having regular mammograms starting at age 40. This is a scan to check for breast cancer.  Colon cancer screening: It is important to start screening for colon cancer at age 45.  Have a colonoscopy test every 10 years (or more often if you're at risk) Or, ask your provider about stool tests like a FIT test every year or Cologuard test every 3 years.  To learn more about your testing options, visit:   .  For help making a decision, visit:   https://bit.ly/ml76322.  Prostate cancer screening test: If you have a prostate, ask your care team if a prostate cancer screening test (PSA) at age 55 is right for you.  Lung cancer screening: If you are a current or former smoker ages 50 to 80, ask your care team if ongoing lung cancer screenings are right for you.  For informational purposes only. Not to replace the advice of your health care provider. Copyright   2023 [employer]. All rights reserved. Clinically reviewed by the [facility]. howsimple 210531 - REV 01/24.  Preventing Falls: Care Instructions  Injuries and health problems such as trouble walking or poor eyesight can increase your risk of  falling. So can some medicines. But there are things you can do to help prevent falls. You can exercise to get stronger. You can also arrange your home to make it safer.    Talk to your doctor about the medicines you take. Ask if any of them increase the risk of falls and whether they can be changed or stopped.   Try to exercise regularly. It can help improve your strength and balance. This can help lower your risk of falling.         Practice fall safety and prevention.   Wear low-heeled shoes that fit well and give your feet good support. Talk to your doctor if you have foot problems that make this hard.  Carry a cellphone or wear a medical alert device that you can use to call for help.  Use stepladders instead of chairs to reach high objects. Don't climb if you're at risk for falls. Ask for help, if needed.  Wear the correct eyeglasses, if you need them.        Make your home safer.   Remove rugs, cords, clutter, and furniture from walkways.  Keep your house well lit. Use night-lights in hallways and bathrooms.  Install and use sturdy handrails on stairways.  Wear nonskid footwear, even inside. Don't walk barefoot or in socks without shoes.        Be safe outside.   Use handrails, curb cuts, and ramps whenever possible.  Keep your hands free by using a shoulder bag or backpack.  Try to walk in well-lit areas. Watch out for uneven ground, changes in pavement, and debris.  Be careful in the winter. Walk on the grass or gravel when sidewalks are slippery. Use de-icer on steps and walkways. Add non-slip devices to shoes.    Put grab bars and nonskid mats in your shower or tub and near the toilet. Try to use a shower chair or bath bench when bathing.   Get into a tub or shower by putting in your weaker leg first. Get out with your strong side first. Have a phone or medical alert device in the bathroom with you.   Where can you learn more?  Go to https://www.[employer].net/patiented  Enter G117 in the search box to  "learn more about \"Preventing Falls: Care Instructions.\"  Current as of: July 31, 2024  Content Version: 14.5    8108-5818 [employer].   Care instructions adapted under license by your healthcare professional. If you have questions about a medical condition or this instruction, always ask your healthcare professional. [employer] disclaims any warranty or liability for your use of this information.    Hearing Loss: Care Instructions  Overview     Hearing loss is a sudden or slow decrease in how well you hear. It can range from slight to profound. Permanent hearing loss can occur with aging. It also can happen when you are exposed long-term to loud noise. Examples include listening to loud music, riding motorcycles, or being around other loud machines.  Hearing loss can affect your work and home life. It can make you feel lonely or depressed. You may feel that you have lost your independence. But hearing aids and other devices can help you hear better and feel connected to others.  Follow-up care is a key part of your treatment and safety. Be sure to make and go to all appointments, and call your doctor if you are having problems. It's also a good idea to know your test results and keep a list of the medicines you take.  How can you care for yourself at home?  Avoid loud noises whenever possible. This helps keep your hearing from getting worse.  Always wear hearing protection around loud noises.  Wear a hearing aid as directed.  A professional can help you pick a hearing aid that will work best for you.  You can also get hearing aids over the counter for mild to moderate hearing loss.  Have hearing tests as your doctor suggests. They can show whether your hearing has changed. Your hearing aid may need to be adjusted.  Use other devices as needed. These may include:  Telephone amplifiers and hearing aids that can connect to a television, stereo, radio, or microphone.  Devices that use lights or " "vibrations. These alert you to the doorbell, a ringing telephone, or a baby monitor.  Television closed-captioning. This shows the words at the bottom of the screen. Most new TVs can do this.  TTY (text telephone). This lets you type messages back and forth on the telephone instead of talking or listening. These devices are also called TDD. When messages are typed on the keyboard, they are sent over the phone line to a receiving TTY. The message is shown on a monitor.  Use text messaging, social media, and email if it is hard for you to communicate by telephone.  Try to learn a listening technique called speechreading. It is not lipreading. You pay attention to people's gestures, expressions, posture, and tone of voice. These clues can help you understand what a person is saying. Face the person you are talking to, and have them face you. Make sure the lighting is good. You need to see the other person's face clearly.  Think about counseling if you need help to adjust to your hearing loss.  When should you call for help?  Watch closely for changes in your health, and be sure to contact your doctor if:    You think your hearing is getting worse.     You have new symptoms, such as dizziness or nausea.   Where can you learn more?  Go to https://www.[employer].net/patiented  Enter R798 in the search box to learn more about \"Hearing Loss: Care Instructions.\"  Current as of: October 27, 2024  Content Version: 14.5 2024-2025 [employer].   Care instructions adapted under license by your healthcare professional. If you have questions about a medical condition or this instruction, always ask your healthcare professional. [employer] disclaims any warranty or liability for your use of this information.    Learning About Sleeping Well  What does sleeping well mean?     Sleeping well means getting enough sleep to feel good and stay healthy. How much sleep is enough varies among people.  The number of " hours you sleep and how you feel when you wake up are both important. If you do not feel refreshed, you probably need more sleep. Another sign of not getting enough sleep is feeling tired during the day.  Experts recommend that adults get at least 7 or more hours of sleep per day. Children and older adults need more sleep.  Why is getting enough sleep important?  Getting enough quality sleep is a basic part of good health. When your sleep suffers, your physical health, mood, and your thoughts can suffer too. You may find yourself feeling more grumpy or stressed. Not getting enough sleep also can lead to serious problems, including injury, accidents, anxiety, and depression.  What might cause poor sleeping?  Many things can cause sleep problems, including:  Changes to your sleep schedule.  Stress. Stress can be caused by fear about a single event, such as giving a speech. Or you may have ongoing stress, such as worry about work or school.  Depression, anxiety, and other mental or emotional conditions.  Changes in your sleep habits or surroundings. This includes changes that happen where you sleep, such as noise, light, or sleeping in a different bed. It also includes changes in your sleep pattern, such as having jet lag or working a late shift.  Health problems, such as pain, breathing problems, and restless legs syndrome.  Lack of regular exercise.  Using alcohol, nicotine, or caffeine before bed.  How can you help yourself?  Here are some tips that may help you sleep more soundly and wake up feeling more refreshed.  Your sleeping area   Use your bedroom only for sleeping and sex. A bit of light reading may help you fall asleep. But if it doesn't, do your reading elsewhere in the house. Try not to use your TV, computer, smartphone, or tablet while you are in bed.  Be sure your bed is big enough to stretch out comfortably, especially if you have a sleep partner.  Keep your bedroom quiet, dark, and cool. Use curtains,  "blinds, or a sleep mask to block out light. To block out noise, use earplugs, soothing music, or a \"white noise\" machine.  Your evening and bedtime routine   Create a relaxing bedtime routine. You might want to take a warm shower or bath, or listen to soothing music.  Go to bed at the same time every night. And get up at the same time every morning, even if you feel tired.  What to avoid   Limit caffeine (coffee, tea, caffeinated sodas) during the day, and don't have any for at least 6 hours before bedtime.  Avoid drinking alcohol before bedtime. Alcohol can cause you to wake up more often during the night.  Try not to smoke or use tobacco, especially in the evening. Nicotine can keep you awake.  Limit naps during the day, especially close to bedtime.  Avoid lying in bed awake for too long. If you can't fall asleep or if you wake up in the middle of the night and can't get back to sleep within about 20 minutes, get out of bed and go to another room until you feel sleepy.  Avoid taking medicine right before bed that may keep you awake or make you feel hyper or energized. Your doctor can tell you if your medicine may do this and if you can take it earlier in the day.  If you can't sleep   Imagine yourself in a peaceful, pleasant scene. Focus on the details and feelings of being in a place that is relaxing.  Get up and do a quiet or boring activity until you feel sleepy.  Avoid drinking any liquids before going to bed to help prevent waking up often to use the bathroom.  Where can you learn more?  Go to https://www.[employer].net/patiented  Enter J942 in the search box to learn more about \"Learning About Sleeping Well.\"  Current as of: July 31, 2024  Content Version: 14.5 2024-2025 [employer].   Care instructions adapted under license by your healthcare professional. If you have questions about a medical condition or this instruction, always ask your healthcare professional. [employer] " disclaims any warranty or liability for your use of this information.    Bladder Training: Care Instructions  Your Care Instructions     Bladder training is used to treat urge incontinence and stress incontinence. Urge incontinence means that the need to urinate comes on so fast that you can't get to a toilet in time. Stress incontinence means that you leak urine because of pressure on your bladder. For example, it may happen when you laugh, cough, or lift something heavy.  Bladder training can increase how long you can wait before you have to urinate. It can also help your bladder hold more urine. And it can give you better control over the urge to urinate.  It is important to remember that bladder training takes a few weeks to a few months to make a difference. You may not see results right away, but don't give up.  Follow-up care is a key part of your treatment and safety. Be sure to make and go to all appointments, and call your doctor if you are having problems. It's also a good idea to know your test results and keep a list of the medicines you take.  How can you care for yourself at home?  Work with your doctor to come up with a bladder training program that is right for you. You may use one or more of the following methods.  Delayed urination  In the beginning, try to keep from urinating for 5 minutes after you first feel the need to go.  While you wait, take deep, slow breaths to relax. Kegel exercises can also help you delay the need to go to the bathroom.  After some practice, when you can easily wait 5 minutes to urinate, try to wait 10 minutes before you urinate.  Slowly increase the waiting period until you are able to control when you have to urinate.  Scheduled urination  Empty your bladder when you first wake up in the morning.  Schedule times throughout the day when you will urinate.  Start by going to the bathroom every hour, even if you don't need to go.  Slowly increase the time between trips to  "the bathroom.  When you have found a schedule that works well for you, keep doing it.  If you wake up during the night and have to urinate, do it. Apply your schedule to waking hours only.  Kegel exercises  These tighten and strengthen pelvic muscles, which can help you control the flow of urine. (If doing these exercises causes pain, stop doing them and talk with your doctor.) To do Kegel exercises:  Squeeze your muscles as if you were trying not to pass gas. Or squeeze your muscles as if you were stopping the flow of urine. Your belly, legs, and buttocks shouldn't move.  Hold the squeeze for 3 seconds, then relax for 5 to 10 seconds.  Start with 3 seconds, then add 1 second each week until you are able to squeeze for 10 seconds.  Repeat the exercise 10 times a session. Do 3 to 8 sessions a day.  When should you call for help?  Watch closely for changes in your health, and be sure to contact your doctor if:    Your incontinence is getting worse.     You do not get better as expected.   Where can you learn more?  Go to https://www.[employer].net/patiented  Enter V684 in the search box to learn more about \"Bladder Training: Care Instructions.\"  Current as of: April 30, 2024  Content Version: 14.5 2024-2025 [employer].   Care instructions adapted under license by your healthcare professional. If you have questions about a medical condition or this instruction, always ask your healthcare professional. [employer] disclaims any warranty or liability for your use of this information.       "

## 2025-07-22 NOTE — TELEPHONE ENCOUNTER
Sent another [employer] message to pt regarding this    MARGOT Davidson, [facility]  Specialty Pharmacy Clinic Liaison     [facility]    [email or web address]     Phone: [phone]  Fax: [phone]

## 2025-08-19 ENCOUNTER — OFFICE VISIT (OUTPATIENT)
Dept: DERMATOLOGY | Facility: CLINIC | Age: 75
End: 2025-08-19
Payer: COMMERCIAL

## 2025-08-19 DIAGNOSIS — L40.9 PSORIASIS: ICD-10-CM

## 2025-08-19 PROCEDURE — 99214 OFFICE O/P EST MOD 30 MIN: CPT | Mod: GC | Performed by: STUDENT IN AN ORGANIZED HEALTH CARE EDUCATION/TRAINING PROGRAM

## 2025-08-19 PROCEDURE — 1126F AMNT PAIN NOTED NONE PRSNT: CPT | Performed by: STUDENT IN AN ORGANIZED HEALTH CARE EDUCATION/TRAINING PROGRAM

## 2025-08-19 PROCEDURE — G2211 COMPLEX E/M VISIT ADD ON: HCPCS | Mod: GC | Performed by: STUDENT IN AN ORGANIZED HEALTH CARE EDUCATION/TRAINING PROGRAM

## 2025-08-19 RX ORDER — CLOBETASOL PROPIONATE 0.5 MG/G
CREAM TOPICAL
Qty: 30 G | Refills: 3 | Status: SHIPPED | OUTPATIENT
Start: 2025-08-19

## 2025-08-19 ASSESSMENT — PAIN SCALES - GENERAL: PAINLEVEL_OUTOF10: NO PAIN (0)

## (undated) DEVICE — ESU BIPOLAR SEALER AQUAMANTYS 6MM 23-112-1

## (undated) DEVICE — SYR 50ML LL W/O NDL 309653

## (undated) DEVICE — LINEN TOWEL PACK X5 5464

## (undated) DEVICE — SOLUTION WOUND CLEANSING 3/4OZ 10% PVP EA-L3011FB-50

## (undated) DEVICE — GLOVE PROTEXIS MICRO 6.5  2D73PM65

## (undated) DEVICE — NDL 21GA 1.5"

## (undated) DEVICE — ESU GROUND PAD UNIVERSAL W/O CORD

## (undated) DEVICE — Device

## (undated) DEVICE — HOOD FLYTE W/PEELAWAY 408-800-100

## (undated) DEVICE — SU MONOCRYL 3-0 PS-2 27" Y427H

## (undated) DEVICE — SU STRATAFIX PDS PLUS 1 CT-1 18" SXPP1A404

## (undated) DEVICE — SOL NACL 0.9% INJ 1000ML BAG 2B1324X

## (undated) DEVICE — PREP CHLORAPREP 26ML TINTED ORANGE  260815

## (undated) DEVICE — GLOVE PROTEXIS POWDER FREE 8.5 ORTHOPEDIC 2D73ET85

## (undated) DEVICE — SU STRATAFIX PDS PLUS 2-0 SPIRAL CT-1 30CM SXPP1B410

## (undated) DEVICE — BLADE SAW RECIP STRK 70X12.5X1.2MM 0277-096-281

## (undated) DEVICE — SU ETHIBOND 0 CTX CR  8X18" CX31D

## (undated) DEVICE — SOL NACL 0.9% IRRIG 1000ML BOTTLE 2F7124

## (undated) DEVICE — PACK TOTAL KNEE SOP15TKFSD

## (undated) DEVICE — CLOSURE SYS SKIN PREMIERPRO EXOFIN FUSION 4X22CM STRL 3472

## (undated) DEVICE — DRAPE IOBAN INCISE 23X17" 6650EZ

## (undated) DEVICE — GLOVE PROTEXIS W/NEU-THERA 8.0  2D73TE80

## (undated) DEVICE — MANIFOLD NEPTUNE 4 PORT 700-20

## (undated) DEVICE — SU VICRYL 2-0 CP-1 27" UND J266H

## (undated) DEVICE — GLOVE PROTEXIS W/NEU-THERA 8.5  2D73TE85

## (undated) DEVICE — SOL WATER IRRIG 1000ML BOTTLE 2F7114

## (undated) DEVICE — DRSG ABDOMINAL 07 1/2X8" 7197D

## (undated) DEVICE — GLOVE PROTEXIS W/NEU-THERA 6.5  2D73TE65

## (undated) DEVICE — BONE CLEANING TIP INTERPULSE  0210-010-000

## (undated) DEVICE — SU VICRYL 0 CTX CR 8X18" J764D

## (undated) DEVICE — SOL NACL 0.9% INJ 250ML BAG 2B1322Q

## (undated) DEVICE — SUCTION IRR SYSTEM W/O TIP INTERPULSE HANDPIECE 0210-100-000

## (undated) DEVICE — WRAP EZY KNEE

## (undated) DEVICE — BLADE SAW SAGITTAL STRK 18X90X1.27MM HD SYS 6 6118-127-090

## (undated) DEVICE — BLADE CLIPPER 4412A

## (undated) DEVICE — GLOVE PROTEXIS POWDER FREE 7.5 ORTHOPEDIC 2D73ET75

## (undated) DEVICE — DRAPE STERI U 1015

## (undated) DEVICE — BONE CEMENT MIXEVAC III HI VAC KIT  0206-015-000

## (undated) DEVICE — GLOVE PROTEXIS BLUE W/NEU-THERA 7.0  2D73EB70

## (undated) DEVICE — DRAPE SHEET REV FOLD 3/4 9349

## (undated) DEVICE — GLOVE PROTEXIS BLUE W/NEU-THERA 8.0  2D73EB80

## (undated) RX ORDER — CEFAZOLIN SODIUM 2 G/100ML
INJECTION, SOLUTION INTRAVENOUS
Status: DISPENSED
Start: 2021-03-04

## (undated) RX ORDER — FENTANYL CITRATE 50 UG/ML
INJECTION, SOLUTION INTRAMUSCULAR; INTRAVENOUS
Status: DISPENSED
Start: 2021-06-03

## (undated) RX ORDER — ONDANSETRON 2 MG/ML
INJECTION INTRAMUSCULAR; INTRAVENOUS
Status: DISPENSED
Start: 2021-06-03

## (undated) RX ORDER — DEXAMETHASONE SODIUM PHOSPHATE 4 MG/ML
INJECTION, SOLUTION INTRA-ARTICULAR; INTRALESIONAL; INTRAMUSCULAR; INTRAVENOUS; SOFT TISSUE
Status: DISPENSED
Start: 2021-03-04

## (undated) RX ORDER — PROPOFOL 10 MG/ML
INJECTION, EMULSION INTRAVENOUS
Status: DISPENSED
Start: 2021-03-04

## (undated) RX ORDER — TRANEXAMIC ACID 650 MG/1
TABLET ORAL
Status: DISPENSED
Start: 2021-06-03

## (undated) RX ORDER — VANCOMYCIN HYDROCHLORIDE 1 G/20ML
INJECTION, POWDER, LYOPHILIZED, FOR SOLUTION INTRAVENOUS
Status: DISPENSED
Start: 2021-06-03

## (undated) RX ORDER — CELECOXIB 200 MG/1
CAPSULE ORAL
Status: DISPENSED
Start: 2021-06-03

## (undated) RX ORDER — PROPOFOL 10 MG/ML
INJECTION, EMULSION INTRAVENOUS
Status: DISPENSED
Start: 2021-06-03

## (undated) RX ORDER — CELECOXIB 200 MG/1
CAPSULE ORAL
Status: DISPENSED
Start: 2021-03-04

## (undated) RX ORDER — FENTANYL CITRATE 50 UG/ML
INJECTION, SOLUTION INTRAMUSCULAR; INTRAVENOUS
Status: DISPENSED
Start: 2021-03-04

## (undated) RX ORDER — CEFAZOLIN SODIUM 2 G/100ML
INJECTION, SOLUTION INTRAVENOUS
Status: DISPENSED
Start: 2021-06-03

## (undated) RX ORDER — DEXAMETHASONE SODIUM PHOSPHATE 4 MG/ML
INJECTION, SOLUTION INTRA-ARTICULAR; INTRALESIONAL; INTRAMUSCULAR; INTRAVENOUS; SOFT TISSUE
Status: DISPENSED
Start: 2021-06-03

## (undated) RX ORDER — LIDOCAINE HYDROCHLORIDE 20 MG/ML
INJECTION, SOLUTION EPIDURAL; INFILTRATION; INTRACAUDAL; PERINEURAL
Status: DISPENSED
Start: 2021-03-04

## (undated) RX ORDER — ONDANSETRON 2 MG/ML
INJECTION INTRAMUSCULAR; INTRAVENOUS
Status: DISPENSED
Start: 2021-03-04

## (undated) RX ORDER — TRANEXAMIC ACID 650 MG/1
TABLET ORAL
Status: DISPENSED
Start: 2021-03-04

## (undated) RX ORDER — PREGABALIN 150 MG/1
CAPSULE ORAL
Status: DISPENSED
Start: 2021-03-04

## (undated) RX ORDER — LIDOCAINE HYDROCHLORIDE 20 MG/ML
INJECTION, SOLUTION EPIDURAL; INFILTRATION; INTRACAUDAL; PERINEURAL
Status: DISPENSED
Start: 2021-06-03

## (undated) RX ORDER — LIDOCAINE HYDROCHLORIDE 10 MG/ML
INJECTION, SOLUTION EPIDURAL; INFILTRATION; INTRACAUDAL; PERINEURAL
Status: DISPENSED
Start: 2019-04-25

## (undated) RX ORDER — VANCOMYCIN HYDROCHLORIDE 1 G/20ML
INJECTION, POWDER, LYOPHILIZED, FOR SOLUTION INTRAVENOUS
Status: DISPENSED
Start: 2021-03-04

## (undated) RX ORDER — PREGABALIN 150 MG/1
CAPSULE ORAL
Status: DISPENSED
Start: 2021-06-03

## (undated) RX ORDER — DEXAMETHASONE SODIUM PHOSPHATE 10 MG/ML
INJECTION, SOLUTION INTRAMUSCULAR; INTRAVENOUS
Status: DISPENSED
Start: 2019-04-25